# Patient Record
Sex: MALE | Race: BLACK OR AFRICAN AMERICAN | Employment: FULL TIME | RURAL
[De-identification: names, ages, dates, MRNs, and addresses within clinical notes are randomized per-mention and may not be internally consistent; named-entity substitution may affect disease eponyms.]

---

## 2021-10-14 LAB — COLONOSCOPY, EXTERNAL: NORMAL

## 2023-11-14 ENCOUNTER — TELEPHONE (OUTPATIENT)
Age: 73
End: 2023-11-14

## 2023-11-14 NOTE — TELEPHONE ENCOUNTER
Pt has diagnosis of congestive heart failure [place] Family is getting him to [facility]. Pt will be seeing Dr. Laura Colón for Cardiology on 11/27. Pt is in need of a primary care doctor. They would like him to see Dr. Bola Corona since he has family that sees her. Pt needs to be seen in the next few weeks. Are you willing to take him as a new patient within the next few weeks?

## 2023-11-16 ENCOUNTER — TELEPHONE (OUTPATIENT)
Age: 73
End: 2023-11-16

## 2023-11-16 NOTE — TELEPHONE ENCOUNTER
Pt. Is at a hospital in [place], Family wants the Pt. To be transported to [address] [place] [address]. In order to make that happen they are asking if Dr. Bianca Rojas, ( Pt. Stefany Rang seen him yet ) can call and speak with the DrMichelle There in [place] so he can confirm that he has an appt. Here. They are going to have him airlifted to [place] with a medical assistant since he needs to be on oxygen for his heart.      Dr. Sukumar Gutierrez - [phone]    [address] [facility] [address] - [phone]  Daughter - Bean Ponce - [phone]

## 2023-11-17 ENCOUNTER — TELEPHONE (OUTPATIENT)
Age: 73
End: 2023-11-17

## 2023-11-17 NOTE — TELEPHONE ENCOUNTER
Evita Andino would like the nurse to call the Dr at [facility] to confirm that the pt has a upcoming appt so the pt can be discharged.     Evita Andino # [phone]

## 2023-11-17 NOTE — TELEPHONE ENCOUNTER
We cannot call and request patient be transferred. We have no history on patient so not sure risks involved with transport, but also cannot call anyone to let them know patient even has an appointment because it would be a HIPAA violation with no PHI form completed since this is not a patient yet. Called Khloe Hoff (Emergency Contact) to let her know all of this. Her VM box is full so unable to leave message. If she calls back, please notify her of my message above. Dr. Pilar Conde is not even in the office today, but either way no one can call the doctor to let him know pt even has appt. I believe it would violate HIPAA and this is not even a patient of ours yet. If the doctor wants to call our office a PSR can confirm patient is scheduled, but that is all we are able to do.

## 2023-11-17 NOTE — TELEPHONE ENCOUNTER
Called Khloe. Mailbox full so unable to leave message. If she calls back, please tell her we cannot call the doctor in [place] or the hospital to have pt transferred or transported or to just say pt is scheduled for an office appt.  The patient would need to be transferred to a hospital here so the calls would need to be between discharging hospital and admitting hospital.

## 2023-11-27 ENCOUNTER — OFFICE VISIT (OUTPATIENT)
Age: 73
End: 2023-11-27
Payer: MEDICARE

## 2023-11-27 VITALS
RESPIRATION RATE: 15 BRPM | WEIGHT: 174 LBS | HEART RATE: 70 BPM | OXYGEN SATURATION: 97 % | SYSTOLIC BLOOD PRESSURE: 100 MMHG | DIASTOLIC BLOOD PRESSURE: 70 MMHG

## 2023-11-27 DIAGNOSIS — T84.59XS INFECTION OF PROSTHETIC KNEE JOINT, SEQUELA: ICD-10-CM

## 2023-11-27 DIAGNOSIS — I49.9 IRREGULAR HEART BEAT: ICD-10-CM

## 2023-11-27 DIAGNOSIS — I48.0 PAF (PAROXYSMAL ATRIAL FIBRILLATION) (HCC): ICD-10-CM

## 2023-11-27 DIAGNOSIS — N18.31 STAGE 3A CHRONIC KIDNEY DISEASE (HCC): ICD-10-CM

## 2023-11-27 DIAGNOSIS — I42.0 DILATED CARDIOMYOPATHY (HCC): Primary | ICD-10-CM

## 2023-11-27 DIAGNOSIS — Z95.810 AICD (AUTOMATIC CARDIOVERTER/DEFIBRILLATOR) PRESENT: ICD-10-CM

## 2023-11-27 DIAGNOSIS — Z96.659 INFECTION OF PROSTHETIC KNEE JOINT, SEQUELA: ICD-10-CM

## 2023-11-27 PROBLEM — N18.9 CKD (CHRONIC KIDNEY DISEASE): Status: ACTIVE | Noted: 2023-11-27

## 2023-11-27 PROBLEM — T84.59XA INFECTED PROSTHETIC KNEE JOINT (HCC): Status: ACTIVE | Noted: 2023-11-27

## 2023-11-27 PROCEDURE — G8421 BMI NOT CALCULATED: HCPCS | Performed by: SPECIALIST

## 2023-11-27 PROCEDURE — 99205 OFFICE O/P NEW HI 60 MIN: CPT | Performed by: SPECIALIST

## 2023-11-27 PROCEDURE — G8484 FLU IMMUNIZE NO ADMIN: HCPCS | Performed by: SPECIALIST

## 2023-11-27 PROCEDURE — 3017F COLORECTAL CA SCREEN DOC REV: CPT | Performed by: SPECIALIST

## 2023-11-27 PROCEDURE — 93010 ELECTROCARDIOGRAM REPORT: CPT | Performed by: SPECIALIST

## 2023-11-27 PROCEDURE — 1123F ACP DISCUSS/DSCN MKR DOCD: CPT | Performed by: SPECIALIST

## 2023-11-27 PROCEDURE — 93005 ELECTROCARDIOGRAM TRACING: CPT | Performed by: SPECIALIST

## 2023-11-27 PROCEDURE — 1036F TOBACCO NON-USER: CPT | Performed by: SPECIALIST

## 2023-11-27 PROCEDURE — G8427 DOCREV CUR MEDS BY ELIG CLIN: HCPCS | Performed by: SPECIALIST

## 2023-11-27 RX ORDER — BUMETANIDE 2 MG/1
2 TABLET ORAL 2 TIMES DAILY
COMMUNITY

## 2023-11-27 RX ORDER — AMIODARONE HYDROCHLORIDE 100 MG/1
100 TABLET ORAL DAILY
Qty: 30 TABLET | Refills: 3 | Status: SHIPPED | OUTPATIENT
Start: 2023-11-27

## 2023-11-27 RX ORDER — SACUBITRIL AND VALSARTAN 24; 26 MG/1; MG/1
1 TABLET, FILM COATED ORAL 2 TIMES DAILY
COMMUNITY

## 2023-11-27 RX ORDER — POTASSIUM CHLORIDE 20 MEQ/1
20 TABLET, EXTENDED RELEASE ORAL DAILY
COMMUNITY

## 2023-11-27 RX ORDER — FUROSEMIDE 40 MG/1
40 TABLET ORAL DAILY
COMMUNITY
End: 2023-11-27 | Stop reason: ALTCHOICE

## 2023-11-27 RX ORDER — RIFAMPIN 300 MG/1
300 CAPSULE ORAL DAILY
COMMUNITY

## 2023-11-27 RX ORDER — AMIODARONE HYDROCHLORIDE 400 MG/1
400 TABLET ORAL DAILY
COMMUNITY
End: 2023-11-27

## 2023-11-27 RX ORDER — AMIODARONE HYDROCHLORIDE 100 MG/1
100 TABLET ORAL DAILY
Qty: 30 TABLET | Refills: 3
Start: 2023-11-27 | End: 2023-11-27 | Stop reason: SDUPTHER

## 2023-11-27 RX ORDER — CARVEDILOL 3.12 MG/1
3.12 TABLET ORAL 2 TIMES DAILY WITH MEALS
COMMUNITY

## 2023-11-27 RX ORDER — AMLODIPINE BESYLATE 5 MG/1
5 TABLET ORAL DAILY
COMMUNITY
End: 2023-11-30

## 2023-11-27 RX ORDER — SPIRONOLACTONE 25 MG/1
25 TABLET ORAL DAILY
COMMUNITY

## 2023-11-27 RX ORDER — METOPROLOL SUCCINATE 50 MG/1
50 TABLET, EXTENDED RELEASE ORAL DAILY
COMMUNITY
End: 2023-11-27 | Stop reason: ALTCHOICE

## 2023-11-27 RX ORDER — CIPROFLOXACIN 750 MG/1
750 TABLET, FILM COATED ORAL 2 TIMES DAILY
COMMUNITY

## 2023-11-27 RX ORDER — LOSARTAN POTASSIUM 100 MG/1
100 TABLET ORAL DAILY
COMMUNITY
End: 2023-11-27 | Stop reason: ALTCHOICE

## 2023-11-27 RX ORDER — TAMSULOSIN HYDROCHLORIDE 0.4 MG/1
0.4 CAPSULE ORAL DAILY
COMMUNITY

## 2023-11-27 ASSESSMENT — PATIENT HEALTH QUESTIONNAIRE - PHQ9
2. FEELING DOWN, DEPRESSED OR HOPELESS: 0
SUM OF ALL RESPONSES TO PHQ QUESTIONS 1-9: 0
SUM OF ALL RESPONSES TO PHQ QUESTIONS 1-9: 0
1. LITTLE INTEREST OR PLEASURE IN DOING THINGS: 0
SUM OF ALL RESPONSES TO PHQ9 QUESTIONS 1 & 2: 0
SUM OF ALL RESPONSES TO PHQ QUESTIONS 1-9: 0
SUM OF ALL RESPONSES TO PHQ QUESTIONS 1-9: 0

## 2023-11-27 ASSESSMENT — ENCOUNTER SYMPTOMS: SHORTNESS OF BREATH: 1

## 2023-11-27 NOTE — PROGRESS NOTES
HISTORY OF PRESENT ILLNESS  Chastity Rodriguez is a 68 y.o. male   He is seen for an initial visit for heart failure and dilated cardiomyopathy. He is originally from the [facility] and is seen with 2 of his daughters. He is somewhat of a poor historian but his daughters are very good at providing history and I have multiple records for review. Records were obtained from [address] [place] [address] in the [facility] where he was admitted to the hospital even recently. He also has records from the [facility] in [place] where he was sent for heart failure. He has an implantable defibrillator and was told that he might need an LVAD. His ejection fraction was 24% at 1 time but apparently fell more recently to 18% although I cannot find records from the echocardiogram.  He is a poor historian but tells me that he might of had a heart catheterization done somewhere in [place] a year ago and was told that he had no blockages. He does have some shortness of breath and some swelling in his legs. He is on amiodarone 200 mg down from 400 mg. He was previously on digoxin but it was stopped. He was on milrinone in the hospital in the [facility] and his creatinine was about 2.7 but more recently has come down to 1.3. He was taking Bumex 2 mg twice daily but was told to increase it to 4 mg twice daily for swelling which she has done for several days. He also takes Entresto and carvedilol as well as amlodipine. 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 Edema    Shortness of Breath  Associated symptoms include leg swelling. Irregular Heart Beat   Associated symptoms include an irregular heartbeat, malaise/fatigue and shortness of breath.         Specialty Problems          Cardiology Problems    Dilated cardiomyopathy [facility])          Current Outpatient Medications   Medication Instructions    amiodarone (PACERONE) 100

## 2023-11-27 NOTE — PROGRESS NOTES
Requested records from [address] [place] [address] Cardiology. Phone # [phone]. Called for fax # [phone]. Faxed records request.    Per pt's family, if Dr. Puja Giron would like to speak to pt's cardiologist in [facility], his name is Dr. Michaela Griffin.  Phone # [phone]

## 2023-11-29 ENCOUNTER — TELEPHONE (OUTPATIENT)
Age: 73
End: 2023-11-29

## 2023-11-29 NOTE — TELEPHONE ENCOUNTER
Your office note says he may need another echocardiogram and that once you got records we would call him to schedule any follow-up and referral.    Now that we received records (they also sent some via [employer] which you can view under media) what would you like to do?

## 2023-11-29 NOTE — TELEPHONE ENCOUNTER
MD Erma Benson, RN  Caller: Unspecified (Today, 10:01 AM)  His arteries were normal by cardiac catheterization. I would like to get another echo here in our office. If he is still on amlodipine please have him stop taking it. I would like to see him back sometime in December. *called patient. LM on  and sent [employer] message.  Also tentatively scheduled echo and f/u    Khloe called back and r/s'ed (see separate encounter)

## 2023-11-30 ENCOUNTER — TELEPHONE (OUTPATIENT)
Age: 73
End: 2023-11-30

## 2023-11-30 NOTE — TELEPHONE ENCOUNTER
Called Khloe. Verified patient's identity with two identifiers. Notified her of Dr. Zoë Orozco recommendations to stop amlodipine and see him plus have an echo in December. She requested sooner appts than 12/26 and agreed to bring pt for echo at [facility] office then drive him to [facility] office to see Dr. Zoë Orozco same day. R/s'ed appts. She verbalized understanding and denied further questions or concerns.      Future Appointments   Date Time Provider [address] [place] [address]   12/6/2023  2:30 PM MD SONYA Almaguer BS AMB   12/13/2023  1:00 PM MATA STARKEY ECHO 1 STEFAN HARDIN AMB   12/13/2023  2:40 PM Lexine Cooks, MD CAV BS AMB

## 2023-11-30 NOTE — TELEPHONE ENCOUNTER
Kylie Birch is following up to see if we have received records for the Pt.      Phone - [phone]

## 2023-11-30 NOTE — TELEPHONE ENCOUNTER
Received disability forms. They were given to Dr. Chantale Bella to complete. Faxed completed form with last office note to [address] [facility].  Fax # [phone]

## 2023-12-06 ENCOUNTER — HOSPITAL ENCOUNTER (OUTPATIENT)
Facility: HOSPITAL | Age: 73
Discharge: HOME | End: 2023-12-09
Payer: MEDICARE

## 2023-12-06 ENCOUNTER — APPOINTMENT (OUTPATIENT)
Age: 73
End: 2023-12-06
Payer: MEDICARE

## 2023-12-06 ENCOUNTER — OFFICE VISIT (OUTPATIENT)
Age: 73
End: 2023-12-06
Payer: MEDICARE

## 2023-12-06 VITALS
SYSTOLIC BLOOD PRESSURE: 106 MMHG | WEIGHT: 167 LBS | DIASTOLIC BLOOD PRESSURE: 64 MMHG | TEMPERATURE: 97.6 F | HEIGHT: 70 IN | BODY MASS INDEX: 23.91 KG/M2 | OXYGEN SATURATION: 96 % | RESPIRATION RATE: 16 BRPM | HEART RATE: 60 BPM

## 2023-12-06 DIAGNOSIS — Z76.89 ENCOUNTER TO ESTABLISH CARE WITH NEW DOCTOR: ICD-10-CM

## 2023-12-06 DIAGNOSIS — M25.512 ACUTE PAIN OF LEFT SHOULDER: ICD-10-CM

## 2023-12-06 DIAGNOSIS — I25.83 CORONARY ARTERY DISEASE DUE TO LIPID RICH PLAQUE: ICD-10-CM

## 2023-12-06 DIAGNOSIS — Z12.5 SCREENING FOR PROSTATE CANCER: ICD-10-CM

## 2023-12-06 DIAGNOSIS — I25.10 CORONARY ARTERY DISEASE DUE TO LIPID RICH PLAQUE: ICD-10-CM

## 2023-12-06 DIAGNOSIS — Z91.81 AT HIGH RISK FOR FALLS: ICD-10-CM

## 2023-12-06 DIAGNOSIS — R53.83 MALAISE AND FATIGUE: ICD-10-CM

## 2023-12-06 DIAGNOSIS — R06.02 SOB (SHORTNESS OF BREATH): ICD-10-CM

## 2023-12-06 DIAGNOSIS — N18.31 STAGE 3A CHRONIC KIDNEY DISEASE (HCC): ICD-10-CM

## 2023-12-06 DIAGNOSIS — Z11.59 ENCOUNTER FOR HEPATITIS C SCREENING TEST FOR LOW RISK PATIENT: ICD-10-CM

## 2023-12-06 DIAGNOSIS — I42.0 DILATED CARDIOMYOPATHY (HCC): Primary | ICD-10-CM

## 2023-12-06 DIAGNOSIS — R53.81 MALAISE AND FATIGUE: ICD-10-CM

## 2023-12-06 PROCEDURE — 1036F TOBACCO NON-USER: CPT | Performed by: INTERNAL MEDICINE

## 2023-12-06 PROCEDURE — G8484 FLU IMMUNIZE NO ADMIN: HCPCS | Performed by: INTERNAL MEDICINE

## 2023-12-06 PROCEDURE — 99205 OFFICE O/P NEW HI 60 MIN: CPT | Performed by: INTERNAL MEDICINE

## 2023-12-06 PROCEDURE — 73030 X-RAY EXAM OF SHOULDER: CPT

## 2023-12-06 PROCEDURE — G8420 CALC BMI NORM PARAMETERS: HCPCS | Performed by: INTERNAL MEDICINE

## 2023-12-06 PROCEDURE — G8427 DOCREV CUR MEDS BY ELIG CLIN: HCPCS | Performed by: INTERNAL MEDICINE

## 2023-12-06 PROCEDURE — 1123F ACP DISCUSS/DSCN MKR DOCD: CPT | Performed by: INTERNAL MEDICINE

## 2023-12-06 PROCEDURE — 3017F COLORECTAL CA SCREEN DOC REV: CPT | Performed by: INTERNAL MEDICINE

## 2023-12-06 SDOH — ECONOMIC STABILITY: HOUSING INSECURITY
IN THE LAST 12 MONTHS, WAS THERE A TIME WHEN YOU DID NOT HAVE A STEADY PLACE TO SLEEP OR SLEPT IN A SHELTER (INCLUDING NOW)?: NO

## 2023-12-06 SDOH — ECONOMIC STABILITY: FOOD INSECURITY: WITHIN THE PAST 12 MONTHS, THE FOOD YOU BOUGHT JUST DIDN'T LAST AND YOU DIDN'T HAVE MONEY TO GET MORE.: NEVER TRUE

## 2023-12-06 SDOH — ECONOMIC STABILITY: INCOME INSECURITY: HOW HARD IS IT FOR YOU TO PAY FOR THE VERY BASICS LIKE FOOD, HOUSING, MEDICAL CARE, AND HEATING?: NOT HARD AT ALL

## 2023-12-06 SDOH — ECONOMIC STABILITY: FOOD INSECURITY: WITHIN THE PAST 12 MONTHS, YOU WORRIED THAT YOUR FOOD WOULD RUN OUT BEFORE YOU GOT MONEY TO BUY MORE.: NEVER TRUE

## 2023-12-06 ASSESSMENT — PATIENT HEALTH QUESTIONNAIRE - PHQ9
SUM OF ALL RESPONSES TO PHQ QUESTIONS 1-9: 0
1. LITTLE INTEREST OR PLEASURE IN DOING THINGS: 0
2. FEELING DOWN, DEPRESSED OR HOPELESS: 0
SUM OF ALL RESPONSES TO PHQ9 QUESTIONS 1 & 2: 0

## 2023-12-06 ASSESSMENT — LIFESTYLE VARIABLES
HOW OFTEN DO YOU HAVE A DRINK CONTAINING ALCOHOL: NEVER
HOW MANY STANDARD DRINKS CONTAINING ALCOHOL DO YOU HAVE ON A TYPICAL DAY: PATIENT DOES NOT DRINK

## 2023-12-06 NOTE — PROGRESS NOTES
CHIEF COMPLAINT:   Chief Complaint   Patient presents with    Medicare AWV    New Patient     Establish care new to provider     Shoulder Pain     Left shoulder pain      IMPRESSION AND PLAN:   1. Dilated cardiomyopathy ([address])  -     Comprehensive Metabolic Panel; Future  -     Brain Natriuretic Peptide; Future   ECHO is scheduled for 12/13/2023  2. Stage 3a chronic kidney disease ([address])  -     Comprehensive Metabolic Panel; Future  3. Screening for prostate cancer  -     PSA Screening; Future  4. SOB (shortness of breath)  -     Brain Natriuretic Peptide; Future  5. Malaise and fatigue  -     TSH; Future  -     T4, Free; Future  -     Vitamin B12; Future  -     Methylmalonic Acid, Serum; Future  6. Coronary artery disease due to lipid rich plaque  -     Comprehensive Metabolic Panel; Future  -     CBC with Auto Differential; Future  -     Lipid Panel; Future  7. Encounter to establish care with new doctor  Anticipatory guidance discussed. Immunizations reviewed  HM updated. 8. At high risk for falls   Discussed that he should use his assistive device at all times to help prevent falls (he has a cane). 9. Encounter for hepatitis C screening test for low risk patient  -     Hepatitis C Antibody; Future  10. Acute pain of left shoulder  -     XR SHOULDER LEFT (MIN 2 VIEWS); Future   Suggested that he could take Tylenol 500 mgs every 8 hours. Will assess further one the xray returns and labs to get basic physiological function. Will review past history as provided on the day of service. I have discussed the diagnosis with the patient and the intended treatment plan as seen in the above orders. The patient has received an after-visit summary and questions were answered concerning future plans. Asked to return should symptoms worsen or not improve with treatment. Any pending labs and studies will be relayed to patient when they become available.      Pt verbalizes understanding of plan of care and denies

## 2023-12-07 ENCOUNTER — TELEMEDICINE (OUTPATIENT)
Age: 73
End: 2023-12-07

## 2023-12-07 ENCOUNTER — TELEPHONE (OUTPATIENT)
Age: 73
End: 2023-12-07

## 2023-12-07 DIAGNOSIS — E87.6 HYPOKALEMIA: ICD-10-CM

## 2023-12-07 DIAGNOSIS — I42.0 DILATED CARDIOMYOPATHY (HCC): ICD-10-CM

## 2023-12-07 DIAGNOSIS — G47.33 OSA ON CPAP: ICD-10-CM

## 2023-12-07 DIAGNOSIS — E03.8 SUBCLINICAL HYPOTHYROIDISM: Primary | ICD-10-CM

## 2023-12-07 DIAGNOSIS — N18.32 STAGE 3B CHRONIC KIDNEY DISEASE (HCC): ICD-10-CM

## 2023-12-07 DIAGNOSIS — R97.20 ELEVATED PSA: ICD-10-CM

## 2023-12-07 LAB
ALBUMIN SERPL-MCNC: 3.2 G/DL (ref 3.5–5)
ALBUMIN/GLOB SERPL: 0.8 (ref 1.1–2.2)
ALP SERPL-CCNC: 69 U/L (ref 45–117)
ALT SERPL-CCNC: 24 U/L (ref 12–78)
ANION GAP SERPL CALC-SCNC: 4 MMOL/L (ref 5–15)
AST SERPL-CCNC: 18 U/L (ref 15–37)
BASOPHILS # BLD: 0 K/UL (ref 0–0.1)
BASOPHILS NFR BLD: 1 % (ref 0–1)
BILIRUB SERPL-MCNC: 0.5 MG/DL (ref 0.2–1)
BUN SERPL-MCNC: 22 MG/DL (ref 6–20)
BUN/CREAT SERPL: 11 (ref 12–20)
CALCIUM SERPL-MCNC: 8.3 MG/DL (ref 8.5–10.1)
CHLORIDE SERPL-SCNC: 106 MMOL/L (ref 97–108)
CHOLEST SERPL-MCNC: 176 MG/DL
CO2 SERPL-SCNC: 33 MMOL/L (ref 21–32)
CREAT SERPL-MCNC: 1.93 MG/DL (ref 0.7–1.3)
DIFFERENTIAL METHOD BLD: ABNORMAL
EOSINOPHIL # BLD: 0.1 K/UL (ref 0–0.4)
EOSINOPHIL NFR BLD: 3 % (ref 0–7)
ERYTHROCYTE [DISTWIDTH] IN BLOOD BY AUTOMATED COUNT: 17.8 % (ref 11.5–14.5)
GLOBULIN SER CALC-MCNC: 3.9 G/DL (ref 2–4)
GLUCOSE SERPL-MCNC: 101 MG/DL (ref 65–100)
HCT VFR BLD AUTO: 35.5 % (ref 36.6–50.3)
HCV AB SER IA-ACNC: 0.16 INDEX
HCV AB SERPL QL IA: NONREACTIVE
HDLC SERPL-MCNC: 87 MG/DL
HDLC SERPL: 2 (ref 0–5)
HGB BLD-MCNC: 11.6 G/DL (ref 12.1–17)
IMM GRANULOCYTES # BLD AUTO: 0 K/UL (ref 0–0.04)
IMM GRANULOCYTES NFR BLD AUTO: 0 % (ref 0–0.5)
LDLC SERPL CALC-MCNC: 77.2 MG/DL (ref 0–100)
LYMPHOCYTES # BLD: 2.1 K/UL (ref 0.8–3.5)
LYMPHOCYTES NFR BLD: 43 % (ref 12–49)
MCH RBC QN AUTO: 27.4 PG (ref 26–34)
MCHC RBC AUTO-ENTMCNC: 32.7 G/DL (ref 30–36.5)
MCV RBC AUTO: 83.9 FL (ref 80–99)
MONOCYTES # BLD: 0.6 K/UL (ref 0–1)
MONOCYTES NFR BLD: 11 % (ref 5–13)
NEUTS SEG # BLD: 2.1 K/UL (ref 1.8–8)
NEUTS SEG NFR BLD: 42 % (ref 32–75)
NRBC # BLD: 0 K/UL (ref 0–0.01)
NRBC BLD-RTO: 0 PER 100 WBC
NT PRO BNP: ABNORMAL PG/ML
PLATELET # BLD AUTO: 259 K/UL (ref 150–400)
PMV BLD AUTO: 10.2 FL (ref 8.9–12.9)
POTASSIUM SERPL-SCNC: 3.1 MMOL/L (ref 3.5–5.1)
PROT SERPL-MCNC: 7.1 G/DL (ref 6.4–8.2)
PSA SERPL-MCNC: 5.5 NG/ML (ref 0.01–4)
RBC # BLD AUTO: 4.23 M/UL (ref 4.1–5.7)
SODIUM SERPL-SCNC: 143 MMOL/L (ref 136–145)
T4 FREE SERPL-MCNC: 0.8 NG/DL (ref 0.8–1.5)
TRIGL SERPL-MCNC: 59 MG/DL
TSH SERPL DL<=0.05 MIU/L-ACNC: 37.5 UIU/ML (ref 0.36–3.74)
VIT B12 SERPL-MCNC: 373 PG/ML (ref 193–986)
VLDLC SERPL CALC-MCNC: 11.8 MG/DL
WBC # BLD AUTO: 4.9 K/UL (ref 4.1–11.1)

## 2023-12-07 RX ORDER — LEVOTHYROXINE SODIUM 0.05 MG/1
50 TABLET ORAL DAILY
Qty: 30 TABLET | Refills: 3 | Status: SHIPPED | OUTPATIENT
Start: 2023-12-07

## 2023-12-07 RX ORDER — POTASSIUM CHLORIDE 20 MEQ/1
20 TABLET, EXTENDED RELEASE ORAL DAILY
Qty: 30 TABLET | Refills: 3 | Status: SHIPPED | OUTPATIENT
Start: 2023-12-07

## 2023-12-07 ASSESSMENT — PATIENT HEALTH QUESTIONNAIRE - PHQ9
SUM OF ALL RESPONSES TO PHQ QUESTIONS 1-9: 0
1. LITTLE INTEREST OR PLEASURE IN DOING THINGS: 0
SUM OF ALL RESPONSES TO PHQ9 QUESTIONS 1 & 2: 0
SUM OF ALL RESPONSES TO PHQ QUESTIONS 1-9: 0
SUM OF ALL RESPONSES TO PHQ QUESTIONS 1-9: 0
2. FEELING DOWN, DEPRESSED OR HOPELESS: 0
SUM OF ALL RESPONSES TO PHQ QUESTIONS 1-9: 0

## 2023-12-07 ASSESSMENT — ENCOUNTER SYMPTOMS
SHORTNESS OF BREATH: 1
EYES NEGATIVE: 1
SHORTNESS OF BREATH: 1
EYES NEGATIVE: 1
GASTROINTESTINAL NEGATIVE: 1
ALLERGIC/IMMUNOLOGIC NEGATIVE: 1

## 2023-12-07 NOTE — TELEPHONE ENCOUNTER
----- Message from James Espinosa MD sent at 12/7/2023  2:06 AM EST -----  Please let patient know that his xray was normal and showed no fracture. More than likely, this is MSK pain and I would recommend PT. Also, we are awaiting his labs to see how aggressive we can be with pain management. Thanks!

## 2023-12-09 ENCOUNTER — HOSPITAL ENCOUNTER (INPATIENT)
Facility: HOSPITAL | Age: 73
LOS: 18 days | Discharge: HOME HEALTH | DRG: 286 | End: 2023-12-28
Attending: EMERGENCY MEDICINE | Admitting: FAMILY MEDICINE
Payer: MEDICARE

## 2023-12-09 DIAGNOSIS — E87.6 HYPOKALEMIA: ICD-10-CM

## 2023-12-09 DIAGNOSIS — I50.23 ACUTE ON CHRONIC SYSTOLIC CHF (CONGESTIVE HEART FAILURE), NYHA CLASS 2 (HCC): ICD-10-CM

## 2023-12-09 DIAGNOSIS — E85.4 CARDIAC AMYLOIDOSIS (HCC): ICD-10-CM

## 2023-12-09 DIAGNOSIS — I43 CARDIAC AMYLOIDOSIS (HCC): ICD-10-CM

## 2023-12-09 DIAGNOSIS — I49.9 IRREGULAR HEART BEAT: ICD-10-CM

## 2023-12-09 DIAGNOSIS — I42.0 DILATED CARDIOMYOPATHY (HCC): ICD-10-CM

## 2023-12-09 DIAGNOSIS — I50.9 ACUTE ON CHRONIC CONGESTIVE HEART FAILURE, UNSPECIFIED HEART FAILURE TYPE (HCC): Primary | ICD-10-CM

## 2023-12-09 DIAGNOSIS — I50.9 HEART FAILURE (HCC): ICD-10-CM

## 2023-12-09 LAB
COMMENT:: NORMAL
SPECIMEN HOLD: NORMAL

## 2023-12-09 PROCEDURE — 80053 COMPREHEN METABOLIC PANEL: CPT

## 2023-12-09 PROCEDURE — 93005 ELECTROCARDIOGRAM TRACING: CPT | Performed by: EMERGENCY MEDICINE

## 2023-12-09 PROCEDURE — 84484 ASSAY OF TROPONIN QUANT: CPT

## 2023-12-09 PROCEDURE — 85025 COMPLETE CBC W/AUTO DIFF WBC: CPT

## 2023-12-09 PROCEDURE — 99285 EMERGENCY DEPT VISIT HI MDM: CPT

## 2023-12-09 PROCEDURE — 83880 ASSAY OF NATRIURETIC PEPTIDE: CPT

## 2023-12-09 PROCEDURE — 36415 COLL VENOUS BLD VENIPUNCTURE: CPT

## 2023-12-09 PROCEDURE — 96374 THER/PROPH/DIAG INJ IV PUSH: CPT

## 2023-12-09 PROCEDURE — 83735 ASSAY OF MAGNESIUM: CPT

## 2023-12-09 RX ORDER — BUMETANIDE 0.25 MG/ML
2 INJECTION INTRAMUSCULAR; INTRAVENOUS ONCE
Status: COMPLETED | OUTPATIENT
Start: 2023-12-09 | End: 2023-12-10

## 2023-12-10 ENCOUNTER — APPOINTMENT (OUTPATIENT)
Facility: HOSPITAL | Age: 73
DRG: 286 | End: 2023-12-10
Payer: MEDICARE

## 2023-12-10 PROBLEM — I50.9 ACUTE ON CHRONIC CONGESTIVE HEART FAILURE, UNSPECIFIED HEART FAILURE TYPE (HCC): Status: ACTIVE | Noted: 2023-12-10

## 2023-12-10 LAB
ALBUMIN SERPL-MCNC: 3.2 G/DL (ref 3.5–5)
ALBUMIN/GLOB SERPL: 0.8 (ref 1.1–2.2)
ALP SERPL-CCNC: 89 U/L (ref 45–117)
ALT SERPL-CCNC: 38 U/L (ref 12–78)
ANION GAP SERPL CALC-SCNC: 7 MMOL/L (ref 5–15)
ANION GAP SERPL CALC-SCNC: 9 MMOL/L (ref 5–15)
APPEARANCE UR: CLEAR
AST SERPL-CCNC: 35 U/L (ref 15–37)
BACTERIA URNS QL MICRO: NEGATIVE /HPF
BASOPHILS # BLD: 0.1 K/UL (ref 0–0.1)
BASOPHILS NFR BLD: 1 % (ref 0–1)
BILIRUB SERPL-MCNC: 0.6 MG/DL (ref 0.2–1)
BILIRUB UR QL: NEGATIVE
BUN SERPL-MCNC: 36 MG/DL (ref 6–20)
BUN SERPL-MCNC: 37 MG/DL (ref 6–20)
BUN/CREAT SERPL: 14 (ref 12–20)
BUN/CREAT SERPL: 17 (ref 12–20)
CALCIUM SERPL-MCNC: 8 MG/DL (ref 8.5–10.1)
CALCIUM SERPL-MCNC: 8.6 MG/DL (ref 8.5–10.1)
CHLORIDE SERPL-SCNC: 107 MMOL/L (ref 97–108)
CHLORIDE SERPL-SCNC: 108 MMOL/L (ref 97–108)
CO2 SERPL-SCNC: 28 MMOL/L (ref 21–32)
CO2 SERPL-SCNC: 29 MMOL/L (ref 21–32)
COLOR UR: ABNORMAL
CREAT SERPL-MCNC: 2.19 MG/DL (ref 0.7–1.3)
CREAT SERPL-MCNC: 2.58 MG/DL (ref 0.7–1.3)
CREAT UR-MCNC: 181 MG/DL
DIFFERENTIAL METHOD BLD: ABNORMAL
EKG ATRIAL RATE: 50 BPM
EKG DIAGNOSIS: NORMAL
EKG DIAGNOSIS: NORMAL
EKG P AXIS: 48 DEGREES
EKG Q-T INTERVAL: 352 MS
EKG Q-T INTERVAL: 412 MS
EKG QRS DURATION: 134 MS
EKG QRS DURATION: 136 MS
EKG QTC CALCULATION (BAZETT): 454 MS
EKG QTC CALCULATION (BAZETT): 539 MS
EKG R AXIS: -50 DEGREES
EKG R AXIS: -50 DEGREES
EKG T AXIS: 104 DEGREES
EKG T AXIS: 116 DEGREES
EKG VENTRICULAR RATE: 100 BPM
EKG VENTRICULAR RATE: 103 BPM
EOSINOPHIL # BLD: 0.1 K/UL (ref 0–0.4)
EOSINOPHIL NFR BLD: 3 % (ref 0–7)
EPITH CASTS URNS QL MICRO: ABNORMAL /LPF
ERYTHROCYTE [DISTWIDTH] IN BLOOD BY AUTOMATED COUNT: 18 % (ref 11.5–14.5)
GLOBULIN SER CALC-MCNC: 4.1 G/DL (ref 2–4)
GLUCOSE SERPL-MCNC: 139 MG/DL (ref 65–100)
GLUCOSE SERPL-MCNC: 173 MG/DL (ref 65–100)
GLUCOSE UR STRIP.AUTO-MCNC: NEGATIVE MG/DL
HCT VFR BLD AUTO: 35 % (ref 36.6–50.3)
HGB BLD-MCNC: 11.6 G/DL (ref 12.1–17)
HGB UR QL STRIP: ABNORMAL
IMM GRANULOCYTES # BLD AUTO: 0 K/UL (ref 0–0.04)
IMM GRANULOCYTES NFR BLD AUTO: 0 % (ref 0–0.5)
KETONES UR QL STRIP.AUTO: NEGATIVE MG/DL
LEUKOCYTE ESTERASE UR QL STRIP.AUTO: NEGATIVE
LYMPHOCYTES # BLD: 2.2 K/UL (ref 0.8–3.5)
LYMPHOCYTES NFR BLD: 52 % (ref 12–49)
MAGNESIUM SERPL-MCNC: 1.8 MG/DL (ref 1.6–2.4)
MCH RBC QN AUTO: 27.6 PG (ref 26–34)
MCHC RBC AUTO-ENTMCNC: 33.1 G/DL (ref 30–36.5)
MCV RBC AUTO: 83.1 FL (ref 80–99)
MONOCYTES # BLD: 0.3 K/UL (ref 0–1)
MONOCYTES NFR BLD: 8 % (ref 5–13)
NEUTS SEG # BLD: 1.5 K/UL (ref 1.8–8)
NEUTS SEG NFR BLD: 36 % (ref 32–75)
NITRITE UR QL STRIP.AUTO: NEGATIVE
NRBC # BLD: 0 K/UL (ref 0–0.01)
NRBC BLD-RTO: 0 PER 100 WBC
NT PRO BNP: ABNORMAL PG/ML
PH UR STRIP: 5 (ref 5–8)
PLATELET # BLD AUTO: 252 K/UL (ref 150–400)
PMV BLD AUTO: 10.5 FL (ref 8.9–12.9)
POTASSIUM SERPL-SCNC: 2.8 MMOL/L (ref 3.5–5.1)
POTASSIUM SERPL-SCNC: 3.6 MMOL/L (ref 3.5–5.1)
PROT SERPL-MCNC: 7.3 G/DL (ref 6.4–8.2)
PROT UR STRIP-MCNC: ABNORMAL MG/DL
PROT UR-MCNC: 28 MG/DL (ref 0–11.9)
PROT/CREAT UR-RTO: 0.2
RBC # BLD AUTO: 4.21 M/UL (ref 4.1–5.7)
RBC #/AREA URNS HPF: ABNORMAL /HPF (ref 0–5)
SODIUM SERPL-SCNC: 143 MMOL/L (ref 136–145)
SODIUM SERPL-SCNC: 145 MMOL/L (ref 136–145)
SODIUM UR-SCNC: 12 MMOL/L
SP GR UR REFRACTOMETRY: 1.01 (ref 1–1.03)
SPECIMEN HOLD: NORMAL
TROPONIN I SERPL HS-MCNC: 115 NG/L (ref 0–76)
UROBILINOGEN UR QL STRIP.AUTO: 0.2 EU/DL (ref 0.2–1)
WBC # BLD AUTO: 4.1 K/UL (ref 4.1–11.1)
WBC URNS QL MICRO: ABNORMAL /HPF (ref 0–4)

## 2023-12-10 PROCEDURE — 84540 ASSAY OF URINE/UREA-N: CPT

## 2023-12-10 PROCEDURE — 2580000003 HC RX 258: Performed by: FAMILY MEDICINE

## 2023-12-10 PROCEDURE — 6360000002 HC RX W HCPCS: Performed by: INTERNAL MEDICINE

## 2023-12-10 PROCEDURE — G0378 HOSPITAL OBSERVATION PER HR: HCPCS

## 2023-12-10 PROCEDURE — 81001 URINALYSIS AUTO W/SCOPE: CPT

## 2023-12-10 PROCEDURE — 2060000000 HC ICU INTERMEDIATE R&B

## 2023-12-10 PROCEDURE — 71045 X-RAY EXAM CHEST 1 VIEW: CPT

## 2023-12-10 PROCEDURE — 82570 ASSAY OF URINE CREATININE: CPT

## 2023-12-10 PROCEDURE — 84300 ASSAY OF URINE SODIUM: CPT

## 2023-12-10 PROCEDURE — 99223 1ST HOSP IP/OBS HIGH 75: CPT | Performed by: INTERNAL MEDICINE

## 2023-12-10 PROCEDURE — 96376 TX/PRO/DX INJ SAME DRUG ADON: CPT

## 2023-12-10 PROCEDURE — 96366 THER/PROPH/DIAG IV INF ADDON: CPT

## 2023-12-10 PROCEDURE — 2500000003 HC RX 250 WO HCPCS: Performed by: STUDENT IN AN ORGANIZED HEALTH CARE EDUCATION/TRAINING PROGRAM

## 2023-12-10 PROCEDURE — 96375 TX/PRO/DX INJ NEW DRUG ADDON: CPT

## 2023-12-10 PROCEDURE — 84156 ASSAY OF PROTEIN URINE: CPT

## 2023-12-10 PROCEDURE — 2500000003 HC RX 250 WO HCPCS: Performed by: EMERGENCY MEDICINE

## 2023-12-10 PROCEDURE — 6370000000 HC RX 637 (ALT 250 FOR IP): Performed by: FAMILY MEDICINE

## 2023-12-10 PROCEDURE — 93010 ELECTROCARDIOGRAM REPORT: CPT | Performed by: INTERNAL MEDICINE

## 2023-12-10 PROCEDURE — 76770 US EXAM ABDO BACK WALL COMP: CPT

## 2023-12-10 PROCEDURE — 96365 THER/PROPH/DIAG IV INF INIT: CPT

## 2023-12-10 RX ORDER — POTASSIUM CHLORIDE 7.45 MG/ML
10 INJECTION INTRAVENOUS
Status: COMPLETED | OUTPATIENT
Start: 2023-12-10 | End: 2023-12-10

## 2023-12-10 RX ORDER — ACETAMINOPHEN 650 MG/1
650 SUPPOSITORY RECTAL EVERY 6 HOURS PRN
Status: DISCONTINUED | OUTPATIENT
Start: 2023-12-10 | End: 2023-12-28 | Stop reason: HOSPADM

## 2023-12-10 RX ORDER — SODIUM CHLORIDE 0.9 % (FLUSH) 0.9 %
5-40 SYRINGE (ML) INJECTION PRN
Status: DISCONTINUED | OUTPATIENT
Start: 2023-12-10 | End: 2023-12-28 | Stop reason: HOSPADM

## 2023-12-10 RX ORDER — POLYETHYLENE GLYCOL 3350 17 G/17G
17 POWDER, FOR SOLUTION ORAL DAILY PRN
Status: DISCONTINUED | OUTPATIENT
Start: 2023-12-10 | End: 2023-12-28 | Stop reason: HOSPADM

## 2023-12-10 RX ORDER — ONDANSETRON 4 MG/1
4 TABLET, ORALLY DISINTEGRATING ORAL EVERY 8 HOURS PRN
Status: DISCONTINUED | OUTPATIENT
Start: 2023-12-10 | End: 2023-12-28 | Stop reason: HOSPADM

## 2023-12-10 RX ORDER — POTASSIUM CHLORIDE 750 MG/1
20 TABLET, FILM COATED, EXTENDED RELEASE ORAL DAILY
Status: DISCONTINUED | OUTPATIENT
Start: 2023-12-10 | End: 2023-12-11

## 2023-12-10 RX ORDER — LEVOTHYROXINE SODIUM 0.05 MG/1
50 TABLET ORAL DAILY
Status: DISCONTINUED | OUTPATIENT
Start: 2023-12-10 | End: 2023-12-28 | Stop reason: HOSPADM

## 2023-12-10 RX ORDER — LEVOTHYROXINE SODIUM 0.05 MG/1
50 TABLET ORAL DAILY
Status: DISCONTINUED | OUTPATIENT
Start: 2023-12-10 | End: 2023-12-10

## 2023-12-10 RX ORDER — SODIUM CHLORIDE 0.9 % (FLUSH) 0.9 %
5-40 SYRINGE (ML) INJECTION EVERY 12 HOURS SCHEDULED
Status: DISCONTINUED | OUTPATIENT
Start: 2023-12-10 | End: 2023-12-28 | Stop reason: HOSPADM

## 2023-12-10 RX ORDER — ACETAMINOPHEN 325 MG/1
650 TABLET ORAL EVERY 6 HOURS PRN
Status: DISCONTINUED | OUTPATIENT
Start: 2023-12-10 | End: 2023-12-28 | Stop reason: HOSPADM

## 2023-12-10 RX ORDER — SODIUM CHLORIDE 9 MG/ML
INJECTION, SOLUTION INTRAVENOUS PRN
Status: DISCONTINUED | OUTPATIENT
Start: 2023-12-10 | End: 2023-12-28 | Stop reason: HOSPADM

## 2023-12-10 RX ORDER — AMIODARONE HYDROCHLORIDE 200 MG/1
100 TABLET ORAL DAILY
Status: DISCONTINUED | OUTPATIENT
Start: 2023-12-10 | End: 2023-12-10

## 2023-12-10 RX ORDER — AMIODARONE HYDROCHLORIDE 200 MG/1
200 TABLET ORAL DAILY
Status: DISCONTINUED | OUTPATIENT
Start: 2023-12-11 | End: 2023-12-28 | Stop reason: HOSPADM

## 2023-12-10 RX ORDER — BUMETANIDE 0.25 MG/ML
2 INJECTION INTRAMUSCULAR; INTRAVENOUS 2 TIMES DAILY
Status: DISCONTINUED | OUTPATIENT
Start: 2023-12-10 | End: 2023-12-13

## 2023-12-10 RX ORDER — TAMSULOSIN HYDROCHLORIDE 0.4 MG/1
0.4 CAPSULE ORAL DAILY
Status: DISCONTINUED | OUTPATIENT
Start: 2023-12-10 | End: 2023-12-28 | Stop reason: HOSPADM

## 2023-12-10 RX ORDER — ONDANSETRON 2 MG/ML
4 INJECTION INTRAMUSCULAR; INTRAVENOUS EVERY 6 HOURS PRN
Status: DISCONTINUED | OUTPATIENT
Start: 2023-12-10 | End: 2023-12-28 | Stop reason: HOSPADM

## 2023-12-10 RX ORDER — CARVEDILOL 3.12 MG/1
3.12 TABLET ORAL 2 TIMES DAILY WITH MEALS
Status: DISCONTINUED | OUTPATIENT
Start: 2023-12-10 | End: 2023-12-13

## 2023-12-10 RX ORDER — MILRINONE LACTATE 0.2 MG/ML
0.38 INJECTION, SOLUTION INTRAVENOUS CONTINUOUS
Status: DISCONTINUED | OUTPATIENT
Start: 2023-12-10 | End: 2023-12-16

## 2023-12-10 RX ADMIN — MILRINONE LACTATE 0.38 MCG/KG/MIN: 0.2 INJECTION, SOLUTION INTRAVENOUS at 10:53

## 2023-12-10 RX ADMIN — BUMETANIDE 2 MG: 0.25 INJECTION INTRAMUSCULAR; INTRAVENOUS at 10:53

## 2023-12-10 RX ADMIN — CARVEDILOL 3.12 MG: 3.12 TABLET, FILM COATED ORAL at 09:51

## 2023-12-10 RX ADMIN — BUMETANIDE 2 MG: 0.25 INJECTION INTRAMUSCULAR; INTRAVENOUS at 20:32

## 2023-12-10 RX ADMIN — LEVOTHYROXINE SODIUM 50 MCG: 50 TABLET ORAL at 06:21

## 2023-12-10 RX ADMIN — APIXABAN 5 MG: 5 TABLET, FILM COATED ORAL at 20:32

## 2023-12-10 RX ADMIN — POTASSIUM CHLORIDE 20 MEQ: 750 TABLET, FILM COATED, EXTENDED RELEASE ORAL at 09:50

## 2023-12-10 RX ADMIN — TAMSULOSIN HYDROCHLORIDE 0.4 MG: 0.4 CAPSULE ORAL at 09:50

## 2023-12-10 RX ADMIN — Medication 10 ML: at 19:58

## 2023-12-10 RX ADMIN — POTASSIUM CHLORIDE 10 MEQ: 10 INJECTION, SOLUTION INTRAVENOUS at 17:40

## 2023-12-10 RX ADMIN — APIXABAN 5 MG: 5 TABLET, FILM COATED ORAL at 09:50

## 2023-12-10 RX ADMIN — POTASSIUM CHLORIDE 10 MEQ: 10 INJECTION, SOLUTION INTRAVENOUS at 18:55

## 2023-12-10 RX ADMIN — POTASSIUM CHLORIDE 10 MEQ: 10 INJECTION, SOLUTION INTRAVENOUS at 20:41

## 2023-12-10 RX ADMIN — CARVEDILOL 3.12 MG: 3.12 TABLET, FILM COATED ORAL at 18:55

## 2023-12-10 RX ADMIN — BUMETANIDE 2 MG: 0.25 INJECTION INTRAMUSCULAR; INTRAVENOUS at 00:13

## 2023-12-10 RX ADMIN — MILRINONE LACTATE 0.38 MCG/KG/MIN: 0.2 INJECTION, SOLUTION INTRAVENOUS at 21:34

## 2023-12-10 NOTE — ED NOTES
Patient asleep on stretcher but arouses to RN presence in room. Synthroid given per orders. Patient still with 2+ BLE edema. Patient reports SOB improved but does appear dyspneic with conversation. O2 titrated down d/t patient maintaining 100% on RA. Patient remains on continuous cardiopulmonary monitoring with a fib and PVC on monitor. Call light within reach, side rails up for safety. Patient denies further needs at this time.      Serge Rodriguez RN  12/10/23 7743

## 2023-12-10 NOTE — ED PROVIDER NOTES
[facility] EMERGENCY DEP  EMERGENCY DEPARTMENT ENCOUNTER      Pt Name: Isatu Franco  MRN: [id number]  [address] [facility] 1950  Date of evaluation: 12/9/2023  Provider: El Poe MD    CHIEF COMPLAINT       Chief Complaint   Patient presents with    Shortness of Breath    Urinary Hesitancy         HISTORY OF PRESENT ILLNESS    This is a 28-year-old male with past med history dilated cardiomyopathy, CKD, AICD in place, atrial fibrillation on Eliquis presented to the ER for evaluation of worsening shortness breath requiring supplemental oxygen. Patient been having issues since last August vaccinating symptoms, current episode  The past day. Reports orthopnea associate with it. Denies any fevers or chills or chest pain. Has been compliant to medications include amiodarone for his A-fib and also Eliquis and Bumex. Care for cardiology being transferred to Drs. In the region, Dr. Jesse Andino with cardiology here            Review of External Medical Records:     Nursing Notes were reviewed. REVIEW OF SYSTEMS       Review of Systems   Constitutional:  Negative for fever. Respiratory:  Positive for cough and shortness of breath. Cardiovascular:  Positive for leg swelling. Negative for chest pain. Gastrointestinal:  Negative for abdominal pain. Neurological:  Negative for syncope. Except as noted above the remainder of the review of systems was reviewed and negative.        PAST MEDICAL HISTORY     Past Medical History:   Diagnosis Date    AICD (automatic cardioverter/defibrillator) present     CKD (chronic kidney disease)     Dilated cardiomyopathy ([address])     Low left ventricular ejection fraction     GERRI on CPAP          SURGICAL HISTORY       Past Surgical History:   Procedure Laterality Date    TOTAL KNEE ARTHROPLASTY           CURRENT MEDICATIONS       Previous Medications    AMIODARONE (PACERONE) 100 MG TABLET    Take 1 tablet by mouth daily    APIXABAN (ELIQUIS) 5 MG TABS TABLET    Take 1 tablet by

## 2023-12-10 NOTE — ED NOTES
RN alerted to artifact on monitor. Patient attempting to use urinal. Patient reports increased SOB and feeling like he's not able to urinate. Bladder scan completed showing max of 54 mL. Patient reassured he was not retaining. Patient unable to tolerate decreased supplemental O2 and again placed on 2LNC.      Osmany Bishop RN  12/10/23 8208

## 2023-12-10 NOTE — ED NOTES
Pt moved to hospital bed and attempted to get daily weight. Hospital bed scale inaccurate at time of transfer and unable to obtain weight.      Jerad Us RN  12/10/23 7756

## 2023-12-10 NOTE — ED NOTES
Patient resting quietly on stretcher with eyes closed after MD evaluation. Patient denies any s/s at this time and is in no apparent distress. Patient with equal chest rise and fall and non-labored breathing. No adventitious lung sounds notes. Call light within reach, side rails up for safety. Patient denies needs at this time. Family remains at bedside.       Franklin Cotter RN  12/09/23 1249

## 2023-12-10 NOTE — ED NOTES
Patient family requesting call when patient has room assignment. Emergency contacts updated.      Yamileth Rodriguez RN  12/10/23 0396

## 2023-12-10 NOTE — ED TRIAGE NOTES
Patient arrives with CC of SOB and trouble urinating. Stated they both started today. Patient stated when he experienced the SOB he felt like he had to urinate but was not able to.

## 2023-12-11 ENCOUNTER — APPOINTMENT (OUTPATIENT)
Facility: HOSPITAL | Age: 73
DRG: 286 | End: 2023-12-11
Attending: STUDENT IN AN ORGANIZED HEALTH CARE EDUCATION/TRAINING PROGRAM
Payer: MEDICARE

## 2023-12-11 PROBLEM — I50.23 ACUTE ON CHRONIC SYSTOLIC CHF (CONGESTIVE HEART FAILURE), NYHA CLASS 2 (HCC): Status: ACTIVE | Noted: 2023-12-11

## 2023-12-11 LAB
ALBUMIN SERPL-MCNC: 2.6 G/DL (ref 3.5–5)
ALBUMIN/GLOB SERPL: 0.7 (ref 1.1–2.2)
ALP SERPL-CCNC: 63 U/L (ref 45–117)
ALT SERPL-CCNC: 26 U/L (ref 12–78)
ANION GAP SERPL CALC-SCNC: 7 MMOL/L (ref 5–15)
APTT PPP: 29.1 SEC (ref 22.1–31)
AST SERPL-CCNC: 27 U/L (ref 15–37)
BASOPHILS # BLD: 0 K/UL (ref 0–0.1)
BASOPHILS NFR BLD: 1 % (ref 0–1)
BILIRUB SERPL-MCNC: 0.5 MG/DL (ref 0.2–1)
BUN SERPL-MCNC: 35 MG/DL (ref 6–20)
BUN/CREAT SERPL: 17 (ref 12–20)
CALCIUM SERPL-MCNC: 7.8 MG/DL (ref 8.5–10.1)
CHLORIDE SERPL-SCNC: 111 MMOL/L (ref 97–108)
CO2 SERPL-SCNC: 25 MMOL/L (ref 21–32)
CREAT SERPL-MCNC: 2.01 MG/DL (ref 0.7–1.3)
DIFFERENTIAL METHOD BLD: ABNORMAL
ECHO AO ROOT DIAM: 3.8 CM
ECHO AO ROOT INDEX: 1.94 CM/M2
ECHO AR MAX VEL PISA: 3.8 M/S
ECHO AV AREA PEAK VELOCITY: 1.9 CM2
ECHO AV AREA VTI: 1.8 CM2
ECHO AV AREA/BSA PEAK VELOCITY: 1 CM2/M2
ECHO AV AREA/BSA VTI: 0.9 CM2/M2
ECHO AV MEAN GRADIENT: 8 MMHG
ECHO AV MEAN VELOCITY: 1.2 M/S
ECHO AV PEAK GRADIENT: 13 MMHG
ECHO AV PEAK VELOCITY: 1.8 M/S
ECHO AV REGURGITANT PHT: 750 MILLISECOND
ECHO AV VELOCITY RATIO: 0.5
ECHO AV VTI: 31.2 CM
ECHO BSA: 1.97 M2
ECHO EST RA PRESSURE: 8 MMHG
ECHO LA DIAMETER INDEX: 1.94 CM/M2
ECHO LA DIAMETER: 3.8 CM
ECHO LA TO AORTIC ROOT RATIO: 1
ECHO LA VOL A-L A2C: 223 ML (ref 18–58)
ECHO LA VOL A-L A4C: 67 ML (ref 18–58)
ECHO LA VOL MOD A2C: 222 ML (ref 18–58)
ECHO LA VOL MOD A4C: 63 ML (ref 18–58)
ECHO LA VOLUME AREA LENGTH: 126 ML
ECHO LA VOLUME INDEX A-L A2C: 114 ML/M2 (ref 16–34)
ECHO LA VOLUME INDEX A-L A4C: 34 ML/M2 (ref 16–34)
ECHO LA VOLUME INDEX AREA LENGTH: 64 ML/M2 (ref 16–34)
ECHO LA VOLUME INDEX MOD A2C: 113 ML/M2 (ref 16–34)
ECHO LA VOLUME INDEX MOD A4C: 32 ML/M2 (ref 16–34)
ECHO LV E' LATERAL VELOCITY: 7 CM/S
ECHO LV E' SEPTAL VELOCITY: 2 CM/S
ECHO LV FRACTIONAL SHORTENING: 24 % (ref 28–44)
ECHO LV INTERNAL DIMENSION DIASTOLE INDEX: 3.37 CM/M2
ECHO LV INTERNAL DIMENSION DIASTOLIC: 6.6 CM (ref 4.2–5.9)
ECHO LV INTERNAL DIMENSION SYSTOLIC INDEX: 2.55 CM/M2
ECHO LV INTERNAL DIMENSION SYSTOLIC: 5 CM
ECHO LV IVSD: 1.2 CM (ref 0.6–1)
ECHO LV MASS 2D: 367.9 G (ref 88–224)
ECHO LV MASS INDEX 2D: 187.7 G/M2 (ref 49–115)
ECHO LV POSTERIOR WALL DIASTOLIC: 1.2 CM (ref 0.6–1)
ECHO LV RELATIVE WALL THICKNESS RATIO: 0.36
ECHO LVOT AREA: 3.5 CM2
ECHO LVOT AV VTI INDEX: 0.5
ECHO LVOT DIAM: 2.1 CM
ECHO LVOT MEAN GRADIENT: 2 MMHG
ECHO LVOT PEAK GRADIENT: 4 MMHG
ECHO LVOT PEAK VELOCITY: 0.9 M/S
ECHO LVOT STROKE VOLUME INDEX: 27.6 ML/M2
ECHO LVOT SV: 54 ML
ECHO LVOT VTI: 15.6 CM
ECHO MV REGURGITANT PEAK VELOCITY: 4.3 M/S
ECHO MV REGURGITANT PEAK VELOCITY: 4.6 M/S
ECHO MV REGURGITANT VTIA: 117.1 CM
ECHO PULMONARY ARTERY END DIASTOLIC PRESSURE: 25 MMHG
ECHO PULMONARY ARTERY SYSTOLIC PRESSURE (PASP): 50 MMHG
ECHO PV MAX VELOCITY: 1 M/S
ECHO PV PEAK GRADIENT: 4 MMHG
ECHO RIGHT VENTRICULAR SYSTOLIC PRESSURE (RVSP): 44 MMHG
ECHO RV TAPSE: 1.9 CM (ref 1.7–?)
ECHO TV REGURGITANT MAX VELOCITY: 3.01 M/S
ECHO TV REGURGITANT PEAK GRADIENT: 37 MMHG
EOSINOPHIL # BLD: 0.2 K/UL (ref 0–0.4)
EOSINOPHIL NFR BLD: 6 % (ref 0–7)
ERYTHROCYTE [DISTWIDTH] IN BLOOD BY AUTOMATED COUNT: 17.1 % (ref 11.5–14.5)
GLOBULIN SER CALC-MCNC: 3.8 G/DL (ref 2–4)
GLUCOSE SERPL-MCNC: 98 MG/DL (ref 65–100)
HCT VFR BLD AUTO: 29.4 % (ref 36.6–50.3)
HGB BLD-MCNC: 10 G/DL (ref 12.1–17)
IMM GRANULOCYTES # BLD AUTO: 0 K/UL (ref 0–0.04)
IMM GRANULOCYTES NFR BLD AUTO: 0 % (ref 0–0.5)
INR PPP: 1.3 (ref 0.9–1.1)
LYMPHOCYTES # BLD: 1.7 K/UL (ref 0.8–3.5)
LYMPHOCYTES NFR BLD: 43 % (ref 12–49)
MAGNESIUM SERPL-MCNC: 1.8 MG/DL (ref 1.6–2.4)
MCH RBC QN AUTO: 27.7 PG (ref 26–34)
MCHC RBC AUTO-ENTMCNC: 34 G/DL (ref 30–36.5)
MCV RBC AUTO: 81.4 FL (ref 80–99)
METHYLMALONATE SERPL-SCNC: 365 NMOL/L (ref 0–378)
MONOCYTES # BLD: 0.4 K/UL (ref 0–1)
MONOCYTES NFR BLD: 10 % (ref 5–13)
NEUTS SEG # BLD: 1.5 K/UL (ref 1.8–8)
NEUTS SEG NFR BLD: 40 % (ref 32–75)
NRBC # BLD: 0 K/UL (ref 0–0.01)
NRBC BLD-RTO: 0 PER 100 WBC
PLATELET # BLD AUTO: 198 K/UL (ref 150–400)
PMV BLD AUTO: 10.7 FL (ref 8.9–12.9)
POTASSIUM SERPL-SCNC: 3.3 MMOL/L (ref 3.5–5.1)
PROT SERPL-MCNC: 6.4 G/DL (ref 6.4–8.2)
PROTHROMBIN TIME: 13.4 SEC (ref 9–11.1)
RBC # BLD AUTO: 3.61 M/UL (ref 4.1–5.7)
SODIUM SERPL-SCNC: 143 MMOL/L (ref 136–145)
THERAPEUTIC RANGE: NORMAL SECS (ref 58–77)
UUN UR-MCNC: 673 MG/DL
WBC # BLD AUTO: 3.8 K/UL (ref 4.1–11.1)

## 2023-12-11 PROCEDURE — 94760 N-INVAS EAR/PLS OXIMETRY 1: CPT

## 2023-12-11 PROCEDURE — 96376 TX/PRO/DX INJ SAME DRUG ADON: CPT

## 2023-12-11 PROCEDURE — G0378 HOSPITAL OBSERVATION PER HR: HCPCS

## 2023-12-11 PROCEDURE — 2060000000 HC ICU INTERMEDIATE R&B

## 2023-12-11 PROCEDURE — 83735 ASSAY OF MAGNESIUM: CPT

## 2023-12-11 PROCEDURE — 99222 1ST HOSP IP/OBS MODERATE 55: CPT | Performed by: INTERNAL MEDICINE

## 2023-12-11 PROCEDURE — 2500000003 HC RX 250 WO HCPCS: Performed by: STUDENT IN AN ORGANIZED HEALTH CARE EDUCATION/TRAINING PROGRAM

## 2023-12-11 PROCEDURE — 93306 TTE W/DOPPLER COMPLETE: CPT | Performed by: INTERNAL MEDICINE

## 2023-12-11 PROCEDURE — 6360000002 HC RX W HCPCS: Performed by: INTERNAL MEDICINE

## 2023-12-11 PROCEDURE — 2700000000 HC OXYGEN THERAPY PER DAY

## 2023-12-11 PROCEDURE — 6370000000 HC RX 637 (ALT 250 FOR IP): Performed by: INTERNAL MEDICINE

## 2023-12-11 PROCEDURE — 36415 COLL VENOUS BLD VENIPUNCTURE: CPT

## 2023-12-11 PROCEDURE — 51798 US URINE CAPACITY MEASURE: CPT

## 2023-12-11 PROCEDURE — 2580000003 HC RX 258: Performed by: FAMILY MEDICINE

## 2023-12-11 PROCEDURE — 93306 TTE W/DOPPLER COMPLETE: CPT

## 2023-12-11 PROCEDURE — 6370000000 HC RX 637 (ALT 250 FOR IP): Performed by: STUDENT IN AN ORGANIZED HEALTH CARE EDUCATION/TRAINING PROGRAM

## 2023-12-11 PROCEDURE — 85730 THROMBOPLASTIN TIME PARTIAL: CPT

## 2023-12-11 PROCEDURE — 6370000000 HC RX 637 (ALT 250 FOR IP): Performed by: FAMILY MEDICINE

## 2023-12-11 PROCEDURE — 85025 COMPLETE CBC W/AUTO DIFF WBC: CPT

## 2023-12-11 PROCEDURE — 85610 PROTHROMBIN TIME: CPT

## 2023-12-11 PROCEDURE — 80053 COMPREHEN METABOLIC PANEL: CPT

## 2023-12-11 RX ORDER — POTASSIUM CHLORIDE 750 MG/1
40 TABLET, FILM COATED, EXTENDED RELEASE ORAL DAILY
Status: DISCONTINUED | OUTPATIENT
Start: 2023-12-11 | End: 2023-12-12

## 2023-12-11 RX ADMIN — MILRINONE LACTATE 0.38 MCG/KG/MIN: 0.2 INJECTION, SOLUTION INTRAVENOUS at 22:02

## 2023-12-11 RX ADMIN — Medication 10 ML: at 08:31

## 2023-12-11 RX ADMIN — APIXABAN 5 MG: 5 TABLET, FILM COATED ORAL at 09:05

## 2023-12-11 RX ADMIN — Medication 10 ML: at 21:33

## 2023-12-11 RX ADMIN — MILRINONE LACTATE 0.38 MCG/KG/MIN: 0.2 INJECTION, SOLUTION INTRAVENOUS at 12:21

## 2023-12-11 RX ADMIN — BUMETANIDE 2 MG: 0.25 INJECTION INTRAMUSCULAR; INTRAVENOUS at 21:32

## 2023-12-11 RX ADMIN — BUMETANIDE 2 MG: 0.25 INJECTION INTRAMUSCULAR; INTRAVENOUS at 09:05

## 2023-12-11 RX ADMIN — APIXABAN 5 MG: 5 TABLET, FILM COATED ORAL at 21:32

## 2023-12-11 RX ADMIN — POTASSIUM CHLORIDE 40 MEQ: 750 TABLET, FILM COATED, EXTENDED RELEASE ORAL at 09:05

## 2023-12-11 RX ADMIN — AMIODARONE HYDROCHLORIDE 200 MG: 200 TABLET ORAL at 09:05

## 2023-12-11 RX ADMIN — LEVOTHYROXINE SODIUM 50 MCG: 50 TABLET ORAL at 09:11

## 2023-12-11 RX ADMIN — TAMSULOSIN HYDROCHLORIDE 0.4 MG: 0.4 CAPSULE ORAL at 09:05

## 2023-12-11 NOTE — CARE COORDINATION
Care Management Initial Assessment       RUR:  15% Moderate Risk  Readmission? No  1st IM letter given? Yes - 12/10/2023  1st  letter given: No       12/11/23 0084   Service Assessment   Patient Orientation Alert and Oriented   Cognition Alert   History Provided By Patient   Primary Caregiver Self   Support Systems Family Members;Friends/Neighbors   Patient's Healthcare Decision Maker is: Named in [address]   PCP Verified by CM Yes   Last Visit to PCP Within last 3 months   Prior Functional Level Independent in ADLs/IADLs   Current Functional Level Independent in ADLs/IADLs   Can patient return to prior living arrangement Yes   Ability to make needs known: Good   Family able to assist with home care needs: Yes   Would you like for me to discuss the discharge plan with any other family members/significant others, and if so, who? Yes   Financial Resources Medicare   Social/Functional History   Lives With Friend(s)   Type of [address] [place] [address] Avenue to enter with rails   Entrance Stairs - Number of Steps 4   Entrance Stairs - Rails Both   Bathroom Shower/Tub Tub/Shower unit   Bathroom Toilet Standard   Bathroom Equipment Grab bars in shower   Bathroom Accessibility Walker accessible   [address] Help From Friend(s)   ADL Assistance Independent   Homemaking Assistance Independent   Homemaking Responsibilities No   Ambulation Assistance Independent   Transfer Assistance Independent   Active  Yes   Mode of Transportation Truck   Education technical School   Occupation Full time employment   Discharge [employer] [facility] Prior To Admission C-pap   Potential Assistance Needed N/A   DME Ordered? No   Potential Assistance Purchasing Medications No   Type of Home Care Services None   Patient expects to be discharged to: House   One/Two Story Residence One story   History of falls?  1

## 2023-12-11 NOTE — ED NOTES
Bedside and Verbal shift change report given to Darlene (oncoming nurse) by Barbara Clements (offgoing nurse). Report included the following information Nurse Handoff Report, Index, ED Encounter Summary, ED SBAR, and Adult Overview.        Debi Polo RN  12/10/23 1950

## 2023-12-11 NOTE — ED NOTES
TRANSFER - OUT REPORT:    Verbal report given to Kiko Kevin RN on Gregoria Felton  being transferred to [facility] for routine progression of patient care       Report consisted of patient's Situation, Background, Assessment and   Recommendations(SBAR). Information from the following report(s) Nurse Handoff Report, Index, ED Encounter Summary, ED SBAR, Adult Overview, MAR, Cardiac Rhythm Afib, Quality Measures, Neuro Assessment, and Event Log was reviewed with the receiving nurse. [place] Fall Assessment:    Presents to emergency department  because of falls (Syncope, seizure, or loss of consciousness): No  Age > 70: Yes  Altered Mental Status, Intoxication with alcohol or substance confusion (Disorientation, impaired judgment, poor safety awaremess, or inability to follow instructions): No  Impaired Mobility: Ambulates or transfers with assistive devices or assistance; Unable to ambulate or transer.: Yes  Nursing Judgement: Yes          Lines:   Peripheral IV 12/09/23 Left Antecubital (Active)        Opportunity for questions and clarification was provided.       Patient transported with:  Monitor, O2 @ 2lpm, Registered Nurse, and [place], [facility] Federico Guzman RN  12/11/23 0300

## 2023-12-11 NOTE — ED NOTES
Bladder scanner showing 41mL in bladder, pt denies feeling that he needs to void.  Tolerated well     Bethany English RN  12/10/23 1640

## 2023-12-11 NOTE — NURSE NAVIGATOR
HEART FAILURE NURSE NAVIGATOR NOTE  [address]    Patient chart was reviewed by Heart Failure (HF) Nurse Navigators for compliance of prescribed treatment with guidelines directed medical therapy (GDMT) and HF database completed. Please, review beneath recommendations for symptomatic patients with HF with Reduced Ejection Fraction ? 40% (HFrEF)* and patients whose LVEF improved > 40% (HFimpEF)* for your consideration when taking care of this patient. Current Medical Therapy:    Name Sofi Sung    1950   LVEF   24%   NYHA Functional Class   Documentation needed   ARNi/ACEi/ARB  Not prescribed see recommendation below   Beta-blocker  Coreg   Aldosterone Antagonist  Not prescribed see recommendation below   SGLT2 inhibitor  Not prescribed see recommendation below   Hydralazine/Isosorbide Dinitrate    Consulting Cardiologist:   MATA   see recommendation below     Recommendations:    Please, add the following GDMT for HFrEF ? 40% [Class 1] or document in discharge summary/progress note why patient cannot take the medication:  ARNi/ACEi or ARB  Beta-blockers (carvedilol, sustained-release metoprolol succinate or bisoprolol)  Aldosterone antagonists GFR > 30 and K< 5  SGLT2 inhibitor  Hydralazine/Isosorbide dinitrate for  Americans with Class III/IV symptoms  Adjust diuretic dose at discharge if hospitalized for volume overload    Consider adding the following GDMT for HFrEF ? 40%, if appropriate [Class 2b]:   Ivabradine for patients on maximally tolerated beta-blocker dose in order to achieve HR 70-80bpm  Digoxin, goal level 0.5-0.9  Polyunsaturated fatty acids  Vericuguat  For patient with hyperkalemia while on RAASi > 5.5, consider adding potassium binders (patiromer, sodium zirconium cycosilicate)    Note: the following medications may be potentially harmful in heart failure [Class 3]:  Calcium channel blockers (doxazosin, diltiazem, verapamil,

## 2023-12-12 ENCOUNTER — HOSPITAL ENCOUNTER (INPATIENT)
Facility: HOSPITAL | Age: 73
Discharge: HOME | DRG: 286 | End: 2023-12-14
Attending: NURSE PRACTITIONER
Payer: MEDICARE

## 2023-12-12 ENCOUNTER — HOSPITAL ENCOUNTER (INPATIENT)
Facility: HOSPITAL | Age: 73
Discharge: HOME | DRG: 286 | End: 2023-12-15
Payer: MEDICARE

## 2023-12-12 ENCOUNTER — APPOINTMENT (OUTPATIENT)
Facility: HOSPITAL | Age: 73
DRG: 286 | End: 2023-12-12
Attending: NURSE PRACTITIONER
Payer: MEDICARE

## 2023-12-12 DIAGNOSIS — I50.22 CHRONIC SYSTOLIC HEART FAILURE (HCC): Primary | ICD-10-CM

## 2023-12-12 LAB
25(OH)D3 SERPL-MCNC: 27.1 NG/ML (ref 30–100)
ABO + RH BLD: NORMAL
ANION GAP SERPL CALC-SCNC: 4 MMOL/L (ref 5–15)
APTT PPP: 28.7 SEC (ref 22.1–31)
BASOPHILS # BLD: 0 K/UL (ref 0–0.1)
BASOPHILS NFR BLD: 1 % (ref 0–1)
BLOOD GROUP ANTIBODIES SERPL: NORMAL
BUN SERPL-MCNC: 33 MG/DL (ref 6–20)
BUN/CREAT SERPL: 17 (ref 12–20)
CALCIUM SERPL-MCNC: 7.5 MG/DL (ref 8.5–10.1)
CALCIUM SERPL-MCNC: 7.9 MG/DL (ref 8.5–10.1)
CHLORIDE SERPL-SCNC: 110 MMOL/L (ref 97–108)
CO2 SERPL-SCNC: 29 MMOL/L (ref 21–32)
COMMENT:: NORMAL
CREAT SERPL-MCNC: 1.9 MG/DL (ref 0.7–1.3)
DIFFERENTIAL METHOD BLD: ABNORMAL
EOSINOPHIL # BLD: 0.3 K/UL (ref 0–0.4)
EOSINOPHIL NFR BLD: 7 % (ref 0–7)
ERYTHROCYTE [DISTWIDTH] IN BLOOD BY AUTOMATED COUNT: 17.6 % (ref 11.5–14.5)
EST. AVERAGE GLUCOSE BLD GHB EST-MCNC: 128 MG/DL
FERRITIN SERPL-MCNC: 56 NG/ML (ref 26–388)
GLUCOSE SERPL-MCNC: 98 MG/DL (ref 65–100)
HBA1C MFR BLD: 6.1 % (ref 4–5.6)
HCT VFR BLD AUTO: 30.6 % (ref 36.6–50.3)
HGB BLD-MCNC: 10.2 G/DL (ref 12.1–17)
HIV 1+2 AB+HIV1 P24 AG SERPL QL IA: NONREACTIVE
HIV 1/2 RESULT COMMENT: NORMAL
IMM GRANULOCYTES # BLD AUTO: 0 K/UL (ref 0–0.04)
IMM GRANULOCYTES NFR BLD AUTO: 0 % (ref 0–0.5)
INR PPP: 1.3 (ref 0.9–1.1)
IRON SATN MFR SERPL: 16 % (ref 20–50)
IRON SERPL-MCNC: 41 UG/DL (ref 35–150)
LDH SERPL L TO P-CCNC: 205 U/L (ref 85–241)
LYMPHOCYTES # BLD: 1.7 K/UL (ref 0.8–3.5)
LYMPHOCYTES NFR BLD: 47 % (ref 12–49)
MAGNESIUM SERPL-MCNC: 2.1 MG/DL (ref 1.6–2.4)
MCH RBC QN AUTO: 27.3 PG (ref 26–34)
MCHC RBC AUTO-ENTMCNC: 33.3 G/DL (ref 30–36.5)
MCV RBC AUTO: 82 FL (ref 80–99)
MONOCYTES # BLD: 0.3 K/UL (ref 0–1)
MONOCYTES NFR BLD: 8 % (ref 5–13)
NEUTS SEG # BLD: 1.4 K/UL (ref 1.8–8)
NEUTS SEG NFR BLD: 37 % (ref 32–75)
NRBC # BLD: 0 K/UL (ref 0–0.01)
NRBC BLD-RTO: 0 PER 100 WBC
PHOSPHATE SERPL-MCNC: 3.8 MG/DL (ref 2.6–4.7)
PLATELET # BLD AUTO: 191 K/UL (ref 150–400)
PMV BLD AUTO: 10.1 FL (ref 8.9–12.9)
POTASSIUM SERPL-SCNC: 3.1 MMOL/L (ref 3.5–5.1)
PREALB SERPL-MCNC: 15.2 MG/DL (ref 20–40)
PROTHROMBIN TIME: 13 SEC (ref 9–11.1)
PTH-INTACT SERPL-MCNC: 160.9 PG/ML (ref 18.4–88)
RBC # BLD AUTO: 3.73 M/UL (ref 4.1–5.7)
RPR SER QL: NONREACTIVE
SODIUM SERPL-SCNC: 143 MMOL/L (ref 136–145)
SPECIMEN EXP DATE BLD: NORMAL
SPECIMEN HOLD: NORMAL
THERAPEUTIC RANGE: NORMAL SECS (ref 58–77)
TIBC SERPL-MCNC: 261 UG/DL (ref 250–450)
URATE SERPL-MCNC: 8.7 MG/DL (ref 3.5–7.2)
WBC # BLD AUTO: 3.6 K/UL (ref 4.1–11.1)

## 2023-12-12 PROCEDURE — 86592 SYPHILIS TEST NON-TREP QUAL: CPT

## 2023-12-12 PROCEDURE — 74176 CT ABD & PELVIS W/O CONTRAST: CPT

## 2023-12-12 PROCEDURE — 93880 EXTRACRANIAL BILAT STUDY: CPT

## 2023-12-12 PROCEDURE — 6370000000 HC RX 637 (ALT 250 FOR IP): Performed by: STUDENT IN AN ORGANIZED HEALTH CARE EDUCATION/TRAINING PROGRAM

## 2023-12-12 PROCEDURE — 85610 PROTHROMBIN TIME: CPT

## 2023-12-12 PROCEDURE — G0378 HOSPITAL OBSERVATION PER HR: HCPCS

## 2023-12-12 PROCEDURE — 82306 VITAMIN D 25 HYDROXY: CPT

## 2023-12-12 PROCEDURE — 85613 RUSSELL VIPER VENOM DILUTED: CPT

## 2023-12-12 PROCEDURE — 94760 N-INVAS EAR/PLS OXIMETRY 1: CPT

## 2023-12-12 PROCEDURE — 36415 COLL VENOUS BLD VENIPUNCTURE: CPT

## 2023-12-12 PROCEDURE — 84100 ASSAY OF PHOSPHORUS: CPT

## 2023-12-12 PROCEDURE — 2500000003 HC RX 250 WO HCPCS: Performed by: STUDENT IN AN ORGANIZED HEALTH CARE EDUCATION/TRAINING PROGRAM

## 2023-12-12 PROCEDURE — 83970 ASSAY OF PARATHORMONE: CPT

## 2023-12-12 PROCEDURE — 85025 COMPLETE CBC W/AUTO DIFF WBC: CPT

## 2023-12-12 PROCEDURE — 83735 ASSAY OF MAGNESIUM: CPT

## 2023-12-12 PROCEDURE — G0480 DRUG TEST DEF 1-7 CLASSES: HCPCS

## 2023-12-12 PROCEDURE — 84550 ASSAY OF BLOOD/URIC ACID: CPT

## 2023-12-12 PROCEDURE — 51798 US URINE CAPACITY MEASURE: CPT

## 2023-12-12 PROCEDURE — 80307 DRUG TEST PRSMV CHEM ANLYZR: CPT

## 2023-12-12 PROCEDURE — 82575 CREATININE CLEARANCE TEST: CPT

## 2023-12-12 PROCEDURE — 99223 1ST HOSP IP/OBS HIGH 75: CPT | Performed by: INTERNAL MEDICINE

## 2023-12-12 PROCEDURE — 86022 PLATELET ANTIBODIES: CPT

## 2023-12-12 PROCEDURE — 86850 RBC ANTIBODY SCREEN: CPT

## 2023-12-12 PROCEDURE — 80048 BASIC METABOLIC PNL TOTAL CA: CPT

## 2023-12-12 PROCEDURE — 82570 ASSAY OF URINE CREATININE: CPT

## 2023-12-12 PROCEDURE — 6370000000 HC RX 637 (ALT 250 FOR IP): Performed by: FAMILY MEDICINE

## 2023-12-12 PROCEDURE — 2700000000 HC OXYGEN THERAPY PER DAY

## 2023-12-12 PROCEDURE — 6370000000 HC RX 637 (ALT 250 FOR IP): Performed by: INTERNAL MEDICINE

## 2023-12-12 PROCEDURE — 83615 LACTATE (LD) (LDH) ENZYME: CPT

## 2023-12-12 PROCEDURE — 86900 BLOOD TYPING SEROLOGIC ABO: CPT

## 2023-12-12 PROCEDURE — 86225 DNA ANTIBODY NATIVE: CPT

## 2023-12-12 PROCEDURE — 85732 THROMBOPLASTIN TIME PARTIAL: CPT

## 2023-12-12 PROCEDURE — 85598 HEXAGNAL PHOSPH PLTLT NEUTRL: CPT

## 2023-12-12 PROCEDURE — 86334 IMMUNOFIX E-PHORESIS SERUM: CPT

## 2023-12-12 PROCEDURE — 84155 ASSAY OF PROTEIN SERUM: CPT

## 2023-12-12 PROCEDURE — 83036 HEMOGLOBIN GLYCOSYLATED A1C: CPT

## 2023-12-12 PROCEDURE — 82728 ASSAY OF FERRITIN: CPT

## 2023-12-12 PROCEDURE — 6360000002 HC RX W HCPCS: Performed by: INTERNAL MEDICINE

## 2023-12-12 PROCEDURE — 83521 IG LIGHT CHAINS FREE EACH: CPT

## 2023-12-12 PROCEDURE — 85670 THROMBIN TIME PLASMA: CPT

## 2023-12-12 PROCEDURE — 85730 THROMBOPLASTIN TIME PARTIAL: CPT

## 2023-12-12 PROCEDURE — 2580000003 HC RX 258: Performed by: FAMILY MEDICINE

## 2023-12-12 PROCEDURE — 96376 TX/PRO/DX INJ SAME DRUG ADON: CPT

## 2023-12-12 PROCEDURE — 82784 ASSAY IGA/IGD/IGG/IGM EACH: CPT

## 2023-12-12 PROCEDURE — 84134 ASSAY OF PREALBUMIN: CPT

## 2023-12-12 PROCEDURE — 6370000000 HC RX 637 (ALT 250 FOR IP): Performed by: NURSE PRACTITIONER

## 2023-12-12 PROCEDURE — 87389 HIV-1 AG W/HIV-1&-2 AB AG IA: CPT

## 2023-12-12 PROCEDURE — 93922 UPR/L XTREMITY ART 2 LEVELS: CPT

## 2023-12-12 PROCEDURE — 84165 PROTEIN E-PHORESIS SERUM: CPT

## 2023-12-12 PROCEDURE — 85041 AUTOMATED RBC COUNT: CPT

## 2023-12-12 PROCEDURE — 82043 UR ALBUMIN QUANTITATIVE: CPT

## 2023-12-12 PROCEDURE — 86235 NUCLEAR ANTIGEN ANTIBODY: CPT

## 2023-12-12 PROCEDURE — 99232 SBSQ HOSP IP/OBS MODERATE 35: CPT | Performed by: INTERNAL MEDICINE

## 2023-12-12 PROCEDURE — 85705 THROMBOPLASTIN INHIBITION: CPT

## 2023-12-12 PROCEDURE — 71250 CT THORAX DX C-: CPT

## 2023-12-12 PROCEDURE — 82955 ASSAY OF G6PD ENZYME: CPT

## 2023-12-12 PROCEDURE — 83550 IRON BINDING TEST: CPT

## 2023-12-12 PROCEDURE — 83540 ASSAY OF IRON: CPT

## 2023-12-12 PROCEDURE — 86901 BLOOD TYPING SEROLOGIC RH(D): CPT

## 2023-12-12 PROCEDURE — 2060000000 HC ICU INTERMEDIATE R&B

## 2023-12-12 RX ORDER — POTASSIUM CHLORIDE 750 MG/1
40 TABLET, FILM COATED, EXTENDED RELEASE ORAL 2 TIMES DAILY
Status: DISCONTINUED | OUTPATIENT
Start: 2023-12-12 | End: 2023-12-13

## 2023-12-12 RX ADMIN — Medication 10 ML: at 20:52

## 2023-12-12 RX ADMIN — LEVOTHYROXINE SODIUM 50 MCG: 50 TABLET ORAL at 06:00

## 2023-12-12 RX ADMIN — BUMETANIDE 2 MG: 0.25 INJECTION INTRAMUSCULAR; INTRAVENOUS at 09:05

## 2023-12-12 RX ADMIN — POTASSIUM CHLORIDE 40 MEQ: 750 TABLET, FILM COATED, EXTENDED RELEASE ORAL at 20:51

## 2023-12-12 RX ADMIN — AMIODARONE HYDROCHLORIDE 200 MG: 200 TABLET ORAL at 09:05

## 2023-12-12 RX ADMIN — MILRINONE LACTATE 0.38 MCG/KG/MIN: 0.2 INJECTION, SOLUTION INTRAVENOUS at 10:54

## 2023-12-12 RX ADMIN — MILRINONE LACTATE 0.38 MCG/KG/MIN: 0.2 INJECTION, SOLUTION INTRAVENOUS at 22:52

## 2023-12-12 RX ADMIN — TAMSULOSIN HYDROCHLORIDE 0.4 MG: 0.4 CAPSULE ORAL at 09:05

## 2023-12-12 RX ADMIN — EMPAGLIFLOZIN 10 MG: 10 TABLET, FILM COATED ORAL at 13:21

## 2023-12-12 RX ADMIN — Medication 10 ML: at 08:04

## 2023-12-12 RX ADMIN — CARVEDILOL 3.12 MG: 3.12 TABLET, FILM COATED ORAL at 17:17

## 2023-12-12 RX ADMIN — BUMETANIDE 2 MG: 0.25 INJECTION INTRAMUSCULAR; INTRAVENOUS at 20:51

## 2023-12-12 RX ADMIN — CARVEDILOL 3.12 MG: 3.12 TABLET, FILM COATED ORAL at 06:00

## 2023-12-12 RX ADMIN — APIXABAN 5 MG: 5 TABLET, FILM COATED ORAL at 09:05

## 2023-12-12 RX ADMIN — APIXABAN 5 MG: 5 TABLET, FILM COATED ORAL at 20:51

## 2023-12-12 RX ADMIN — POTASSIUM CHLORIDE 40 MEQ: 750 TABLET, FILM COATED, EXTENDED RELEASE ORAL at 09:05

## 2023-12-13 ENCOUNTER — APPOINTMENT (OUTPATIENT)
Facility: HOSPITAL | Age: 73
DRG: 286 | End: 2023-12-13
Attending: NURSE PRACTITIONER
Payer: MEDICARE

## 2023-12-13 ENCOUNTER — APPOINTMENT (OUTPATIENT)
Facility: HOSPITAL | Age: 73
DRG: 286 | End: 2023-12-13
Payer: MEDICARE

## 2023-12-13 PROBLEM — R06.02 SHORTNESS OF BREATH: Status: ACTIVE | Noted: 2023-12-13

## 2023-12-13 LAB
ANION GAP SERPL CALC-SCNC: 3 MMOL/L (ref 5–15)
BASOPHILS # BLD: 0 K/UL (ref 0–0.1)
BASOPHILS NFR BLD: 1 % (ref 0–1)
BUN SERPL-MCNC: 33 MG/DL (ref 6–20)
BUN/CREAT SERPL: 18 (ref 12–20)
CALCIUM SERPL-MCNC: 8 MG/DL (ref 8.5–10.1)
CHLORIDE SERPL-SCNC: 111 MMOL/L (ref 97–108)
CO2 SERPL-SCNC: 29 MMOL/L (ref 21–32)
CREAT SERPL-MCNC: 1.8 MG/DL (ref 0.7–1.3)
CREAT UR-MCNC: 85.8 MG/DL
DIFFERENTIAL METHOD BLD: ABNORMAL
ECHO AO ASC DIAM: 3.7 CM
ECHO AO ASCENDING AORTA INDEX: 1.88 CM/M2
ECHO AO ROOT DIAM: 3.9 CM
ECHO AO ROOT INDEX: 1.98 CM/M2
ECHO AR MAX VEL PISA: 4.8 M/S
ECHO AV AREA PEAK VELOCITY: 1.5 CM2
ECHO AV AREA VTI: 1.4 CM2
ECHO AV AREA/BSA PEAK VELOCITY: 0.8 CM2/M2
ECHO AV AREA/BSA VTI: 0.7 CM2/M2
ECHO AV MEAN GRADIENT: 9 MMHG
ECHO AV MEAN VELOCITY: 1.4 M/S
ECHO AV PEAK GRADIENT: 17 MMHG
ECHO AV PEAK VELOCITY: 2.1 M/S
ECHO AV REGURGITANT PHT: 463.9 MILLISECOND
ECHO AV VELOCITY RATIO: 0.43
ECHO AV VTI: 36.5 CM
ECHO BSA: 1.98 M2
ECHO LA DIAMETER INDEX: 2.54 CM/M2
ECHO LA DIAMETER: 5 CM
ECHO LA TO AORTIC ROOT RATIO: 1.28
ECHO LA VOL A-L A2C: 134 ML (ref 18–58)
ECHO LA VOL A-L A4C: 105 ML (ref 18–58)
ECHO LA VOL MOD A2C: 129 ML (ref 18–58)
ECHO LA VOL MOD A4C: 97 ML (ref 18–58)
ECHO LA VOLUME AREA LENGTH: 123 ML
ECHO LA VOLUME INDEX A-L A2C: 68 ML/M2 (ref 16–34)
ECHO LA VOLUME INDEX A-L A4C: 53 ML/M2 (ref 16–34)
ECHO LA VOLUME INDEX AREA LENGTH: 62 ML/M2 (ref 16–34)
ECHO LA VOLUME INDEX MOD A2C: 65 ML/M2 (ref 16–34)
ECHO LA VOLUME INDEX MOD A4C: 49 ML/M2 (ref 16–34)
ECHO LV EDV A2C: 214 ML
ECHO LV EDV A4C: 215 ML
ECHO LV EDV BP: 215 ML (ref 67–155)
ECHO LV EDV INDEX A4C: 109 ML/M2
ECHO LV EDV INDEX BP: 109 ML/M2
ECHO LV EDV NDEX A2C: 109 ML/M2
ECHO LV EJECTION FRACTION A2C: 19 %
ECHO LV EJECTION FRACTION A4C: 28 %
ECHO LV EJECTION FRACTION BIPLANE: 23 % (ref 55–100)
ECHO LV ESV A2C: 173 ML
ECHO LV ESV A4C: 156 ML
ECHO LV ESV BP: 165 ML (ref 22–58)
ECHO LV ESV INDEX A2C: 88 ML/M2
ECHO LV ESV INDEX A4C: 79 ML/M2
ECHO LV ESV INDEX BP: 84 ML/M2
ECHO LV FRACTIONAL SHORTENING: 9 % (ref 28–44)
ECHO LV INTERNAL DIMENSION DIASTOLE INDEX: 3.25 CM/M2
ECHO LV INTERNAL DIMENSION DIASTOLIC: 6.4 CM (ref 4.2–5.9)
ECHO LV INTERNAL DIMENSION SYSTOLIC INDEX: 2.94 CM/M2
ECHO LV INTERNAL DIMENSION SYSTOLIC: 5.8 CM
ECHO LV IVSD: 0.9 CM (ref 0.6–1)
ECHO LV MASS 2D: 275.6 G (ref 88–224)
ECHO LV MASS INDEX 2D: 139.9 G/M2 (ref 49–115)
ECHO LV POSTERIOR WALL DIASTOLIC: 1.1 CM (ref 0.6–1)
ECHO LV RELATIVE WALL THICKNESS RATIO: 0.34
ECHO LVOT AREA: 3.5 CM2
ECHO LVOT AV VTI INDEX: 0.41
ECHO LVOT DIAM: 2.1 CM
ECHO LVOT MEAN GRADIENT: 2 MMHG
ECHO LVOT PEAK GRADIENT: 3 MMHG
ECHO LVOT PEAK VELOCITY: 0.9 M/S
ECHO LVOT STROKE VOLUME INDEX: 26.4 ML/M2
ECHO LVOT SV: 51.9 ML
ECHO LVOT VTI: 15 CM
ECHO MV REGURGITANT ALIASING (NYQUIST) VELOCITY: 34 CM/S
ECHO MV REGURGITANT RADIUS PISA: 0.64 CM
ECHO RV INTERNAL DIMENSION: 4.8 CM
ECHO RV LONGITUDINAL DIMENSION: 9 CM
ECHO RV MID DIMENSION: 3.3 CM
ECHO TV ALIASING VELOCITY (NYQUIST): 33 CM/S
EOSINOPHIL # BLD: 0.2 K/UL (ref 0–0.4)
EOSINOPHIL NFR BLD: 6 % (ref 0–7)
ERYTHROCYTE [DISTWIDTH] IN BLOOD BY AUTOMATED COUNT: 17.5 % (ref 11.5–14.5)
G6PD BLD QN: 284 U/10E12 RBC (ref 127–427)
GLUCOSE SERPL-MCNC: 98 MG/DL (ref 65–100)
HCT VFR BLD AUTO: 28.8 % (ref 36.6–50.3)
HEMOCCULT STL QL: NEGATIVE
HGB BLD-MCNC: 9.5 G/DL (ref 12.1–17)
IMM GRANULOCYTES # BLD AUTO: 0 K/UL (ref 0–0.04)
IMM GRANULOCYTES NFR BLD AUTO: 0 % (ref 0–0.5)
LYMPHOCYTES # BLD: 1.7 K/UL (ref 0.8–3.5)
LYMPHOCYTES NFR BLD: 45 % (ref 12–49)
MCH RBC QN AUTO: 27.7 PG (ref 26–34)
MCHC RBC AUTO-ENTMCNC: 33 G/DL (ref 30–36.5)
MCV RBC AUTO: 84 FL (ref 80–99)
MICROALBUMIN UR-MCNC: 0.91 MG/DL
MICROALBUMIN/CREAT UR-RTO: 11 MG/G (ref 0–30)
MONOCYTES # BLD: 0.3 K/UL (ref 0–1)
MONOCYTES NFR BLD: 9 % (ref 5–13)
NEUTS SEG # BLD: 1.5 K/UL (ref 1.8–8)
NEUTS SEG NFR BLD: 39 % (ref 32–75)
NRBC # BLD: 0 K/UL (ref 0–0.01)
NRBC BLD-RTO: 0 PER 100 WBC
PF4 HEPARIN CMPLX AB SER-ACNC: 0.09 OD (ref 0–0.4)
PLATELET # BLD AUTO: 187 K/UL (ref 150–400)
PMV BLD AUTO: 10.4 FL (ref 8.9–12.9)
POTASSIUM SERPL-SCNC: 3.6 MMOL/L (ref 3.5–5.1)
RBC # BLD AUTO: 3.43 M/UL (ref 4.1–5.7)
RBC # BLD AUTO: 3.56 X10E6/UL (ref 4.14–5.8)
SODIUM SERPL-SCNC: 143 MMOL/L (ref 136–145)
WBC # BLD AUTO: 3.8 K/UL (ref 4.1–11.1)

## 2023-12-13 PROCEDURE — 6370000000 HC RX 637 (ALT 250 FOR IP): Performed by: INTERNAL MEDICINE

## 2023-12-13 PROCEDURE — 36415 COLL VENOUS BLD VENIPUNCTURE: CPT

## 2023-12-13 PROCEDURE — 94760 N-INVAS EAR/PLS OXIMETRY 1: CPT

## 2023-12-13 PROCEDURE — 6370000000 HC RX 637 (ALT 250 FOR IP): Performed by: STUDENT IN AN ORGANIZED HEALTH CARE EDUCATION/TRAINING PROGRAM

## 2023-12-13 PROCEDURE — 6370000000 HC RX 637 (ALT 250 FOR IP): Performed by: NURSE PRACTITIONER

## 2023-12-13 PROCEDURE — 93321 DOPPLER ECHO F-UP/LMTD STD: CPT | Performed by: INTERNAL MEDICINE

## 2023-12-13 PROCEDURE — 99232 SBSQ HOSP IP/OBS MODERATE 35: CPT | Performed by: INTERNAL MEDICINE

## 2023-12-13 PROCEDURE — 93308 TTE F-UP OR LMTD: CPT

## 2023-12-13 PROCEDURE — 99223 1ST HOSP IP/OBS HIGH 75: CPT | Performed by: NURSE PRACTITIONER

## 2023-12-13 PROCEDURE — G0378 HOSPITAL OBSERVATION PER HR: HCPCS

## 2023-12-13 PROCEDURE — 2580000003 HC RX 258: Performed by: INTERNAL MEDICINE

## 2023-12-13 PROCEDURE — 80048 BASIC METABOLIC PNL TOTAL CA: CPT

## 2023-12-13 PROCEDURE — 96376 TX/PRO/DX INJ SAME DRUG ADON: CPT

## 2023-12-13 PROCEDURE — 6360000002 HC RX W HCPCS: Performed by: INTERNAL MEDICINE

## 2023-12-13 PROCEDURE — 85025 COMPLETE CBC W/AUTO DIFF WBC: CPT

## 2023-12-13 PROCEDURE — 2700000000 HC OXYGEN THERAPY PER DAY

## 2023-12-13 PROCEDURE — 2060000000 HC ICU INTERMEDIATE R&B

## 2023-12-13 PROCEDURE — 93325 DOPPLER ECHO COLOR FLOW MAPG: CPT | Performed by: INTERNAL MEDICINE

## 2023-12-13 PROCEDURE — 76700 US EXAM ABDOM COMPLETE: CPT

## 2023-12-13 PROCEDURE — 2580000003 HC RX 258: Performed by: FAMILY MEDICINE

## 2023-12-13 PROCEDURE — 82272 OCCULT BLD FECES 1-3 TESTS: CPT

## 2023-12-13 PROCEDURE — 99233 SBSQ HOSP IP/OBS HIGH 50: CPT | Performed by: INTERNAL MEDICINE

## 2023-12-13 PROCEDURE — 93308 TTE F-UP OR LMTD: CPT | Performed by: INTERNAL MEDICINE

## 2023-12-13 PROCEDURE — 2500000003 HC RX 250 WO HCPCS: Performed by: INTERNAL MEDICINE

## 2023-12-13 PROCEDURE — 6370000000 HC RX 637 (ALT 250 FOR IP): Performed by: FAMILY MEDICINE

## 2023-12-13 PROCEDURE — 94618 PULMONARY STRESS TESTING: CPT

## 2023-12-13 PROCEDURE — 96375 TX/PRO/DX INJ NEW DRUG ADDON: CPT

## 2023-12-13 RX ORDER — VITAMIN B COMPLEX
1000 TABLET ORAL DAILY
Status: DISCONTINUED | OUTPATIENT
Start: 2023-12-13 | End: 2023-12-28 | Stop reason: HOSPADM

## 2023-12-13 RX ORDER — METOPROLOL SUCCINATE 25 MG/1
12.5 TABLET, EXTENDED RELEASE ORAL DAILY
Status: DISCONTINUED | OUTPATIENT
Start: 2023-12-13 | End: 2023-12-16

## 2023-12-13 RX ORDER — BUMETANIDE 0.25 MG/ML
2 INJECTION INTRAMUSCULAR; INTRAVENOUS 3 TIMES DAILY
Status: DISCONTINUED | OUTPATIENT
Start: 2023-12-13 | End: 2023-12-25

## 2023-12-13 RX ADMIN — BUMETANIDE 2 MG: 0.25 INJECTION INTRAMUSCULAR; INTRAVENOUS at 20:54

## 2023-12-13 RX ADMIN — Medication 10 ML: at 08:53

## 2023-12-13 RX ADMIN — METOPROLOL SUCCINATE 12.5 MG: 25 TABLET, EXTENDED RELEASE ORAL at 17:10

## 2023-12-13 RX ADMIN — MILRINONE LACTATE 0.38 MCG/KG/MIN: 0.2 INJECTION, SOLUTION INTRAVENOUS at 21:28

## 2023-12-13 RX ADMIN — APIXABAN 5 MG: 5 TABLET, FILM COATED ORAL at 20:54

## 2023-12-13 RX ADMIN — APIXABAN 5 MG: 5 TABLET, FILM COATED ORAL at 09:54

## 2023-12-13 RX ADMIN — WATER 500 MG: 1 INJECTION INTRAMUSCULAR; INTRAVENOUS; SUBCUTANEOUS at 10:33

## 2023-12-13 RX ADMIN — LEVOTHYROXINE SODIUM 50 MCG: 50 TABLET ORAL at 04:37

## 2023-12-13 RX ADMIN — Medication 10 ML: at 20:54

## 2023-12-13 RX ADMIN — BUMETANIDE 2 MG: 0.25 INJECTION INTRAMUSCULAR; INTRAVENOUS at 09:54

## 2023-12-13 RX ADMIN — IRON SUCROSE 200 MG: 20 INJECTION, SOLUTION INTRAVENOUS at 10:28

## 2023-12-13 RX ADMIN — Medication 1000 UNITS: at 09:54

## 2023-12-13 RX ADMIN — CARVEDILOL 3.12 MG: 3.12 TABLET, FILM COATED ORAL at 09:54

## 2023-12-13 RX ADMIN — BUMETANIDE 2 MG: 0.25 INJECTION INTRAMUSCULAR; INTRAVENOUS at 15:34

## 2023-12-13 RX ADMIN — EMPAGLIFLOZIN 10 MG: 10 TABLET, FILM COATED ORAL at 09:54

## 2023-12-13 RX ADMIN — TAMSULOSIN HYDROCHLORIDE 0.4 MG: 0.4 CAPSULE ORAL at 09:54

## 2023-12-13 RX ADMIN — POTASSIUM BICARBONATE 40 MEQ: 782 TABLET, EFFERVESCENT ORAL at 20:54

## 2023-12-13 RX ADMIN — POTASSIUM CHLORIDE 40 MEQ: 750 TABLET, FILM COATED, EXTENDED RELEASE ORAL at 08:52

## 2023-12-13 RX ADMIN — AMIODARONE HYDROCHLORIDE 200 MG: 200 TABLET ORAL at 09:54

## 2023-12-13 RX ADMIN — MILRINONE LACTATE 0.38 MCG/KG/MIN: 0.2 INJECTION, SOLUTION INTRAVENOUS at 10:32

## 2023-12-13 NOTE — NURSE NAVIGATOR
Heart Failure Nurse Navigator rounds completed. HF NN provided introduction to self and nurse navigator role. Living With Heart Failure booklet given to patient, along with weight calendar, and magnet. Patient give card with QR codes for video resources for HF self-management. Patient is somewhat knowledgeable of his heart failure diagnosis as he has been living with HF for some time now. Patient states that right now the doctors are working him up for \"the pump\". HF NN asked patient how he is feeling about getting an LVAD. Patient shrugged his shoulders and shook his head. HF NN offered encouragement to patient and offered to answer any questions he had and also advised patient to ask questions of the Advanced Heart Failure team regarding LVAD placement. HF NN talked to patient about the importance of daily weights. He states he has a working scale but had not really been weighing daily, although he states he was told in October do do daily weights. HF NN advised to call provider if he notices a weight gain of 3 lbs overnight or 5 lbs in a week. Also reviewed importance of following low sodium diet. Dietitian has already been in to see patient regarding low sodium diet. HF NN went over heart failure zones and signs/symptoms to watch out for. Advised patient that follow up appointment will be scheduled and placed on Discharge Instructions prior to discharge. Patient given [facility] flyer and is agreeable to services as needed. Patient provided with contact information for HF NN and encouraged to call with questions. Will continue to follow until discharge.         Time spent with patient discussing HF education:  15 minutes

## 2023-12-14 ENCOUNTER — APPOINTMENT (OUTPATIENT)
Facility: HOSPITAL | Age: 73
DRG: 286 | End: 2023-12-14
Payer: MEDICARE

## 2023-12-14 LAB
AMPHETAMINES UR QL SCN: NEGATIVE NG/ML
ANION GAP SERPL CALC-SCNC: 3 MMOL/L (ref 5–15)
ANION GAP SERPL CALC-SCNC: 5 MMOL/L (ref 5–15)
APTT SCREEN TO CONFIRM RATIO: 0.78 RATIO (ref 0–1.34)
BARBITURATES UR QL SCN: NEGATIVE NG/ML
BASOPHILS # BLD: 0 K/UL (ref 0–0.1)
BASOPHILS NFR BLD: 1 % (ref 0–1)
BENZODIAZ UR QL: NEGATIVE NG/ML
BUN SERPL-MCNC: 29 MG/DL (ref 6–20)
BUN SERPL-MCNC: 29 MG/DL (ref 6–20)
BUN/CREAT SERPL: 17 (ref 12–20)
BUN/CREAT SERPL: 17 (ref 12–20)
BZE UR QL: NEGATIVE NG/ML
CALCIUM SERPL-MCNC: 8 MG/DL (ref 8.5–10.1)
CALCIUM SERPL-MCNC: 8.3 MG/DL (ref 8.5–10.1)
CANNABINOIDS UR QL SCN: NEGATIVE NG/ML
CENTROMERE B AB SER-ACNC: <0.2 AI (ref 0–0.9)
CHLORIDE SERPL-SCNC: 105 MMOL/L (ref 97–108)
CHLORIDE SERPL-SCNC: 106 MMOL/L (ref 97–108)
CHROMATIN AB SERPL-ACNC: <0.2 AI (ref 0–0.9)
CO2 SERPL-SCNC: 29 MMOL/L (ref 21–32)
CO2 SERPL-SCNC: 31 MMOL/L (ref 21–32)
COLLECT DURATION TIME UR: 12 HR
COMMENT:: NORMAL
CONFIRM APTT/NORMAL: 52 SEC (ref 0–47.6)
COTININE UR QL SCN: NEGATIVE NG/ML
CREAT SERPL-MCNC: 1.72 MG/DL (ref 0.7–1.3)
CREAT SERPL-MCNC: 1.74 MG/DL (ref 0.7–1.3)
CREAT SERPL-MCNC: 1.8 MG/DL (ref 0.7–1.3)
CREATININE RENAL CLEARANCE IN 12 HOUR URINE AND SERUM OR PLASMA: 77 ML/MIN (ref 97–137)
DIFFERENTIAL METHOD BLD: ABNORMAL
DSDNA AB SER-ACNC: <1 IU/ML (ref 0–9)
ECHO BSA: 1.97 M2
ECHO BSA: 1.97 M2
ENA JO1 AB SER-ACNC: <0.2 AI (ref 0–0.9)
ENA RNP AB SER-ACNC: 0.3 AI (ref 0–0.9)
ENA SCL70 AB SER-ACNC: <0.2 AI (ref 0–0.9)
ENA SM AB SER-ACNC: <0.2 AI (ref 0–0.9)
ENA SS-A AB SER-ACNC: 0.2 AI (ref 0–0.9)
ENA SS-B AB SER-ACNC: <0.2 AI (ref 0–0.9)
EOSINOPHIL # BLD: 0.2 K/UL (ref 0–0.4)
EOSINOPHIL NFR BLD: 5 % (ref 0–7)
ERYTHROCYTE [DISTWIDTH] IN BLOOD BY AUTOMATED COUNT: 17.6 % (ref 11.5–14.5)
GLUCOSE SERPL-MCNC: 108 MG/DL (ref 65–100)
GLUCOSE SERPL-MCNC: 97 MG/DL (ref 65–100)
HCT VFR BLD AUTO: 29.2 % (ref 36.6–50.3)
HEXAGONAL PHASE PHOSPHOLIPID: 5 SEC (ref 0–11)
HGB BLD-MCNC: 9.8 G/DL (ref 12.1–17)
IMM GRANULOCYTES # BLD AUTO: 0 K/UL (ref 0–0.04)
IMM GRANULOCYTES NFR BLD AUTO: 0 % (ref 0–0.5)
LA 2 SCREEN W REFLEX-IMP: ABNORMAL
LYMPHOCYTES # BLD: 1.6 K/UL (ref 0.8–3.5)
LYMPHOCYTES NFR BLD: 46 % (ref 12–49)
Lab: NORMAL
MAGNESIUM SERPL-MCNC: 2 MG/DL (ref 1.6–2.4)
MCH RBC QN AUTO: 28 PG (ref 26–34)
MCHC RBC AUTO-ENTMCNC: 33.6 G/DL (ref 30–36.5)
MCV RBC AUTO: 83.4 FL (ref 80–99)
MDMA URINE: NEGATIVE NG/ML
METHADONE UR QL SCN: NEGATIVE NG/ML
METHAQUALONE UR QL: NEGATIVE NG/ML
MIXING DRVVT: 39.7 SEC (ref 0–40.4)
MONOCYTES # BLD: 0.4 K/UL (ref 0–1)
MONOCYTES NFR BLD: 10 % (ref 5–13)
NEUTS SEG # BLD: 1.3 K/UL (ref 1.8–8)
NEUTS SEG NFR BLD: 38 % (ref 32–75)
NRBC # BLD: 0 K/UL (ref 0–0.01)
NRBC BLD-RTO: 0 PER 100 WBC
OPIATES UR QL: NEGATIVE NG/ML
PCP UR QL: NEGATIVE NG/ML
PLATELET # BLD AUTO: 178 K/UL (ref 150–400)
PMV BLD AUTO: 10.2 FL (ref 8.9–12.9)
POTASSIUM SERPL-SCNC: 3.2 MMOL/L (ref 3.5–5.1)
POTASSIUM SERPL-SCNC: 3.6 MMOL/L (ref 3.5–5.1)
PROPOXYPH UR QL: NEGATIVE NG/ML
PTT-LA MIX: 44.6 SEC (ref 0–40.5)
RBC # BLD AUTO: 3.5 M/UL (ref 4.1–5.7)
SCREEN APTT: 64.8 SEC (ref 0–43.5)
SCREEN DRVVT: 49.2 SEC (ref 0–47)
SODIUM SERPL-SCNC: 137 MMOL/L (ref 136–145)
SODIUM SERPL-SCNC: 142 MMOL/L (ref 136–145)
SPECIMEN VOL ?TM UR: 1300 ML
THROMBIN TIME: 17.2 SEC (ref 0–23)
VAS LEFT ABI: 1.21
VAS LEFT ARM BP: 117 MMHG
VAS LEFT CCA DIST EDV: 14.6 CM/S
VAS LEFT CCA DIST PSV: 64.8 CM/S
VAS LEFT CCA PROX EDV: 11.7 CM/S
VAS LEFT CCA PROX PSV: 65.7 CM/S
VAS LEFT DORSALIS PEDIS BP: 141 MMHG
VAS LEFT ECA EDV: 4.8 CM/S
VAS LEFT ECA PSV: 43.1 CM/S
VAS LEFT ICA DIST EDV: 19.5 CM/S
VAS LEFT ICA DIST PSV: 40.9 CM/S
VAS LEFT ICA MID EDV: 10.4 CM/S
VAS LEFT ICA MID PSV: 34.6 CM/S
VAS LEFT ICA PROX EDV: 14.6 CM/S
VAS LEFT ICA PROX PSV: 45.1 CM/S
VAS LEFT ICA/CCA PSV: 0.7 NO UNITS
VAS LEFT PTA BP: 132 MMHG
VAS LEFT VERTEBRAL EDV: 15.7 CM/S
VAS LEFT VERTEBRAL PSV: 58.5 CM/S
VAS RIGHT ABI: 1.23
VAS RIGHT ARM BP: 114 MMHG
VAS RIGHT CCA DIST EDV: 15.5 CM/S
VAS RIGHT CCA DIST PSV: 82 CM/S
VAS RIGHT CCA PROX EDV: 11.6 CM/S
VAS RIGHT CCA PROX PSV: 58.6 CM/S
VAS RIGHT DORSALIS PEDIS BP: 144 MMHG
VAS RIGHT ECA EDV: 6.4 CM/S
VAS RIGHT ECA PSV: 62.4 CM/S
VAS RIGHT ICA DIST EDV: 17.7 CM/S
VAS RIGHT ICA DIST PSV: 51.4 CM/S
VAS RIGHT ICA MID EDV: 15 CM/S
VAS RIGHT ICA MID PSV: 46.2 CM/S
VAS RIGHT ICA PROX EDV: 11.2 CM/S
VAS RIGHT ICA PROX PSV: 36.7 CM/S
VAS RIGHT ICA/CCA PSV: 0.6 NO UNITS
VAS RIGHT PTA BP: 125 MMHG
VAS RIGHT VERTEBRAL EDV: 15.2 CM/S
VAS RIGHT VERTEBRAL PSV: 31.1 CM/S
WBC # BLD AUTO: 3.5 K/UL (ref 4.1–11.1)

## 2023-12-14 PROCEDURE — 6370000000 HC RX 637 (ALT 250 FOR IP): Performed by: STUDENT IN AN ORGANIZED HEALTH CARE EDUCATION/TRAINING PROGRAM

## 2023-12-14 PROCEDURE — 6370000000 HC RX 637 (ALT 250 FOR IP): Performed by: INTERNAL MEDICINE

## 2023-12-14 PROCEDURE — 2580000003 HC RX 258: Performed by: FAMILY MEDICINE

## 2023-12-14 PROCEDURE — 6370000000 HC RX 637 (ALT 250 FOR IP): Performed by: FAMILY MEDICINE

## 2023-12-14 PROCEDURE — G0378 HOSPITAL OBSERVATION PER HR: HCPCS

## 2023-12-14 PROCEDURE — A9538 TC99M PYROPHOSPHATE: HCPCS | Performed by: NURSE PRACTITIONER

## 2023-12-14 PROCEDURE — 2500000003 HC RX 250 WO HCPCS: Performed by: INTERNAL MEDICINE

## 2023-12-14 PROCEDURE — 78803 RP LOCLZJ TUM SPECT 1 AREA: CPT

## 2023-12-14 PROCEDURE — 6360000002 HC RX W HCPCS: Performed by: INTERNAL MEDICINE

## 2023-12-14 PROCEDURE — 6370000000 HC RX 637 (ALT 250 FOR IP): Performed by: NURSE PRACTITIONER

## 2023-12-14 PROCEDURE — 96375 TX/PRO/DX INJ NEW DRUG ADDON: CPT

## 2023-12-14 PROCEDURE — 96376 TX/PRO/DX INJ SAME DRUG ADON: CPT

## 2023-12-14 PROCEDURE — 36415 COLL VENOUS BLD VENIPUNCTURE: CPT

## 2023-12-14 PROCEDURE — 6360000002 HC RX W HCPCS: Performed by: NURSE PRACTITIONER

## 2023-12-14 PROCEDURE — 83735 ASSAY OF MAGNESIUM: CPT

## 2023-12-14 PROCEDURE — 2060000000 HC ICU INTERMEDIATE R&B

## 2023-12-14 PROCEDURE — 99233 SBSQ HOSP IP/OBS HIGH 50: CPT | Performed by: INTERNAL MEDICINE

## 2023-12-14 PROCEDURE — 3430000000 HC RX DIAGNOSTIC RADIOPHARMACEUTICAL: Performed by: NURSE PRACTITIONER

## 2023-12-14 PROCEDURE — 85025 COMPLETE CBC W/AUTO DIFF WBC: CPT

## 2023-12-14 PROCEDURE — 80048 BASIC METABOLIC PNL TOTAL CA: CPT

## 2023-12-14 RX ORDER — POTASSIUM CHLORIDE 7.45 MG/ML
10 INJECTION INTRAVENOUS ONCE
Status: COMPLETED | OUTPATIENT
Start: 2023-12-14 | End: 2023-12-14

## 2023-12-14 RX ORDER — TECHNETIUM TC99M PYROPHOSPHATE 12 MG/10ML
16 INJECTION INTRAVENOUS ONCE
Status: COMPLETED | OUTPATIENT
Start: 2023-12-14 | End: 2023-12-14

## 2023-12-14 RX ADMIN — MILRINONE LACTATE 0.38 MCG/KG/MIN: 0.2 INJECTION, SOLUTION INTRAVENOUS at 17:13

## 2023-12-14 RX ADMIN — LEVOTHYROXINE SODIUM 50 MCG: 50 TABLET ORAL at 04:17

## 2023-12-14 RX ADMIN — EMPAGLIFLOZIN 10 MG: 10 TABLET, FILM COATED ORAL at 08:31

## 2023-12-14 RX ADMIN — Medication 1000 UNITS: at 08:31

## 2023-12-14 RX ADMIN — AMIODARONE HYDROCHLORIDE 200 MG: 200 TABLET ORAL at 08:30

## 2023-12-14 RX ADMIN — METOPROLOL SUCCINATE 12.5 MG: 25 TABLET, EXTENDED RELEASE ORAL at 08:31

## 2023-12-14 RX ADMIN — IRON SUCROSE 200 MG: 20 INJECTION, SOLUTION INTRAVENOUS at 08:30

## 2023-12-14 RX ADMIN — APIXABAN 5 MG: 5 TABLET, FILM COATED ORAL at 21:30

## 2023-12-14 RX ADMIN — APIXABAN 5 MG: 5 TABLET, FILM COATED ORAL at 08:30

## 2023-12-14 RX ADMIN — BUMETANIDE 2 MG: 0.25 INJECTION INTRAMUSCULAR; INTRAVENOUS at 16:00

## 2023-12-14 RX ADMIN — Medication 10 ML: at 21:34

## 2023-12-14 RX ADMIN — POTASSIUM BICARBONATE 40 MEQ: 782 TABLET, EFFERVESCENT ORAL at 08:30

## 2023-12-14 RX ADMIN — POTASSIUM BICARBONATE 40 MEQ: 782 TABLET, EFFERVESCENT ORAL at 21:32

## 2023-12-14 RX ADMIN — Medication 10 ML: at 08:31

## 2023-12-14 RX ADMIN — TAMSULOSIN HYDROCHLORIDE 0.4 MG: 0.4 CAPSULE ORAL at 08:30

## 2023-12-14 RX ADMIN — MILRINONE LACTATE 0.38 MCG/KG/MIN: 0.2 INJECTION, SOLUTION INTRAVENOUS at 08:29

## 2023-12-14 RX ADMIN — BUMETANIDE 2 MG: 0.25 INJECTION INTRAMUSCULAR; INTRAVENOUS at 08:29

## 2023-12-14 RX ADMIN — TECHNETIUM TC99M PYROPHOSPHATE 16 MILLICURIE: 12 INJECTION INTRAVENOUS at 07:35

## 2023-12-14 RX ADMIN — BUMETANIDE 2 MG: 0.25 INJECTION INTRAMUSCULAR; INTRAVENOUS at 21:34

## 2023-12-14 RX ADMIN — POTASSIUM CHLORIDE 10 MEQ: 10 INJECTION, SOLUTION INTRAVENOUS at 13:47

## 2023-12-14 NOTE — CARE COORDINATION
Transition of Care Plan:    RUR: 15% - moderate  Prior Level of Functioning: independent  Disposition: home health and home infusion  Follow up appointments: [facility]  DME needed: [employer]  Transportation at discharge: friend/family  Caregiver Contact: Friend - Anup Gonzales - p: [phone]  Discharge Caregiver contacted prior to discharge? no  Care Conference needed? no  Barriers to discharge: medical    CM updated by Advance Heart Failure MD that patient will eventually need home inotrope therapy once medically stable for discharge. Anticipate patient will be ready for discharge early next week. Clinical update: LVAD evaluation ongoing; RHC prior to discharge - timing is TBD. Baseline - patient splits time between address on file and [address] [place] [address]. Patient resides with family (stepdaughter) and friends in [facility]. Patient's wife resides in [address] [place] [address]. Patient is aware he will need to remain local to continue workup for LVAD. CM will continue to follow.     Umu Navarro, MPH  Care Manager [facility]  Available via [employer] or  [employer]

## 2023-12-15 ENCOUNTER — TELEPHONE (OUTPATIENT)
Age: 73
End: 2023-12-15

## 2023-12-15 LAB
ANION GAP SERPL CALC-SCNC: 5 MMOL/L (ref 5–15)
BUN SERPL-MCNC: 28 MG/DL (ref 6–20)
BUN/CREAT SERPL: 17 (ref 12–20)
CALCIUM SERPL-MCNC: 8.7 MG/DL (ref 8.5–10.1)
CHLORIDE SERPL-SCNC: 107 MMOL/L (ref 97–108)
CO2 SERPL-SCNC: 31 MMOL/L (ref 21–32)
CREAT SERPL-MCNC: 1.69 MG/DL (ref 0.7–1.3)
GLUCOSE SERPL-MCNC: 99 MG/DL (ref 65–100)
POTASSIUM SERPL-SCNC: 3.4 MMOL/L (ref 3.5–5.1)
SODIUM SERPL-SCNC: 143 MMOL/L (ref 136–145)

## 2023-12-15 PROCEDURE — 80048 BASIC METABOLIC PNL TOTAL CA: CPT

## 2023-12-15 PROCEDURE — G0378 HOSPITAL OBSERVATION PER HR: HCPCS

## 2023-12-15 PROCEDURE — 6370000000 HC RX 637 (ALT 250 FOR IP): Performed by: INTERNAL MEDICINE

## 2023-12-15 PROCEDURE — 97161 PT EVAL LOW COMPLEX 20 MIN: CPT

## 2023-12-15 PROCEDURE — 96376 TX/PRO/DX INJ SAME DRUG ADON: CPT

## 2023-12-15 PROCEDURE — 6360000002 HC RX W HCPCS: Performed by: INTERNAL MEDICINE

## 2023-12-15 PROCEDURE — 6370000000 HC RX 637 (ALT 250 FOR IP): Performed by: FAMILY MEDICINE

## 2023-12-15 PROCEDURE — 36415 COLL VENOUS BLD VENIPUNCTURE: CPT

## 2023-12-15 PROCEDURE — 2580000003 HC RX 258: Performed by: FAMILY MEDICINE

## 2023-12-15 PROCEDURE — 6370000000 HC RX 637 (ALT 250 FOR IP): Performed by: NURSE PRACTITIONER

## 2023-12-15 PROCEDURE — 97116 GAIT TRAINING THERAPY: CPT

## 2023-12-15 PROCEDURE — 2060000000 HC ICU INTERMEDIATE R&B

## 2023-12-15 PROCEDURE — 99233 SBSQ HOSP IP/OBS HIGH 50: CPT | Performed by: INTERNAL MEDICINE

## 2023-12-15 PROCEDURE — 6360000002 HC RX W HCPCS: Performed by: FAMILY MEDICINE

## 2023-12-15 PROCEDURE — 2500000003 HC RX 250 WO HCPCS: Performed by: INTERNAL MEDICINE

## 2023-12-15 RX ORDER — SPIRONOLACTONE 25 MG/1
25 TABLET ORAL DAILY
Status: DISCONTINUED | OUTPATIENT
Start: 2023-12-15 | End: 2023-12-28 | Stop reason: HOSPADM

## 2023-12-15 RX ORDER — POTASSIUM CHLORIDE 7.45 MG/ML
10 INJECTION INTRAVENOUS
Status: COMPLETED | OUTPATIENT
Start: 2023-12-15 | End: 2023-12-15

## 2023-12-15 RX ADMIN — Medication 10 ML: at 21:14

## 2023-12-15 RX ADMIN — METOPROLOL SUCCINATE 12.5 MG: 25 TABLET, EXTENDED RELEASE ORAL at 09:09

## 2023-12-15 RX ADMIN — Medication 1000 UNITS: at 09:07

## 2023-12-15 RX ADMIN — AMIODARONE HYDROCHLORIDE 200 MG: 200 TABLET ORAL at 09:08

## 2023-12-15 RX ADMIN — MILRINONE LACTATE 0.38 MCG/KG/MIN: 0.2 INJECTION, SOLUTION INTRAVENOUS at 13:42

## 2023-12-15 RX ADMIN — BUMETANIDE 2 MG: 0.25 INJECTION INTRAMUSCULAR; INTRAVENOUS at 21:14

## 2023-12-15 RX ADMIN — APIXABAN 5 MG: 5 TABLET, FILM COATED ORAL at 21:14

## 2023-12-15 RX ADMIN — POTASSIUM BICARBONATE 40 MEQ: 782 TABLET, EFFERVESCENT ORAL at 21:14

## 2023-12-15 RX ADMIN — POTASSIUM BICARBONATE 40 MEQ: 782 TABLET, EFFERVESCENT ORAL at 16:05

## 2023-12-15 RX ADMIN — BUMETANIDE 2 MG: 0.25 INJECTION INTRAMUSCULAR; INTRAVENOUS at 16:05

## 2023-12-15 RX ADMIN — APIXABAN 5 MG: 5 TABLET, FILM COATED ORAL at 09:08

## 2023-12-15 RX ADMIN — SPIRONOLACTONE 25 MG: 25 TABLET ORAL at 09:07

## 2023-12-15 RX ADMIN — LEVOTHYROXINE SODIUM 50 MCG: 50 TABLET ORAL at 06:57

## 2023-12-15 RX ADMIN — Medication 10 ML: at 09:08

## 2023-12-15 RX ADMIN — POTASSIUM CHLORIDE 10 MEQ: 10 INJECTION, SOLUTION INTRAVENOUS at 11:20

## 2023-12-15 RX ADMIN — POTASSIUM CHLORIDE 10 MEQ: 10 INJECTION, SOLUTION INTRAVENOUS at 09:08

## 2023-12-15 RX ADMIN — EMPAGLIFLOZIN 10 MG: 10 TABLET, FILM COATED ORAL at 09:09

## 2023-12-15 RX ADMIN — TAMSULOSIN HYDROCHLORIDE 0.4 MG: 0.4 CAPSULE ORAL at 09:07

## 2023-12-15 RX ADMIN — POTASSIUM BICARBONATE 40 MEQ: 782 TABLET, EFFERVESCENT ORAL at 09:07

## 2023-12-15 RX ADMIN — MILRINONE LACTATE 0.38 MCG/KG/MIN: 0.2 INJECTION, SOLUTION INTRAVENOUS at 07:01

## 2023-12-15 RX ADMIN — BUMETANIDE 2 MG: 0.25 INJECTION INTRAMUSCULAR; INTRAVENOUS at 09:07

## 2023-12-15 NOTE — TELEPHONE ENCOUNTER
Called and requested most recent office notes and cath records. Per Pacheco Douglas, will fax patient records to [facility] for Dr. Anisha Israel. Awaiting records.

## 2023-12-16 LAB
ANION GAP SERPL CALC-SCNC: 4 MMOL/L (ref 5–15)
BUN SERPL-MCNC: 29 MG/DL (ref 6–20)
BUN/CREAT SERPL: 17 (ref 12–20)
CALCIUM SERPL-MCNC: 8.1 MG/DL (ref 8.5–10.1)
CHLORIDE SERPL-SCNC: 107 MMOL/L (ref 97–108)
CO2 SERPL-SCNC: 32 MMOL/L (ref 21–32)
CREAT SERPL-MCNC: 1.67 MG/DL (ref 0.7–1.3)
GLUCOSE SERPL-MCNC: 106 MG/DL (ref 65–100)
NT PRO BNP: 7715 PG/ML
POTASSIUM SERPL-SCNC: 4.1 MMOL/L (ref 3.5–5.1)
SODIUM SERPL-SCNC: 143 MMOL/L (ref 136–145)

## 2023-12-16 PROCEDURE — 6370000000 HC RX 637 (ALT 250 FOR IP): Performed by: FAMILY MEDICINE

## 2023-12-16 PROCEDURE — 6360000002 HC RX W HCPCS: Performed by: INTERNAL MEDICINE

## 2023-12-16 PROCEDURE — G0378 HOSPITAL OBSERVATION PER HR: HCPCS

## 2023-12-16 PROCEDURE — 2500000003 HC RX 250 WO HCPCS: Performed by: INTERNAL MEDICINE

## 2023-12-16 PROCEDURE — 2060000000 HC ICU INTERMEDIATE R&B

## 2023-12-16 PROCEDURE — 36415 COLL VENOUS BLD VENIPUNCTURE: CPT

## 2023-12-16 PROCEDURE — 6370000000 HC RX 637 (ALT 250 FOR IP): Performed by: NURSE PRACTITIONER

## 2023-12-16 PROCEDURE — 2580000003 HC RX 258: Performed by: INTERNAL MEDICINE

## 2023-12-16 PROCEDURE — 6370000000 HC RX 637 (ALT 250 FOR IP): Performed by: INTERNAL MEDICINE

## 2023-12-16 PROCEDURE — 83880 ASSAY OF NATRIURETIC PEPTIDE: CPT

## 2023-12-16 PROCEDURE — 96376 TX/PRO/DX INJ SAME DRUG ADON: CPT

## 2023-12-16 PROCEDURE — 80048 BASIC METABOLIC PNL TOTAL CA: CPT

## 2023-12-16 PROCEDURE — 93005 ELECTROCARDIOGRAM TRACING: CPT | Performed by: INTERNAL MEDICINE

## 2023-12-16 PROCEDURE — 2580000003 HC RX 258: Performed by: FAMILY MEDICINE

## 2023-12-16 RX ORDER — MILRINONE LACTATE 0.2 MG/ML
0.12 INJECTION, SOLUTION INTRAVENOUS CONTINUOUS
Status: DISCONTINUED | OUTPATIENT
Start: 2023-12-16 | End: 2023-12-23

## 2023-12-16 RX ORDER — METOPROLOL SUCCINATE 25 MG/1
12.5 TABLET, EXTENDED RELEASE ORAL 2 TIMES DAILY
Status: DISCONTINUED | OUTPATIENT
Start: 2023-12-16 | End: 2023-12-21

## 2023-12-16 RX ADMIN — LEVOTHYROXINE SODIUM 50 MCG: 50 TABLET ORAL at 07:17

## 2023-12-16 RX ADMIN — APIXABAN 5 MG: 5 TABLET, FILM COATED ORAL at 20:47

## 2023-12-16 RX ADMIN — MILRINONE LACTATE 0.38 MCG/KG/MIN: 0.2 INJECTION, SOLUTION INTRAVENOUS at 10:58

## 2023-12-16 RX ADMIN — BUMETANIDE 2 MG: 0.25 INJECTION INTRAMUSCULAR; INTRAVENOUS at 08:53

## 2023-12-16 RX ADMIN — Medication 1000 UNITS: at 08:54

## 2023-12-16 RX ADMIN — Medication 10 ML: at 20:47

## 2023-12-16 RX ADMIN — APIXABAN 5 MG: 5 TABLET, FILM COATED ORAL at 08:54

## 2023-12-16 RX ADMIN — POTASSIUM BICARBONATE 40 MEQ: 782 TABLET, EFFERVESCENT ORAL at 20:46

## 2023-12-16 RX ADMIN — MILRINONE LACTATE 0.38 MCG/KG/MIN: 0.2 INJECTION, SOLUTION INTRAVENOUS at 00:13

## 2023-12-16 RX ADMIN — AMIODARONE HYDROCHLORIDE 200 MG: 200 TABLET ORAL at 08:53

## 2023-12-16 RX ADMIN — Medication 10 ML: at 08:54

## 2023-12-16 RX ADMIN — EMPAGLIFLOZIN 10 MG: 10 TABLET, FILM COATED ORAL at 08:54

## 2023-12-16 RX ADMIN — SPIRONOLACTONE 25 MG: 25 TABLET ORAL at 08:53

## 2023-12-16 RX ADMIN — WATER 250 MG: 1 INJECTION INTRAMUSCULAR; INTRAVENOUS; SUBCUTANEOUS at 14:36

## 2023-12-16 RX ADMIN — TAMSULOSIN HYDROCHLORIDE 0.4 MG: 0.4 CAPSULE ORAL at 08:53

## 2023-12-16 RX ADMIN — POTASSIUM BICARBONATE 40 MEQ: 782 TABLET, EFFERVESCENT ORAL at 08:53

## 2023-12-16 RX ADMIN — METOPROLOL SUCCINATE 12.5 MG: 25 TABLET, EXTENDED RELEASE ORAL at 20:46

## 2023-12-16 RX ADMIN — BUMETANIDE 2 MG: 0.25 INJECTION INTRAMUSCULAR; INTRAVENOUS at 20:46

## 2023-12-16 RX ADMIN — POTASSIUM BICARBONATE 40 MEQ: 782 TABLET, EFFERVESCENT ORAL at 14:36

## 2023-12-16 RX ADMIN — METOPROLOL SUCCINATE 12.5 MG: 25 TABLET, EXTENDED RELEASE ORAL at 08:54

## 2023-12-17 LAB
ANION GAP SERPL CALC-SCNC: 4 MMOL/L (ref 5–15)
BUN SERPL-MCNC: 36 MG/DL (ref 6–20)
BUN/CREAT SERPL: 20 (ref 12–20)
CALCIUM SERPL-MCNC: 8.3 MG/DL (ref 8.5–10.1)
CHLORIDE SERPL-SCNC: 102 MMOL/L (ref 97–108)
CO2 SERPL-SCNC: 32 MMOL/L (ref 21–32)
CREAT SERPL-MCNC: 1.8 MG/DL (ref 0.7–1.3)
EKG ATRIAL RATE: 88 BPM
EKG DIAGNOSIS: NORMAL
EKG P AXIS: 49 DEGREES
EKG P-R INTERVAL: 240 MS
EKG Q-T INTERVAL: 420 MS
EKG QRS DURATION: 132 MS
EKG QTC CALCULATION (BAZETT): 508 MS
EKG R AXIS: -29 DEGREES
EKG T AXIS: 73 DEGREES
EKG VENTRICULAR RATE: 88 BPM
GLUCOSE SERPL-MCNC: 97 MG/DL (ref 65–100)
NT PRO BNP: 7821 PG/ML
PETH BLD QL SCN: NEGATIVE
PETH BLD-MCNC: NEGATIVE NG/ML
POTASSIUM SERPL-SCNC: 3.9 MMOL/L (ref 3.5–5.1)
SODIUM SERPL-SCNC: 138 MMOL/L (ref 136–145)

## 2023-12-17 PROCEDURE — 2580000003 HC RX 258: Performed by: FAMILY MEDICINE

## 2023-12-17 PROCEDURE — 36415 COLL VENOUS BLD VENIPUNCTURE: CPT

## 2023-12-17 PROCEDURE — 6370000000 HC RX 637 (ALT 250 FOR IP): Performed by: INTERNAL MEDICINE

## 2023-12-17 PROCEDURE — 6370000000 HC RX 637 (ALT 250 FOR IP): Performed by: FAMILY MEDICINE

## 2023-12-17 PROCEDURE — 83880 ASSAY OF NATRIURETIC PEPTIDE: CPT

## 2023-12-17 PROCEDURE — 6360000002 HC RX W HCPCS: Performed by: INTERNAL MEDICINE

## 2023-12-17 PROCEDURE — G0378 HOSPITAL OBSERVATION PER HR: HCPCS

## 2023-12-17 PROCEDURE — 6370000000 HC RX 637 (ALT 250 FOR IP): Performed by: NURSE PRACTITIONER

## 2023-12-17 PROCEDURE — 2580000003 HC RX 258: Performed by: INTERNAL MEDICINE

## 2023-12-17 PROCEDURE — 96376 TX/PRO/DX INJ SAME DRUG ADON: CPT

## 2023-12-17 PROCEDURE — 2700000000 HC OXYGEN THERAPY PER DAY

## 2023-12-17 PROCEDURE — 80048 BASIC METABOLIC PNL TOTAL CA: CPT

## 2023-12-17 PROCEDURE — 2500000003 HC RX 250 WO HCPCS: Performed by: INTERNAL MEDICINE

## 2023-12-17 PROCEDURE — 2060000000 HC ICU INTERMEDIATE R&B

## 2023-12-17 PROCEDURE — 94760 N-INVAS EAR/PLS OXIMETRY 1: CPT

## 2023-12-17 RX ADMIN — BUMETANIDE 2 MG: 0.25 INJECTION INTRAMUSCULAR; INTRAVENOUS at 20:37

## 2023-12-17 RX ADMIN — AMIODARONE HYDROCHLORIDE 200 MG: 200 TABLET ORAL at 08:19

## 2023-12-17 RX ADMIN — MILRINONE LACTATE IN DEXTROSE 0.25 MCG/KG/MIN: 0.2 INJECTION, SOLUTION INTRAVENOUS at 04:49

## 2023-12-17 RX ADMIN — POTASSIUM BICARBONATE 40 MEQ: 782 TABLET, EFFERVESCENT ORAL at 20:37

## 2023-12-17 RX ADMIN — MILRINONE LACTATE IN DEXTROSE 0.25 MCG/KG/MIN: 0.2 INJECTION, SOLUTION INTRAVENOUS at 22:45

## 2023-12-17 RX ADMIN — WATER 250 MG: 1 INJECTION INTRAMUSCULAR; INTRAVENOUS; SUBCUTANEOUS at 08:40

## 2023-12-17 RX ADMIN — BUMETANIDE 2 MG: 0.25 INJECTION INTRAMUSCULAR; INTRAVENOUS at 15:37

## 2023-12-17 RX ADMIN — Medication 10 ML: at 08:20

## 2023-12-17 RX ADMIN — METOPROLOL SUCCINATE 12.5 MG: 25 TABLET, EXTENDED RELEASE ORAL at 08:19

## 2023-12-17 RX ADMIN — SPIRONOLACTONE 25 MG: 25 TABLET ORAL at 08:19

## 2023-12-17 RX ADMIN — POTASSIUM BICARBONATE 40 MEQ: 782 TABLET, EFFERVESCENT ORAL at 15:37

## 2023-12-17 RX ADMIN — BUMETANIDE 2 MG: 0.25 INJECTION INTRAMUSCULAR; INTRAVENOUS at 08:19

## 2023-12-17 RX ADMIN — LEVOTHYROXINE SODIUM 50 MCG: 50 TABLET ORAL at 04:51

## 2023-12-17 RX ADMIN — TAMSULOSIN HYDROCHLORIDE 0.4 MG: 0.4 CAPSULE ORAL at 08:19

## 2023-12-17 RX ADMIN — Medication 10 ML: at 20:37

## 2023-12-17 RX ADMIN — POTASSIUM BICARBONATE 40 MEQ: 782 TABLET, EFFERVESCENT ORAL at 08:20

## 2023-12-17 RX ADMIN — APIXABAN 5 MG: 5 TABLET, FILM COATED ORAL at 08:19

## 2023-12-17 RX ADMIN — EMPAGLIFLOZIN 10 MG: 10 TABLET, FILM COATED ORAL at 08:18

## 2023-12-17 RX ADMIN — APIXABAN 5 MG: 5 TABLET, FILM COATED ORAL at 20:37

## 2023-12-17 RX ADMIN — METOPROLOL SUCCINATE 12.5 MG: 25 TABLET, EXTENDED RELEASE ORAL at 20:37

## 2023-12-17 RX ADMIN — Medication 1000 UNITS: at 08:19

## 2023-12-18 LAB
ANION GAP SERPL CALC-SCNC: 5 MMOL/L (ref 5–15)
BUN SERPL-MCNC: 38 MG/DL (ref 6–20)
BUN/CREAT SERPL: 22 (ref 12–20)
CALCIUM SERPL-MCNC: 8.1 MG/DL (ref 8.5–10.1)
CHLORIDE SERPL-SCNC: 105 MMOL/L (ref 97–108)
CO2 SERPL-SCNC: 32 MMOL/L (ref 21–32)
CREAT SERPL-MCNC: 1.75 MG/DL (ref 0.7–1.3)
ECHO BSA: 1.98 M2
ERYTHROCYTE [DISTWIDTH] IN BLOOD BY AUTOMATED COUNT: 17.4 % (ref 11.5–14.5)
GLUCOSE SERPL-MCNC: 99 MG/DL (ref 65–100)
HCT VFR BLD AUTO: 29.1 % (ref 36.6–50.3)
HGB BLD-MCNC: 9.6 G/DL (ref 12.1–17)
MCH RBC QN AUTO: 27.5 PG (ref 26–34)
MCHC RBC AUTO-ENTMCNC: 33 G/DL (ref 30–36.5)
MCV RBC AUTO: 83.4 FL (ref 80–99)
NRBC # BLD: 0 K/UL (ref 0–0.01)
NRBC BLD-RTO: 0 PER 100 WBC
NT PRO BNP: 8001 PG/ML
PLATELET # BLD AUTO: 173 K/UL (ref 150–400)
PMV BLD AUTO: 10.6 FL (ref 8.9–12.9)
POTASSIUM SERPL-SCNC: 4 MMOL/L (ref 3.5–5.1)
RBC # BLD AUTO: 3.49 M/UL (ref 4.1–5.7)
SODIUM SERPL-SCNC: 142 MMOL/L (ref 136–145)
WBC # BLD AUTO: 4.2 K/UL (ref 4.1–11.1)

## 2023-12-18 PROCEDURE — 2500000003 HC RX 250 WO HCPCS: Performed by: INTERNAL MEDICINE

## 2023-12-18 PROCEDURE — 36415 COLL VENOUS BLD VENIPUNCTURE: CPT

## 2023-12-18 PROCEDURE — C1725 CATH, TRANSLUMIN NON-LASER: HCPCS | Performed by: INTERNAL MEDICINE

## 2023-12-18 PROCEDURE — 6370000000 HC RX 637 (ALT 250 FOR IP): Performed by: INTERNAL MEDICINE

## 2023-12-18 PROCEDURE — 76937 US GUIDE VASCULAR ACCESS: CPT | Performed by: INTERNAL MEDICINE

## 2023-12-18 PROCEDURE — 6370000000 HC RX 637 (ALT 250 FOR IP): Performed by: FAMILY MEDICINE

## 2023-12-18 PROCEDURE — C1713 ANCHOR/SCREW BN/BN,TIS/BN: HCPCS | Performed by: INTERNAL MEDICINE

## 2023-12-18 PROCEDURE — G0378 HOSPITAL OBSERVATION PER HR: HCPCS

## 2023-12-18 PROCEDURE — 93451 RIGHT HEART CATH: CPT | Performed by: INTERNAL MEDICINE

## 2023-12-18 PROCEDURE — 80048 BASIC METABOLIC PNL TOTAL CA: CPT

## 2023-12-18 PROCEDURE — 85027 COMPLETE CBC AUTOMATED: CPT

## 2023-12-18 PROCEDURE — 6370000000 HC RX 637 (ALT 250 FOR IP): Performed by: NURSE PRACTITIONER

## 2023-12-18 PROCEDURE — 99232 SBSQ HOSP IP/OBS MODERATE 35: CPT | Performed by: NURSE PRACTITIONER

## 2023-12-18 PROCEDURE — 4A023N6 MEASUREMENT OF CARDIAC SAMPLING AND PRESSURE, RIGHT HEART, PERCUTANEOUS APPROACH: ICD-10-PCS | Performed by: INTERNAL MEDICINE

## 2023-12-18 PROCEDURE — C1894 INTRO/SHEATH, NON-LASER: HCPCS | Performed by: INTERNAL MEDICINE

## 2023-12-18 PROCEDURE — 83880 ASSAY OF NATRIURETIC PEPTIDE: CPT

## 2023-12-18 PROCEDURE — 6360000002 HC RX W HCPCS: Performed by: INTERNAL MEDICINE

## 2023-12-18 PROCEDURE — 96376 TX/PRO/DX INJ SAME DRUG ADON: CPT

## 2023-12-18 PROCEDURE — 2580000003 HC RX 258: Performed by: FAMILY MEDICINE

## 2023-12-18 PROCEDURE — 2580000003 HC RX 258: Performed by: INTERNAL MEDICINE

## 2023-12-18 PROCEDURE — 2709999900 HC NON-CHARGEABLE SUPPLY: Performed by: INTERNAL MEDICINE

## 2023-12-18 PROCEDURE — 2060000000 HC ICU INTERMEDIATE R&B

## 2023-12-18 PROCEDURE — 99233 SBSQ HOSP IP/OBS HIGH 50: CPT | Performed by: INTERNAL MEDICINE

## 2023-12-18 RX ORDER — LIDOCAINE HYDROCHLORIDE 10 MG/ML
INJECTION, SOLUTION INFILTRATION; PERINEURAL PRN
Status: DISCONTINUED | OUTPATIENT
Start: 2023-12-18 | End: 2023-12-18 | Stop reason: HOSPADM

## 2023-12-18 RX ADMIN — LEVOTHYROXINE SODIUM 50 MCG: 50 TABLET ORAL at 04:45

## 2023-12-18 RX ADMIN — BUMETANIDE 2 MG: 0.25 INJECTION INTRAMUSCULAR; INTRAVENOUS at 21:25

## 2023-12-18 RX ADMIN — Medication 1000 UNITS: at 10:54

## 2023-12-18 RX ADMIN — AMIODARONE HYDROCHLORIDE 200 MG: 200 TABLET ORAL at 10:56

## 2023-12-18 RX ADMIN — APIXABAN 5 MG: 5 TABLET, FILM COATED ORAL at 10:54

## 2023-12-18 RX ADMIN — WATER 250 MG: 1 INJECTION INTRAMUSCULAR; INTRAVENOUS; SUBCUTANEOUS at 11:12

## 2023-12-18 RX ADMIN — TAMSULOSIN HYDROCHLORIDE 0.4 MG: 0.4 CAPSULE ORAL at 10:52

## 2023-12-18 RX ADMIN — POTASSIUM BICARBONATE 40 MEQ: 782 TABLET, EFFERVESCENT ORAL at 15:19

## 2023-12-18 RX ADMIN — MILRINONE LACTATE IN DEXTROSE 0.25 MCG/KG/MIN: 0.2 INJECTION, SOLUTION INTRAVENOUS at 19:21

## 2023-12-18 RX ADMIN — BUMETANIDE 2 MG: 0.25 INJECTION INTRAMUSCULAR; INTRAVENOUS at 15:22

## 2023-12-18 RX ADMIN — Medication 10 ML: at 21:27

## 2023-12-18 RX ADMIN — SPIRONOLACTONE 25 MG: 25 TABLET ORAL at 10:54

## 2023-12-18 RX ADMIN — POTASSIUM BICARBONATE 40 MEQ: 782 TABLET, EFFERVESCENT ORAL at 21:25

## 2023-12-18 RX ADMIN — METOPROLOL SUCCINATE 12.5 MG: 25 TABLET, EXTENDED RELEASE ORAL at 21:25

## 2023-12-18 RX ADMIN — BUMETANIDE 2 MG: 0.25 INJECTION INTRAMUSCULAR; INTRAVENOUS at 10:58

## 2023-12-18 RX ADMIN — METOPROLOL SUCCINATE 12.5 MG: 25 TABLET, EXTENDED RELEASE ORAL at 10:53

## 2023-12-18 RX ADMIN — EMPAGLIFLOZIN 10 MG: 10 TABLET, FILM COATED ORAL at 10:52

## 2023-12-18 RX ADMIN — APIXABAN 5 MG: 5 TABLET, FILM COATED ORAL at 21:25

## 2023-12-18 RX ADMIN — Medication 10 ML: at 10:58

## 2023-12-18 RX ADMIN — POTASSIUM BICARBONATE 40 MEQ: 782 TABLET, EFFERVESCENT ORAL at 10:54

## 2023-12-18 NOTE — DISCHARGE INSTRUCTIONS
Home milrinone arranged. Medications will continue to be adjusted by the heart failure team on follow-up. Follow-up with orthopedic surgeon Dr. Lakisha Kennedy for the post-op knees.

## 2023-12-19 LAB
ALBUMIN SERPL ELPH-MCNC: 3 G/DL (ref 2.9–4.4)
ALBUMIN SERPL-MCNC: 2.9 G/DL (ref 3.5–5)
ALBUMIN/GLOB SERPL: 0.8 (ref 1.1–2.2)
ALBUMIN/GLOB SERPL: 1.1 (ref 0.7–1.7)
ALP SERPL-CCNC: 55 U/L (ref 45–117)
ALPHA1 GLOB SERPL ELPH-MCNC: 0.2 G/DL (ref 0–0.4)
ALPHA2 GLOB SERPL ELPH-MCNC: 0.5 G/DL (ref 0.4–1)
ALT SERPL-CCNC: 17 U/L (ref 12–78)
ANION GAP SERPL CALC-SCNC: 6 MMOL/L (ref 5–15)
AST SERPL-CCNC: 20 U/L (ref 15–37)
B-GLOBULIN SERPL ELPH-MCNC: 1 G/DL (ref 0.7–1.3)
BILIRUB DIRECT SERPL-MCNC: 0.2 MG/DL (ref 0–0.2)
BILIRUB SERPL-MCNC: 0.7 MG/DL (ref 0.2–1)
BUN SERPL-MCNC: 35 MG/DL (ref 6–20)
BUN/CREAT SERPL: 19 (ref 12–20)
CALCIUM SERPL-MCNC: 8.3 MG/DL (ref 8.5–10.1)
CHLORIDE SERPL-SCNC: 103 MMOL/L (ref 97–108)
CO2 SERPL-SCNC: 33 MMOL/L (ref 21–32)
CREAT SERPL-MCNC: 1.89 MG/DL (ref 0.7–1.3)
GAMMA GLOB SERPL ELPH-MCNC: 1.2 G/DL (ref 0.4–1.8)
GLOBULIN SER CALC-MCNC: 3.5 G/DL (ref 2–4)
GLOBULIN SER-MCNC: 2.8 G/DL (ref 2.2–3.9)
GLUCOSE SERPL-MCNC: 103 MG/DL (ref 65–100)
IGA SERPL-MCNC: 552 MG/DL (ref 61–437)
IGG SERPL-MCNC: 1252 MG/DL (ref 603–1613)
IGM SERPL-MCNC: 109 MG/DL (ref 15–143)
INTERPRETATION SERPL IEP-IMP: ABNORMAL
KAPPA LC FREE SER-MCNC: 49.1 MG/L (ref 3.3–19.4)
KAPPA LC FREE/LAMBDA FREE SER: 1.72 (ref 0.26–1.65)
LAMBDA LC FREE SERPL-MCNC: 28.5 MG/L (ref 5.7–26.3)
M PROTEIN SERPL ELPH-MCNC: ABNORMAL G/DL
NT PRO BNP: 6089 PG/ML
POTASSIUM SERPL-SCNC: 4.2 MMOL/L (ref 3.5–5.1)
PROT SERPL-MCNC: 5.8 G/DL (ref 6–8.5)
PROT SERPL-MCNC: 6.4 G/DL (ref 6.4–8.2)
SODIUM SERPL-SCNC: 142 MMOL/L (ref 136–145)

## 2023-12-19 PROCEDURE — 93005 ELECTROCARDIOGRAM TRACING: CPT | Performed by: FAMILY MEDICINE

## 2023-12-19 PROCEDURE — 6370000000 HC RX 637 (ALT 250 FOR IP): Performed by: FAMILY MEDICINE

## 2023-12-19 PROCEDURE — 94760 N-INVAS EAR/PLS OXIMETRY 1: CPT

## 2023-12-19 PROCEDURE — 6370000000 HC RX 637 (ALT 250 FOR IP): Performed by: NURSE PRACTITIONER

## 2023-12-19 PROCEDURE — 96376 TX/PRO/DX INJ SAME DRUG ADON: CPT

## 2023-12-19 PROCEDURE — 99232 SBSQ HOSP IP/OBS MODERATE 35: CPT | Performed by: NURSE PRACTITIONER

## 2023-12-19 PROCEDURE — 2060000000 HC ICU INTERMEDIATE R&B

## 2023-12-19 PROCEDURE — 6370000000 HC RX 637 (ALT 250 FOR IP): Performed by: INTERNAL MEDICINE

## 2023-12-19 PROCEDURE — 2580000003 HC RX 258: Performed by: INTERNAL MEDICINE

## 2023-12-19 PROCEDURE — 6360000002 HC RX W HCPCS: Performed by: INTERNAL MEDICINE

## 2023-12-19 PROCEDURE — 83880 ASSAY OF NATRIURETIC PEPTIDE: CPT

## 2023-12-19 PROCEDURE — 80076 HEPATIC FUNCTION PANEL: CPT

## 2023-12-19 PROCEDURE — 80048 BASIC METABOLIC PNL TOTAL CA: CPT

## 2023-12-19 PROCEDURE — 6360000002 HC RX W HCPCS: Performed by: NURSE PRACTITIONER

## 2023-12-19 PROCEDURE — 2500000003 HC RX 250 WO HCPCS: Performed by: INTERNAL MEDICINE

## 2023-12-19 PROCEDURE — G0378 HOSPITAL OBSERVATION PER HR: HCPCS

## 2023-12-19 PROCEDURE — 2580000003 HC RX 258: Performed by: NURSE PRACTITIONER

## 2023-12-19 PROCEDURE — 36415 COLL VENOUS BLD VENIPUNCTURE: CPT

## 2023-12-19 PROCEDURE — 94618 PULMONARY STRESS TESTING: CPT

## 2023-12-19 PROCEDURE — 2700000000 HC OXYGEN THERAPY PER DAY

## 2023-12-19 PROCEDURE — 2580000003 HC RX 258: Performed by: FAMILY MEDICINE

## 2023-12-19 PROCEDURE — 97530 THERAPEUTIC ACTIVITIES: CPT

## 2023-12-19 PROCEDURE — P9047 ALBUMIN (HUMAN), 25%, 50ML: HCPCS | Performed by: NURSE PRACTITIONER

## 2023-12-19 RX ORDER — IPRATROPIUM BROMIDE AND ALBUTEROL SULFATE 2.5; .5 MG/3ML; MG/3ML
1 SOLUTION RESPIRATORY (INHALATION) EVERY 4 HOURS PRN
Status: DISCONTINUED | OUTPATIENT
Start: 2023-12-19 | End: 2023-12-28 | Stop reason: HOSPADM

## 2023-12-19 RX ORDER — METOLAZONE 5 MG/1
2.5 TABLET ORAL ONCE
Status: COMPLETED | OUTPATIENT
Start: 2023-12-19 | End: 2023-12-19

## 2023-12-19 RX ORDER — HYDRALAZINE HYDROCHLORIDE 10 MG/1
10 TABLET, FILM COATED ORAL EVERY 8 HOURS SCHEDULED
Status: DISCONTINUED | OUTPATIENT
Start: 2023-12-19 | End: 2023-12-21

## 2023-12-19 RX ORDER — ALBUMIN (HUMAN) 12.5 G/50ML
25 SOLUTION INTRAVENOUS EVERY 12 HOURS
Status: COMPLETED | OUTPATIENT
Start: 2023-12-19 | End: 2023-12-20

## 2023-12-19 RX ADMIN — WATER 250 MG: 1 INJECTION INTRAMUSCULAR; INTRAVENOUS; SUBCUTANEOUS at 22:10

## 2023-12-19 RX ADMIN — Medication 1000 UNITS: at 09:00

## 2023-12-19 RX ADMIN — BUMETANIDE 2 MG: 0.25 INJECTION INTRAMUSCULAR; INTRAVENOUS at 09:02

## 2023-12-19 RX ADMIN — BUMETANIDE 2 MG: 0.25 INJECTION INTRAMUSCULAR; INTRAVENOUS at 15:13

## 2023-12-19 RX ADMIN — Medication 10 ML: at 22:03

## 2023-12-19 RX ADMIN — HYDRALAZINE HYDROCHLORIDE 10 MG: 10 TABLET, FILM COATED ORAL at 10:04

## 2023-12-19 RX ADMIN — WATER 250 MG: 1 INJECTION INTRAMUSCULAR; INTRAVENOUS; SUBCUTANEOUS at 09:05

## 2023-12-19 RX ADMIN — ALBUMIN (HUMAN) 25 G: 0.25 INJECTION, SOLUTION INTRAVENOUS at 10:05

## 2023-12-19 RX ADMIN — AMIODARONE HYDROCHLORIDE 200 MG: 200 TABLET ORAL at 08:59

## 2023-12-19 RX ADMIN — METOPROLOL SUCCINATE 12.5 MG: 25 TABLET, EXTENDED RELEASE ORAL at 22:02

## 2023-12-19 RX ADMIN — BUMETANIDE 2 MG: 0.25 INJECTION INTRAMUSCULAR; INTRAVENOUS at 22:03

## 2023-12-19 RX ADMIN — POTASSIUM BICARBONATE 40 MEQ: 782 TABLET, EFFERVESCENT ORAL at 08:58

## 2023-12-19 RX ADMIN — APIXABAN 5 MG: 5 TABLET, FILM COATED ORAL at 08:59

## 2023-12-19 RX ADMIN — POTASSIUM BICARBONATE 40 MEQ: 782 TABLET, EFFERVESCENT ORAL at 22:03

## 2023-12-19 RX ADMIN — SPIRONOLACTONE 25 MG: 25 TABLET ORAL at 09:01

## 2023-12-19 RX ADMIN — LEVOTHYROXINE SODIUM 50 MCG: 50 TABLET ORAL at 04:54

## 2023-12-19 RX ADMIN — TAMSULOSIN HYDROCHLORIDE 0.4 MG: 0.4 CAPSULE ORAL at 08:59

## 2023-12-19 RX ADMIN — APIXABAN 5 MG: 5 TABLET, FILM COATED ORAL at 22:02

## 2023-12-19 RX ADMIN — POTASSIUM BICARBONATE 40 MEQ: 782 TABLET, EFFERVESCENT ORAL at 14:30

## 2023-12-19 RX ADMIN — ALBUMIN (HUMAN) 25 G: 0.25 INJECTION, SOLUTION INTRAVENOUS at 22:03

## 2023-12-19 RX ADMIN — MILRINONE LACTATE IN DEXTROSE 0.25 MCG/KG/MIN: 0.2 INJECTION, SOLUTION INTRAVENOUS at 14:36

## 2023-12-19 RX ADMIN — METOPROLOL SUCCINATE 12.5 MG: 25 TABLET, EXTENDED RELEASE ORAL at 09:00

## 2023-12-19 RX ADMIN — METOLAZONE 2.5 MG: 5 TABLET ORAL at 14:31

## 2023-12-19 RX ADMIN — Medication 20 ML: at 09:04

## 2023-12-19 RX ADMIN — EMPAGLIFLOZIN 10 MG: 10 TABLET, FILM COATED ORAL at 08:59

## 2023-12-19 NOTE — CARE COORDINATION
Transition of Care Plan:    RUR: 15% - moderate  Prior Level of Functioning: independent  Disposition: home health and home infusion  Follow up appointments: [facility]  DME needed: none  Transportation at discharge: family  IM/IMM Medicare/ letter given: needs 2nd IMM  Caregiver Contact: Samantha Lambert - [phone]   Discharge Caregiver contacted prior to discharge? planned  Care Conference needed? no  Barriers to discharge: medical    Patient continues LVAD education and workup. S/p RHC yesterday - revealed patient needs chronic milrinone. Anticipate milrinone for discharge. Home with home health and home infusion when medically stable. CM will continue to follow.     Disposition Plan:  Home Health: awaiting orders for inotrope therapy  Home Infusion: awaiting orders for inotrope therapy  Transportation: Family    Dyan Prather, [facility]  Available via [employer] or

## 2023-12-20 LAB
ALBUMIN SERPL-MCNC: 3.3 G/DL (ref 3.5–5)
ALBUMIN/GLOB SERPL: 1 (ref 1.1–2.2)
ALP SERPL-CCNC: 55 U/L (ref 45–117)
ALT SERPL-CCNC: 16 U/L (ref 12–78)
ANION GAP SERPL CALC-SCNC: 5 MMOL/L (ref 5–15)
AST SERPL-CCNC: 14 U/L (ref 15–37)
BILIRUB SERPL-MCNC: 0.9 MG/DL (ref 0.2–1)
BUN SERPL-MCNC: 33 MG/DL (ref 6–20)
BUN/CREAT SERPL: 16 (ref 12–20)
CALCIUM SERPL-MCNC: 8.7 MG/DL (ref 8.5–10.1)
CHLORIDE SERPL-SCNC: 100 MMOL/L (ref 97–108)
CO2 SERPL-SCNC: 33 MMOL/L (ref 21–32)
CREAT SERPL-MCNC: 2.03 MG/DL (ref 0.7–1.3)
CRP SERPL-MCNC: 0.38 MG/DL (ref 0–0.6)
ERYTHROCYTE [SEDIMENTATION RATE] IN BLOOD: 25 MM/HR (ref 0–20)
GLOBULIN SER CALC-MCNC: 3.3 G/DL (ref 2–4)
GLUCOSE SERPL-MCNC: 97 MG/DL (ref 65–100)
MAGNESIUM SERPL-MCNC: 2.2 MG/DL (ref 1.6–2.4)
NT PRO BNP: 5902 PG/ML
POTASSIUM SERPL-SCNC: 3.5 MMOL/L (ref 3.5–5.1)
PROT SERPL-MCNC: 6.6 G/DL (ref 6.4–8.2)
PSA SERPL-MCNC: 4.6 NG/ML (ref 0.01–4)
SODIUM SERPL-SCNC: 138 MMOL/L (ref 136–145)

## 2023-12-20 PROCEDURE — 6370000000 HC RX 637 (ALT 250 FOR IP): Performed by: NURSE PRACTITIONER

## 2023-12-20 PROCEDURE — 2580000003 HC RX 258: Performed by: NURSE PRACTITIONER

## 2023-12-20 PROCEDURE — 6370000000 HC RX 637 (ALT 250 FOR IP): Performed by: INTERNAL MEDICINE

## 2023-12-20 PROCEDURE — 6360000002 HC RX W HCPCS: Performed by: NURSE PRACTITIONER

## 2023-12-20 PROCEDURE — 6370000000 HC RX 637 (ALT 250 FOR IP): Performed by: FAMILY MEDICINE

## 2023-12-20 PROCEDURE — 6360000002 HC RX W HCPCS: Performed by: INTERNAL MEDICINE

## 2023-12-20 PROCEDURE — 99221 1ST HOSP IP/OBS SF/LOW 40: CPT | Performed by: INTERNAL MEDICINE

## 2023-12-20 PROCEDURE — 36415 COLL VENOUS BLD VENIPUNCTURE: CPT

## 2023-12-20 PROCEDURE — 2700000000 HC OXYGEN THERAPY PER DAY

## 2023-12-20 PROCEDURE — 83735 ASSAY OF MAGNESIUM: CPT

## 2023-12-20 PROCEDURE — 99222 1ST HOSP IP/OBS MODERATE 55: CPT | Performed by: PHYSICIAN ASSISTANT

## 2023-12-20 PROCEDURE — 86140 C-REACTIVE PROTEIN: CPT

## 2023-12-20 PROCEDURE — 80053 COMPREHEN METABOLIC PANEL: CPT

## 2023-12-20 PROCEDURE — 99253 IP/OBS CNSLTJ NEW/EST LOW 45: CPT | Performed by: INTERNAL MEDICINE

## 2023-12-20 PROCEDURE — 83880 ASSAY OF NATRIURETIC PEPTIDE: CPT

## 2023-12-20 PROCEDURE — 2500000003 HC RX 250 WO HCPCS: Performed by: INTERNAL MEDICINE

## 2023-12-20 PROCEDURE — G0103 PSA SCREENING: HCPCS

## 2023-12-20 PROCEDURE — G0378 HOSPITAL OBSERVATION PER HR: HCPCS

## 2023-12-20 PROCEDURE — 97116 GAIT TRAINING THERAPY: CPT

## 2023-12-20 PROCEDURE — 85652 RBC SED RATE AUTOMATED: CPT

## 2023-12-20 PROCEDURE — 2060000000 HC ICU INTERMEDIATE R&B

## 2023-12-20 PROCEDURE — 2580000003 HC RX 258: Performed by: FAMILY MEDICINE

## 2023-12-20 PROCEDURE — 96376 TX/PRO/DX INJ SAME DRUG ADON: CPT

## 2023-12-20 PROCEDURE — P9047 ALBUMIN (HUMAN), 25%, 50ML: HCPCS | Performed by: NURSE PRACTITIONER

## 2023-12-20 PROCEDURE — 94760 N-INVAS EAR/PLS OXIMETRY 1: CPT

## 2023-12-20 RX ORDER — METOLAZONE 5 MG/1
5 TABLET ORAL ONCE
Status: DISCONTINUED | OUTPATIENT
Start: 2023-12-20 | End: 2023-12-24

## 2023-12-20 RX ADMIN — POTASSIUM BICARBONATE 40 MEQ: 782 TABLET, EFFERVESCENT ORAL at 14:22

## 2023-12-20 RX ADMIN — EMPAGLIFLOZIN 10 MG: 10 TABLET, FILM COATED ORAL at 08:58

## 2023-12-20 RX ADMIN — SPIRONOLACTONE 25 MG: 25 TABLET ORAL at 08:57

## 2023-12-20 RX ADMIN — LEVOTHYROXINE SODIUM 50 MCG: 50 TABLET ORAL at 04:47

## 2023-12-20 RX ADMIN — APIXABAN 5 MG: 5 TABLET, FILM COATED ORAL at 21:31

## 2023-12-20 RX ADMIN — BUMETANIDE 2 MG: 0.25 INJECTION INTRAMUSCULAR; INTRAVENOUS at 08:57

## 2023-12-20 RX ADMIN — ALBUMIN (HUMAN) 25 G: 0.25 INJECTION, SOLUTION INTRAVENOUS at 08:56

## 2023-12-20 RX ADMIN — POTASSIUM BICARBONATE 40 MEQ: 782 TABLET, EFFERVESCENT ORAL at 08:57

## 2023-12-20 RX ADMIN — MILRINONE LACTATE IN DEXTROSE 0.25 MCG/KG/MIN: 0.2 INJECTION, SOLUTION INTRAVENOUS at 21:29

## 2023-12-20 RX ADMIN — WATER 250 MG: 1 INJECTION INTRAMUSCULAR; INTRAVENOUS; SUBCUTANEOUS at 09:12

## 2023-12-20 RX ADMIN — MILRINONE LACTATE IN DEXTROSE 0.25 MCG/KG/MIN: 0.2 INJECTION, SOLUTION INTRAVENOUS at 04:43

## 2023-12-20 RX ADMIN — POTASSIUM BICARBONATE 40 MEQ: 782 TABLET, EFFERVESCENT ORAL at 21:31

## 2023-12-20 RX ADMIN — TAMSULOSIN HYDROCHLORIDE 0.4 MG: 0.4 CAPSULE ORAL at 08:57

## 2023-12-20 RX ADMIN — BUMETANIDE 2 MG: 0.25 INJECTION INTRAMUSCULAR; INTRAVENOUS at 21:31

## 2023-12-20 RX ADMIN — Medication 10 ML: at 08:58

## 2023-12-20 RX ADMIN — METOPROLOL SUCCINATE 12.5 MG: 25 TABLET, EXTENDED RELEASE ORAL at 08:57

## 2023-12-20 RX ADMIN — ALBUMIN (HUMAN) 25 G: 0.25 INJECTION, SOLUTION INTRAVENOUS at 21:31

## 2023-12-20 RX ADMIN — METOPROLOL SUCCINATE 12.5 MG: 25 TABLET, EXTENDED RELEASE ORAL at 21:31

## 2023-12-20 RX ADMIN — Medication 1000 UNITS: at 08:58

## 2023-12-20 RX ADMIN — Medication 10 ML: at 21:32

## 2023-12-20 RX ADMIN — AMIODARONE HYDROCHLORIDE 200 MG: 200 TABLET ORAL at 08:57

## 2023-12-20 RX ADMIN — APIXABAN 5 MG: 5 TABLET, FILM COATED ORAL at 08:57

## 2023-12-21 LAB
ALBUMIN SERPL-MCNC: 3.1 G/DL (ref 3.5–5)
ALBUMIN/GLOB SERPL: 1 (ref 1.1–2.2)
ALP SERPL-CCNC: 55 U/L (ref 45–117)
ALT SERPL-CCNC: 13 U/L (ref 12–78)
ANION GAP SERPL CALC-SCNC: 6 MMOL/L (ref 5–15)
AST SERPL-CCNC: 18 U/L (ref 15–37)
BILIRUB SERPL-MCNC: 0.8 MG/DL (ref 0.2–1)
BUN SERPL-MCNC: 36 MG/DL (ref 6–20)
BUN/CREAT SERPL: 18 (ref 12–20)
CALCIUM SERPL-MCNC: 8.5 MG/DL (ref 8.5–10.1)
CHLORIDE SERPL-SCNC: 100 MMOL/L (ref 97–108)
CO2 SERPL-SCNC: 33 MMOL/L (ref 21–32)
CREAT SERPL-MCNC: 2.03 MG/DL (ref 0.7–1.3)
EKG ATRIAL RATE: 70 BPM
EKG DIAGNOSIS: NORMAL
EKG P AXIS: 70 DEGREES
EKG P-R INTERVAL: 194 MS
EKG Q-T INTERVAL: 472 MS
EKG QRS DURATION: 136 MS
EKG QTC CALCULATION (BAZETT): 509 MS
EKG R AXIS: -40 DEGREES
EKG T AXIS: 92 DEGREES
EKG VENTRICULAR RATE: 70 BPM
FERRITIN SERPL-MCNC: 171 NG/ML (ref 26–388)
GLOBULIN SER CALC-MCNC: 3.2 G/DL (ref 2–4)
GLUCOSE SERPL-MCNC: 98 MG/DL (ref 65–100)
IRON SATN MFR SERPL: 14 % (ref 20–50)
IRON SERPL-MCNC: 34 UG/DL (ref 35–150)
MAGNESIUM SERPL-MCNC: 2.1 MG/DL (ref 1.6–2.4)
NT PRO BNP: 5534 PG/ML
POTASSIUM SERPL-SCNC: 3.7 MMOL/L (ref 3.5–5.1)
PROT SERPL-MCNC: 6.3 G/DL (ref 6.4–8.2)
SODIUM SERPL-SCNC: 139 MMOL/L (ref 136–145)
TIBC SERPL-MCNC: 245 UG/DL (ref 250–450)

## 2023-12-21 PROCEDURE — 2500000003 HC RX 250 WO HCPCS: Performed by: INTERNAL MEDICINE

## 2023-12-21 PROCEDURE — 97535 SELF CARE MNGMENT TRAINING: CPT

## 2023-12-21 PROCEDURE — 2060000000 HC ICU INTERMEDIATE R&B

## 2023-12-21 PROCEDURE — 6370000000 HC RX 637 (ALT 250 FOR IP): Performed by: NURSE PRACTITIONER

## 2023-12-21 PROCEDURE — 94762 N-INVAS EAR/PLS OXIMTRY CONT: CPT

## 2023-12-21 PROCEDURE — 6370000000 HC RX 637 (ALT 250 FOR IP): Performed by: INTERNAL MEDICINE

## 2023-12-21 PROCEDURE — 99232 SBSQ HOSP IP/OBS MODERATE 35: CPT | Performed by: NURSE PRACTITIONER

## 2023-12-21 PROCEDURE — 83735 ASSAY OF MAGNESIUM: CPT

## 2023-12-21 PROCEDURE — 6360000002 HC RX W HCPCS: Performed by: INTERNAL MEDICINE

## 2023-12-21 PROCEDURE — APPNB30 APP NON BILLABLE TIME 0-30 MINS: Performed by: PHYSICIAN ASSISTANT

## 2023-12-21 PROCEDURE — 6370000000 HC RX 637 (ALT 250 FOR IP): Performed by: FAMILY MEDICINE

## 2023-12-21 PROCEDURE — 6360000002 HC RX W HCPCS: Performed by: NURSE PRACTITIONER

## 2023-12-21 PROCEDURE — P9047 ALBUMIN (HUMAN), 25%, 50ML: HCPCS | Performed by: NURSE PRACTITIONER

## 2023-12-21 PROCEDURE — 80053 COMPREHEN METABOLIC PANEL: CPT

## 2023-12-21 PROCEDURE — 51798 US URINE CAPACITY MEASURE: CPT

## 2023-12-21 PROCEDURE — 83540 ASSAY OF IRON: CPT

## 2023-12-21 PROCEDURE — 36415 COLL VENOUS BLD VENIPUNCTURE: CPT

## 2023-12-21 PROCEDURE — G0378 HOSPITAL OBSERVATION PER HR: HCPCS

## 2023-12-21 PROCEDURE — 83880 ASSAY OF NATRIURETIC PEPTIDE: CPT

## 2023-12-21 PROCEDURE — 2700000000 HC OXYGEN THERAPY PER DAY

## 2023-12-21 PROCEDURE — 2580000003 HC RX 258: Performed by: FAMILY MEDICINE

## 2023-12-21 PROCEDURE — 83550 IRON BINDING TEST: CPT

## 2023-12-21 PROCEDURE — 96376 TX/PRO/DX INJ SAME DRUG ADON: CPT

## 2023-12-21 PROCEDURE — 97165 OT EVAL LOW COMPLEX 30 MIN: CPT

## 2023-12-21 PROCEDURE — 82728 ASSAY OF FERRITIN: CPT

## 2023-12-21 RX ORDER — ALBUMIN (HUMAN) 12.5 G/50ML
25 SOLUTION INTRAVENOUS EVERY 12 HOURS
Status: COMPLETED | OUTPATIENT
Start: 2023-12-21 | End: 2023-12-22

## 2023-12-21 RX ORDER — METOPROLOL SUCCINATE 25 MG/1
12.5 TABLET, EXTENDED RELEASE ORAL DAILY
Status: DISCONTINUED | OUTPATIENT
Start: 2023-12-22 | End: 2023-12-28 | Stop reason: HOSPADM

## 2023-12-21 RX ADMIN — ALBUMIN (HUMAN) 25 G: 0.25 INJECTION, SOLUTION INTRAVENOUS at 21:08

## 2023-12-21 RX ADMIN — MILRINONE LACTATE IN DEXTROSE 0.25 MCG/KG/MIN: 0.2 INJECTION, SOLUTION INTRAVENOUS at 13:17

## 2023-12-21 RX ADMIN — APIXABAN 5 MG: 5 TABLET, FILM COATED ORAL at 09:23

## 2023-12-21 RX ADMIN — ALBUMIN (HUMAN) 25 G: 0.25 INJECTION, SOLUTION INTRAVENOUS at 09:22

## 2023-12-21 RX ADMIN — SPIRONOLACTONE 25 MG: 25 TABLET ORAL at 09:23

## 2023-12-21 RX ADMIN — TAMSULOSIN HYDROCHLORIDE 0.4 MG: 0.4 CAPSULE ORAL at 09:23

## 2023-12-21 RX ADMIN — BUMETANIDE 2 MG: 0.25 INJECTION INTRAMUSCULAR; INTRAVENOUS at 09:22

## 2023-12-21 RX ADMIN — POTASSIUM BICARBONATE 40 MEQ: 782 TABLET, EFFERVESCENT ORAL at 14:56

## 2023-12-21 RX ADMIN — AMIODARONE HYDROCHLORIDE 200 MG: 200 TABLET ORAL at 09:23

## 2023-12-21 RX ADMIN — IRON SUCROSE 200 MG: 20 INJECTION, SOLUTION INTRAVENOUS at 10:21

## 2023-12-21 RX ADMIN — POTASSIUM BICARBONATE 40 MEQ: 782 TABLET, EFFERVESCENT ORAL at 09:23

## 2023-12-21 RX ADMIN — Medication 1000 UNITS: at 09:23

## 2023-12-21 RX ADMIN — Medication 10 ML: at 09:24

## 2023-12-21 RX ADMIN — Medication 10 ML: at 21:09

## 2023-12-21 RX ADMIN — APIXABAN 5 MG: 5 TABLET, FILM COATED ORAL at 21:08

## 2023-12-21 RX ADMIN — BUMETANIDE 2 MG: 0.25 INJECTION INTRAMUSCULAR; INTRAVENOUS at 21:08

## 2023-12-21 RX ADMIN — POTASSIUM BICARBONATE 40 MEQ: 782 TABLET, EFFERVESCENT ORAL at 21:08

## 2023-12-21 RX ADMIN — LEVOTHYROXINE SODIUM 50 MCG: 50 TABLET ORAL at 07:02

## 2023-12-21 RX ADMIN — EMPAGLIFLOZIN 10 MG: 10 TABLET, FILM COATED ORAL at 09:23

## 2023-12-21 NOTE — CARE COORDINATION
Transition of Care Plan:    RUR: 12% - low  Prior Level of Functioning: independent  Disposition: home health and home infusion  Follow up appointments: [facility]  DME needed: none  Transportation at discharge: family  Caregiver Contact: lexi - Josh Arias - p: [phone]  Discharge Caregiver contacted prior to discharge? planned  Care Conference needed? no  Barriers to discharge: medical    Chart reviewed. Multiple consults placed for LVAD workup including ID, Ortho to determine if patient has infected knee. Images ordered. LVAD workup ongoing. Anticipate patient will discharge to address on file with lexi on home milrinone. CM will continue to follow.     Jenifer Guevara, MPH  Care Manager l [employer]  Available via [facility] or

## 2023-12-22 LAB
ALBUMIN SERPL-MCNC: 3.1 G/DL (ref 3.5–5)
ALBUMIN/GLOB SERPL: 0.8 (ref 1.1–2.2)
ALP SERPL-CCNC: 56 U/L (ref 45–117)
ALT SERPL-CCNC: 18 U/L (ref 12–78)
ANION GAP SERPL CALC-SCNC: 7 MMOL/L (ref 5–15)
AST SERPL-CCNC: 13 U/L (ref 15–37)
BASOPHILS # BLD: 0 K/UL (ref 0–0.1)
BASOPHILS NFR BLD: 1 % (ref 0–1)
BILIRUB SERPL-MCNC: 0.8 MG/DL (ref 0.2–1)
BUN SERPL-MCNC: 37 MG/DL (ref 6–20)
BUN/CREAT SERPL: 19 (ref 12–20)
CALCIUM SERPL-MCNC: 8.6 MG/DL (ref 8.5–10.1)
CHLORIDE SERPL-SCNC: 101 MMOL/L (ref 97–108)
CO2 SERPL-SCNC: 34 MMOL/L (ref 21–32)
CREAT SERPL-MCNC: 1.95 MG/DL (ref 0.7–1.3)
DIFFERENTIAL METHOD BLD: ABNORMAL
EOSINOPHIL # BLD: 0.2 K/UL (ref 0–0.4)
EOSINOPHIL NFR BLD: 4 % (ref 0–7)
ERYTHROCYTE [DISTWIDTH] IN BLOOD BY AUTOMATED COUNT: 17.5 % (ref 11.5–14.5)
GLOBULIN SER CALC-MCNC: 3.7 G/DL (ref 2–4)
GLUCOSE SERPL-MCNC: 87 MG/DL (ref 65–100)
HCT VFR BLD AUTO: 29.3 % (ref 36.6–50.3)
HGB BLD-MCNC: 9.7 G/DL (ref 12.1–17)
IMM GRANULOCYTES # BLD AUTO: 0 K/UL (ref 0–0.04)
IMM GRANULOCYTES NFR BLD AUTO: 0 % (ref 0–0.5)
LYMPHOCYTES # BLD: 1.5 K/UL (ref 0.8–3.5)
LYMPHOCYTES NFR BLD: 38 % (ref 12–49)
MAGNESIUM SERPL-MCNC: 2.2 MG/DL (ref 1.6–2.4)
MCH RBC QN AUTO: 27.8 PG (ref 26–34)
MCHC RBC AUTO-ENTMCNC: 33.1 G/DL (ref 30–36.5)
MCV RBC AUTO: 84 FL (ref 80–99)
MONOCYTES # BLD: 0.3 K/UL (ref 0–1)
MONOCYTES NFR BLD: 8 % (ref 5–13)
NEUTS SEG # BLD: 1.9 K/UL (ref 1.8–8)
NEUTS SEG NFR BLD: 49 % (ref 32–75)
NRBC # BLD: 0 K/UL (ref 0–0.01)
NRBC BLD-RTO: 0 PER 100 WBC
NT PRO BNP: 5313 PG/ML
PLATELET # BLD AUTO: 164 K/UL (ref 150–400)
PMV BLD AUTO: 10.3 FL (ref 8.9–12.9)
POTASSIUM SERPL-SCNC: 3.3 MMOL/L (ref 3.5–5.1)
PROT SERPL-MCNC: 6.8 G/DL (ref 6.4–8.2)
RBC # BLD AUTO: 3.49 M/UL (ref 4.1–5.7)
SODIUM SERPL-SCNC: 142 MMOL/L (ref 136–145)
WBC # BLD AUTO: 3.9 K/UL (ref 4.1–11.1)

## 2023-12-22 PROCEDURE — 80053 COMPREHEN METABOLIC PANEL: CPT

## 2023-12-22 PROCEDURE — G0378 HOSPITAL OBSERVATION PER HR: HCPCS

## 2023-12-22 PROCEDURE — 6370000000 HC RX 637 (ALT 250 FOR IP): Performed by: INTERNAL MEDICINE

## 2023-12-22 PROCEDURE — 97116 GAIT TRAINING THERAPY: CPT

## 2023-12-22 PROCEDURE — P9047 ALBUMIN (HUMAN), 25%, 50ML: HCPCS | Performed by: NURSE PRACTITIONER

## 2023-12-22 PROCEDURE — 36415 COLL VENOUS BLD VENIPUNCTURE: CPT

## 2023-12-22 PROCEDURE — 85025 COMPLETE CBC W/AUTO DIFF WBC: CPT

## 2023-12-22 PROCEDURE — 6360000002 HC RX W HCPCS: Performed by: NURSE PRACTITIONER

## 2023-12-22 PROCEDURE — 2700000000 HC OXYGEN THERAPY PER DAY

## 2023-12-22 PROCEDURE — 2500000003 HC RX 250 WO HCPCS: Performed by: INTERNAL MEDICINE

## 2023-12-22 PROCEDURE — 6370000000 HC RX 637 (ALT 250 FOR IP): Performed by: NURSE PRACTITIONER

## 2023-12-22 PROCEDURE — 6370000000 HC RX 637 (ALT 250 FOR IP): Performed by: FAMILY MEDICINE

## 2023-12-22 PROCEDURE — 2580000003 HC RX 258: Performed by: FAMILY MEDICINE

## 2023-12-22 PROCEDURE — 83880 ASSAY OF NATRIURETIC PEPTIDE: CPT

## 2023-12-22 PROCEDURE — 2060000000 HC ICU INTERMEDIATE R&B

## 2023-12-22 PROCEDURE — 83735 ASSAY OF MAGNESIUM: CPT

## 2023-12-22 PROCEDURE — 6360000002 HC RX W HCPCS: Performed by: INTERNAL MEDICINE

## 2023-12-22 PROCEDURE — 96376 TX/PRO/DX INJ SAME DRUG ADON: CPT

## 2023-12-22 PROCEDURE — 94760 N-INVAS EAR/PLS OXIMETRY 1: CPT

## 2023-12-22 PROCEDURE — 99232 SBSQ HOSP IP/OBS MODERATE 35: CPT | Performed by: NURSE PRACTITIONER

## 2023-12-22 RX ORDER — POTASSIUM CHLORIDE 750 MG/1
40 TABLET, FILM COATED, EXTENDED RELEASE ORAL 2 TIMES DAILY
Status: COMPLETED | OUTPATIENT
Start: 2023-12-22 | End: 2023-12-23

## 2023-12-22 RX ADMIN — AMIODARONE HYDROCHLORIDE 200 MG: 200 TABLET ORAL at 09:12

## 2023-12-22 RX ADMIN — POTASSIUM BICARBONATE 40 MEQ: 782 TABLET, EFFERVESCENT ORAL at 09:12

## 2023-12-22 RX ADMIN — Medication 1000 UNITS: at 09:12

## 2023-12-22 RX ADMIN — IRON SUCROSE 200 MG: 20 INJECTION, SOLUTION INTRAVENOUS at 11:12

## 2023-12-22 RX ADMIN — SPIRONOLACTONE 25 MG: 25 TABLET ORAL at 09:12

## 2023-12-22 RX ADMIN — ALBUMIN (HUMAN) 25 G: 0.25 INJECTION, SOLUTION INTRAVENOUS at 09:13

## 2023-12-22 RX ADMIN — APIXABAN 5 MG: 5 TABLET, FILM COATED ORAL at 22:07

## 2023-12-22 RX ADMIN — EMPAGLIFLOZIN 10 MG: 10 TABLET, FILM COATED ORAL at 09:12

## 2023-12-22 RX ADMIN — BUMETANIDE 2 MG: 0.25 INJECTION INTRAMUSCULAR; INTRAVENOUS at 22:06

## 2023-12-22 RX ADMIN — TAMSULOSIN HYDROCHLORIDE 0.4 MG: 0.4 CAPSULE ORAL at 09:12

## 2023-12-22 RX ADMIN — BUMETANIDE 2 MG: 0.25 INJECTION INTRAMUSCULAR; INTRAVENOUS at 09:13

## 2023-12-22 RX ADMIN — Medication 10 ML: at 09:52

## 2023-12-22 RX ADMIN — POTASSIUM CHLORIDE 40 MEQ: 750 TABLET, FILM COATED, EXTENDED RELEASE ORAL at 22:07

## 2023-12-22 RX ADMIN — POTASSIUM CHLORIDE 40 MEQ: 750 TABLET, FILM COATED, EXTENDED RELEASE ORAL at 13:26

## 2023-12-22 RX ADMIN — ALBUMIN (HUMAN) 25 G: 0.25 INJECTION, SOLUTION INTRAVENOUS at 22:12

## 2023-12-22 RX ADMIN — METOPROLOL SUCCINATE 12.5 MG: 25 TABLET, EXTENDED RELEASE ORAL at 09:12

## 2023-12-22 RX ADMIN — BUMETANIDE 2 MG: 0.25 INJECTION INTRAMUSCULAR; INTRAVENOUS at 16:08

## 2023-12-22 RX ADMIN — MILRINONE LACTATE IN DEXTROSE 0.25 MCG/KG/MIN: 0.2 INJECTION, SOLUTION INTRAVENOUS at 07:25

## 2023-12-22 RX ADMIN — APIXABAN 5 MG: 5 TABLET, FILM COATED ORAL at 09:12

## 2023-12-22 RX ADMIN — LEVOTHYROXINE SODIUM 50 MCG: 50 TABLET ORAL at 04:00

## 2023-12-22 RX ADMIN — Medication 10 ML: at 22:18

## 2023-12-22 NOTE — CONSULTS
[facility]                    Cardiology [facility] Care Note     [x]Initial Encounter     []Follow-up    Patient Name: Jeffrey Shaikh [facility]:[id number]  Primary Cardiologist: Jenna Joseph MD  Consulting Cardiologist: Beth Paul MD     Reason for encounter: CHF    HPI:       Gumaro Lemon is a 68 y.o. male with PMH significant for AICD and CHF   [place] and said his cardiologist was at [facility] there  He cannot tell me about the device or meds and PMH but said he had heart failure  Telemetry in ED showed atrial pacing with paroxysmal atrial tachycardia and PVCs  Chest xray had reported mild pulmonary edema  TSH 37.5     Dr Mika Kelley saw him in Nov   His daughter lives in [place]  Dr Mika Kelley summarized: \"Records were obtained from [address] [place] [address] in the [facility] where he was admitted to the hospital recently. He also has records from the [facility] in [place] where he was sent for heart failure. He has an implantable defibrillator and was told that he might need an LVAD. His ejection fraction was 24% at 1 time but apparently fell more recently to 18% although I cannot find records from the echocardiogram.  He is a poor historian but tells me that he might of had a heart catheterization done somewhere in [place] a year ago and was told that he had no blockages. He does have some shortness of breath and some swelling in his legs. He is on amiodarone 200 mg down from 400 mg. He was previously on digoxin but it was stopped. He was on milrinone in the hospital in the [facility] and his creatinine was about 2.7 but more recently has come down to 1.3. He was taking Bumex 2 mg twice daily but was told to increase it to 4 mg twice daily for swelling which she has done for several days. He also takes Entresto and carvedilol as well as amlodipine.   He has bilateral prosthetic knees and a currently has a chronic infection in the
[address] in [place], [place]  Inpatient Progress Note      Patient name: Michelle Kline  Patient : 1950  Patient MRN: [id number]  Consulting MD: Lisset Bailon, *  Date of service: 23    REASON FOR REFERRAL:  Management of advanced heart failure    DISCUSSION:  Michelle Kline is a 68 y.o. male admitted with acute on chronic systolic heart failure, stage D, NYHA class IV. Multiple hospitalizations for heart failure last 6 months, requiring inotrope therapy during hospitalization for stabilization. I have discussed anticipated benefits to both quality and quantity of life with DT LVAD. I have also included longer-term risk which include:  Bleedin out of 5 patients. Most common source is gastrointestinal and recurrent bleeding can require transfusion, hospitalization, and endoscopic evaluations  Infection: 1 out of 10 patients develop infection. These are most commonly associated with exit site driveline and can require long-term antibiotics, surgical drainage or pump replacement  Stroke: 1 out of 15 patient's may have stroke, half of which may be hemorrhagic, often life-threatening, while nonhemorrhagic strokes tend to be disabling  LVAD thrombosis/replacement: 1 out of 75 patients may require pump replacement due to mechanical failure  Body image concerns: Some patients experience emotional stress from having to manage LVAD peripherals (batteries,  and driveline)  LVAD evaluation process discussed with patient, daughter at bedside and daughter by phone. Questions answered. Patient would like to proceed with LVAD evaluation, though not sure yet if he would want to pursue LVAD implant. We discussed this would limit him to living full time locally and he would not be able to permanently move back home to [address] [place] [address].  We discussed alternatives including palliative inotrope therapy for symptom relief but inotrope therapy does not
[facility]   [address], [facility], [address] [facility]  Phone: [phone]   [phone] NOTE     Patient: Kristina Carr MRN: [id number]  PCP: Lala Rausch MD   :     1950  Age:   68 y.o. Sex:  male      Referring physician: Nicol Glez, Marylin Krueger, *  Reason for consultation: AMITA on CKD  Admission Date: 2023 11:01 PM  LOS: 0 days      ASSESSMENT and PLAN :   AMITA on CKD,  likely cardiorenal, UOO?  - Cr on record 2  - presenting Cr 2.58; given IV Bumex  - was having urinary retention but this resolved  - Cr down to 2.19  - check US renal; Ulytes, UPCR  - strict I/Os; bladder scan  - no NSAIDs, hypotension    Hypokalemia, from diuretics  - replete accordingly; Mg normal    HFrEF flare  Dilated cardiomyopathy s/p AICD  - Cardio on board  - milrinone gtt  - Bumex   - hold Aldactone, Paulett Part for now       Afib   Hypothyroidism       Care Plan discussed with:  pt/daughter        Thank you for consulting [facility] in the care of your patient. Subjective:   HPI: Kristina Carr is a 68 y.o.  male with past medical history of dilated cardiomyopathy, AICD, chronic heart failure reduced ejection fraction (HFrEF), atrial fibrillation, obstructive sleep apnea on CPAP, CKD, hypothyroidism presented to the emergency department chief complaint of shortness of breath and difficulty urinating. Patient is a limited historian. Majority of history is obtained per review of ED and electronic medical records. Of note, patient has been followed in the past at [facility] clinic for heart failure with EF as low as 18%. He notably had AICD implantation with discussion for possible LVAD. He now is followed locally by cardiologist.  He has some chronic knee infection for which he has been on rifampin and ciprofloxacin as an outpatient.   He started complaining of shortness of breath which is worse in, aggravated with activity, without
Nutrition Education    Consult appreciated. RD following pt, see full assessment from 12/11 for more details. Did provided education today with daughter present. Pt has received low Na+ diet education in the past and has made dietary changes at home. He does not add salt to his foods, seasons with Ms. Marely Van. He eats fast food on occasion, less than weekly. Does not regularly consume high Na+ foods. Educated on CHF diet. Learners: Patient and Family  Readiness: Acceptance  Method: Explanation and Handout  Response: Verbalizes Understanding  Contact name and number provided.     Manjula Rinaldi RD  Contact via [email or web address]
Palliative Medicine  Patient Name: Jack Reilly  YOB: 1950  MRN: [id number]  Age: 68 y.o. Gender: male    Date of Initial Consult: 12/13/2023  Date of Service: 12/13/2023  Time: 4:20 PM  Provider: Concha Bell Day: 5  Admit Date: 12/9/2023  Referring Provider: Jose Tang        Reasons for Consultation:  LVAD evaluation     HISTORY OF PRESENT ILLNESS (HPI):   Jack Reilly is a 68 y.o. male with a past medical history of HFrEF- 15-20%, who was admitted on 12/9/2023 from home due to shortness of breath and urinary hesitancy and admitted with acute on chronic HFrEF, pulmonary edema, AMITA on CKD 3, afib (amiodarone), elevated troponin,urinary retention. Patient is on a milrinone drip and likely to go home on inotrope therapy. PMH:   HFrEF- stage D, NYHA Class IV, non-ischemic cardiomyopathy, dual chamber AICD- 9/29/2023, MI x 2,  afib (amiodarone), hypothyroidism, HTN, hyperlipidemia, GERRI (CPAP), BPH, TURP, bilateral prosthetic knees (June and July 2023) with infection in right knee (rifampin and cipro)     Psychosocial: patient is from [address] [place] [address] in the [facility]. His wife lives there. Patient is now here in [facility] as of October 2023. He lives with close family friends- his \"adopted daughter\" (Major Ross) and her family- 5 total adults in the home. Patient's daughter, Diogenes Ramsey, who is his MPOA lives in [address] [place] [address]   Patient was receiving care at the [facility] in [address] [place] [address]. Patient has 5 daughters and 2 sons. They live all over the [address] [place] [address] and in [address] [place] [address]. Patient had been working as a - more recently doing more maintenance work. Daughter reports she has the patient's AMD and she is the primary MPOA.  She will email in the document     Patient would describe himself as easily adaptable and a \"simple person\"    Patient states his family and his work are important to him       PALLIATIVE DIAGNOSES:    Palliative care encounter  HFrEF-
for headache   Skin: Negative for rash, sores/open wounds   Musculoskeletal: Negative joint pain,has R knee swelling,no erythema    Endocrine: Negative    Psych: Negative     Medications:  No current facility-administered medications on file prior to encounter.      Current Outpatient Medications on File Prior to Encounter   Medication Sig Dispense Refill    levothyroxine (SYNTHROID) 50 MCG tablet Take 1 tablet by mouth daily 30 tablet 3    potassium chloride (KLOR-CON M) 20 MEQ extended release tablet Take 1 tablet by mouth daily (Patient taking differently: Take 1 tablet by mouth 2 times daily) 30 tablet 3    ciprofloxacin (CIPRO) 750 MG tablet Take 1 tablet by mouth 2 times daily      dapagliflozin (FARXIGA) 10 MG tablet Take 1 tablet by mouth Daily with supper      apixaban (ELIQUIS) 5 MG TABS tablet Take 1 tablet by mouth 2 times daily      carvedilol (COREG) 3.125 MG tablet Take 1 tablet by mouth 2 times daily (with meals)      spironolactone (ALDACTONE) 25 MG tablet Take 1 tablet by mouth daily      sacubitril-valsartan (ENTRESTO) 24-26 MG per tablet Take 1 tablet by mouth daily      bumetanide (BUMEX) 2 MG tablet Take 1 tablet by mouth 2 times daily      tamsulosin (FLOMAX) 0.4 MG capsule Take 1 capsule by mouth daily      potassium chloride (KLOR-CON M) 20 MEQ extended release tablet Take 1 tablet by mouth daily (Patient not taking: Reported on 12/11/2023)      rifAMPin (RIFADIN) 300 MG capsule Take 1 capsule by mouth daily      amiodarone (PACERONE) 100 MG tablet Take 1 tablet by mouth daily (Patient taking differently: Take 2 tablets by mouth 2 times daily) 30 tablet 3           Physical Exam:    Vitals: Patient Vitals for the past 24 hrs:   Temp Pulse Resp BP SpO2   12/20/23 1541 -- 77 -- 95/62 --   12/20/23 1444 -- 96 -- 121/61 90 %   12/20/23 1434 -- 94 -- 95/65 91 %   12/20/23 1430 -- 76 -- (!) 85/53 --   12/20/23 1415 -- -- -- (!) 89/49 --   12/20/23 1400 -- 80 -- -- --   12/20/23 1200 -- 73 -- -- --
chronic congestive heart failure, unspecified heart failure type (HCC)    Acute on chronic systolic CHF (congestive heart failure), NYHA class 2 (HCC)    Shortness of breath     Plan:       Regular diet now, transition to CLD on 12/26  Plan for EGD/colonoscopy on 12/27  On Eliquis, would need to hold 2 days prior to procedure  Cardiac clearance requested  Discussed patient with Dr. Freedom Montana  Thank you for allowing me to participate in care of Neerajdionna Angeloa       Signed By: Sol Muhammad PA-C     12/22/2023  12:56 PM
problems. *      Plan:   History of bilateral total knee arthroplasties out of the area with reported chronic infection of right knee    At this time patient does not have exam consistent with chronic infection of his right knee. He has no erythema, effusion or evidence of chronic pain. He has done well with his motion despite having minimal amounts of physical therapy. It is not completely clear why he was started on 6 months of antibiotics. Per patient family member at his surgeon states that no bacteria was ever found on aspirate or washout cultures. She is unsure why he he was concerned about an infection in the first place. Have called patient's index surgeon's office and found him to be unavailable but have started the process to request records that cover his treatment in terms of his right knee.   CRP and ESR ordered   Will hold off on aspiration for now until above labs are back - can consider aspiration if significantly elevated but patient likely has minimal if any intraarticular fluid  Unclear if needs to remain on PTA antibiotics  Likely needs ID input  Will follow  No surgical plans at this time and would be very hesitant to explant current hardware as he has no symptoms    Dr Beatriz Hawthorne aware of patient and in agreement with current plan of care    Dylon Bingham PA-C  Orthopedic Trauma Service  [facility]

## 2023-12-23 LAB
ALBUMIN SERPL-MCNC: 3.6 G/DL (ref 3.5–5)
ALBUMIN/GLOB SERPL: 1 (ref 1.1–2.2)
ALP SERPL-CCNC: 64 U/L (ref 45–117)
ALT SERPL-CCNC: 16 U/L (ref 12–78)
ANION GAP SERPL CALC-SCNC: 6 MMOL/L (ref 5–15)
AST SERPL-CCNC: 10 U/L (ref 15–37)
BASOPHILS # BLD: 0 K/UL (ref 0–0.1)
BASOPHILS NFR BLD: 1 % (ref 0–1)
BILIRUB SERPL-MCNC: 0.9 MG/DL (ref 0.2–1)
BUN SERPL-MCNC: 35 MG/DL (ref 6–20)
BUN/CREAT SERPL: 19 (ref 12–20)
CALCIUM SERPL-MCNC: 9.2 MG/DL (ref 8.5–10.1)
CHLORIDE SERPL-SCNC: 105 MMOL/L (ref 97–108)
CO2 SERPL-SCNC: 31 MMOL/L (ref 21–32)
CREAT SERPL-MCNC: 1.82 MG/DL (ref 0.7–1.3)
DIFFERENTIAL METHOD BLD: ABNORMAL
EOSINOPHIL # BLD: 0.2 K/UL (ref 0–0.4)
EOSINOPHIL NFR BLD: 4 % (ref 0–7)
ERYTHROCYTE [DISTWIDTH] IN BLOOD BY AUTOMATED COUNT: 17.5 % (ref 11.5–14.5)
GLOBULIN SER CALC-MCNC: 3.7 G/DL (ref 2–4)
GLUCOSE SERPL-MCNC: 108 MG/DL (ref 65–100)
HCT VFR BLD AUTO: 30 % (ref 36.6–50.3)
HGB BLD-MCNC: 9.9 G/DL (ref 12.1–17)
IMM GRANULOCYTES # BLD AUTO: 0 K/UL (ref 0–0.04)
IMM GRANULOCYTES NFR BLD AUTO: 0 % (ref 0–0.5)
LYMPHOCYTES # BLD: 1.4 K/UL (ref 0.8–3.5)
LYMPHOCYTES NFR BLD: 35 % (ref 12–49)
MCH RBC QN AUTO: 27.9 PG (ref 26–34)
MCHC RBC AUTO-ENTMCNC: 33 G/DL (ref 30–36.5)
MCV RBC AUTO: 84.5 FL (ref 80–99)
MONOCYTES # BLD: 0.3 K/UL (ref 0–1)
MONOCYTES NFR BLD: 7 % (ref 5–13)
NEUTS SEG # BLD: 2.1 K/UL (ref 1.8–8)
NEUTS SEG NFR BLD: 53 % (ref 32–75)
NRBC # BLD: 0 K/UL (ref 0–0.01)
NRBC BLD-RTO: 0 PER 100 WBC
NT PRO BNP: 6784 PG/ML
PLATELET # BLD AUTO: 163 K/UL (ref 150–400)
PMV BLD AUTO: 9.8 FL (ref 8.9–12.9)
POTASSIUM SERPL-SCNC: 3.8 MMOL/L (ref 3.5–5.1)
PROT SERPL-MCNC: 7.3 G/DL (ref 6.4–8.2)
RBC # BLD AUTO: 3.55 M/UL (ref 4.1–5.7)
SODIUM SERPL-SCNC: 142 MMOL/L (ref 136–145)
WBC # BLD AUTO: 3.9 K/UL (ref 4.1–11.1)

## 2023-12-23 PROCEDURE — 2060000000 HC ICU INTERMEDIATE R&B

## 2023-12-23 PROCEDURE — 6370000000 HC RX 637 (ALT 250 FOR IP): Performed by: INTERNAL MEDICINE

## 2023-12-23 PROCEDURE — G0378 HOSPITAL OBSERVATION PER HR: HCPCS

## 2023-12-23 PROCEDURE — 2500000003 HC RX 250 WO HCPCS: Performed by: INTERNAL MEDICINE

## 2023-12-23 PROCEDURE — 6370000000 HC RX 637 (ALT 250 FOR IP): Performed by: NURSE PRACTITIONER

## 2023-12-23 PROCEDURE — 99233 SBSQ HOSP IP/OBS HIGH 50: CPT | Performed by: INTERNAL MEDICINE

## 2023-12-23 PROCEDURE — 96376 TX/PRO/DX INJ SAME DRUG ADON: CPT

## 2023-12-23 PROCEDURE — 80053 COMPREHEN METABOLIC PANEL: CPT

## 2023-12-23 PROCEDURE — 6360000002 HC RX W HCPCS: Performed by: INTERNAL MEDICINE

## 2023-12-23 PROCEDURE — 2580000003 HC RX 258: Performed by: FAMILY MEDICINE

## 2023-12-23 PROCEDURE — 6370000000 HC RX 637 (ALT 250 FOR IP): Performed by: FAMILY MEDICINE

## 2023-12-23 PROCEDURE — 85025 COMPLETE CBC W/AUTO DIFF WBC: CPT

## 2023-12-23 PROCEDURE — 36415 COLL VENOUS BLD VENIPUNCTURE: CPT

## 2023-12-23 PROCEDURE — 83880 ASSAY OF NATRIURETIC PEPTIDE: CPT

## 2023-12-23 RX ORDER — MILRINONE LACTATE 0.2 MG/ML
0.25 INJECTION, SOLUTION INTRAVENOUS CONTINUOUS
Status: DISCONTINUED | OUTPATIENT
Start: 2023-12-23 | End: 2023-12-27

## 2023-12-23 RX ADMIN — MILRINONE LACTATE IN DEXTROSE 0.25 MCG/KG/MIN: 0.2 INJECTION, SOLUTION INTRAVENOUS at 16:24

## 2023-12-23 RX ADMIN — APIXABAN 5 MG: 5 TABLET, FILM COATED ORAL at 09:23

## 2023-12-23 RX ADMIN — SPIRONOLACTONE 25 MG: 25 TABLET ORAL at 09:23

## 2023-12-23 RX ADMIN — APIXABAN 5 MG: 5 TABLET, FILM COATED ORAL at 21:03

## 2023-12-23 RX ADMIN — TAMSULOSIN HYDROCHLORIDE 0.4 MG: 0.4 CAPSULE ORAL at 09:23

## 2023-12-23 RX ADMIN — AMIODARONE HYDROCHLORIDE 200 MG: 200 TABLET ORAL at 09:23

## 2023-12-23 RX ADMIN — Medication 10 ML: at 21:04

## 2023-12-23 RX ADMIN — METOPROLOL SUCCINATE 12.5 MG: 25 TABLET, EXTENDED RELEASE ORAL at 09:23

## 2023-12-23 RX ADMIN — POTASSIUM CHLORIDE 40 MEQ: 750 TABLET, FILM COATED, EXTENDED RELEASE ORAL at 21:03

## 2023-12-23 RX ADMIN — Medication 1000 UNITS: at 09:23

## 2023-12-23 RX ADMIN — POTASSIUM CHLORIDE 40 MEQ: 750 TABLET, FILM COATED, EXTENDED RELEASE ORAL at 09:23

## 2023-12-23 RX ADMIN — BUMETANIDE 2 MG: 0.25 INJECTION INTRAMUSCULAR; INTRAVENOUS at 09:26

## 2023-12-23 RX ADMIN — LEVOTHYROXINE SODIUM 50 MCG: 50 TABLET ORAL at 06:24

## 2023-12-23 RX ADMIN — BUMETANIDE 2 MG: 0.25 INJECTION INTRAMUSCULAR; INTRAVENOUS at 14:20

## 2023-12-23 RX ADMIN — EMPAGLIFLOZIN 10 MG: 10 TABLET, FILM COATED ORAL at 09:23

## 2023-12-23 RX ADMIN — BUMETANIDE 2 MG: 0.25 INJECTION INTRAMUSCULAR; INTRAVENOUS at 21:03

## 2023-12-24 LAB
ALBUMIN SERPL-MCNC: 3.3 G/DL (ref 3.5–5)
ALBUMIN/GLOB SERPL: 1 (ref 1.1–2.2)
ALP SERPL-CCNC: 52 U/L (ref 45–117)
ALT SERPL-CCNC: 15 U/L (ref 12–78)
ANION GAP SERPL CALC-SCNC: 5 MMOL/L (ref 5–15)
AST SERPL-CCNC: 11 U/L (ref 15–37)
BILIRUB SERPL-MCNC: 1.3 MG/DL (ref 0.2–1)
BUN SERPL-MCNC: 33 MG/DL (ref 6–20)
BUN/CREAT SERPL: 19 (ref 12–20)
CALCIUM SERPL-MCNC: 8.8 MG/DL (ref 8.5–10.1)
CHLORIDE SERPL-SCNC: 105 MMOL/L (ref 97–108)
CO2 SERPL-SCNC: 30 MMOL/L (ref 21–32)
CREAT SERPL-MCNC: 1.72 MG/DL (ref 0.7–1.3)
GLOBULIN SER CALC-MCNC: 3.4 G/DL (ref 2–4)
GLUCOSE SERPL-MCNC: 105 MG/DL (ref 65–100)
NT PRO BNP: 7570 PG/ML
POTASSIUM SERPL-SCNC: 3.5 MMOL/L (ref 3.5–5.1)
PROT SERPL-MCNC: 6.7 G/DL (ref 6.4–8.2)
SODIUM SERPL-SCNC: 140 MMOL/L (ref 136–145)

## 2023-12-24 PROCEDURE — 99233 SBSQ HOSP IP/OBS HIGH 50: CPT | Performed by: INTERNAL MEDICINE

## 2023-12-24 PROCEDURE — 6370000000 HC RX 637 (ALT 250 FOR IP): Performed by: NURSE PRACTITIONER

## 2023-12-24 PROCEDURE — 6370000000 HC RX 637 (ALT 250 FOR IP): Performed by: INTERNAL MEDICINE

## 2023-12-24 PROCEDURE — 36415 COLL VENOUS BLD VENIPUNCTURE: CPT

## 2023-12-24 PROCEDURE — 96376 TX/PRO/DX INJ SAME DRUG ADON: CPT

## 2023-12-24 PROCEDURE — 2060000000 HC ICU INTERMEDIATE R&B

## 2023-12-24 PROCEDURE — 80053 COMPREHEN METABOLIC PANEL: CPT

## 2023-12-24 PROCEDURE — 6360000002 HC RX W HCPCS: Performed by: INTERNAL MEDICINE

## 2023-12-24 PROCEDURE — 6370000000 HC RX 637 (ALT 250 FOR IP): Performed by: FAMILY MEDICINE

## 2023-12-24 PROCEDURE — 2580000003 HC RX 258: Performed by: FAMILY MEDICINE

## 2023-12-24 PROCEDURE — 83880 ASSAY OF NATRIURETIC PEPTIDE: CPT

## 2023-12-24 PROCEDURE — 2500000003 HC RX 250 WO HCPCS: Performed by: INTERNAL MEDICINE

## 2023-12-24 PROCEDURE — G0378 HOSPITAL OBSERVATION PER HR: HCPCS

## 2023-12-24 RX ORDER — METOLAZONE 5 MG/1
5 TABLET ORAL ONCE
Status: COMPLETED | OUTPATIENT
Start: 2023-12-24 | End: 2023-12-24

## 2023-12-24 RX ADMIN — EMPAGLIFLOZIN 10 MG: 10 TABLET, FILM COATED ORAL at 08:39

## 2023-12-24 RX ADMIN — LEVOTHYROXINE SODIUM 50 MCG: 50 TABLET ORAL at 05:35

## 2023-12-24 RX ADMIN — Medication 1000 UNITS: at 08:39

## 2023-12-24 RX ADMIN — BUMETANIDE 2 MG: 0.25 INJECTION INTRAMUSCULAR; INTRAVENOUS at 08:40

## 2023-12-24 RX ADMIN — AMIODARONE HYDROCHLORIDE 200 MG: 200 TABLET ORAL at 08:40

## 2023-12-24 RX ADMIN — Medication 10 ML: at 08:40

## 2023-12-24 RX ADMIN — METOPROLOL SUCCINATE 12.5 MG: 25 TABLET, EXTENDED RELEASE ORAL at 08:39

## 2023-12-24 RX ADMIN — TAMSULOSIN HYDROCHLORIDE 0.4 MG: 0.4 CAPSULE ORAL at 08:39

## 2023-12-24 RX ADMIN — APIXABAN 5 MG: 5 TABLET, FILM COATED ORAL at 20:45

## 2023-12-24 RX ADMIN — BUMETANIDE 2 MG: 0.25 INJECTION INTRAMUSCULAR; INTRAVENOUS at 15:32

## 2023-12-24 RX ADMIN — SPIRONOLACTONE 25 MG: 25 TABLET ORAL at 08:39

## 2023-12-24 RX ADMIN — BUMETANIDE 2 MG: 0.25 INJECTION INTRAMUSCULAR; INTRAVENOUS at 20:45

## 2023-12-24 RX ADMIN — Medication 10 ML: at 20:56

## 2023-12-24 RX ADMIN — METOLAZONE 5 MG: 5 TABLET ORAL at 08:39

## 2023-12-24 RX ADMIN — APIXABAN 5 MG: 5 TABLET, FILM COATED ORAL at 08:40

## 2023-12-24 RX ADMIN — MILRINONE LACTATE IN DEXTROSE 0.25 MCG/KG/MIN: 0.2 INJECTION, SOLUTION INTRAVENOUS at 13:00

## 2023-12-24 ASSESSMENT — PAIN SCALES - GENERAL
PAINLEVEL_OUTOF10: 0

## 2023-12-25 LAB
ALBUMIN SERPL-MCNC: 3.4 G/DL (ref 3.5–5)
ALBUMIN/GLOB SERPL: 0.9 (ref 1.1–2.2)
ALP SERPL-CCNC: 54 U/L (ref 45–117)
ALT SERPL-CCNC: 13 U/L (ref 12–78)
ANION GAP SERPL CALC-SCNC: 7 MMOL/L (ref 5–15)
AST SERPL-CCNC: 12 U/L (ref 15–37)
BILIRUB SERPL-MCNC: 1.3 MG/DL (ref 0.2–1)
BUN SERPL-MCNC: 32 MG/DL (ref 6–20)
BUN/CREAT SERPL: 17 (ref 12–20)
CALCIUM SERPL-MCNC: 9 MG/DL (ref 8.5–10.1)
CHLORIDE SERPL-SCNC: 99 MMOL/L (ref 97–108)
CO2 SERPL-SCNC: 34 MMOL/L (ref 21–32)
CREAT SERPL-MCNC: 1.89 MG/DL (ref 0.7–1.3)
GLOBULIN SER CALC-MCNC: 3.6 G/DL (ref 2–4)
GLUCOSE SERPL-MCNC: 105 MG/DL (ref 65–100)
NT PRO BNP: 6461 PG/ML
POTASSIUM SERPL-SCNC: 2.8 MMOL/L (ref 3.5–5.1)
PROT SERPL-MCNC: 7 G/DL (ref 6.4–8.2)
SODIUM SERPL-SCNC: 140 MMOL/L (ref 136–145)

## 2023-12-25 PROCEDURE — 2580000003 HC RX 258: Performed by: FAMILY MEDICINE

## 2023-12-25 PROCEDURE — 99233 SBSQ HOSP IP/OBS HIGH 50: CPT | Performed by: INTERNAL MEDICINE

## 2023-12-25 PROCEDURE — 6370000000 HC RX 637 (ALT 250 FOR IP): Performed by: INTERNAL MEDICINE

## 2023-12-25 PROCEDURE — 83880 ASSAY OF NATRIURETIC PEPTIDE: CPT

## 2023-12-25 PROCEDURE — 6370000000 HC RX 637 (ALT 250 FOR IP): Performed by: FAMILY MEDICINE

## 2023-12-25 PROCEDURE — 94760 N-INVAS EAR/PLS OXIMETRY 1: CPT

## 2023-12-25 PROCEDURE — 2060000000 HC ICU INTERMEDIATE R&B

## 2023-12-25 PROCEDURE — 6370000000 HC RX 637 (ALT 250 FOR IP): Performed by: NURSE PRACTITIONER

## 2023-12-25 PROCEDURE — 80053 COMPREHEN METABOLIC PANEL: CPT

## 2023-12-25 PROCEDURE — 36415 COLL VENOUS BLD VENIPUNCTURE: CPT

## 2023-12-25 PROCEDURE — G0378 HOSPITAL OBSERVATION PER HR: HCPCS

## 2023-12-25 PROCEDURE — 6360000002 HC RX W HCPCS: Performed by: INTERNAL MEDICINE

## 2023-12-25 PROCEDURE — 96376 TX/PRO/DX INJ SAME DRUG ADON: CPT

## 2023-12-25 PROCEDURE — 6370000000 HC RX 637 (ALT 250 FOR IP): Performed by: HOSPITALIST

## 2023-12-25 RX ORDER — POTASSIUM CHLORIDE 750 MG/1
40 TABLET, FILM COATED, EXTENDED RELEASE ORAL
Status: DISCONTINUED | OUTPATIENT
Start: 2023-12-25 | End: 2023-12-26

## 2023-12-25 RX ORDER — POTASSIUM CHLORIDE 750 MG/1
40 TABLET, FILM COATED, EXTENDED RELEASE ORAL ONCE
Status: CANCELLED | OUTPATIENT
Start: 2023-12-25

## 2023-12-25 RX ORDER — POTASSIUM CHLORIDE 750 MG/1
40 TABLET, FILM COATED, EXTENDED RELEASE ORAL 2 TIMES DAILY
Status: DISCONTINUED | OUTPATIENT
Start: 2023-12-25 | End: 2023-12-25

## 2023-12-25 RX ORDER — POTASSIUM CHLORIDE 750 MG/1
40 TABLET, FILM COATED, EXTENDED RELEASE ORAL ONCE
Status: COMPLETED | OUTPATIENT
Start: 2023-12-25 | End: 2023-12-25

## 2023-12-25 RX ORDER — BUMETANIDE 1 MG/1
3 TABLET ORAL
Status: DISCONTINUED | OUTPATIENT
Start: 2023-12-25 | End: 2023-12-28

## 2023-12-25 RX ORDER — ACETAZOLAMIDE 500 MG/1
500 CAPSULE, EXTENDED RELEASE ORAL DAILY
Status: COMPLETED | OUTPATIENT
Start: 2023-12-25 | End: 2023-12-26

## 2023-12-25 RX ADMIN — METOPROLOL SUCCINATE 12.5 MG: 25 TABLET, EXTENDED RELEASE ORAL at 08:27

## 2023-12-25 RX ADMIN — Medication 10 ML: at 08:28

## 2023-12-25 RX ADMIN — POTASSIUM CHLORIDE 40 MEQ: 750 TABLET, FILM COATED, EXTENDED RELEASE ORAL at 08:27

## 2023-12-25 RX ADMIN — Medication 1000 UNITS: at 08:27

## 2023-12-25 RX ADMIN — AMIODARONE HYDROCHLORIDE 200 MG: 200 TABLET ORAL at 08:27

## 2023-12-25 RX ADMIN — BUMETANIDE 3 MG: 1 TABLET ORAL at 08:25

## 2023-12-25 RX ADMIN — MILRINONE LACTATE IN DEXTROSE 0.25 MCG/KG/MIN: 0.2 INJECTION, SOLUTION INTRAVENOUS at 23:16

## 2023-12-25 RX ADMIN — EMPAGLIFLOZIN 10 MG: 10 TABLET, FILM COATED ORAL at 08:26

## 2023-12-25 RX ADMIN — SPIRONOLACTONE 25 MG: 25 TABLET ORAL at 08:26

## 2023-12-25 RX ADMIN — BUMETANIDE 3 MG: 1 TABLET ORAL at 16:06

## 2023-12-25 RX ADMIN — MILRINONE LACTATE IN DEXTROSE 0.25 MCG/KG/MIN: 0.2 INJECTION, SOLUTION INTRAVENOUS at 04:43

## 2023-12-25 RX ADMIN — ACETAZOLAMIDE EXTENDED-RELEASE 500 MG: 500 CAPSULE ORAL at 10:11

## 2023-12-25 RX ADMIN — APIXABAN 5 MG: 5 TABLET, FILM COATED ORAL at 20:30

## 2023-12-25 RX ADMIN — POTASSIUM CHLORIDE 40 MEQ: 750 TABLET, FILM COATED, EXTENDED RELEASE ORAL at 16:06

## 2023-12-25 RX ADMIN — LEVOTHYROXINE SODIUM 50 MCG: 50 TABLET ORAL at 04:43

## 2023-12-25 RX ADMIN — POTASSIUM CHLORIDE 40 MEQ: 750 TABLET, FILM COATED, EXTENDED RELEASE ORAL at 11:49

## 2023-12-25 RX ADMIN — APIXABAN 5 MG: 5 TABLET, FILM COATED ORAL at 08:27

## 2023-12-25 RX ADMIN — Medication 10 ML: at 20:31

## 2023-12-25 RX ADMIN — TAMSULOSIN HYDROCHLORIDE 0.4 MG: 0.4 CAPSULE ORAL at 08:26

## 2023-12-25 ASSESSMENT — PAIN SCALES - GENERAL
PAINLEVEL_OUTOF10: 0
PAINLEVEL_OUTOF10: 2
PAINLEVEL_OUTOF10: 0
PAINLEVEL_OUTOF10: 0

## 2023-12-26 ENCOUNTER — APPOINTMENT (OUTPATIENT)
Facility: HOSPITAL | Age: 73
DRG: 286 | End: 2023-12-26
Attending: HOSPITALIST
Payer: MEDICARE

## 2023-12-26 LAB
ALBUMIN SERPL-MCNC: 3.4 G/DL (ref 3.5–5)
ALBUMIN/GLOB SERPL: 1 (ref 1.1–2.2)
ALP SERPL-CCNC: 57 U/L (ref 45–117)
ALT SERPL-CCNC: 14 U/L (ref 12–78)
ANION GAP SERPL CALC-SCNC: 8 MMOL/L (ref 5–15)
AST SERPL-CCNC: 11 U/L (ref 15–37)
BILIRUB SERPL-MCNC: 0.9 MG/DL (ref 0.2–1)
BUN SERPL-MCNC: 41 MG/DL (ref 6–20)
BUN/CREAT SERPL: 18 (ref 12–20)
CALCIUM SERPL-MCNC: 8.5 MG/DL (ref 8.5–10.1)
CHLORIDE SERPL-SCNC: 101 MMOL/L (ref 97–108)
CO2 SERPL-SCNC: 32 MMOL/L (ref 21–32)
CREAT SERPL-MCNC: 2.23 MG/DL (ref 0.7–1.3)
GLOBULIN SER CALC-MCNC: 3.5 G/DL (ref 2–4)
GLUCOSE SERPL-MCNC: 99 MG/DL (ref 65–100)
MAGNESIUM SERPL-MCNC: 2.1 MG/DL (ref 1.6–2.4)
NT PRO BNP: 5130 PG/ML
POTASSIUM SERPL-SCNC: 3.2 MMOL/L (ref 3.5–5.1)
PROT SERPL-MCNC: 6.9 G/DL (ref 6.4–8.2)
SODIUM SERPL-SCNC: 141 MMOL/L (ref 136–145)

## 2023-12-26 PROCEDURE — 2700000000 HC OXYGEN THERAPY PER DAY

## 2023-12-26 PROCEDURE — 94618 PULMONARY STRESS TESTING: CPT

## 2023-12-26 PROCEDURE — 6370000000 HC RX 637 (ALT 250 FOR IP): Performed by: NURSE PRACTITIONER

## 2023-12-26 PROCEDURE — 2580000003 HC RX 258: Performed by: FAMILY MEDICINE

## 2023-12-26 PROCEDURE — 2060000000 HC ICU INTERMEDIATE R&B

## 2023-12-26 PROCEDURE — 6360000002 HC RX W HCPCS: Performed by: STUDENT IN AN ORGANIZED HEALTH CARE EDUCATION/TRAINING PROGRAM

## 2023-12-26 PROCEDURE — 6370000000 HC RX 637 (ALT 250 FOR IP): Performed by: HOSPITALIST

## 2023-12-26 PROCEDURE — 36415 COLL VENOUS BLD VENIPUNCTURE: CPT

## 2023-12-26 PROCEDURE — 6370000000 HC RX 637 (ALT 250 FOR IP): Performed by: INTERNAL MEDICINE

## 2023-12-26 PROCEDURE — 2709999900 IR INSERT TUNNELED CV CATH WO SQ PORT/PUMP < 5YRS

## 2023-12-26 PROCEDURE — 76937 US GUIDE VASCULAR ACCESS: CPT

## 2023-12-26 PROCEDURE — 83880 ASSAY OF NATRIURETIC PEPTIDE: CPT

## 2023-12-26 PROCEDURE — G0378 HOSPITAL OBSERVATION PER HR: HCPCS

## 2023-12-26 PROCEDURE — 2580000003 HC RX 258: Performed by: STUDENT IN AN ORGANIZED HEALTH CARE EDUCATION/TRAINING PROGRAM

## 2023-12-26 PROCEDURE — 94760 N-INVAS EAR/PLS OXIMETRY 1: CPT

## 2023-12-26 PROCEDURE — 83735 ASSAY OF MAGNESIUM: CPT

## 2023-12-26 PROCEDURE — P9047 ALBUMIN (HUMAN), 25%, 50ML: HCPCS | Performed by: NURSE PRACTITIONER

## 2023-12-26 PROCEDURE — 80053 COMPREHEN METABOLIC PANEL: CPT

## 2023-12-26 PROCEDURE — 2500000003 HC RX 250 WO HCPCS: Performed by: STUDENT IN AN ORGANIZED HEALTH CARE EDUCATION/TRAINING PROGRAM

## 2023-12-26 PROCEDURE — 6370000000 HC RX 637 (ALT 250 FOR IP): Performed by: FAMILY MEDICINE

## 2023-12-26 PROCEDURE — 96376 TX/PRO/DX INJ SAME DRUG ADON: CPT

## 2023-12-26 PROCEDURE — 6360000002 HC RX W HCPCS: Performed by: NURSE PRACTITIONER

## 2023-12-26 PROCEDURE — 99233 SBSQ HOSP IP/OBS HIGH 50: CPT | Performed by: INTERNAL MEDICINE

## 2023-12-26 PROCEDURE — 6360000002 HC RX W HCPCS: Performed by: INTERNAL MEDICINE

## 2023-12-26 RX ORDER — LIDOCAINE HYDROCHLORIDE 10 MG/ML
INJECTION, SOLUTION EPIDURAL; INFILTRATION; INTRACAUDAL; PERINEURAL PRN
Status: COMPLETED | OUTPATIENT
Start: 2023-12-26 | End: 2023-12-26

## 2023-12-26 RX ORDER — ALBUMIN (HUMAN) 12.5 G/50ML
25 SOLUTION INTRAVENOUS ONCE
Status: COMPLETED | OUTPATIENT
Start: 2023-12-26 | End: 2023-12-26

## 2023-12-26 RX ORDER — SODIUM CHLORIDE 0.9 % (FLUSH) 0.9 %
SYRINGE (ML) INJECTION PRN
Status: COMPLETED | OUTPATIENT
Start: 2023-12-26 | End: 2023-12-26

## 2023-12-26 RX ORDER — FENTANYL CITRATE 50 UG/ML
INJECTION, SOLUTION INTRAMUSCULAR; INTRAVENOUS PRN
Status: COMPLETED | OUTPATIENT
Start: 2023-12-26 | End: 2023-12-26

## 2023-12-26 RX ORDER — MIDAZOLAM HYDROCHLORIDE 2 MG/2ML
INJECTION, SOLUTION INTRAMUSCULAR; INTRAVENOUS PRN
Status: COMPLETED | OUTPATIENT
Start: 2023-12-26 | End: 2023-12-26

## 2023-12-26 RX ORDER — POTASSIUM CHLORIDE 750 MG/1
40 TABLET, FILM COATED, EXTENDED RELEASE ORAL 2 TIMES DAILY
Status: DISCONTINUED | OUTPATIENT
Start: 2023-12-26 | End: 2023-12-28 | Stop reason: HOSPADM

## 2023-12-26 RX ADMIN — LIDOCAINE HYDROCHLORIDE 10 ML: 10 INJECTION, SOLUTION EPIDURAL; INFILTRATION; INTRACAUDAL; PERINEURAL at 09:36

## 2023-12-26 RX ADMIN — POTASSIUM CHLORIDE 40 MEQ: 750 TABLET, FILM COATED, EXTENDED RELEASE ORAL at 20:54

## 2023-12-26 RX ADMIN — APIXABAN 5 MG: 5 TABLET, FILM COATED ORAL at 08:05

## 2023-12-26 RX ADMIN — BUMETANIDE 3 MG: 1 TABLET ORAL at 07:26

## 2023-12-26 RX ADMIN — MIDAZOLAM HYDROCHLORIDE 0.5 MG: 1 INJECTION, SOLUTION INTRAMUSCULAR; INTRAVENOUS at 09:35

## 2023-12-26 RX ADMIN — ACETAZOLAMIDE EXTENDED-RELEASE 500 MG: 500 CAPSULE ORAL at 10:38

## 2023-12-26 RX ADMIN — Medication 10 ML: at 20:54

## 2023-12-26 RX ADMIN — LEVOTHYROXINE SODIUM 50 MCG: 50 TABLET ORAL at 04:51

## 2023-12-26 RX ADMIN — SODIUM CHLORIDE, PRESERVATIVE FREE 50 ML: 5 INJECTION INTRAVENOUS at 09:42

## 2023-12-26 RX ADMIN — METOPROLOL SUCCINATE 12.5 MG: 25 TABLET, EXTENDED RELEASE ORAL at 10:38

## 2023-12-26 RX ADMIN — TAMSULOSIN HYDROCHLORIDE 0.4 MG: 0.4 CAPSULE ORAL at 10:38

## 2023-12-26 RX ADMIN — WATER 2000 MG: 1 INJECTION INTRAMUSCULAR; INTRAVENOUS; SUBCUTANEOUS at 09:14

## 2023-12-26 RX ADMIN — ALBUMIN (HUMAN) 25 G: 0.25 INJECTION, SOLUTION INTRAVENOUS at 20:53

## 2023-12-26 RX ADMIN — APIXABAN 5 MG: 5 TABLET, FILM COATED ORAL at 20:54

## 2023-12-26 RX ADMIN — AMIODARONE HYDROCHLORIDE 200 MG: 200 TABLET ORAL at 10:37

## 2023-12-26 RX ADMIN — Medication 10 ML: at 10:38

## 2023-12-26 RX ADMIN — POTASSIUM CHLORIDE 40 MEQ: 750 TABLET, FILM COATED, EXTENDED RELEASE ORAL at 07:26

## 2023-12-26 RX ADMIN — MILRINONE LACTATE IN DEXTROSE 0.25 MCG/KG/MIN: 0.2 INJECTION, SOLUTION INTRAVENOUS at 18:43

## 2023-12-26 RX ADMIN — Medication 1000 UNITS: at 10:37

## 2023-12-26 RX ADMIN — EMPAGLIFLOZIN 10 MG: 10 TABLET, FILM COATED ORAL at 10:37

## 2023-12-26 RX ADMIN — SPIRONOLACTONE 25 MG: 25 TABLET ORAL at 10:37

## 2023-12-26 RX ADMIN — FENTANYL CITRATE 25 MCG: 50 INJECTION INTRAMUSCULAR; INTRAVENOUS at 09:35

## 2023-12-26 ASSESSMENT — PAIN SCALES - GENERAL
PAINLEVEL_OUTOF10: 0

## 2023-12-26 ASSESSMENT — PAIN - FUNCTIONAL ASSESSMENT: PAIN_FUNCTIONAL_ASSESSMENT: NONE - DENIES PAIN

## 2023-12-26 NOTE — PROCEDURES
Interventional Radiology  Procedure Note        12/26/2023 10:07 AM    Patient: Neeraj Lucas     Informed consent obtained    Diagnosis: heart failure    Procedure(s): tunneled RIJ central venous catheter    Estimated blood loss: less than 5cc    Anesthesia: Moderate sedation    Specimens removed:  none    Complications: None    Implants: None    Primary Physician: Anne Fernandez MD    Recommendations: N/A    Full dictated report to follow    Anne Fernandez MD  Interventional Radiology  [facility]  10:07 AM, 12/26/2023

## 2023-12-26 NOTE — H&P
10\"), weight 70.3 kg (154 lb 15.7 oz), SpO2 99 %. GENERAL: alert, cooperative, no distress, appears stated age,   LUNG: Nonlabored respiration on room air  HEART: regular rate and rhythm    ASA 3  Mallampati 3    Alerts:      Laboratory:      Recent Labs     12/26/23  0452   BUN 41*   K 3.2*         Plan of Care/Planned Procedure:  Risks, benefits, and alternatives reviewed with patient and he agrees to proceed with the procedure. Tunneled central venous catheter placement    Deemed appropriate for moderate sedation with versed and fentanyl.     Rikki Perales MD  Interventional Radiology  [facility]  9:10 AM, 12/26/2023
normal limits   URINALYSIS WITH MICROSCOPIC - Abnormal; Notable for the following components:    Protein, UA TRACE (*)     Blood, Urine TRACE (*)     All other components within normal limits   BRAIN NATRIURETIC PEPTIDE - Abnormal; Notable for the following components:    NT Pro-BNP 21,006 (*)     All other components within normal limits       [unfilled]    IMAGING:   XR CHEST PORTABLE   Final Result      Mild pulmonary edema. ECG/ECHO:  [unfilled]       Notes reviewed from all clinical/nonclinical/nursing services involved in patient's clinical care. Care coordination discussions were held with appropriate clinical/nonclinical/ nursing providers based on care coordination needs. Assessment:   Given the patient's current clinical presentation, there is a high level of concern for decompensation if discharged from the emergency department. Complex decision making was performed, which includes reviewing the patient's available past medical records, laboratory results, and imaging studies. Principal Problem:    Acute on chronic congestive heart failure, unspecified heart failure type ([address])  Resolved Problems:    * No resolved hospital problems. *      Plan:     1. Acute on chronic heart failure reduced ejection fraction (HFrEF)  -Dilated cardiomyopathy  -Status post AICD  -Continue telemetry  -Given Bumex 2 mg IV x 1 dose-consult cardiologist  -Adjust diuretic dosing accordingly  -Place on strict I's and O's  -Daily weights  -Continue guideline directed medical therapy as tolerated    2. Difficulty urinating  -Urinary retention  -Now able to self void  -Monitor urine output    3. Acute kidney injury superimposed on chronic kidney disease (AMITA on CKD)  -BUN 36, creatinine 2.58, GFR 26  -Repeat renal panel in a.m.  -Consult nephrologist    4. Atrial fibrillation  -Intermittently rate controlled  -Continue Eliquis  -Continuous telemetry monitoring    5.   Elevated troponin  -High

## 2023-12-26 NOTE — CARE COORDINATION
Transition of Care Plan:     RUR: 12% - low  Prior Level of Functioning: independent  Disposition: home health and home infusion  Follow up appointments: [facility]  DME needed: none  Transportation at discharge: family  Caregiver Contact: lexi - Lesli Toro - luis miguel: [phone]  Discharge Caregiver contacted prior to discharge? planned  Care Conference needed? no  Barriers to discharge: medical     Chart reviewed. Multiple consults placed for LVAD workup including ID, Ortho to determine if patient has infected knee. Images ordered. LVAD workup ongoing. Anticipate patient will discharge to address on file with lexi on home milrinone. Update-12/26/23  Plan to discharge home with IV Milrinone gtt. Having line placed today in IR. Will need orders for medication and home health. Referral sent to [facility] for medication and supplies. Referral also sent to [facility] for home health services. Will need final order for home health and medication orders to be uploaded when available.

## 2023-12-27 ENCOUNTER — HOME HEALTH ADMISSION (OUTPATIENT)
Dept: HOME HEALTH SERVICES | Facility: HOME HEALTH | Age: 73
End: 2023-12-27
Payer: COMMERCIAL

## 2023-12-27 LAB
ALBUMIN SERPL-MCNC: 3.5 G/DL (ref 3.5–5)
ALBUMIN/GLOB SERPL: 1 (ref 1.1–2.2)
ALP SERPL-CCNC: 55 U/L (ref 45–117)
ALT SERPL-CCNC: 11 U/L (ref 12–78)
ANION GAP SERPL CALC-SCNC: 8 MMOL/L (ref 5–15)
AST SERPL-CCNC: 9 U/L (ref 15–37)
BASOPHILS # BLD: 0 K/UL (ref 0–0.1)
BASOPHILS NFR BLD: 1 % (ref 0–1)
BILIRUB SERPL-MCNC: 0.8 MG/DL (ref 0.2–1)
BUN SERPL-MCNC: 40 MG/DL (ref 6–20)
BUN/CREAT SERPL: 16 (ref 12–20)
CALCIUM SERPL-MCNC: 8.8 MG/DL (ref 8.5–10.1)
CHLORIDE SERPL-SCNC: 103 MMOL/L (ref 97–108)
CO2 SERPL-SCNC: 32 MMOL/L (ref 21–32)
CREAT SERPL-MCNC: 2.43 MG/DL (ref 0.7–1.3)
DIFFERENTIAL METHOD BLD: ABNORMAL
ECHO BSA: 1.98 M2
EOSINOPHIL # BLD: 0.2 K/UL (ref 0–0.4)
EOSINOPHIL NFR BLD: 5 % (ref 0–7)
ERYTHROCYTE [DISTWIDTH] IN BLOOD BY AUTOMATED COUNT: 17.7 % (ref 11.5–14.5)
GLOBULIN SER CALC-MCNC: 3.5 G/DL (ref 2–4)
GLUCOSE SERPL-MCNC: 101 MG/DL (ref 65–100)
HCT VFR BLD AUTO: 28.9 % (ref 36.6–50.3)
HGB BLD-MCNC: 9.4 G/DL (ref 12.1–17)
IMM GRANULOCYTES # BLD AUTO: 0 K/UL
IMM GRANULOCYTES NFR BLD AUTO: 0 %
LYMPHOCYTES # BLD: 1.7 K/UL (ref 0.8–3.5)
LYMPHOCYTES NFR BLD: 41 % (ref 12–49)
MCH RBC QN AUTO: 27.9 PG (ref 26–34)
MCHC RBC AUTO-ENTMCNC: 32.5 G/DL (ref 30–36.5)
MCV RBC AUTO: 85.8 FL (ref 80–99)
MONOCYTES # BLD: 0.3 K/UL (ref 0–1)
MONOCYTES NFR BLD: 8 % (ref 5–13)
NEUTS BAND NFR BLD MANUAL: 1 % (ref 0–6)
NEUTS SEG # BLD: 1.9 K/UL (ref 1.8–8)
NEUTS SEG NFR BLD: 44 % (ref 32–75)
NRBC # BLD: 0 K/UL (ref 0–0.01)
NRBC BLD-RTO: 0 PER 100 WBC
NT PRO BNP: 3248 PG/ML
PLATELET # BLD AUTO: 180 K/UL (ref 150–400)
PMV BLD AUTO: 10 FL (ref 8.9–12.9)
POTASSIUM SERPL-SCNC: 3.6 MMOL/L (ref 3.5–5.1)
PROT SERPL-MCNC: 7 G/DL (ref 6.4–8.2)
RBC # BLD AUTO: 3.37 M/UL (ref 4.1–5.7)
RBC MORPH BLD: ABNORMAL
SODIUM SERPL-SCNC: 143 MMOL/L (ref 136–145)
WBC # BLD AUTO: 4.1 K/UL (ref 4.1–11.1)
WBC MORPH BLD: ABNORMAL

## 2023-12-27 PROCEDURE — 76937 US GUIDE VASCULAR ACCESS: CPT | Performed by: INTERNAL MEDICINE

## 2023-12-27 PROCEDURE — 2500000003 HC RX 250 WO HCPCS: Performed by: INTERNAL MEDICINE

## 2023-12-27 PROCEDURE — 6370000000 HC RX 637 (ALT 250 FOR IP): Performed by: NURSE PRACTITIONER

## 2023-12-27 PROCEDURE — 85025 COMPLETE CBC W/AUTO DIFF WBC: CPT

## 2023-12-27 PROCEDURE — 94760 N-INVAS EAR/PLS OXIMETRY 1: CPT

## 2023-12-27 PROCEDURE — 6370000000 HC RX 637 (ALT 250 FOR IP): Performed by: INTERNAL MEDICINE

## 2023-12-27 PROCEDURE — 6370000000 HC RX 637 (ALT 250 FOR IP): Performed by: FAMILY MEDICINE

## 2023-12-27 PROCEDURE — 2580000003 HC RX 258: Performed by: FAMILY MEDICINE

## 2023-12-27 PROCEDURE — 36415 COLL VENOUS BLD VENIPUNCTURE: CPT

## 2023-12-27 PROCEDURE — G0378 HOSPITAL OBSERVATION PER HR: HCPCS

## 2023-12-27 PROCEDURE — C1894 INTRO/SHEATH, NON-LASER: HCPCS | Performed by: INTERNAL MEDICINE

## 2023-12-27 PROCEDURE — 99231 SBSQ HOSP IP/OBS SF/LOW 25: CPT | Performed by: INTERNAL MEDICINE

## 2023-12-27 PROCEDURE — 83880 ASSAY OF NATRIURETIC PEPTIDE: CPT

## 2023-12-27 PROCEDURE — 93451 RIGHT HEART CATH: CPT | Performed by: INTERNAL MEDICINE

## 2023-12-27 PROCEDURE — 4A023N6 MEASUREMENT OF CARDIAC SAMPLING AND PRESSURE, RIGHT HEART, PERCUTANEOUS APPROACH: ICD-10-PCS | Performed by: INTERNAL MEDICINE

## 2023-12-27 PROCEDURE — 6360000002 HC RX W HCPCS: Performed by: INTERNAL MEDICINE

## 2023-12-27 PROCEDURE — 51798 US URINE CAPACITY MEASURE: CPT

## 2023-12-27 PROCEDURE — 2709999900 HC NON-CHARGEABLE SUPPLY: Performed by: INTERNAL MEDICINE

## 2023-12-27 PROCEDURE — 99233 SBSQ HOSP IP/OBS HIGH 50: CPT | Performed by: INTERNAL MEDICINE

## 2023-12-27 PROCEDURE — 96376 TX/PRO/DX INJ SAME DRUG ADON: CPT

## 2023-12-27 PROCEDURE — 2060000000 HC ICU INTERMEDIATE R&B

## 2023-12-27 PROCEDURE — 80053 COMPREHEN METABOLIC PANEL: CPT

## 2023-12-27 PROCEDURE — C1725 CATH, TRANSLUMIN NON-LASER: HCPCS | Performed by: INTERNAL MEDICINE

## 2023-12-27 RX ORDER — MILRINONE LACTATE 0.2 MG/ML
0.25 INJECTION, SOLUTION INTRAVENOUS CONTINUOUS
Status: DISCONTINUED | OUTPATIENT
Start: 2023-12-27 | End: 2023-12-28 | Stop reason: HOSPADM

## 2023-12-27 RX ADMIN — AMIODARONE HYDROCHLORIDE 200 MG: 200 TABLET ORAL at 08:17

## 2023-12-27 RX ADMIN — SPIRONOLACTONE 25 MG: 25 TABLET ORAL at 08:18

## 2023-12-27 RX ADMIN — APIXABAN 5 MG: 5 TABLET, FILM COATED ORAL at 08:18

## 2023-12-27 RX ADMIN — Medication 10 ML: at 20:51

## 2023-12-27 RX ADMIN — Medication 10 ML: at 08:19

## 2023-12-27 RX ADMIN — TAMSULOSIN HYDROCHLORIDE 0.4 MG: 0.4 CAPSULE ORAL at 08:18

## 2023-12-27 RX ADMIN — METOPROLOL SUCCINATE 12.5 MG: 25 TABLET, EXTENDED RELEASE ORAL at 08:18

## 2023-12-27 RX ADMIN — POTASSIUM CHLORIDE 40 MEQ: 750 TABLET, FILM COATED, EXTENDED RELEASE ORAL at 08:17

## 2023-12-27 RX ADMIN — EMPAGLIFLOZIN 10 MG: 10 TABLET, FILM COATED ORAL at 08:18

## 2023-12-27 RX ADMIN — LEVOTHYROXINE SODIUM 50 MCG: 50 TABLET ORAL at 05:16

## 2023-12-27 RX ADMIN — Medication 1000 UNITS: at 08:18

## 2023-12-27 RX ADMIN — MILRINONE LACTATE IN DEXTROSE 0.25 MCG/KG/MIN: 0.2 INJECTION, SOLUTION INTRAVENOUS at 13:18

## 2023-12-27 RX ADMIN — APIXABAN 5 MG: 5 TABLET, FILM COATED ORAL at 20:51

## 2023-12-27 ASSESSMENT — PAIN SCALES - GENERAL
PAINLEVEL_OUTOF10: 0

## 2023-12-27 NOTE — CARE COORDINATION
Transition of Care Plan:    RUR: 14% - low  Prior Level of Functioning: independent  Disposition: home health and home infusion  Follow up appointments: [facility]  DME needed: none  Transportation at discharge: family  IM/IMM Medicare/ letter given: needs 2nd IMM  Caregiver Contact: Angelo Ho - [phone]   Discharge Caregiver contacted prior to discharge? planned  Care Conference needed? no  Barriers to discharge: medical; home infusion set up    CM spoke with [facility] MD. Plans for [facility]HC today and potential discharge on milrinone in 1-2 days. Chart reviewed - referral was pending with [facility] that start of care delay for 48hrs. CM called [facility] and spoke with Yas at intake - advised intake that this patient is on home inotrope and a LVAD workup for [address]. Home health will re-review and likely able to accept patient pending medical stability and discharge. Updated [facility] about discharge plans. Sent home health order and supporting clinicals. Romie Port result remains outstanding/pending provider signature. Will need to send report to [address] for final acceptance of home infusion services. IR contacted by [facility]. Appreciate assistance. CM will continue to follow. Disposition Plan:  Home Health:  [address] Infusion: [facility]  Transportation: Family    Keke Chandler, [address]  Available via [facility] or

## 2023-12-28 ENCOUNTER — TELEPHONE (OUTPATIENT)
Age: 73
End: 2023-12-28

## 2023-12-28 VITALS
TEMPERATURE: 98.1 F | HEIGHT: 70 IN | BODY MASS INDEX: 22.79 KG/M2 | SYSTOLIC BLOOD PRESSURE: 104 MMHG | HEART RATE: 72 BPM | WEIGHT: 159.17 LBS | RESPIRATION RATE: 18 BRPM | DIASTOLIC BLOOD PRESSURE: 67 MMHG | OXYGEN SATURATION: 100 %

## 2023-12-28 DIAGNOSIS — I42.0 DILATED CARDIOMYOPATHY (HCC): ICD-10-CM

## 2023-12-28 DIAGNOSIS — G47.33 OSA ON CPAP: Primary | ICD-10-CM

## 2023-12-28 DIAGNOSIS — R97.20 ELEVATED PSA: ICD-10-CM

## 2023-12-28 PROBLEM — I50.9 ACUTE ON CHRONIC CONGESTIVE HEART FAILURE, UNSPECIFIED HEART FAILURE TYPE (HCC): Status: RESOLVED | Noted: 2023-12-10 | Resolved: 2023-12-28

## 2023-12-28 PROBLEM — I50.23 ACUTE ON CHRONIC SYSTOLIC CHF (CONGESTIVE HEART FAILURE), NYHA CLASS 2 (HCC): Status: RESOLVED | Noted: 2023-12-11 | Resolved: 2023-12-28

## 2023-12-28 PROBLEM — R06.02 SHORTNESS OF BREATH: Status: RESOLVED | Noted: 2023-12-13 | Resolved: 2023-12-28

## 2023-12-28 LAB
ALBUMIN SERPL-MCNC: 3.6 G/DL (ref 3.5–5)
ALBUMIN/GLOB SERPL: 1 (ref 1.1–2.2)
ALP SERPL-CCNC: 52 U/L (ref 45–117)
ALT SERPL-CCNC: 12 U/L (ref 12–78)
ANION GAP SERPL CALC-SCNC: 7 MMOL/L (ref 5–15)
AST SERPL-CCNC: 13 U/L (ref 15–37)
BASOPHILS # BLD: 0 K/UL (ref 0–0.1)
BASOPHILS NFR BLD: 1 % (ref 0–1)
BILIRUB SERPL-MCNC: 0.8 MG/DL (ref 0.2–1)
BUN SERPL-MCNC: 45 MG/DL (ref 6–20)
BUN/CREAT SERPL: 20 (ref 12–20)
CALCIUM SERPL-MCNC: 8.6 MG/DL (ref 8.5–10.1)
CHLORIDE SERPL-SCNC: 103 MMOL/L (ref 97–108)
CO2 SERPL-SCNC: 31 MMOL/L (ref 21–32)
CREAT SERPL-MCNC: 2.26 MG/DL (ref 0.7–1.3)
DIFFERENTIAL METHOD BLD: ABNORMAL
EOSINOPHIL # BLD: 0.2 K/UL (ref 0–0.4)
EOSINOPHIL NFR BLD: 5 % (ref 0–7)
ERYTHROCYTE [DISTWIDTH] IN BLOOD BY AUTOMATED COUNT: 17.9 % (ref 11.5–14.5)
GLOBULIN SER CALC-MCNC: 3.5 G/DL (ref 2–4)
GLUCOSE SERPL-MCNC: 97 MG/DL (ref 65–100)
HCT VFR BLD AUTO: 27.8 % (ref 36.6–50.3)
HGB BLD-MCNC: 9.2 G/DL (ref 12.1–17)
IMM GRANULOCYTES # BLD AUTO: 0 K/UL
IMM GRANULOCYTES NFR BLD AUTO: 0 %
LYMPHOCYTES # BLD: 1.8 K/UL (ref 0.8–3.5)
LYMPHOCYTES NFR BLD: 45 % (ref 12–49)
MAGNESIUM SERPL-MCNC: 2.4 MG/DL (ref 1.6–2.4)
MCH RBC QN AUTO: 27.9 PG (ref 26–34)
MCHC RBC AUTO-ENTMCNC: 33.1 G/DL (ref 30–36.5)
MCV RBC AUTO: 84.2 FL (ref 80–99)
MONOCYTES # BLD: 0.3 K/UL (ref 0–1)
MONOCYTES NFR BLD: 9 % (ref 5–13)
NEUTS SEG # BLD: 1.5 K/UL (ref 1.8–8)
NEUTS SEG NFR BLD: 40 % (ref 32–75)
NRBC # BLD: 0 K/UL (ref 0–0.01)
NRBC BLD-RTO: 0 PER 100 WBC
NT PRO BNP: 3658 PG/ML
PLATELET # BLD AUTO: 191 K/UL (ref 150–400)
PMV BLD AUTO: 9.9 FL (ref 8.9–12.9)
POTASSIUM SERPL-SCNC: 3.2 MMOL/L (ref 3.5–5.1)
PROT SERPL-MCNC: 7.1 G/DL (ref 6.4–8.2)
RBC # BLD AUTO: 3.3 M/UL (ref 4.1–5.7)
RBC MORPH BLD: ABNORMAL
SODIUM SERPL-SCNC: 141 MMOL/L (ref 136–145)
WBC # BLD AUTO: 3.8 K/UL (ref 4.1–11.1)
WBC MORPH BLD: ABNORMAL

## 2023-12-28 PROCEDURE — 6370000000 HC RX 637 (ALT 250 FOR IP): Performed by: NURSE PRACTITIONER

## 2023-12-28 PROCEDURE — 6370000000 HC RX 637 (ALT 250 FOR IP): Performed by: HOSPITALIST

## 2023-12-28 PROCEDURE — 6370000000 HC RX 637 (ALT 250 FOR IP): Performed by: FAMILY MEDICINE

## 2023-12-28 PROCEDURE — 36415 COLL VENOUS BLD VENIPUNCTURE: CPT

## 2023-12-28 PROCEDURE — 6370000000 HC RX 637 (ALT 250 FOR IP): Performed by: INTERNAL MEDICINE

## 2023-12-28 PROCEDURE — 83735 ASSAY OF MAGNESIUM: CPT

## 2023-12-28 PROCEDURE — 97530 THERAPEUTIC ACTIVITIES: CPT

## 2023-12-28 PROCEDURE — 83880 ASSAY OF NATRIURETIC PEPTIDE: CPT

## 2023-12-28 PROCEDURE — 80053 COMPREHEN METABOLIC PANEL: CPT

## 2023-12-28 PROCEDURE — 85025 COMPLETE CBC W/AUTO DIFF WBC: CPT

## 2023-12-28 PROCEDURE — G0378 HOSPITAL OBSERVATION PER HR: HCPCS

## 2023-12-28 PROCEDURE — 96376 TX/PRO/DX INJ SAME DRUG ADON: CPT

## 2023-12-28 PROCEDURE — 94760 N-INVAS EAR/PLS OXIMETRY 1: CPT

## 2023-12-28 PROCEDURE — 2700000000 HC OXYGEN THERAPY PER DAY

## 2023-12-28 PROCEDURE — 99233 SBSQ HOSP IP/OBS HIGH 50: CPT | Performed by: INTERNAL MEDICINE

## 2023-12-28 PROCEDURE — 6360000002 HC RX W HCPCS: Performed by: INTERNAL MEDICINE

## 2023-12-28 RX ORDER — METOPROLOL SUCCINATE 25 MG/1
12.5 TABLET, EXTENDED RELEASE ORAL DAILY
Qty: 30 TABLET | Refills: 2 | Status: SHIPPED | OUTPATIENT
Start: 2023-12-29

## 2023-12-28 RX ORDER — BUMETANIDE 1 MG/1
3 TABLET ORAL
Status: DISCONTINUED | OUTPATIENT
Start: 2023-12-28 | End: 2023-12-28 | Stop reason: HOSPADM

## 2023-12-28 RX ORDER — AMIODARONE HYDROCHLORIDE 200 MG/1
200 TABLET ORAL DAILY
Qty: 90 TABLET | Refills: 0 | Status: SHIPPED | OUTPATIENT
Start: 2023-12-28

## 2023-12-28 RX ORDER — MILRINONE LACTATE 0.2 MG/ML
0.25 INJECTION, SOLUTION INTRAVENOUS CONTINUOUS
Qty: 999 ML | Refills: 0 | Status: SHIPPED
Start: 2023-12-28

## 2023-12-28 RX ORDER — BUMETANIDE 2 MG/1
3 TABLET ORAL 2 TIMES DAILY
Qty: 90 TABLET | Refills: 2 | Status: SHIPPED | OUTPATIENT
Start: 2023-12-28

## 2023-12-28 RX ORDER — POTASSIUM CHLORIDE 750 MG/1
40 TABLET, FILM COATED, EXTENDED RELEASE ORAL ONCE
Status: COMPLETED | OUTPATIENT
Start: 2023-12-28 | End: 2023-12-28

## 2023-12-28 RX ORDER — CHOLECALCIFEROL (VITAMIN D3) 25 MCG
1000 TABLET ORAL DAILY
Qty: 90 TABLET | Refills: 1 | Status: SHIPPED | OUTPATIENT
Start: 2023-12-29

## 2023-12-28 RX ORDER — POTASSIUM CHLORIDE 20 MEQ/1
20 TABLET, EXTENDED RELEASE ORAL 2 TIMES DAILY
Qty: 60 TABLET | Refills: 2 | Status: SHIPPED | OUTPATIENT
Start: 2023-12-28

## 2023-12-28 RX ADMIN — TAMSULOSIN HYDROCHLORIDE 0.4 MG: 0.4 CAPSULE ORAL at 09:27

## 2023-12-28 RX ADMIN — APIXABAN 5 MG: 5 TABLET, FILM COATED ORAL at 09:28

## 2023-12-28 RX ADMIN — LEVOTHYROXINE SODIUM 50 MCG: 50 TABLET ORAL at 06:38

## 2023-12-28 RX ADMIN — AMIODARONE HYDROCHLORIDE 200 MG: 200 TABLET ORAL at 09:27

## 2023-12-28 RX ADMIN — POTASSIUM CHLORIDE 40 MEQ: 750 TABLET, FILM COATED, EXTENDED RELEASE ORAL at 09:27

## 2023-12-28 RX ADMIN — EMPAGLIFLOZIN 10 MG: 10 TABLET, FILM COATED ORAL at 09:28

## 2023-12-28 RX ADMIN — BUMETANIDE 3 MG: 1 TABLET ORAL at 10:17

## 2023-12-28 RX ADMIN — MILRINONE LACTATE IN DEXTROSE 0.25 MCG/KG/MIN: 0.2 INJECTION, SOLUTION INTRAVENOUS at 06:38

## 2023-12-28 RX ADMIN — METOPROLOL SUCCINATE 12.5 MG: 25 TABLET, EXTENDED RELEASE ORAL at 09:27

## 2023-12-28 RX ADMIN — Medication 1000 UNITS: at 09:28

## 2023-12-28 ASSESSMENT — PAIN SCALES - GENERAL
PAINLEVEL_OUTOF10: 0
PAINLEVEL_OUTOF10: 0

## 2023-12-28 NOTE — DISCHARGE SUMMARY
[facility] [place]. Building: Driving WEST on [address], take exit [address] to [address]. Turn left onto [address], then turn right into [employer] Parking lot Driving EAST on [address], take exit [address]. Turn right at the end of the exit ramp. Turn left onto [address], then turn right into [employer] lot. Dilcia Burton MD Cardiology Follow up The office will call you with a follow up appointment date and time. [address]       Vianca Durand MD Orthopedic Surgery Schedule an appointment as soon as possible for a visit  [address] [id number] [phone]                 ADDITIONAL CARE RECOMMENDATIONS: ***    DIET: {diet:10013}  Oral Nutritional Supplements: {NUTRITION SUPPLEMENTS:347248}{Frequencies; times a MRT:99605}    ACTIVITY: {discharge activity:13398}    WOUND CARE: ***    EQUIPMENT needed: ***      DISCHARGE MEDICATIONS:     Medication List        START taking these medications      metoprolol succinate 25 MG extended release tablet  Commonly known as: TOPROL XL  Take 0.5 tablets by mouth daily  Start taking on: December 29, 2023     milrinone 20-5 MG/100ML-% Soln infusion  Commonly known as: PRIMACOR  Infuse 0.0176 mg/min intravenously continuous     Vitamin D3 25 MCG Tabs  Take 1 tablet by mouth daily  Start taking on: December 29, 2023            CHANGE how you take these medications      amiodarone 200 MG tablet  Commonly known as: CORDARONE  Take 1 tablet by mouth daily  What changed:   medication strength  how much to take     bumetanide 2 MG tablet  Commonly known as: BUMEX  Take 1.5 tablets by mouth 2 times daily  What changed: how much to take     potassium chloride 20 MEQ extended release tablet  Commonly known as: KLOR-CON M  Take 1 tablet by mouth 2 times daily  What changed:   when to take this  Another medication with the same name was removed.  Continue taking

## 2023-12-28 NOTE — TELEPHONE ENCOUNTER
----- Message from Nuria Major MD sent at 12/28/2023  3:11 PM EST -----  This patient is discharging on intotrope as a bridge to LVAD candidacy.     He needs discharge follow up appointment.--scheduled 1/2/24 at 9am    He needs to schedule outpatient PFTs, referral to sleep medicine, referral for neurocognitive testing, referral to urology for elevated PSA.    At clinic visit will need genetic testing.     Thanks    Sleep med referral placed  PFT, neurocog, and urology orders placed and reviewed with patient at hospital discharge visit.

## 2023-12-28 NOTE — CARE COORDINATION
Transition of Care Plan:    RUR: 15% - moderate  Prior Level of Functioning: independent w cane  Disposition: home health and home infusion  Follow up appointments: [facility]  DME needed: none  Transportation at discharge: family  IM/IMM Medicare/ letter given: 12/28/23  Caregiver Contact: Albert Rodriguez - [phone]  Discharge Caregiver contacted prior to discharge? yes  Care Conference needed? no  Barriers to discharge: signed IR note for home infusion company    CM updated by [facility] MD that patient may need nocturnal oxygen for GERRI. Patient has been placed on nocturnal oxygen during inpatient admission. Patient has CPAP machine; however, may be broken or in house at [address] [place], [place] [address]or to follow up on this with patient and family. Reviewed PT note from this AM - patient does not qualify for oxygen while ambulating - SPO2 was 96% during session. CM spoke with [facility]. Pharmacy is mixing the milrinone for the patient this morning but will not release it for delivery until note from IR is received indicating Romie port is OK for use and location of placement. CM escalated Romie port documentation issue. Spoke with patient and family member at bedside re CPAP machine issue. Both advised me that patient has not used the CPAP machine in 10 years and it is no longer functioning. CM encouraged them to go to Sleep Medicine clinic and appointment information will be on AVS for renewal of testing and CPAP machine. GERRI is not a qualifying dx for nocturnal oxygen. CM will continue to follow. Disposition Plan:  Home Health:  [address] [facility] Infusion: [facility]  Transportation: Family    Ivana Michael, [address]  Available via [facility] or  "[employer]

## 2023-12-29 ENCOUNTER — TELEPHONE (OUTPATIENT)
Facility: HOSPITAL | Age: 73
End: 2023-12-29

## 2023-12-29 ENCOUNTER — HOME CARE VISIT (OUTPATIENT)
Facility: HOME HEALTH | Age: 73
End: 2023-12-29
Payer: COMMERCIAL

## 2023-12-29 PROCEDURE — 0221000100 HH NO PAY CLAIM PROCEDURE

## 2023-12-29 PROCEDURE — G0299 HHS/HOSPICE OF RN EA 15 MIN: HCPCS

## 2023-12-29 NOTE — TELEPHONE ENCOUNTER
HEART FAILURE NURSE NAVIGATOR POST DISCHARGE FOLLOW UP PHONE CALL     HF NN contacted patient by telephone to perform post hospital discharge follow up call. Spoke with Abdias. Her number is primary for the patient. The pharmacy closed early yesterday. Ms. Madina Beckford is call the pharmacy this morning and will  the medications today. She has not heard from home health yet. She is aware the medications that were discontinued. Reinforced importance of daily weights and dietary restrictions, following low sodium diet. Per Ms. Madina Beckford, patient had been weighting himself daily prior to hospital stay. Reinforced signs/symptoms of HF and when to notify the physician.     Confirmed knowledge of scheduled follow up appointment:

## 2023-12-30 ENCOUNTER — HOME CARE VISIT (OUTPATIENT)
Facility: HOME HEALTH | Age: 73
End: 2023-12-30
Payer: COMMERCIAL

## 2023-12-30 VITALS
BODY MASS INDEX: 22.15 KG/M2 | OXYGEN SATURATION: 94 % | TEMPERATURE: 98.5 F | RESPIRATION RATE: 16 BRPM | DIASTOLIC BLOOD PRESSURE: 62 MMHG | WEIGHT: 154.4 LBS | SYSTOLIC BLOOD PRESSURE: 114 MMHG | HEART RATE: 72 BPM

## 2023-12-30 VITALS
DIASTOLIC BLOOD PRESSURE: 64 MMHG | HEIGHT: 70 IN | TEMPERATURE: 99.3 F | BODY MASS INDEX: 22.05 KG/M2 | WEIGHT: 154 LBS | SYSTOLIC BLOOD PRESSURE: 108 MMHG | RESPIRATION RATE: 16 BRPM | OXYGEN SATURATION: 97 % | HEART RATE: 72 BPM

## 2023-12-30 PROCEDURE — G0299 HHS/HOSPICE OF RN EA 15 MIN: HCPCS

## 2023-12-30 NOTE — HOME HEALTH
Reason for referral, PMH SUMMARY of clinical condition: 68year old male patient of Dr. Germaine Ocampo admitted to home care after hospitalization at [facility] from 12/11-12/28/23 for heart failure exacerbation, increased shortness of breath and urinary retention. Patient started on milrinone therapy. Patient not a candidate for LVAD due to his kidney function. Patient lives with friend in multi-level home. Moved to [place] from [address] [place] [address] approximately one month ago. Patient ambulates with cane but is a high fall risk related to weakness and caregivers require training to assist patient. Patient had bilateral knee replacements in June and July of 2023, then shortly after he developed a right knee infection and was started on Rifampin and Cipro. No current infection. No wheelchair in home to assess ability to wheel self. Double lumen tunnelled Catheter noted to right internal jugular dated 12/26/23. Friend Mari and Daughter Khushboo Wells  showed willingness in adminstering milrinone via CADD pump. Reeducated on SASH method to line not infusing milrinone. NO SASH MAT in home but patient able to verbalize SASH method with 100% accuracy. Milrinone bag to be changed every 48 hours. Skin intact. Pressure injury to sacrum has healed. Lungs clear. No edema noted. Patient requires skilled nursing for cardiopulmonary assessment,  weekly labs and CVC dressing change and IV inotrope management to prevent infection and prevent rehospitalization. PMH of chronic systolic heart failure due to non ischemic cardiomyopathy, HTN, HLD, PAF/Atrial tachycardia, VT, GERRI on CPAP, CKD stage 3, BPH s/p TURP and hypothyroidism, [employer] Dual Chamber ICD. Diagnosis: Heart failure     Subjective: Patient reports pain 7/10 to bilateral shoulders    Caregiver: daughter/friend. Caregiver assists with: Medications, Meals, Bathing, ADL, IADL, Transportation and Housekeeping.  Caregiver is available 24 hours/day Caregiver is present at

## 2023-12-31 ENCOUNTER — HOME CARE VISIT (OUTPATIENT)
Facility: HOME HEALTH | Age: 73
End: 2023-12-31
Payer: COMMERCIAL

## 2024-01-01 ENCOUNTER — HOME CARE VISIT (OUTPATIENT)
Facility: HOME HEALTH | Age: 74
End: 2024-01-01
Payer: COMMERCIAL

## 2024-01-01 PROCEDURE — G0299 HHS/HOSPICE OF RN EA 15 MIN: HCPCS

## 2024-01-02 ENCOUNTER — SOCIAL WORK (OUTPATIENT)
Age: 74
End: 2024-01-02

## 2024-01-02 ENCOUNTER — OFFICE VISIT (OUTPATIENT)
Age: 74
End: 2024-01-02
Payer: MEDICARE

## 2024-01-02 ENCOUNTER — HOME CARE VISIT (OUTPATIENT)
Dept: HOME HEALTH SERVICES | Facility: HOME HEALTH | Age: 74
End: 2024-01-02
Payer: COMMERCIAL

## 2024-01-02 VITALS
TEMPERATURE: 97.5 F | HEART RATE: 73 BPM | SYSTOLIC BLOOD PRESSURE: 118 MMHG | DIASTOLIC BLOOD PRESSURE: 80 MMHG | BODY MASS INDEX: 22.89 KG/M2 | WEIGHT: 159.5 LBS | RESPIRATION RATE: 18 BRPM | OXYGEN SATURATION: 99 %

## 2024-01-02 VITALS
SYSTOLIC BLOOD PRESSURE: 126 MMHG | HEART RATE: 66 BPM | TEMPERATURE: 97.9 F | WEIGHT: 161 LBS | HEIGHT: 70 IN | DIASTOLIC BLOOD PRESSURE: 68 MMHG | OXYGEN SATURATION: 99 % | RESPIRATION RATE: 18 BRPM | BODY MASS INDEX: 23.05 KG/M2

## 2024-01-02 DIAGNOSIS — I50.22 CHRONIC SYSTOLIC HEART FAILURE (HCC): ICD-10-CM

## 2024-01-02 DIAGNOSIS — I49.9 IRREGULAR HEART BEAT: ICD-10-CM

## 2024-01-02 DIAGNOSIS — E03.8 SUBCLINICAL HYPOTHYROIDISM: ICD-10-CM

## 2024-01-02 DIAGNOSIS — I42.0 DILATED CARDIOMYOPATHY (HCC): Primary | ICD-10-CM

## 2024-01-02 DIAGNOSIS — R06.02 SHORTNESS OF BREATH: ICD-10-CM

## 2024-01-02 PROCEDURE — 99214 OFFICE O/P EST MOD 30 MIN: CPT | Performed by: NURSE PRACTITIONER

## 2024-01-02 PROCEDURE — 3017F COLORECTAL CA SCREEN DOC REV: CPT | Performed by: NURSE PRACTITIONER

## 2024-01-02 PROCEDURE — G8427 DOCREV CUR MEDS BY ELIG CLIN: HCPCS | Performed by: NURSE PRACTITIONER

## 2024-01-02 PROCEDURE — G8484 FLU IMMUNIZE NO ADMIN: HCPCS | Performed by: NURSE PRACTITIONER

## 2024-01-02 PROCEDURE — 1036F TOBACCO NON-USER: CPT | Performed by: NURSE PRACTITIONER

## 2024-01-02 PROCEDURE — G8420 CALC BMI NORM PARAMETERS: HCPCS | Performed by: NURSE PRACTITIONER

## 2024-01-02 PROCEDURE — 1123F ACP DISCUSS/DSCN MKR DOCD: CPT | Performed by: NURSE PRACTITIONER

## 2024-01-02 PROCEDURE — 1111F DSCHRG MED/CURRENT MED MERGE: CPT | Performed by: NURSE PRACTITIONER

## 2024-01-02 RX ORDER — AMIODARONE HYDROCHLORIDE 200 MG/1
100 TABLET ORAL DAILY
Qty: 30 TABLET | Refills: 1
Start: 2024-01-02

## 2024-01-02 RX ORDER — LEVOTHYROXINE SODIUM 0.05 MG/1
50 TABLET ORAL DAILY
Qty: 30 TABLET | Refills: 3 | Status: SHIPPED | OUTPATIENT
Start: 2024-01-02

## 2024-01-02 RX ORDER — METOPROLOL SUCCINATE 25 MG/1
25 TABLET, EXTENDED RELEASE ORAL DAILY
Qty: 30 TABLET | Refills: 2 | Status: SHIPPED | OUTPATIENT
Start: 2024-01-02

## 2024-01-02 ASSESSMENT — ENCOUNTER SYMPTOMS
CHEST TIGHTNESS: 0
SHORTNESS OF BREATH: 1
STOOL DESCRIPTION: FORMED
ABDOMINAL PAIN: 0
SORE THROAT: 0
COUGH: 0
ABDOMINAL DISTENTION: 0
EYE PAIN: 0
BLOOD IN STOOL: 0

## 2024-01-02 ASSESSMENT — PATIENT HEALTH QUESTIONNAIRE - PHQ9
SUM OF ALL RESPONSES TO PHQ QUESTIONS 1-9: 0
SUM OF ALL RESPONSES TO PHQ9 QUESTIONS 1 & 2: 0
SUM OF ALL RESPONSES TO PHQ QUESTIONS 1-9: 0
SUM OF ALL RESPONSES TO PHQ QUESTIONS 1-9: 0
1. LITTLE INTEREST OR PLEASURE IN DOING THINGS: 0
2. FEELING DOWN, DEPRESSED OR HOPELESS: 0
SUM OF ALL RESPONSES TO PHQ QUESTIONS 1-9: 0

## 2024-01-02 NOTE — PROGRESS NOTES
Met with pt. And daughter. Pt. Was very quiet, but did participate in visit today. Pt. Daughter explained need for medical record release to pt. Employer and to a . ZOHAIB provided instructions for how to obtain records via [employer], but also provided two medical record release forms in office which pt. Completed and signed. SW to fax to Medical records. Pt. Daughter also stated pt.  FMLA will need to be renewed in February.

## 2024-01-02 NOTE — PROGRESS NOTES
Called LM with [facility] to add on TSH to lab draw today.  
mmol/L 141  143  141  140  140  142  142    Potassium 3.5 - 5.1 mmol/L 3.2  3.6  3.2  2.8  3.5  3.8  3.3    Chloride 97 - 108 mmol/L 103  103  101  99  105  105  101    CO2 21 - 32 mmol/L 31  32  32  34  30  31  34    CALCIUM, SERUM, 423504 8.5 - 10.1 MG/DL 8.6  8.8  8.5  9.0  8.8  9.2  8.6    Total Protein 6.4 - 8.2 g/dL 7.1  7.0  6.9  7.0  6.7  7.3  6.8    BILIRUBIN TOTAL 0.2 - 1.0 MG/DL 0.8  0.8  0.9  1.3  1.3  0.9  0.8    AST 15 - 37 U/L 13  9  11  12  11  10  13    ALT 12 - 78 U/L 12  11  14  13  15  16  18        ALLERGY:  No Known Allergies     CURRENT MEDICATIONS:    Current Outpatient Medications:     sodium chloride 0.9 % SOLN, Infuse 10 mLs intravenously every 24 hours. FLUSH LUMEN NOT IN USE FOR MILRINONE WITH 10ML EVERY 24 HOURS. FLUSH PRE/POST LAB DRAW WITH 10-20ML, Disp: , Rfl:     Heparin Sod, Pork, Lock Flush (HEPARIN, PF,) 10 UNIT/ML injection, Infuse 3-5 mLs intravenously every 24 hours FLUSH LUMEN NOT IN USE FOR MILRINONE WITH 3-5ML AFTER LAST SALINE FLUSH OR EVERY 24 HOURS FOR MAINTENANCE IF NOT USED. FLUSH POST LAB DRAW., Disp: , Rfl:     acetaminophen (TYLENOL) 500 MG tablet, Take 1 tablet by mouth every 6 hours as needed for Pain, Disp: , Rfl:     amiodarone (CORDARONE) 200 MG tablet, Take 1 tablet by mouth daily (Patient taking differently: Take 0.5 tablets by mouth daily), Disp: 90 tablet, Rfl: 0    bumetanide (BUMEX) 2 MG tablet, Take 1.5 tablets by mouth 2 times daily, Disp: 90 tablet, Rfl: 2    potassium chloride (KLOR-CON M) 20 MEQ extended release tablet, Take 1 tablet by mouth 2 times daily, Disp: 60 tablet, Rfl: 2    Cholecalciferol (VITAMIN D) 25 MCG TABS, Take 1 tablet by mouth daily, Disp: 90 tablet, Rfl: 1    metoprolol succinate (TOPROL XL) 25 MG extended release tablet, Take 0.5 tablets by mouth daily, Disp: 30 tablet, Rfl: 2    milrinone (PRIMACOR) 20-5 MG/100ML-% SOLN infusion, Infuse 0.0176 mg/min intravenously continuous (Patient taking differently: Infuse 0.25 mcg/kg/min

## 2024-01-02 NOTE — HOME HEALTH
Subjective: Patient states he is doing well  Falls since last visit No(if yes complete the Fall Tracking Form and include bsrifallreport):   Caregiver involvement changes: N/a  Home health supplies by type and quantity ordered/delivered this visit include: N/A    Clinician asked if patient has had any physician contact since last home care visit and patient states: NO  Clinician asked if patient has any new or changed medications and patient states:  N/A   If Yes, were medications reconciled? N/A   Was the certifying physician notified of changes in medications? N/A no    Clinical assessment (what this visit means for the patient overall and need for ongoing skilled care) and progress or lack of progress towards SPECIFIC goals: Patient is 73 year old male with CADD pump/IJ and hx of CHF living in two story home with family as caregivers at all times.   Medication education done this visit includes milrinone.  Education given to caregiver on milrinone.   Patient response to visit includes understanding of IJ care, and milrinone.   Needs continued SN to prevent infection and rehospitalization due to illness/diagnosis.   Needs continued SN for education.     Patients caregivers/daughter are making strides towards independence with CADD pump.    Written Teaching Material Utilized: N/A    Interdisciplinary communication with: N/A    Discharge planning as follows: When goals are met    Specific plan for next visit: Lab draw/picc dressing change

## 2024-01-02 NOTE — PATIENT INSTRUCTIONS
Medication changes:    Increase metoprolol to 25mg daily    Please record blood pressure/ heart rate daily before medication and two hours after medication, please record weight daily upon waking/after using the bathroom. Keep a written records of your weights and blood pressure and bring to your next appointment. If you have a weight gain of 3 or more pounds overnight OR 5 or more pounds in one week, new/worsened shortness of breath or swelling, or if your blood pressure begins to consistently run below 90/60 and/or you begin to experience dizziness or lightheadedness please contact our office at [phone] option 2.    Please take this to your pharmacy to notify them of the change in medications.     Testing Ordered:    Add TSH (thyroid level) to  labs today    Genetic testing today in clinic      As part of your ongoing LVAD evaluation:    An order for a PULMONARY FUNCTION TEST has been placed to be done SOONEST AVAILABLE. You will be receiving an automated call from [facility] to schedule this test. If you are unavailable to receive the call or would like to contact coordination of care yourself you may contact [phone] to schedule. You will need to contact coordination  care yourself if you miss their calls as they will only make 3 attempts to reach you.         Other Recommendations:     A referral to neurocognitive and urology has been placed as per recommendation of Dr. Major after hospital discharge. You will be contacted for scheduling. If you do not hear from them within two weeks please call our office at [phone].      Ensure your drinking an adequate amount of water with a goal of 6-8 eight ounce glasses (1.5-2 liters) of fluid daily. Your urine should be clear and light yellow straw colored.     Follow up in 1 month with [facility]      Thank you for allowing us the privilege of being a part of your healthcare team! Please do not hesitate to contact our office

## 2024-01-03 ENCOUNTER — HOME CARE VISIT (OUTPATIENT)
Facility: HOME HEALTH | Age: 74
End: 2024-01-03
Payer: COMMERCIAL

## 2024-01-03 ENCOUNTER — HOSPITAL ENCOUNTER (OUTPATIENT)
Facility: HOSPITAL | Age: 74
Setting detail: SPECIMEN
Discharge: HOME | End: 2024-01-06

## 2024-01-03 ENCOUNTER — HOME CARE VISIT (OUTPATIENT)
Dept: HOME HEALTH SERVICES | Facility: HOME HEALTH | Age: 74
End: 2024-01-03
Payer: COMMERCIAL

## 2024-01-03 VITALS
WEIGHT: 154 LBS | DIASTOLIC BLOOD PRESSURE: 70 MMHG | SYSTOLIC BLOOD PRESSURE: 108 MMHG | HEART RATE: 60 BPM | TEMPERATURE: 97.7 F | BODY MASS INDEX: 22.1 KG/M2 | OXYGEN SATURATION: 96 % | RESPIRATION RATE: 18 BRPM

## 2024-01-03 LAB
ALBUMIN SERPL-MCNC: 3.9 G/DL (ref 3.5–5)
ALBUMIN/GLOB SERPL: 1.1 (ref 1.1–2.2)
ALP SERPL-CCNC: 64 U/L (ref 45–117)
ALT SERPL-CCNC: 27 U/L (ref 12–78)
ANION GAP SERPL CALC-SCNC: 8 MMOL/L (ref 5–15)
AST SERPL-CCNC: 16 U/L (ref 15–37)
BASOPHILS # BLD: 0 K/UL (ref 0–0.1)
BASOPHILS NFR BLD: 1 % (ref 0–1)
BILIRUB SERPL-MCNC: 1.1 MG/DL (ref 0.2–1)
BUN SERPL-MCNC: 37 MG/DL (ref 6–20)
BUN/CREAT SERPL: 18 (ref 12–20)
CALCIUM SERPL-MCNC: 8.5 MG/DL (ref 8.5–10.1)
CHLORIDE SERPL-SCNC: 104 MMOL/L (ref 97–108)
CO2 SERPL-SCNC: 28 MMOL/L (ref 21–32)
CREAT SERPL-MCNC: 2.08 MG/DL (ref 0.7–1.3)
DIFFERENTIAL METHOD BLD: ABNORMAL
EOSINOPHIL # BLD: 0.1 K/UL (ref 0–0.4)
EOSINOPHIL NFR BLD: 3 % (ref 0–7)
ERYTHROCYTE [DISTWIDTH] IN BLOOD BY AUTOMATED COUNT: 17.7 % (ref 11.5–14.5)
GLOBULIN SER CALC-MCNC: 3.5 G/DL (ref 2–4)
GLUCOSE SERPL-MCNC: 193 MG/DL (ref 65–100)
HCT VFR BLD AUTO: 29.9 % (ref 36.6–50.3)
HGB BLD-MCNC: 10 G/DL (ref 12.1–17)
IMM GRANULOCYTES # BLD AUTO: 0 K/UL (ref 0–0.04)
IMM GRANULOCYTES NFR BLD AUTO: 0 % (ref 0–0.5)
LYMPHOCYTES # BLD: 1.5 K/UL (ref 0.8–3.5)
LYMPHOCYTES NFR BLD: 39 % (ref 12–49)
MAGNESIUM SERPL-MCNC: 1.8 MG/DL (ref 1.6–2.4)
MCH RBC QN AUTO: 28.2 PG (ref 26–34)
MCHC RBC AUTO-ENTMCNC: 33.4 G/DL (ref 30–36.5)
MCV RBC AUTO: 84.2 FL (ref 80–99)
MONOCYTES # BLD: 0.3 K/UL (ref 0–1)
MONOCYTES NFR BLD: 9 % (ref 5–13)
NEUTS SEG # BLD: 1.8 K/UL (ref 1.8–8)
NEUTS SEG NFR BLD: 48 % (ref 32–75)
NRBC # BLD: 0 K/UL (ref 0–0.01)
NRBC BLD-RTO: 0 PER 100 WBC
NT PRO BNP: ABNORMAL PG/ML
PLATELET # BLD AUTO: 214 K/UL (ref 150–400)
PMV BLD AUTO: 10 FL (ref 8.9–12.9)
POTASSIUM SERPL-SCNC: 3.4 MMOL/L (ref 3.5–5.1)
PROT SERPL-MCNC: 7.4 G/DL (ref 6.4–8.2)
RBC # BLD AUTO: 3.55 M/UL (ref 4.1–5.7)
SODIUM SERPL-SCNC: 140 MMOL/L (ref 136–145)
TSH SERPL DL<=0.05 MIU/L-ACNC: 8.34 UIU/ML (ref 0.36–3.74)
WBC # BLD AUTO: 3.7 K/UL (ref 4.1–11.1)

## 2024-01-03 PROCEDURE — G0151 HHCP-SERV OF PT,EA 15 MIN: HCPCS

## 2024-01-03 PROCEDURE — 85025 COMPLETE CBC W/AUTO DIFF WBC: CPT

## 2024-01-03 PROCEDURE — 84443 ASSAY THYROID STIM HORMONE: CPT

## 2024-01-03 PROCEDURE — 83735 ASSAY OF MAGNESIUM: CPT

## 2024-01-03 PROCEDURE — 83880 ASSAY OF NATRIURETIC PEPTIDE: CPT

## 2024-01-03 PROCEDURE — G0299 HHS/HOSPICE OF RN EA 15 MIN: HCPCS

## 2024-01-03 PROCEDURE — 36415 COLL VENOUS BLD VENIPUNCTURE: CPT

## 2024-01-03 PROCEDURE — 80053 COMPREHEN METABOLIC PANEL: CPT

## 2024-01-03 NOTE — HOME HEALTH
Subjective: \"I have some shoulder pain.\"  Falls since last visit No(if yes complete the Fall Tracking Form and include bsrifallreport):   Caregiver involvement changes: none  Home health supplies by type and quantity ordered/delivered this visit include: curos caps, CHG discs ordered    Clinician asked if patient has had any physician contact since last home care visit and patient states: Yes  Clinician asked if patient has any new or changed medications and patient states:  YES   If Yes, were medications reconciled? YES   Was the certifying physician notified of changes in medications? MD aware    Clinical assessment (what this visit means for the patient overall and need for ongoing skilled care) and progress or lack of progress towards SPECIFIC goals: Patient seen for assessment, education, central line dressing change, labs, and milrinone bag change. Lungs clear, no edema noted to BLE. Patient educated on daily weights and when to call MD, never to flush line infusing Milrinone, and s/sx. infection to watch out for with having a central line. Visits are needed for weekly labs/dressing changes and milrinone bag management to reduce risk of re-hospitalization.     Written Teaching Material Utilized: N/A     Interdisciplinary communication with: Aldo Styles LPN regarding labs collected     Discharge planning as follows: When goals are met    Specific plan for next visit: Milrinone bag change on Friday, assess family's ability to complete indepenently

## 2024-01-04 ENCOUNTER — TELEPHONE (OUTPATIENT)
Age: 74
End: 2024-01-04

## 2024-01-04 VITALS
SYSTOLIC BLOOD PRESSURE: 139 MMHG | TEMPERATURE: 97.1 F | BODY MASS INDEX: 22.44 KG/M2 | WEIGHT: 156.4 LBS | RESPIRATION RATE: 16 BRPM | OXYGEN SATURATION: 98 % | HEART RATE: 66 BPM | DIASTOLIC BLOOD PRESSURE: 82 MMHG

## 2024-01-04 DIAGNOSIS — E87.6 HYPOKALEMIA: ICD-10-CM

## 2024-01-04 NOTE — TELEPHONE ENCOUNTER
----- Message from Alex Major MD sent at 1/4/2024  8:12 AM EST -----  Patient's potassium remains low. Can you have him increase to 40 meq BID    LM for return call    Sent my chart message about above,  Update rx sent to pharmacy  Requested Prescriptions     Signed Prescriptions Disp Refills    potassium chloride (KLOR-CON M) 20 MEQ extended release tablet 120 tablet 1     Sig: Take 2 tablets by mouth 2 times daily     Authorizing Provider: ALEX MAJOR     Ordering User: LINDA DELANEY

## 2024-01-05 ENCOUNTER — HOME CARE VISIT (OUTPATIENT)
Facility: HOME HEALTH | Age: 74
End: 2024-01-05
Payer: COMMERCIAL

## 2024-01-05 PROCEDURE — G0151 HHCP-SERV OF PT,EA 15 MIN: HCPCS

## 2024-01-05 PROCEDURE — G0299 HHS/HOSPICE OF RN EA 15 MIN: HCPCS

## 2024-01-05 RX ORDER — POTASSIUM CHLORIDE 20 MEQ/1
40 TABLET, EXTENDED RELEASE ORAL 2 TIMES DAILY
Qty: 120 TABLET | Refills: 1 | Status: SHIPPED | OUTPATIENT
Start: 2024-01-05

## 2024-01-05 ASSESSMENT — ENCOUNTER SYMPTOMS: DYSPNEA ACTIVITY LEVEL: AFTER AMBULATING MORE THAN 20 FT

## 2024-01-05 NOTE — HOME HEALTH
Caregiver: Pt is home with his daughter who assists with care as needed.  Caregiver assists with: Medications, Meals, Bathing, ADL, IADL, Transportation and Housekeeping.  Caregiver is available Regularly Caregiver is  present at this visit and did participate with clinician.    Medications reconciled and all medications are available in the home this visit. Patient/CG able to demonstrate knowledge through teach back with 100 percent accuracy.      Patient at risk for falls Yes:   Recommended requesting PT/OT orders due to fall risk YES:   Patient response to recommended requesting of PT/OT orders: Agreeable to PT    Interdisciplinary communication with: yes with RN regarding PT admission.     Written Teaching Material Utilized: N/A    Clinician reviewed orientation to home health booklet with patient/caregiver including agency phone number, agency complaint process, state hotline number, as well as Joint Commission's quality hotline number. Emergency preparedness reviewed with patient/caregiver in home health booklet. Consent forms signed    Patient/caregiver instructed on plan of care and are agreeable to plan of care at this time.      Plan of care and admission to home health status called to attending physician. The following practitioner has agreed to sign the ongoing POC Nuria Major MD    and was notified of the following: visit frequency of 2wk4    Discharge planning discussed with patient and caregiver. Discharge planning as follows: Will discharge when the patient has reached their maximum functional potential and maximum safety in their home and When goals are met Patient/caregiver did verbalize agreement with discharge planning.

## 2024-01-07 VITALS
BODY MASS INDEX: 23.24 KG/M2 | RESPIRATION RATE: 18 BRPM | DIASTOLIC BLOOD PRESSURE: 62 MMHG | SYSTOLIC BLOOD PRESSURE: 120 MMHG | OXYGEN SATURATION: 99 % | WEIGHT: 162 LBS | TEMPERATURE: 97.2 F | HEART RATE: 89 BPM

## 2024-01-07 ASSESSMENT — ENCOUNTER SYMPTOMS
STOOL DESCRIPTION: FORMED
DYSPNEA ACTIVITY LEVEL: AFTER AMBULATING 10 - 20 FT

## 2024-01-08 ENCOUNTER — APPOINTMENT (OUTPATIENT)
Facility: HOSPITAL | Age: 74
DRG: 314 | End: 2024-01-08
Payer: MEDICARE

## 2024-01-08 ENCOUNTER — TELEPHONE (OUTPATIENT)
Age: 74
End: 2024-01-08

## 2024-01-08 ENCOUNTER — HOME CARE VISIT (OUTPATIENT)
Facility: HOME HEALTH | Age: 74
End: 2024-01-08
Payer: COMMERCIAL

## 2024-01-08 ENCOUNTER — HOSPITAL ENCOUNTER (INPATIENT)
Facility: HOSPITAL | Age: 74
LOS: 9 days | Discharge: HOME HEALTH | DRG: 314 | End: 2024-01-17
Attending: EMERGENCY MEDICINE | Admitting: INTERNAL MEDICINE
Payer: MEDICARE

## 2024-01-08 VITALS
BODY MASS INDEX: 23.24 KG/M2 | DIASTOLIC BLOOD PRESSURE: 65 MMHG | TEMPERATURE: 97.2 F | OXYGEN SATURATION: 96 % | RESPIRATION RATE: 16 BRPM | SYSTOLIC BLOOD PRESSURE: 122 MMHG | HEART RATE: 66 BPM | WEIGHT: 162 LBS

## 2024-01-08 VITALS
DIASTOLIC BLOOD PRESSURE: 64 MMHG | OXYGEN SATURATION: 97 % | HEART RATE: 78 BPM | WEIGHT: 164 LBS | TEMPERATURE: 98 F | RESPIRATION RATE: 18 BRPM | BODY MASS INDEX: 23.53 KG/M2 | SYSTOLIC BLOOD PRESSURE: 130 MMHG

## 2024-01-08 DIAGNOSIS — R06.02 SHORTNESS OF BREATH: ICD-10-CM

## 2024-01-08 DIAGNOSIS — J90 PLEURAL EFFUSION: ICD-10-CM

## 2024-01-08 DIAGNOSIS — I50.23 ACUTE ON CHRONIC SYSTOLIC HEART FAILURE (HCC): ICD-10-CM

## 2024-01-08 DIAGNOSIS — R78.81 BACTEREMIA: ICD-10-CM

## 2024-01-08 DIAGNOSIS — I50.9 CHRONIC CONGESTIVE HEART FAILURE, UNSPECIFIED HEART FAILURE TYPE (HCC): Primary | ICD-10-CM

## 2024-01-08 LAB
ALBUMIN SERPL-MCNC: 3.8 G/DL (ref 3.5–5)
ALBUMIN/GLOB SERPL: 0.9 (ref 1.1–2.2)
ALP SERPL-CCNC: 99 U/L (ref 45–117)
ALT SERPL-CCNC: 51 U/L (ref 12–78)
ANION GAP SERPL CALC-SCNC: 8 MMOL/L (ref 5–15)
AST SERPL-CCNC: 33 U/L (ref 15–37)
B PERT DNA SPEC QL NAA+PROBE: NOT DETECTED
BASOPHILS # BLD: 0 K/UL (ref 0–0.1)
BASOPHILS NFR BLD: 1 % (ref 0–1)
BILIRUB SERPL-MCNC: 1.6 MG/DL (ref 0.2–1)
BORDETELLA PARAPERTUSSIS BY PCR: NOT DETECTED
BUN SERPL-MCNC: 43 MG/DL (ref 6–20)
BUN/CREAT SERPL: 17 (ref 12–20)
C PNEUM DNA SPEC QL NAA+PROBE: NOT DETECTED
CALCIUM SERPL-MCNC: 9 MG/DL (ref 8.5–10.1)
CHLORIDE SERPL-SCNC: 110 MMOL/L (ref 97–108)
CO2 SERPL-SCNC: 24 MMOL/L (ref 21–32)
COMMENT:: NORMAL
CREAT SERPL-MCNC: 2.58 MG/DL (ref 0.7–1.3)
DIFFERENTIAL METHOD BLD: ABNORMAL
EOSINOPHIL # BLD: 0.1 K/UL (ref 0–0.4)
EOSINOPHIL NFR BLD: 1 % (ref 0–7)
ERYTHROCYTE [DISTWIDTH] IN BLOOD BY AUTOMATED COUNT: 19.3 % (ref 11.5–14.5)
FLUAV AG NPH QL IA: NEGATIVE
FLUAV SUBTYP SPEC NAA+PROBE: NOT DETECTED
FLUBV AG NOSE QL IA: NEGATIVE
FLUBV RNA SPEC QL NAA+PROBE: NOT DETECTED
GLOBULIN SER CALC-MCNC: 4.4 G/DL (ref 2–4)
GLUCOSE SERPL-MCNC: 118 MG/DL (ref 65–100)
HADV DNA SPEC QL NAA+PROBE: NOT DETECTED
HCOV 229E RNA SPEC QL NAA+PROBE: NOT DETECTED
HCOV HKU1 RNA SPEC QL NAA+PROBE: NOT DETECTED
HCOV NL63 RNA SPEC QL NAA+PROBE: NOT DETECTED
HCOV OC43 RNA SPEC QL NAA+PROBE: NOT DETECTED
HCT VFR BLD AUTO: 31.9 % (ref 36.6–50.3)
HGB BLD-MCNC: 10.7 G/DL (ref 12.1–17)
HMPV RNA SPEC QL NAA+PROBE: NOT DETECTED
HPIV1 RNA SPEC QL NAA+PROBE: NOT DETECTED
HPIV2 RNA SPEC QL NAA+PROBE: NOT DETECTED
HPIV3 RNA SPEC QL NAA+PROBE: NOT DETECTED
HPIV4 RNA SPEC QL NAA+PROBE: NOT DETECTED
IMM GRANULOCYTES # BLD AUTO: 0 K/UL (ref 0–0.04)
IMM GRANULOCYTES NFR BLD AUTO: 0 % (ref 0–0.5)
IRON SATN MFR SERPL: 11 % (ref 20–50)
IRON SERPL-MCNC: 34 UG/DL (ref 35–150)
LACTATE SERPL-SCNC: 1.9 MMOL/L (ref 0.4–2)
LYMPHOCYTES # BLD: 1.1 K/UL (ref 0.8–3.5)
LYMPHOCYTES NFR BLD: 22 % (ref 12–49)
M PNEUMO DNA SPEC QL NAA+PROBE: NOT DETECTED
MCH RBC QN AUTO: 28.2 PG (ref 26–34)
MCHC RBC AUTO-ENTMCNC: 33.5 G/DL (ref 30–36.5)
MCV RBC AUTO: 84.2 FL (ref 80–99)
MONOCYTES # BLD: 0.4 K/UL (ref 0–1)
MONOCYTES NFR BLD: 8 % (ref 5–13)
NEUTS SEG # BLD: 3.6 K/UL (ref 1.8–8)
NEUTS SEG NFR BLD: 68 % (ref 32–75)
NRBC # BLD: 0.04 K/UL (ref 0–0.01)
NRBC BLD-RTO: 0.8 PER 100 WBC
NT PRO BNP: ABNORMAL PG/ML
PLATELET # BLD AUTO: 247 K/UL (ref 150–400)
PMV BLD AUTO: 10.7 FL (ref 8.9–12.9)
POTASSIUM SERPL-SCNC: 4.4 MMOL/L (ref 3.5–5.1)
PROT SERPL-MCNC: 8.2 G/DL (ref 6.4–8.2)
RBC # BLD AUTO: 3.79 M/UL (ref 4.1–5.7)
RSV RNA SPEC QL NAA+PROBE: NOT DETECTED
RV+EV RNA SPEC QL NAA+PROBE: NOT DETECTED
SARS-COV-2 RDRP RESP QL NAA+PROBE: NOT DETECTED
SARS-COV-2 RNA RESP QL NAA+PROBE: NOT DETECTED
SODIUM SERPL-SCNC: 142 MMOL/L (ref 136–145)
SOURCE: NORMAL
SPECIMEN HOLD: NORMAL
T4 FREE SERPL-MCNC: 1.2 NG/DL (ref 0.8–1.5)
TIBC SERPL-MCNC: 309 UG/DL (ref 250–450)
TROPONIN I SERPL HS-MCNC: 101 NG/L (ref 0–76)
TROPONIN I SERPL HS-MCNC: 102 NG/L (ref 0–76)
TROPONIN I SERPL HS-MCNC: 80 NG/L (ref 0–76)
TSH SERPL DL<=0.05 MIU/L-ACNC: 10.5 UIU/ML (ref 0.36–3.74)
WBC # BLD AUTO: 5.3 K/UL (ref 4.1–11.1)

## 2024-01-08 PROCEDURE — 6370000000 HC RX 637 (ALT 250 FOR IP): Performed by: INTERNAL MEDICINE

## 2024-01-08 PROCEDURE — 2060000000 HC ICU INTERMEDIATE R&B

## 2024-01-08 PROCEDURE — 87804 INFLUENZA ASSAY W/OPTIC: CPT

## 2024-01-08 PROCEDURE — 36415 COLL VENOUS BLD VENIPUNCTURE: CPT

## 2024-01-08 PROCEDURE — 87154 CUL TYP ID BLD PTHGN 6+ TRGT: CPT

## 2024-01-08 PROCEDURE — 87635 SARS-COV-2 COVID-19 AMP PRB: CPT

## 2024-01-08 PROCEDURE — 84439 ASSAY OF FREE THYROXINE: CPT

## 2024-01-08 PROCEDURE — 87077 CULTURE AEROBIC IDENTIFY: CPT

## 2024-01-08 PROCEDURE — 83605 ASSAY OF LACTIC ACID: CPT

## 2024-01-08 PROCEDURE — 87186 SC STD MICRODIL/AGAR DIL: CPT

## 2024-01-08 PROCEDURE — 83880 ASSAY OF NATRIURETIC PEPTIDE: CPT

## 2024-01-08 PROCEDURE — 93005 ELECTROCARDIOGRAM TRACING: CPT

## 2024-01-08 PROCEDURE — 84484 ASSAY OF TROPONIN QUANT: CPT

## 2024-01-08 PROCEDURE — 85025 COMPLETE CBC W/AUTO DIFF WBC: CPT

## 2024-01-08 PROCEDURE — 84443 ASSAY THYROID STIM HORMONE: CPT

## 2024-01-08 PROCEDURE — 0202U NFCT DS 22 TRGT SARS-COV-2: CPT

## 2024-01-08 PROCEDURE — 83540 ASSAY OF IRON: CPT

## 2024-01-08 PROCEDURE — 6360000002 HC RX W HCPCS: Performed by: EMERGENCY MEDICINE

## 2024-01-08 PROCEDURE — 83550 IRON BINDING TEST: CPT

## 2024-01-08 PROCEDURE — 6360000002 HC RX W HCPCS: Performed by: INTERNAL MEDICINE

## 2024-01-08 PROCEDURE — 80053 COMPREHEN METABOLIC PANEL: CPT

## 2024-01-08 PROCEDURE — 2700000000 HC OXYGEN THERAPY PER DAY

## 2024-01-08 PROCEDURE — 2580000003 HC RX 258: Performed by: INTERNAL MEDICINE

## 2024-01-08 PROCEDURE — 87040 BLOOD CULTURE FOR BACTERIA: CPT

## 2024-01-08 PROCEDURE — 71046 X-RAY EXAM CHEST 2 VIEWS: CPT

## 2024-01-08 PROCEDURE — G0151 HHCP-SERV OF PT,EA 15 MIN: HCPCS

## 2024-01-08 PROCEDURE — 94760 N-INVAS EAR/PLS OXIMETRY 1: CPT

## 2024-01-08 PROCEDURE — 99285 EMERGENCY DEPT VISIT HI MDM: CPT

## 2024-01-08 PROCEDURE — G0300 HHS/HOSPICE OF LPN EA 15 MIN: HCPCS

## 2024-01-08 RX ORDER — VITAMIN B COMPLEX
1000 TABLET ORAL DAILY
Status: DISCONTINUED | OUTPATIENT
Start: 2024-01-08 | End: 2024-01-17 | Stop reason: HOSPADM

## 2024-01-08 RX ORDER — ONDANSETRON 4 MG/1
4 TABLET, ORALLY DISINTEGRATING ORAL EVERY 8 HOURS PRN
Status: DISCONTINUED | OUTPATIENT
Start: 2024-01-08 | End: 2024-01-17 | Stop reason: HOSPADM

## 2024-01-08 RX ORDER — BUMETANIDE 0.25 MG/ML
2 INJECTION INTRAMUSCULAR; INTRAVENOUS 3 TIMES DAILY
Status: DISCONTINUED | OUTPATIENT
Start: 2024-01-09 | End: 2024-01-09

## 2024-01-08 RX ORDER — BUMETANIDE 0.25 MG/ML
3 INJECTION INTRAMUSCULAR; INTRAVENOUS
Status: COMPLETED | OUTPATIENT
Start: 2024-01-08 | End: 2024-01-08

## 2024-01-08 RX ORDER — ACETAMINOPHEN 650 MG/1
650 SUPPOSITORY RECTAL EVERY 6 HOURS PRN
Status: DISCONTINUED | OUTPATIENT
Start: 2024-01-08 | End: 2024-01-17 | Stop reason: HOSPADM

## 2024-01-08 RX ORDER — POLYETHYLENE GLYCOL 3350 17 G/17G
17 POWDER, FOR SOLUTION ORAL DAILY PRN
Status: DISCONTINUED | OUTPATIENT
Start: 2024-01-08 | End: 2024-01-17 | Stop reason: HOSPADM

## 2024-01-08 RX ORDER — MILRINONE LACTATE 0.2 MG/ML
0.25 INJECTION, SOLUTION INTRAVENOUS CONTINUOUS
Status: DISCONTINUED | OUTPATIENT
Start: 2024-01-08 | End: 2024-01-17 | Stop reason: HOSPADM

## 2024-01-08 RX ORDER — AMIODARONE HYDROCHLORIDE 200 MG/1
100 TABLET ORAL DAILY
Status: DISCONTINUED | OUTPATIENT
Start: 2024-01-08 | End: 2024-01-17 | Stop reason: HOSPADM

## 2024-01-08 RX ORDER — SODIUM CHLORIDE 0.9 % (FLUSH) 0.9 %
5-40 SYRINGE (ML) INJECTION PRN
Status: DISCONTINUED | OUTPATIENT
Start: 2024-01-08 | End: 2024-01-17 | Stop reason: HOSPADM

## 2024-01-08 RX ORDER — SODIUM CHLORIDE 9 MG/ML
INJECTION, SOLUTION INTRAVENOUS PRN
Status: DISCONTINUED | OUTPATIENT
Start: 2024-01-08 | End: 2024-01-17 | Stop reason: HOSPADM

## 2024-01-08 RX ORDER — ACETAMINOPHEN 325 MG/1
650 TABLET ORAL EVERY 6 HOURS PRN
Status: DISCONTINUED | OUTPATIENT
Start: 2024-01-08 | End: 2024-01-17 | Stop reason: HOSPADM

## 2024-01-08 RX ORDER — SODIUM CHLORIDE 0.9 % (FLUSH) 0.9 %
5-40 SYRINGE (ML) INJECTION EVERY 12 HOURS SCHEDULED
Status: DISCONTINUED | OUTPATIENT
Start: 2024-01-08 | End: 2024-01-17 | Stop reason: HOSPADM

## 2024-01-08 RX ORDER — TAMSULOSIN HYDROCHLORIDE 0.4 MG/1
0.4 CAPSULE ORAL DAILY
Status: DISCONTINUED | OUTPATIENT
Start: 2024-01-08 | End: 2024-01-17 | Stop reason: HOSPADM

## 2024-01-08 RX ORDER — METOPROLOL SUCCINATE 25 MG/1
25 TABLET, EXTENDED RELEASE ORAL DAILY
Status: DISCONTINUED | OUTPATIENT
Start: 2024-01-08 | End: 2024-01-10

## 2024-01-08 RX ORDER — LEVOTHYROXINE SODIUM 0.05 MG/1
50 TABLET ORAL DAILY
Status: DISCONTINUED | OUTPATIENT
Start: 2024-01-09 | End: 2024-01-17 | Stop reason: HOSPADM

## 2024-01-08 RX ORDER — BUMETANIDE 0.25 MG/ML
0.5 INJECTION INTRAMUSCULAR; INTRAVENOUS
Status: DISCONTINUED | OUTPATIENT
Start: 2024-01-08 | End: 2024-01-08

## 2024-01-08 RX ORDER — ONDANSETRON 2 MG/ML
4 INJECTION INTRAMUSCULAR; INTRAVENOUS EVERY 6 HOURS PRN
Status: DISCONTINUED | OUTPATIENT
Start: 2024-01-08 | End: 2024-01-17 | Stop reason: HOSPADM

## 2024-01-08 RX ADMIN — BUMETANIDE 3 MG: 0.25 INJECTION INTRAMUSCULAR; INTRAVENOUS at 17:05

## 2024-01-08 RX ADMIN — APIXABAN 5 MG: 5 TABLET, FILM COATED ORAL at 20:34

## 2024-01-08 RX ADMIN — MILRINONE LACTATE IN DEXTROSE 0.25 MCG/KG/MIN: 0.2 INJECTION, SOLUTION INTRAVENOUS at 17:19

## 2024-01-08 RX ADMIN — VANCOMYCIN HYDROCHLORIDE 2000 MG: 10 INJECTION, POWDER, LYOPHILIZED, FOR SOLUTION INTRAVENOUS at 21:58

## 2024-01-08 ASSESSMENT — PAIN SCALES - GENERAL: PAINLEVEL_OUTOF10: 0

## 2024-01-08 NOTE — TELEPHONE ENCOUNTER
Pt. Daughter Shannan messaged through [employer] about some questions from the patients' . SW had received her VM on Friday and called back to :[phone] (used [employer]). Today SW replied to [employer] message and called pt. Daughter's number again and left VM requesting return call.

## 2024-01-08 NOTE — ED NOTES
Pt's daughter at bedside stating pt was weighed this morning at 70.2kg. On arrival, pt triaged at 83kg. Pt re-weighed at bedside at 78.6kg. Accurate weight needed for milrinone dosing.

## 2024-01-08 NOTE — H&P
tablet Take 1 tablet by mouth 2 times daily    Provider, MD Justin   tamsulosin (FLOMAX) 0.4 MG capsule Take 1 capsule by mouth daily    Provider, MD Justin     No Known Allergies   No family history on file.   Social History:  reports that he has never smoked. He has never used smokeless tobacco. He reports that he does not drink alcohol.   Social Determinants of Health     Tobacco Use: Low Risk  (1/2/2024)    Patient History     Smoking Tobacco Use: Never     Smokeless Tobacco Use: Never     Passive Exposure: Not on file   Alcohol Use: Not At Risk (12/6/2023)    AUDIT-C     Frequency of Alcohol Consumption: Never     Average Number of Drinks: Patient does not drink     Frequency of Binge Drinking: Never   Financial Resource Strain: Low Risk  (12/6/2023)    Overall Financial Resource Strain (CARDIA)     Difficulty of Paying Living Expenses: Not hard at all   Food Insecurity: No Food Insecurity (12/11/2023)    Hunger Vital Sign     Worried About Running Out of Food in the Last Year: Never true     Ran Out of Food in the Last Year: Never true   Transportation Needs: No Transportation Needs (12/29/2023)    OASIS : Transportation     Lack of Transportation (Medical): No     Lack of Transportation (Non-Medical): No     Patient Unable or Declines to Respond: No   Physical Activity: Inactive (12/6/2023)    Exercise Vital Sign     Days of Exercise per Week: 0 days     Minutes of Exercise per Session: 0 min   Stress: Not on file   Social Connections: Feeling Socially Integrated (12/29/2023)    OASIS : Social Isolation     Frequency of experiencing loneliness or isolation: Never   Intimate Partner Violence: Not on file   Depression: Not at risk (1/2/2024)    PHQ-2     PHQ-2 Score: 0   Housing Stability: Low Risk  (12/11/2023)    Housing Stability Vital Sign     Unable to Pay for Housing in the Last Year: No     Number of Places Lived in the Last Year: 1     Unstable Housing in the Last Year: No

## 2024-01-08 NOTE — HOME HEALTH
Subjective: Pt with no complaints today    Falls since last visit: No  Caregiver involvement changes: No changes  Home health supplies by type and quantity ordered/delivered this visit include: N/A    Clinician asked if patient has had any physician contact since last home care visit and patient states: NO  Clinician asked if patient has any new or changed medications and patient states:  NO   If Yes, were medications reconciled? NA  Was the certifying physician notified of changes in medications? NA    Clinical assessment (what this visit means for the patient overall and need for ongoing skilled care) and progress or lack of progress towards SPECIFIC goals: Mr. Kennedy is in need of skilled PT for strengthening and cardiopulmonary rehab due to heart failure.      Progress towards goals:  Pt is learning recommended home therex;  Still needs PT support to perform.     Written Teaching Material Utilized: NA    Interdisciplinary communication with: N/A     Discharge planning as follows: Is no longer homebound, Will discharge when the patient has reached their maximum functional potential and maximum safety in their home and When goals are met    Specific plan for next visit: Instruct caregiver/patient in strengthening and cardiopulmonary rehab.

## 2024-01-08 NOTE — ED PROVIDER NOTES
[facility] EMERGENCY DEP  EMERGENCY DEPARTMENT ENCOUNTER      Pt Name: Ingrid Kennedy  MRN: [id number]  Birthdate 1950  Date of evaluation: 1/8/2024  Provider: Helene Ac MD    CHIEF COMPLAINT       Chief Complaint   Patient presents with    Shortness of Breath         HISTORY OF PRESENT ILLNESS    HPI    Ingrid Kennedy is a 73 y.o. male with PMH significant for CHF with EF 15%, CKD, dilated cardiomyopathy, GERRI  He presented presents to the emergency department increasing SOB x 4 days.  Patient reports increasing shortness of breath despite increasing his Bumex at home.  Advised by Dr. Hannah Major, heart failure, to go to emergency department today for evaluation.  Denies fever, chills, nausea, vomiting, diarrhea, constipation.  Denies chest pain.  Patient has been on milrinone drip since last admission after echocardiogram showed worsening cardiac function and EF of 15%.  Patient reports no problems with milrinone at home.  Also reports mild bilateral ankle swelling x 4 days.  Patient reports he is on 3 mg Bumex daily and took an extra dose of Bumex 4 days ago with minimal relief.  Nursing Notes were reviewed.    REVIEW OF SYSTEMS       Review of Systems   Constitutional:  Negative for fever.   Respiratory:  Positive for shortness of breath. Negative for cough.    Cardiovascular:  Positive for leg swelling. Negative for chest pain.   Gastrointestinal:  Negative for abdominal pain.   Musculoskeletal:  Negative for myalgias.   Skin:  Negative for wound.           PAST MEDICAL HISTORY     Past Medical History:   Diagnosis Date    AICD (automatic cardioverter/defibrillator) present     CKD (chronic kidney disease)     Dilated cardiomyopathy (HCC)     Low left ventricular ejection fraction     GERRI on CPAP          SURGICAL HISTORY       Past Surgical History:   Procedure Laterality Date    CARDIAC PROCEDURE N/A 12/18/2023    Right heart cath performed by Ananda Walton MD at [facility] CARDIAC CATH LAB    CARDIAC PROCEDURE N/A

## 2024-01-08 NOTE — ED NOTES
Home milrinone running off of wt of 70.2kg from this morning. Per pharmacist, will continue to run using that wt at this time.

## 2024-01-08 NOTE — TELEPHONE ENCOUNTER
[facility] PT called in from patients home, concerned about worsened SOB    Was only able to walk 10 feet with PT then SOB (last visit was able to walk much further with no SOB) Noticed SOB at rest as well during visit.    Minimal weight gain since last week only about 2 lbs, (but up 10lbs from 2 weeks ago) took 1 tab extra bumex tab Thursday as instructed, currently taking 3mg BID  1/8-112/62- 82, 164lb    milrinone infusing-- no issues, confirmed proper dose over phone    call back [phone]  or [phone]--caryn    Will review with provider for recommendations, Per Marly PALOMINO patient needs to come to ER, spoke with HH nurse/ patient--patient will come to [facility] now.

## 2024-01-08 NOTE — ED TRIAGE NOTES
Pt sent for increased sob, pt on Milrinone drip x 2-3 weeks, +ankle swelling ,denies fever , denies chest pain

## 2024-01-09 ENCOUNTER — APPOINTMENT (OUTPATIENT)
Facility: HOSPITAL | Age: 74
DRG: 314 | End: 2024-01-09
Attending: INTERNAL MEDICINE
Payer: MEDICARE

## 2024-01-09 ENCOUNTER — HOSPITAL ENCOUNTER (INPATIENT)
Facility: HOSPITAL | Age: 74
Discharge: HOME | DRG: 314 | End: 2024-01-12
Attending: HOSPITALIST
Payer: MEDICARE

## 2024-01-09 VITALS
SYSTOLIC BLOOD PRESSURE: 112 MMHG | BODY MASS INDEX: 23.53 KG/M2 | RESPIRATION RATE: 16 BRPM | WEIGHT: 164 LBS | OXYGEN SATURATION: 98 % | DIASTOLIC BLOOD PRESSURE: 62 MMHG | HEART RATE: 82 BPM | TEMPERATURE: 97.3 F

## 2024-01-09 PROBLEM — R78.81 BACTEREMIA ASSOCIATED WITH INTRAVASCULAR LINE (HCC): Status: ACTIVE | Noted: 2024-01-09

## 2024-01-09 PROBLEM — T82.7XXA BACTEREMIA ASSOCIATED WITH INTRAVASCULAR LINE (HCC): Status: ACTIVE | Noted: 2024-01-09

## 2024-01-09 LAB
25(OH)D3 SERPL-MCNC: 38.4 NG/ML (ref 30–100)
ACCESSION NUMBER, LLC1M: ABNORMAL
ACINETOBACTER CALCOAC BAUMANNII COMPLEX BY PCR: NOT DETECTED
ALBUMIN SERPL-MCNC: 3.2 G/DL (ref 3.5–5)
ALBUMIN/GLOB SERPL: 0.9 (ref 1.1–2.2)
ALP SERPL-CCNC: 76 U/L (ref 45–117)
ALT SERPL-CCNC: 41 U/L (ref 12–78)
ANION GAP SERPL CALC-SCNC: 7 MMOL/L (ref 5–15)
APPEARANCE UR: CLEAR
AST SERPL-CCNC: 26 U/L (ref 15–37)
B FRAGILIS DNA BLD POS QL NAA+NON-PROBE: NOT DETECTED
BACTERIA SPEC CULT: NORMAL
BACTERIA SPEC CULT: NORMAL
BACTERIA URNS QL MICRO: NEGATIVE /HPF
BASOPHILS # BLD: 0 K/UL (ref 0–0.1)
BASOPHILS NFR BLD: 0 % (ref 0–1)
BILIRUB SERPL-MCNC: 1.7 MG/DL (ref 0.2–1)
BILIRUB UR QL: NEGATIVE
BIOFIRE TEST COMMENT: ABNORMAL
BLACTX-M ISLT/SPM QL: NOT DETECTED
BLAIMP ISLT/SPM QL: NOT DETECTED
BLAKPC BLD POS QL NAA+NON-PROBE: NOT DETECTED
BLAOXA-48-LIKE ISLT/SPM QL: NOT DETECTED
BLAVIM ISLT/SPM QL: NOT DETECTED
BUN SERPL-MCNC: 41 MG/DL (ref 6–20)
BUN/CREAT SERPL: 19 (ref 12–20)
C ALBICANS DNA BLD POS QL NAA+NON-PROBE: NOT DETECTED
C AURIS DNA BLD POS QL NAA+NON-PROBE: NOT DETECTED
C GATTII+NEOFOR DNA BLD POS QL NAA+N-PRB: NOT DETECTED
C GLABRATA DNA BLD POS QL NAA+NON-PROBE: NOT DETECTED
C KRUSEI DNA BLD POS QL NAA+NON-PROBE: NOT DETECTED
C PARAP DNA BLD POS QL NAA+NON-PROBE: NOT DETECTED
C TROPICLS DNA BLD POS QL NAA+NON-PROBE: NOT DETECTED
CALCIUM SERPL-MCNC: 8.4 MG/DL (ref 8.5–10.1)
CHLORIDE SERPL-SCNC: 112 MMOL/L (ref 97–108)
CHOLEST SERPL-MCNC: 126 MG/DL
CO2 SERPL-SCNC: 24 MMOL/L (ref 21–32)
COLISTIN RES MCR-1 ISLT/SPM QL: NOT DETECTED
COLOR UR: ABNORMAL
CREAT SERPL-MCNC: 2.21 MG/DL (ref 0.7–1.3)
CRP SERPL-MCNC: 8.15 MG/DL (ref 0–0.6)
DIFFERENTIAL METHOD BLD: ABNORMAL
E CLOAC COMP DNA BLD POS NAA+NON-PROBE: NOT DETECTED
E COLI DNA BLD POS QL NAA+NON-PROBE: NOT DETECTED
E FAECALIS DNA BLD POS QL NAA+NON-PROBE: NOT DETECTED
E FAECIUM DNA BLD POS QL NAA+NON-PROBE: NOT DETECTED
ECHO AR MAX VEL PISA: 4.1 M/S
ECHO AV AREA PEAK VELOCITY: 1.8 CM2
ECHO AV AREA/BSA PEAK VELOCITY: 0.9 CM2/M2
ECHO AV PEAK GRADIENT: 11 MMHG
ECHO AV PEAK VELOCITY: 1.6 M/S
ECHO AV REGURGITANT PHT: 540.8 MILLISECOND
ECHO AV VELOCITY RATIO: 0.63
ECHO BSA: 1.97 M2
ECHO EST RA PRESSURE: 8 MMHG
ECHO LA DIAMETER INDEX: 2.6 CM/M2
ECHO LA DIAMETER: 5.1 CM
ECHO LA VOL A-L A4C: 135 ML (ref 18–58)
ECHO LA VOL MOD A4C: 129 ML (ref 18–58)
ECHO LA VOLUME INDEX A-L A4C: 69 ML/M2 (ref 16–34)
ECHO LA VOLUME INDEX MOD A4C: 66 ML/M2 (ref 16–34)
ECHO LV FRACTIONAL SHORTENING: -2 % (ref 28–44)
ECHO LV INTERNAL DIMENSION DIASTOLE INDEX: 3.11 CM/M2
ECHO LV INTERNAL DIMENSION DIASTOLIC: 6.1 CM (ref 4.2–5.9)
ECHO LV INTERNAL DIMENSION SYSTOLIC INDEX: 3.16 CM/M2
ECHO LV INTERNAL DIMENSION SYSTOLIC: 6.2 CM
ECHO LV IVSD: 0.9 CM (ref 0.6–1)
ECHO LV MASS 2D: 222 G (ref 88–224)
ECHO LV MASS INDEX 2D: 113.2 G/M2 (ref 49–115)
ECHO LV POSTERIOR WALL DIASTOLIC: 0.9 CM (ref 0.6–1)
ECHO LV RELATIVE WALL THICKNESS RATIO: 0.3
ECHO LVOT AREA: 3.1 CM2
ECHO LVOT DIAM: 2 CM
ECHO LVOT PEAK GRADIENT: 4 MMHG
ECHO LVOT PEAK VELOCITY: 1 M/S
ECHO MV A VELOCITY: 0.35 M/S
ECHO MV E VELOCITY: 1.01 M/S
ECHO MV E/A RATIO: 2.89
ECHO MV REGURGITANT PEAK GRADIENT: 64 MMHG
ECHO MV REGURGITANT PEAK VELOCITY: 4 M/S
ECHO PULMONARY ARTERY SYSTOLIC PRESSURE (PASP): 75 MMHG
ECHO RIGHT VENTRICULAR SYSTOLIC PRESSURE (RVSP): 75 MMHG
ECHO RV INTERNAL DIMENSION: 5.9 CM
ECHO RV TAPSE: 1.6 CM (ref 1.7–?)
ECHO TV REGURGITANT MAX VELOCITY: 4.09 M/S
ECHO TV REGURGITANT PEAK GRADIENT: 67 MMHG
EKG ATRIAL RATE: 85 BPM
EKG DIAGNOSIS: NORMAL
EKG P AXIS: 78 DEGREES
EKG P-R INTERVAL: 240 MS
EKG Q-T INTERVAL: 414 MS
EKG QRS DURATION: 136 MS
EKG QTC CALCULATION (BAZETT): 492 MS
EKG R AXIS: 158 DEGREES
EKG T AXIS: 96 DEGREES
EKG VENTRICULAR RATE: 85 BPM
ENTEROBACTERALES DNA BLD POS NAA+N-PRB: DETECTED
EOSINOPHIL # BLD: 0 K/UL (ref 0–0.4)
EOSINOPHIL NFR BLD: 0 % (ref 0–7)
EPITH CASTS URNS QL MICRO: ABNORMAL /LPF
ERYTHROCYTE [DISTWIDTH] IN BLOOD BY AUTOMATED COUNT: 18.4 % (ref 11.5–14.5)
ERYTHROCYTE [SEDIMENTATION RATE] IN BLOOD: 30 MM/HR (ref 0–20)
FERRITIN SERPL-MCNC: 163 NG/ML (ref 26–388)
GLOBULIN SER CALC-MCNC: 3.6 G/DL (ref 2–4)
GLUCOSE SERPL-MCNC: 114 MG/DL (ref 65–100)
GLUCOSE UR STRIP.AUTO-MCNC: 100 MG/DL
GP B STREP DNA BLD POS QL NAA+NON-PROBE: NOT DETECTED
HAEM INFLU DNA BLD POS QL NAA+NON-PROBE: NOT DETECTED
HCT VFR BLD AUTO: 27.1 % (ref 36.6–50.3)
HDLC SERPL-MCNC: 48 MG/DL
HDLC SERPL: 2.6 (ref 0–5)
HGB BLD-MCNC: 9 G/DL (ref 12.1–17)
HGB UR QL STRIP: ABNORMAL
HYALINE CASTS URNS QL MICRO: ABNORMAL /LPF (ref 0–5)
IMM GRANULOCYTES # BLD AUTO: 0 K/UL (ref 0–0.04)
IMM GRANULOCYTES NFR BLD AUTO: 0 % (ref 0–0.5)
K OXYTOCA DNA BLD POS QL NAA+NON-PROBE: NOT DETECTED
KETONES UR QL STRIP.AUTO: NEGATIVE MG/DL
KLEBSIELLA SP DNA BLD POS QL NAA+NON-PRB: DETECTED
KLEBSIELLA SP DNA BLD POS QL NAA+NON-PRB: NOT DETECTED
L MONOCYTOG DNA BLD POS QL NAA+NON-PROBE: NOT DETECTED
LDLC SERPL CALC-MCNC: 62.8 MG/DL (ref 0–100)
LEUKOCYTE ESTERASE UR QL STRIP.AUTO: ABNORMAL
LYMPHOCYTES # BLD: 1.4 K/UL (ref 0.8–3.5)
LYMPHOCYTES NFR BLD: 14 % (ref 12–49)
MAGNESIUM SERPL-MCNC: 1.9 MG/DL (ref 1.6–2.4)
MCH RBC QN AUTO: 28.1 PG (ref 26–34)
MCHC RBC AUTO-ENTMCNC: 33.2 G/DL (ref 30–36.5)
MCV RBC AUTO: 84.7 FL (ref 80–99)
MONOCYTES # BLD: 0.7 K/UL (ref 0–1)
MONOCYTES NFR BLD: 7 % (ref 5–13)
N MEN DNA BLD POS QL NAA+NON-PROBE: NOT DETECTED
NEUTS SEG # BLD: 7.8 K/UL (ref 1.8–8)
NEUTS SEG NFR BLD: 79 % (ref 32–75)
NITRITE UR QL STRIP.AUTO: NEGATIVE
NRBC # BLD: 0.04 K/UL (ref 0–0.01)
NRBC BLD-RTO: 0.4 PER 100 WBC
NT PRO BNP: ABNORMAL PG/ML
P AERUGINOSA DNA BLD POS NAA+NON-PROBE: NOT DETECTED
PH UR STRIP: 5.5 (ref 5–8)
PLATELET # BLD AUTO: 182 K/UL (ref 150–400)
PMV BLD AUTO: 10 FL (ref 8.9–12.9)
POTASSIUM SERPL-SCNC: 3.9 MMOL/L (ref 3.5–5.1)
PROT SERPL-MCNC: 6.8 G/DL (ref 6.4–8.2)
PROT UR STRIP-MCNC: NEGATIVE MG/DL
PROTEUS SP DNA BLD POS QL NAA+NON-PROBE: NOT DETECTED
RBC # BLD AUTO: 3.2 M/UL (ref 4.1–5.7)
RBC #/AREA URNS HPF: ABNORMAL /HPF (ref 0–5)
RESISTANT GENE NDM BY PCR: NOT DETECTED
RESISTANT GENE TARGETS: ABNORMAL
S AUREUS DNA BLD POS QL NAA+NON-PROBE: NOT DETECTED
S AUREUS+CONS DNA BLD POS NAA+NON-PROBE: NOT DETECTED
S EPIDERMIDIS DNA BLD POS QL NAA+NON-PRB: NOT DETECTED
S LUGDUNENSIS DNA BLD POS QL NAA+NON-PRB: NOT DETECTED
S MALTOPHILIA DNA BLD POS QL NAA+NON-PRB: NOT DETECTED
S MARCESCENS DNA BLD POS NAA+NON-PROBE: NOT DETECTED
S PNEUM DNA BLD POS QL NAA+NON-PROBE: NOT DETECTED
S PYO DNA BLD POS QL NAA+NON-PROBE: NOT DETECTED
SALMONELLA DNA BLD POS QL NAA+NON-PROBE: NOT DETECTED
SERVICE CMNT-IMP: NORMAL
SODIUM SERPL-SCNC: 143 MMOL/L (ref 136–145)
SP GR UR REFRACTOMETRY: 1.01 (ref 1–1.03)
STREPTOCOCCUS DNA BLD POS NAA+NON-PROBE: NOT DETECTED
TRIGL SERPL-MCNC: 76 MG/DL
URATE SERPL-MCNC: 9.5 MG/DL (ref 3.5–7.2)
URINE CULTURE IF INDICATED: ABNORMAL
UROBILINOGEN UR QL STRIP.AUTO: 0.2 EU/DL (ref 0.2–1)
VLDLC SERPL CALC-MCNC: 15.2 MG/DL
WBC # BLD AUTO: 10 K/UL (ref 4.1–11.1)
WBC URNS QL MICRO: ABNORMAL /HPF (ref 0–4)

## 2024-01-09 PROCEDURE — 84550 ASSAY OF BLOOD/URIC ACID: CPT

## 2024-01-09 PROCEDURE — 6360000002 HC RX W HCPCS: Performed by: EMERGENCY MEDICINE

## 2024-01-09 PROCEDURE — 80061 LIPID PANEL: CPT

## 2024-01-09 PROCEDURE — 0JPT3XZ REMOVAL OF TUNNELED VASCULAR ACCESS DEVICE FROM TRUNK SUBCUTANEOUS TISSUE AND FASCIA, PERCUTANEOUS APPROACH: ICD-10-PCS | Performed by: RADIOLOGY

## 2024-01-09 PROCEDURE — 6360000002 HC RX W HCPCS: Performed by: HOSPITALIST

## 2024-01-09 PROCEDURE — 93308 TTE F-UP OR LMTD: CPT

## 2024-01-09 PROCEDURE — 85025 COMPLETE CBC W/AUTO DIFF WBC: CPT

## 2024-01-09 PROCEDURE — 81001 URINALYSIS AUTO W/SCOPE: CPT

## 2024-01-09 PROCEDURE — 80053 COMPREHEN METABOLIC PANEL: CPT

## 2024-01-09 PROCEDURE — 36589 REMOVAL TUNNELED CV CATH: CPT

## 2024-01-09 PROCEDURE — 6370000000 HC RX 637 (ALT 250 FOR IP): Performed by: INTERNAL MEDICINE

## 2024-01-09 PROCEDURE — 6360000002 HC RX W HCPCS: Performed by: NURSE PRACTITIONER

## 2024-01-09 PROCEDURE — 86140 C-REACTIVE PROTEIN: CPT

## 2024-01-09 PROCEDURE — 2700000000 HC OXYGEN THERAPY PER DAY

## 2024-01-09 PROCEDURE — 93010 ELECTROCARDIOGRAM REPORT: CPT | Performed by: SPECIALIST

## 2024-01-09 PROCEDURE — 83880 ASSAY OF NATRIURETIC PEPTIDE: CPT

## 2024-01-09 PROCEDURE — 82306 VITAMIN D 25 HYDROXY: CPT

## 2024-01-09 PROCEDURE — 97535 SELF CARE MNGMENT TRAINING: CPT

## 2024-01-09 PROCEDURE — APPSS45 APP SPLIT SHARED TIME 31-45 MINUTES: Performed by: NURSE PRACTITIONER

## 2024-01-09 PROCEDURE — 83735 ASSAY OF MAGNESIUM: CPT

## 2024-01-09 PROCEDURE — 6370000000 HC RX 637 (ALT 250 FOR IP): Performed by: NURSE PRACTITIONER

## 2024-01-09 PROCEDURE — 93308 TTE F-UP OR LMTD: CPT | Performed by: SPECIALIST

## 2024-01-09 PROCEDURE — 97165 OT EVAL LOW COMPLEX 30 MIN: CPT

## 2024-01-09 PROCEDURE — 2580000003 HC RX 258: Performed by: HOSPITALIST

## 2024-01-09 PROCEDURE — 93325 DOPPLER ECHO COLOR FLOW MAPG: CPT | Performed by: SPECIALIST

## 2024-01-09 PROCEDURE — 36415 COLL VENOUS BLD VENIPUNCTURE: CPT

## 2024-01-09 PROCEDURE — 2580000003 HC RX 258: Performed by: INTERNAL MEDICINE

## 2024-01-09 PROCEDURE — 93321 DOPPLER ECHO F-UP/LMTD STD: CPT | Performed by: SPECIALIST

## 2024-01-09 PROCEDURE — 85652 RBC SED RATE AUTOMATED: CPT

## 2024-01-09 PROCEDURE — 82728 ASSAY OF FERRITIN: CPT

## 2024-01-09 PROCEDURE — 99233 SBSQ HOSP IP/OBS HIGH 50: CPT | Performed by: INTERNAL MEDICINE

## 2024-01-09 PROCEDURE — 2060000000 HC ICU INTERMEDIATE R&B

## 2024-01-09 PROCEDURE — 94760 N-INVAS EAR/PLS OXIMETRY 1: CPT

## 2024-01-09 RX ORDER — POTASSIUM CHLORIDE 750 MG/1
40 TABLET, FILM COATED, EXTENDED RELEASE ORAL 2 TIMES DAILY
Status: DISCONTINUED | OUTPATIENT
Start: 2024-01-09 | End: 2024-01-17 | Stop reason: HOSPADM

## 2024-01-09 RX ORDER — BUMETANIDE 1 MG/1
3 TABLET ORAL 2 TIMES DAILY
Status: DISCONTINUED | OUTPATIENT
Start: 2024-01-09 | End: 2024-01-17 | Stop reason: HOSPADM

## 2024-01-09 RX ORDER — ALLOPURINOL 100 MG/1
50 TABLET ORAL DAILY
Status: DISCONTINUED | OUTPATIENT
Start: 2024-01-09 | End: 2024-01-17 | Stop reason: HOSPADM

## 2024-01-09 RX ADMIN — ALLOPURINOL 50 MG: 100 TABLET ORAL at 15:09

## 2024-01-09 RX ADMIN — PIPERACILLIN AND TAZOBACTAM 3375 MG: 3; .375 INJECTION, POWDER, FOR SOLUTION INTRAVENOUS at 22:08

## 2024-01-09 RX ADMIN — AMIODARONE HYDROCHLORIDE 100 MG: 200 TABLET ORAL at 08:56

## 2024-01-09 RX ADMIN — POTASSIUM CHLORIDE 40 MEQ: 750 TABLET, FILM COATED, EXTENDED RELEASE ORAL at 20:56

## 2024-01-09 RX ADMIN — Medication 1000 UNITS: at 08:56

## 2024-01-09 RX ADMIN — LEVOTHYROXINE SODIUM 50 MCG: 50 TABLET ORAL at 08:56

## 2024-01-09 RX ADMIN — PIPERACILLIN AND TAZOBACTAM 4500 MG: 4; .5 INJECTION, POWDER, LYOPHILIZED, FOR SOLUTION INTRAVENOUS at 08:55

## 2024-01-09 RX ADMIN — APIXABAN 5 MG: 5 TABLET, FILM COATED ORAL at 08:56

## 2024-01-09 RX ADMIN — SODIUM CHLORIDE, PRESERVATIVE FREE 10 ML: 5 INJECTION INTRAVENOUS at 08:55

## 2024-01-09 RX ADMIN — SODIUM CHLORIDE, PRESERVATIVE FREE 10 ML: 5 INJECTION INTRAVENOUS at 20:56

## 2024-01-09 RX ADMIN — PIPERACILLIN AND TAZOBACTAM 3375 MG: 3; .375 INJECTION, POWDER, FOR SOLUTION INTRAVENOUS at 15:09

## 2024-01-09 RX ADMIN — BUMETANIDE 2 MG: 0.25 INJECTION INTRAMUSCULAR; INTRAVENOUS at 08:55

## 2024-01-09 RX ADMIN — APIXABAN 5 MG: 5 TABLET, FILM COATED ORAL at 20:56

## 2024-01-09 RX ADMIN — METOPROLOL SUCCINATE 25 MG: 25 TABLET, EXTENDED RELEASE ORAL at 08:56

## 2024-01-09 RX ADMIN — TAMSULOSIN HYDROCHLORIDE 0.4 MG: 0.4 CAPSULE ORAL at 08:56

## 2024-01-09 RX ADMIN — MILRINONE LACTATE IN DEXTROSE 0.25 MCG/KG/MIN: 0.2 INJECTION, SOLUTION INTRAVENOUS at 10:45

## 2024-01-09 ASSESSMENT — ENCOUNTER SYMPTOMS: STOOL DESCRIPTION: SOFT FORMED

## 2024-01-09 ASSESSMENT — PAIN SCALES - GENERAL: PAINLEVEL_OUTOF10: 0

## 2024-01-09 NOTE — NURSE NAVIGATOR
Heart Failure Nurse Navigator (HFNN) rounds completed.    HF NN provided introduction to self and nurse navigator role.  Patient had received heart failure education material on 12/13/23 when last seen by HFNN.  Patient states he had been doing well at home but became weaker and found he couldn't work with  therapy at home.  He stated he began having afternoon chills.       Advised patient that follow up appointment will be scheduled and placed on Discharge Instructions prior to discharge.Will continue to follow until discharge.        Time spent with patient discussing HF education:  10 minutes  
(thiasolidinediones, saxagliptin, alogliptin)  NSAIDs and LEVY 2 inhibitors    Consider vaccinations for respiratory illnesses (flu, pneumonia, covid) [Class 2b]        Patient Education:     Teach back in heart failure education provided, including information about medical therapy, lifestyle modifications, diet and fluid restrictions, physical activity.  Educational resources provided: “Living with Heart Failure” booklet; Signs/Symptoms magnet; Weight Calendar; Dis[facility] flyer; Preparation for Successful Discharge Checklist.  Information provided about HF support group.  Heart failure avoiding triggers on discharge instructions.      Plan for Transitional Care:    Post discharge follow up phone call to be made within 48-72 hours of discharge.  Patient will follow-up with PCP.  Patient will follow-up with Primary Cardiologist.  Obstructive sleep apnea screening done and patient was referred to Sleep Medicine.  Referral/follow-up with Cardiac Rehabilitation.  Referral/follow-up with Advanced Heart Failure Specialist.  Referral/follow-up with Palliative Care Specialist.      Heart Failure Nurse Navigator  Phone: [phone]  /  [phone]    *Recommendations listed above are based on 2022 AHA/ACC/HFSA Guideline for the Management of Heart Failure: A Report of the American College of Cardiology/American Heart Association Joint Committee on Clinical Practice Guidelines. Circulation 2022; e1032. and 2017 AHA/ACC/HRS guideline for management of patients with ventricular arrhythmias and the prevention of sudden cardiac death: A Report of the American College of Cardiology/American Heart Association Task Force on Clinical Practice Guidelines and the Heart Rhythm Society. Heart Rhythm, Vol 15, No 10, October 2018 *Class of Recommendation: Class 1 (strong), Class 2a (moderate), Class 2b (weak), Class 3 (not recommended, potentially harmful)

## 2024-01-09 NOTE — HOME HEALTH
Subjective: I feel very SOB today.  Pt reports calling MD last week about SOB and he was instructed to take 1 extra Bumex for just 1 day.  Now states that his SOB feels worse.     Falls since last visit: No  Caregiver involvement changes: No changes  Home health supplies by type and quantity ordered/delivered this visit include: N/A    Clinician asked if patient has had any physician contact since last home care visit and patient states: NO  Clinician asked if patient has any new or changed medications and patient states:  NO   If Yes, were medications reconciled? NA  Was the certifying physician notified of changes in medications? NA    Clinical assessment (what this visit means for the patient overall and need for ongoing skilled care) and progress or lack of progress towards SPECIFIC goals: Mr. Kennedy is in need of skilled PT for strengthening and cardiopulmonary rehab due to heart failure.  Nuria Major MD   was notified today of SOB symptoms.  Pt could barely even walk around 10 feet without SOB which was very different from last week.     Progress towards goals:  NA    Written Teaching Material Utilized: NA    Interdisciplinary communication with: N/A     Discharge planning as follows: Is no longer homebound, Will discharge when the patient has reached their maximum functional potential and maximum safety in their home and When goals are met    Specific plan for next visit: Instruct caregiver/patient in strengthening and cardiopulmonary rehab.

## 2024-01-09 NOTE — HOME HEALTH
Subjective: Patient stated he is not feeling well today and getiing short of breath  Falls since last visit No(if yes complete the Fall Tracking Form and include bsrifallreport):   Caregiver involvement changes: family present for visit  Home health supplies by type and quantity ordered/delivered this visit include: none    Clinician asked if patient has had any physician contact since last home care visit and patient states: NO  Clinician asked if patient has any new or changed medications and patient states:  NO  If Yes, were medications reconciled? N/A  Was the certifying physician notified of changes in medications? NO    Clinical assessment (what this visit means for the patient overall and need for ongoing skilled care) and progress or lack of progress towards SPECIFIC goals: Upon arrival PT was present and had called Cardiologist to inform about patient increase in shortness of breath. Patient being seen for cardiac labs due to infusion and history of CHF, patient had increased shortness of breath using his accesory muscles to breath also edema in lower extremities and unsure if weighing self correctly, could not remember what weights were the past two days and not written down. Cardiologist office called during visit and instructed the patient to go to the ED. No education or labs drawn due to going to the ED.    Written Teaching Material Utilized: N/A    Interdisciplinary communication with: Cardiologist office and care team for the purpose of patient shortness of breath    Discharge planning as follows: Per physician order and When goals are met    Specific plan for next visit: None due to going to ED

## 2024-01-10 ENCOUNTER — HOME CARE VISIT (OUTPATIENT)
Dept: HOME HEALTH SERVICES | Facility: HOME HEALTH | Age: 74
End: 2024-01-10
Payer: COMMERCIAL

## 2024-01-10 LAB
ALBUMIN SERPL-MCNC: 3.1 G/DL (ref 3.5–5)
ALBUMIN/GLOB SERPL: 0.9 (ref 1.1–2.2)
ALP SERPL-CCNC: 82 U/L (ref 45–117)
ALT SERPL-CCNC: 36 U/L (ref 12–78)
ANION GAP SERPL CALC-SCNC: 8 MMOL/L (ref 5–15)
AST SERPL-CCNC: 20 U/L (ref 15–37)
BASOPHILS # BLD: 0.1 K/UL (ref 0–0.1)
BASOPHILS NFR BLD: 1 % (ref 0–1)
BILIRUB SERPL-MCNC: 1.2 MG/DL (ref 0.2–1)
BUN SERPL-MCNC: 48 MG/DL (ref 6–20)
BUN/CREAT SERPL: 20 (ref 12–20)
CALCIUM SERPL-MCNC: 8.4 MG/DL (ref 8.5–10.1)
CHLORIDE SERPL-SCNC: 110 MMOL/L (ref 97–108)
CO2 SERPL-SCNC: 23 MMOL/L (ref 21–32)
CREAT SERPL-MCNC: 2.45 MG/DL (ref 0.7–1.3)
DIFFERENTIAL METHOD BLD: ABNORMAL
EOSINOPHIL # BLD: 0.2 K/UL (ref 0–0.4)
EOSINOPHIL NFR BLD: 3 % (ref 0–7)
ERYTHROCYTE [DISTWIDTH] IN BLOOD BY AUTOMATED COUNT: 18.3 % (ref 11.5–14.5)
GLOBULIN SER CALC-MCNC: 3.3 G/DL (ref 2–4)
GLUCOSE SERPL-MCNC: 113 MG/DL (ref 65–100)
HCT VFR BLD AUTO: 28.1 % (ref 36.6–50.3)
HGB BLD-MCNC: 9.5 G/DL (ref 12.1–17)
IMM GRANULOCYTES # BLD AUTO: 0 K/UL (ref 0–0.04)
IMM GRANULOCYTES NFR BLD AUTO: 0 % (ref 0–0.5)
LYMPHOCYTES # BLD: 2.3 K/UL (ref 0.8–3.5)
LYMPHOCYTES NFR BLD: 32 % (ref 12–49)
MAGNESIUM SERPL-MCNC: 2 MG/DL (ref 1.6–2.4)
MCH RBC QN AUTO: 28.4 PG (ref 26–34)
MCHC RBC AUTO-ENTMCNC: 33.8 G/DL (ref 30–36.5)
MCV RBC AUTO: 83.9 FL (ref 80–99)
MONOCYTES # BLD: 0.9 K/UL (ref 0–1)
MONOCYTES NFR BLD: 13 % (ref 5–13)
NEUTS SEG # BLD: 3.7 K/UL (ref 1.8–8)
NEUTS SEG NFR BLD: 51 % (ref 32–75)
NRBC # BLD: 0.04 K/UL (ref 0–0.01)
NRBC BLD-RTO: 0.6 PER 100 WBC
NT PRO BNP: ABNORMAL PG/ML
PLATELET # BLD AUTO: 182 K/UL (ref 150–400)
PMV BLD AUTO: 10.4 FL (ref 8.9–12.9)
POTASSIUM SERPL-SCNC: 4.2 MMOL/L (ref 3.5–5.1)
PROT SERPL-MCNC: 6.4 G/DL (ref 6.4–8.2)
RBC # BLD AUTO: 3.35 M/UL (ref 4.1–5.7)
SODIUM SERPL-SCNC: 141 MMOL/L (ref 136–145)
WBC # BLD AUTO: 7.2 K/UL (ref 4.1–11.1)

## 2024-01-10 PROCEDURE — 6370000000 HC RX 637 (ALT 250 FOR IP): Performed by: INTERNAL MEDICINE

## 2024-01-10 PROCEDURE — 2060000000 HC ICU INTERMEDIATE R&B

## 2024-01-10 PROCEDURE — 6370000000 HC RX 637 (ALT 250 FOR IP): Performed by: NURSE PRACTITIONER

## 2024-01-10 PROCEDURE — 97161 PT EVAL LOW COMPLEX 20 MIN: CPT

## 2024-01-10 PROCEDURE — APPSS30 APP SPLIT SHARED TIME 16-30 MINUTES: Performed by: NURSE PRACTITIONER

## 2024-01-10 PROCEDURE — 36415 COLL VENOUS BLD VENIPUNCTURE: CPT

## 2024-01-10 PROCEDURE — 83735 ASSAY OF MAGNESIUM: CPT

## 2024-01-10 PROCEDURE — 83880 ASSAY OF NATRIURETIC PEPTIDE: CPT

## 2024-01-10 PROCEDURE — 2580000003 HC RX 258: Performed by: HOSPITALIST

## 2024-01-10 PROCEDURE — 2700000000 HC OXYGEN THERAPY PER DAY

## 2024-01-10 PROCEDURE — 99233 SBSQ HOSP IP/OBS HIGH 50: CPT | Performed by: INTERNAL MEDICINE

## 2024-01-10 PROCEDURE — 2580000003 HC RX 258: Performed by: INTERNAL MEDICINE

## 2024-01-10 PROCEDURE — 6360000002 HC RX W HCPCS: Performed by: HOSPITALIST

## 2024-01-10 PROCEDURE — 94760 N-INVAS EAR/PLS OXIMETRY 1: CPT

## 2024-01-10 PROCEDURE — 97116 GAIT TRAINING THERAPY: CPT

## 2024-01-10 PROCEDURE — 85025 COMPLETE CBC W/AUTO DIFF WBC: CPT

## 2024-01-10 PROCEDURE — 99221 1ST HOSP IP/OBS SF/LOW 40: CPT | Performed by: INTERNAL MEDICINE

## 2024-01-10 PROCEDURE — 6360000002 HC RX W HCPCS: Performed by: EMERGENCY MEDICINE

## 2024-01-10 PROCEDURE — NBSRV NON-BILLABLE SERVICE: Performed by: INTERNAL MEDICINE

## 2024-01-10 PROCEDURE — 80053 COMPREHEN METABOLIC PANEL: CPT

## 2024-01-10 RX ORDER — METOPROLOL SUCCINATE 25 MG/1
12.5 TABLET, EXTENDED RELEASE ORAL DAILY
Status: DISCONTINUED | OUTPATIENT
Start: 2024-01-11 | End: 2024-01-17 | Stop reason: HOSPADM

## 2024-01-10 RX ORDER — BENZONATATE 100 MG/1
100 CAPSULE ORAL 3 TIMES DAILY PRN
Status: DISCONTINUED | OUTPATIENT
Start: 2024-01-10 | End: 2024-01-17 | Stop reason: HOSPADM

## 2024-01-10 RX ADMIN — MILRINONE LACTATE IN DEXTROSE 0.25 MCG/KG/MIN: 0.2 INJECTION, SOLUTION INTRAVENOUS at 06:12

## 2024-01-10 RX ADMIN — SODIUM CHLORIDE, PRESERVATIVE FREE 10 ML: 5 INJECTION INTRAVENOUS at 20:53

## 2024-01-10 RX ADMIN — APIXABAN 5 MG: 5 TABLET, FILM COATED ORAL at 20:51

## 2024-01-10 RX ADMIN — Medication 1000 UNITS: at 10:09

## 2024-01-10 RX ADMIN — LEVOTHYROXINE SODIUM 50 MCG: 50 TABLET ORAL at 10:09

## 2024-01-10 RX ADMIN — POTASSIUM CHLORIDE 40 MEQ: 750 TABLET, FILM COATED, EXTENDED RELEASE ORAL at 20:51

## 2024-01-10 RX ADMIN — PIPERACILLIN AND TAZOBACTAM 3375 MG: 3; .375 INJECTION, POWDER, FOR SOLUTION INTRAVENOUS at 21:03

## 2024-01-10 RX ADMIN — BENZONATATE 100 MG: 100 CAPSULE ORAL at 00:46

## 2024-01-10 RX ADMIN — BENZONATATE 100 MG: 100 CAPSULE ORAL at 16:31

## 2024-01-10 RX ADMIN — PIPERACILLIN AND TAZOBACTAM 3375 MG: 3; .375 INJECTION, POWDER, FOR SOLUTION INTRAVENOUS at 13:59

## 2024-01-10 RX ADMIN — ALLOPURINOL 50 MG: 100 TABLET ORAL at 10:09

## 2024-01-10 RX ADMIN — PIPERACILLIN AND TAZOBACTAM 3375 MG: 3; .375 INJECTION, POWDER, FOR SOLUTION INTRAVENOUS at 06:11

## 2024-01-10 RX ADMIN — APIXABAN 5 MG: 5 TABLET, FILM COATED ORAL at 10:10

## 2024-01-10 RX ADMIN — POTASSIUM CHLORIDE 40 MEQ: 750 TABLET, FILM COATED, EXTENDED RELEASE ORAL at 10:09

## 2024-01-10 ASSESSMENT — PAIN SCALES - GENERAL: PAINLEVEL_OUTOF10: 0

## 2024-01-11 ENCOUNTER — TELEPHONE (OUTPATIENT)
Age: 74
End: 2024-01-11

## 2024-01-11 ENCOUNTER — PATIENT MESSAGE (OUTPATIENT)
Age: 74
End: 2024-01-11

## 2024-01-11 LAB
ALBUMIN SERPL-MCNC: 3.1 G/DL (ref 3.5–5)
ALBUMIN/GLOB SERPL: 0.9 (ref 1.1–2.2)
ALP SERPL-CCNC: 82 U/L (ref 45–117)
ALT SERPL-CCNC: 29 U/L (ref 12–78)
ANION GAP SERPL CALC-SCNC: 7 MMOL/L (ref 5–15)
AST SERPL-CCNC: 10 U/L (ref 15–37)
BACTERIA SPEC CULT: ABNORMAL
BACTERIA SPEC CULT: ABNORMAL
BASOPHILS # BLD: 0 K/UL (ref 0–0.1)
BASOPHILS NFR BLD: 0 % (ref 0–1)
BILIRUB SERPL-MCNC: 1.1 MG/DL (ref 0.2–1)
BUN SERPL-MCNC: 42 MG/DL (ref 6–20)
BUN/CREAT SERPL: 19 (ref 12–20)
CALCIUM SERPL-MCNC: 8.5 MG/DL (ref 8.5–10.1)
CHLORIDE SERPL-SCNC: 112 MMOL/L (ref 97–108)
CO2 SERPL-SCNC: 23 MMOL/L (ref 21–32)
CREAT SERPL-MCNC: 2.26 MG/DL (ref 0.7–1.3)
DIFFERENTIAL METHOD BLD: ABNORMAL
EOSINOPHIL # BLD: 0.1 K/UL (ref 0–0.4)
EOSINOPHIL NFR BLD: 2 % (ref 0–7)
ERYTHROCYTE [DISTWIDTH] IN BLOOD BY AUTOMATED COUNT: 18 % (ref 11.5–14.5)
GLOBULIN SER CALC-MCNC: 3.4 G/DL (ref 2–4)
GLUCOSE SERPL-MCNC: 111 MG/DL (ref 65–100)
HCT VFR BLD AUTO: 28.7 % (ref 36.6–50.3)
HGB BLD-MCNC: 9.4 G/DL (ref 12.1–17)
IMM GRANULOCYTES # BLD AUTO: 0 K/UL
IMM GRANULOCYTES NFR BLD AUTO: 0 %
LYMPHOCYTES # BLD: 1.3 K/UL (ref 0.8–3.5)
LYMPHOCYTES NFR BLD: 30 % (ref 12–49)
MAGNESIUM SERPL-MCNC: 2 MG/DL (ref 1.6–2.4)
MCH RBC QN AUTO: 27.7 PG (ref 26–34)
MCHC RBC AUTO-ENTMCNC: 32.8 G/DL (ref 30–36.5)
MCV RBC AUTO: 84.7 FL (ref 80–99)
MONOCYTES # BLD: 0.3 K/UL (ref 0–1)
MONOCYTES NFR BLD: 6 % (ref 5–13)
NEUTS SEG # BLD: 2.5 K/UL (ref 1.8–8)
NEUTS SEG NFR BLD: 62 % (ref 32–75)
NRBC # BLD: 0.04 K/UL (ref 0–0.01)
NRBC BLD-RTO: 1 PER 100 WBC
NT PRO BNP: 8084 PG/ML
PLATELET # BLD AUTO: 204 K/UL (ref 150–400)
PMV BLD AUTO: 10 FL (ref 8.9–12.9)
POTASSIUM SERPL-SCNC: 4.6 MMOL/L (ref 3.5–5.1)
PROT SERPL-MCNC: 6.5 G/DL (ref 6.4–8.2)
RBC # BLD AUTO: 3.39 M/UL (ref 4.1–5.7)
RBC MORPH BLD: ABNORMAL
SERVICE CMNT-IMP: ABNORMAL
SODIUM SERPL-SCNC: 142 MMOL/L (ref 136–145)
WBC # BLD AUTO: 4.2 K/UL (ref 4.1–11.1)

## 2024-01-11 PROCEDURE — 6370000000 HC RX 637 (ALT 250 FOR IP): Performed by: NURSE PRACTITIONER

## 2024-01-11 PROCEDURE — 87040 BLOOD CULTURE FOR BACTERIA: CPT

## 2024-01-11 PROCEDURE — 6360000002 HC RX W HCPCS: Performed by: EMERGENCY MEDICINE

## 2024-01-11 PROCEDURE — 2700000000 HC OXYGEN THERAPY PER DAY

## 2024-01-11 PROCEDURE — 99233 SBSQ HOSP IP/OBS HIGH 50: CPT | Performed by: INTERNAL MEDICINE

## 2024-01-11 PROCEDURE — 6360000002 HC RX W HCPCS: Performed by: HOSPITALIST

## 2024-01-11 PROCEDURE — 6370000000 HC RX 637 (ALT 250 FOR IP): Performed by: INTERNAL MEDICINE

## 2024-01-11 PROCEDURE — 83735 ASSAY OF MAGNESIUM: CPT

## 2024-01-11 PROCEDURE — 2580000003 HC RX 258: Performed by: HOSPITALIST

## 2024-01-11 PROCEDURE — 83880 ASSAY OF NATRIURETIC PEPTIDE: CPT

## 2024-01-11 PROCEDURE — 85025 COMPLETE CBC W/AUTO DIFF WBC: CPT

## 2024-01-11 PROCEDURE — 36415 COLL VENOUS BLD VENIPUNCTURE: CPT

## 2024-01-11 PROCEDURE — 2060000000 HC ICU INTERMEDIATE R&B

## 2024-01-11 PROCEDURE — 94760 N-INVAS EAR/PLS OXIMETRY 1: CPT

## 2024-01-11 PROCEDURE — 6370000000 HC RX 637 (ALT 250 FOR IP): Performed by: HOSPITALIST

## 2024-01-11 PROCEDURE — 80053 COMPREHEN METABOLIC PANEL: CPT

## 2024-01-11 PROCEDURE — APPSS30 APP SPLIT SHARED TIME 16-30 MINUTES: Performed by: NURSE PRACTITIONER

## 2024-01-11 PROCEDURE — 2580000003 HC RX 258: Performed by: INTERNAL MEDICINE

## 2024-01-11 RX ADMIN — BUMETANIDE 3 MG: 1 TABLET ORAL at 21:28

## 2024-01-11 RX ADMIN — SODIUM CHLORIDE, PRESERVATIVE FREE 10 ML: 5 INJECTION INTRAVENOUS at 21:28

## 2024-01-11 RX ADMIN — POTASSIUM CHLORIDE 40 MEQ: 750 TABLET, FILM COATED, EXTENDED RELEASE ORAL at 21:28

## 2024-01-11 RX ADMIN — PIPERACILLIN AND TAZOBACTAM 3375 MG: 3; .375 INJECTION, POWDER, FOR SOLUTION INTRAVENOUS at 14:09

## 2024-01-11 RX ADMIN — SODIUM CHLORIDE, PRESERVATIVE FREE 10 ML: 5 INJECTION INTRAVENOUS at 09:20

## 2024-01-11 RX ADMIN — MILRINONE LACTATE IN DEXTROSE 0.25 MCG/KG/MIN: 0.2 INJECTION, SOLUTION INTRAVENOUS at 19:23

## 2024-01-11 RX ADMIN — AMIODARONE HYDROCHLORIDE 100 MG: 200 TABLET ORAL at 09:16

## 2024-01-11 RX ADMIN — EMPAGLIFLOZIN 10 MG: 10 TABLET, FILM COATED ORAL at 09:17

## 2024-01-11 RX ADMIN — PIPERACILLIN AND TAZOBACTAM 3375 MG: 3; .375 INJECTION, POWDER, FOR SOLUTION INTRAVENOUS at 21:28

## 2024-01-11 RX ADMIN — TAMSULOSIN HYDROCHLORIDE 0.4 MG: 0.4 CAPSULE ORAL at 09:16

## 2024-01-11 RX ADMIN — METOPROLOL SUCCINATE 12.5 MG: 25 TABLET, EXTENDED RELEASE ORAL at 09:16

## 2024-01-11 RX ADMIN — PIPERACILLIN AND TAZOBACTAM 3375 MG: 3; .375 INJECTION, POWDER, FOR SOLUTION INTRAVENOUS at 05:21

## 2024-01-11 RX ADMIN — LEVOTHYROXINE SODIUM 50 MCG: 50 TABLET ORAL at 05:20

## 2024-01-11 RX ADMIN — ALLOPURINOL 50 MG: 100 TABLET ORAL at 09:16

## 2024-01-11 RX ADMIN — APIXABAN 5 MG: 5 TABLET, FILM COATED ORAL at 09:17

## 2024-01-11 RX ADMIN — BUMETANIDE 3 MG: 1 TABLET ORAL at 09:16

## 2024-01-11 RX ADMIN — MILRINONE LACTATE IN DEXTROSE 0.25 MCG/KG/MIN: 0.2 INJECTION, SOLUTION INTRAVENOUS at 00:28

## 2024-01-11 RX ADMIN — POTASSIUM CHLORIDE 40 MEQ: 750 TABLET, FILM COATED, EXTENDED RELEASE ORAL at 09:17

## 2024-01-11 RX ADMIN — APIXABAN 5 MG: 5 TABLET, FILM COATED ORAL at 21:28

## 2024-01-11 RX ADMIN — Medication 1000 UNITS: at 09:17

## 2024-01-11 ASSESSMENT — PAIN SCALES - GENERAL
PAINLEVEL_OUTOF10: 0
PAINLEVEL_OUTOF10: 0

## 2024-01-11 NOTE — CONSULTS
Infectious Disease Consult Note    Reason for Consult: Bacteremia  Date of Consultation: January 10, 2024  Date of Admission: 1/8/2024  Referring Physician:       HPI:    The patient was personally evaluated and examined by me at bedside today.  The patient is comfortable and relaxed offers no complaints.  He denies headache, fever, sweats or chills.  The patient states that he is breathing fine and has no shortness of breath, no cough, no sputum, no chest pain and no chest pressure.  The patient states that he is eating well and has no nausea, vomiting, diarrhea or constipation.  He denies abdominal pain or discomfort of any kind.  The patient states that his knees are fine and that he has not had any problem with them recently.  He states that the antibiotics been working well and that he is feeling much better  Patient remains afebrile without leukocytosis.  Repeat cultures are negative.      The patient was admitted 01/08/2024 by way of the emergency room with a chief complaint of shortness of breath as well as leg swelling.  Patient was found to be afebrile with a temperature of 97.8 and without leukocytosis with WBC of 5.3.  Patient started to have rigors and there was concern for sepsis blood culture sent and physician noting CVC in place since 12/26.  CXR showed enlarged heart with bilateral effusion.  Empiric antibiotics were started with vancomycin.  The patient was evaluated for COVID, influenza, RSV and MRSA.  Blood cultures were positive 1/1 bottle 01/08/2024 for Klebsiella pneumonia.  MRSA screening was negative.  The patient was placed on Zosyn.  The patient has a history of recent bilateral knee replacements in [place] and was placed on chronic rifampin and ciprofloxacin for suppression.  Orthopedic physicians here stopped these antibiotics last admission      Past Medical History:  Past Medical History:   Diagnosis Date    AICD (automatic cardioverter/defibrillator) present     CKD (chronic 
Nutrition Education    Educated on CHF diet  Learners: Patient  Readiness: Acceptance  Method: Explanation (declined additional handout - has from previous education last month)  Response: Verbalizes Understanding  Contact name and number provided.     Pt has received low Na+ diet education in the past, most recently on 12/13/23.  Confirmed again with pt that he drinks Ensure, prefers strawberry flavor, vanilla o/w.  Will add once/day as he was doing on last admission.    Marisa Hernandez RD  Available via [employer]    
[place], [place], referred from hospitalization in [place]. He had a [employer] Dual Chamber ICD implanted 9/29/2023. He continued to be hospitalized with heart failure. His most recent hospitalization was in [place] on 11/4/2023. He required inotrope therapy with Milrinone and IV diuresis. He had AMITA on CKD with creatinine up to 2.91 which improved to 1.68 with Milrinone. LVAD was discussed during hospitalization and he was scheduled for visit in December 2023 back at [facility] in [place] for CardioMEMS implant and LVAD evaluation.      He has since relocated to [place], [place] to live with close family friends which he refers to has his adopted daughter and her family, in total 5 adults. He has no family in [place] and was interested in LVAD evaluation near a support system. His daughter who is present today lives in [place] but travels back and forth to visit. He has been in [place] since November 29 th. He reports between hospitalizations he does well and has no limitations in his daily activities. Decompensations occur quickly. He says the day of admission he was out taking a boat ride and developed progressive shortness of breath. He had noticed swelling. He does monitor weight and blood pressure daily and has been compliant with medications. He is on GDMT therapy with Carvedilol, Entresto, Farxiga and Spironolactone. He reports compliance with diet. He does not smoke or drink alcohol. \"          LIFE GOALS:  Lifestyle goals reviewed with the patient.     IMAGING STUDIES REVIEWED:  Echo 12/11/2023    Left Ventricle: Severely reduced left ventricular systolic function with a visually estimated EF of 20 - 25%. Left ventricle is moderately dilated. Normal wall thickness. Severe global hypokinesis present.    Mitral Valve: Mild regurgitation.    Pulmonary Arteries: Mild to moderate pulmonary hypertension present. Estimated PA pressure of 50 mmhg.    Pericardium:  No pericardial effusion. Left pleural

## 2024-01-11 NOTE — TELEPHONE ENCOUNTER
Spoke with pt. Ellie when she returned call. She requested to have the letter sent to the  whose number is listed on the medical records request form. ZOHAIB noted this form does not necessarily authorize our office to release the records directly as it intended for [facility] Medical records department and the recipient is not a medical facility. Instead, ZOHAIB uploaded  to [employer] and called pt. Daughter back, and left VM advising she has access to the document to print from that application and she and the patient can determine where to send it.

## 2024-01-11 NOTE — TELEPHONE ENCOUNTER
CHARLIE Hale provided SW with a copy of signed letter from provider. SW contacted pt. Daughter, Shannan who has been messaging on [employer] to assist pt. Left VM asking her to call back to clarify who should receive this letter.

## 2024-01-12 ENCOUNTER — HOME CARE VISIT (OUTPATIENT)
Dept: HOME HEALTH SERVICES | Facility: HOME HEALTH | Age: 74
End: 2024-01-12
Payer: COMMERCIAL

## 2024-01-12 LAB
ALBUMIN SERPL-MCNC: 3.1 G/DL (ref 3.5–5)
ALBUMIN/GLOB SERPL: 0.9 (ref 1.1–2.2)
ALP SERPL-CCNC: 86 U/L (ref 45–117)
ALT SERPL-CCNC: 26 U/L (ref 12–78)
ANION GAP SERPL CALC-SCNC: 7 MMOL/L (ref 5–15)
AST SERPL-CCNC: 10 U/L (ref 15–37)
BILIRUB SERPL-MCNC: 0.9 MG/DL (ref 0.2–1)
BUN SERPL-MCNC: 39 MG/DL (ref 6–20)
BUN/CREAT SERPL: 18 (ref 12–20)
CALCIUM SERPL-MCNC: 8 MG/DL (ref 8.5–10.1)
CHLORIDE SERPL-SCNC: 112 MMOL/L (ref 97–108)
CO2 SERPL-SCNC: 24 MMOL/L (ref 21–32)
CREAT SERPL-MCNC: 2.22 MG/DL (ref 0.7–1.3)
ERYTHROCYTE [DISTWIDTH] IN BLOOD BY AUTOMATED COUNT: 18.2 % (ref 11.5–14.5)
GLOBULIN SER CALC-MCNC: 3.4 G/DL (ref 2–4)
GLUCOSE SERPL-MCNC: 122 MG/DL (ref 65–100)
HCT VFR BLD AUTO: 29.4 % (ref 36.6–50.3)
HGB BLD-MCNC: 10 G/DL (ref 12.1–17)
MCH RBC QN AUTO: 28.3 PG (ref 26–34)
MCHC RBC AUTO-ENTMCNC: 34 G/DL (ref 30–36.5)
MCV RBC AUTO: 83.3 FL (ref 80–99)
NRBC # BLD: 0.04 K/UL (ref 0–0.01)
NRBC BLD-RTO: 0.8 PER 100 WBC
NT PRO BNP: ABNORMAL PG/ML
PLATELET # BLD AUTO: 226 K/UL (ref 150–400)
PMV BLD AUTO: 9.9 FL (ref 8.9–12.9)
POTASSIUM SERPL-SCNC: 4.2 MMOL/L (ref 3.5–5.1)
PROT SERPL-MCNC: 6.5 G/DL (ref 6.4–8.2)
RBC # BLD AUTO: 3.53 M/UL (ref 4.1–5.7)
SODIUM SERPL-SCNC: 143 MMOL/L (ref 136–145)
WBC # BLD AUTO: 4.8 K/UL (ref 4.1–11.1)

## 2024-01-12 PROCEDURE — 80053 COMPREHEN METABOLIC PANEL: CPT

## 2024-01-12 PROCEDURE — 2700000000 HC OXYGEN THERAPY PER DAY

## 2024-01-12 PROCEDURE — 83880 ASSAY OF NATRIURETIC PEPTIDE: CPT

## 2024-01-12 PROCEDURE — 97116 GAIT TRAINING THERAPY: CPT

## 2024-01-12 PROCEDURE — 94760 N-INVAS EAR/PLS OXIMETRY 1: CPT

## 2024-01-12 PROCEDURE — 6360000002 HC RX W HCPCS: Performed by: HOSPITALIST

## 2024-01-12 PROCEDURE — APPNB30 APP NON BILLABLE TIME 0-30 MINS: Performed by: NURSE PRACTITIONER

## 2024-01-12 PROCEDURE — 2580000003 HC RX 258: Performed by: HOSPITALIST

## 2024-01-12 PROCEDURE — 85027 COMPLETE CBC AUTOMATED: CPT

## 2024-01-12 PROCEDURE — 6370000000 HC RX 637 (ALT 250 FOR IP): Performed by: INTERNAL MEDICINE

## 2024-01-12 PROCEDURE — 97530 THERAPEUTIC ACTIVITIES: CPT

## 2024-01-12 PROCEDURE — 2580000003 HC RX 258: Performed by: INTERNAL MEDICINE

## 2024-01-12 PROCEDURE — 6370000000 HC RX 637 (ALT 250 FOR IP): Performed by: HOSPITALIST

## 2024-01-12 PROCEDURE — 2060000000 HC ICU INTERMEDIATE R&B

## 2024-01-12 PROCEDURE — 6370000000 HC RX 637 (ALT 250 FOR IP): Performed by: NURSE PRACTITIONER

## 2024-01-12 PROCEDURE — 6360000002 HC RX W HCPCS: Performed by: EMERGENCY MEDICINE

## 2024-01-12 PROCEDURE — 6360000002 HC RX W HCPCS

## 2024-01-12 PROCEDURE — 36415 COLL VENOUS BLD VENIPUNCTURE: CPT

## 2024-01-12 PROCEDURE — 99233 SBSQ HOSP IP/OBS HIGH 50: CPT | Performed by: INTERNAL MEDICINE

## 2024-01-12 PROCEDURE — 2580000003 HC RX 258

## 2024-01-12 RX ADMIN — METOPROLOL SUCCINATE 12.5 MG: 25 TABLET, EXTENDED RELEASE ORAL at 09:17

## 2024-01-12 RX ADMIN — SODIUM CHLORIDE, PRESERVATIVE FREE 10 ML: 5 INJECTION INTRAVENOUS at 20:54

## 2024-01-12 RX ADMIN — APIXABAN 5 MG: 5 TABLET, FILM COATED ORAL at 20:51

## 2024-01-12 RX ADMIN — ALLOPURINOL 50 MG: 100 TABLET ORAL at 09:17

## 2024-01-12 RX ADMIN — APIXABAN 5 MG: 5 TABLET, FILM COATED ORAL at 09:17

## 2024-01-12 RX ADMIN — MILRINONE LACTATE IN DEXTROSE 0.25 MCG/KG/MIN: 0.2 INJECTION, SOLUTION INTRAVENOUS at 16:06

## 2024-01-12 RX ADMIN — BUMETANIDE 3 MG: 1 TABLET ORAL at 20:51

## 2024-01-12 RX ADMIN — AMIODARONE HYDROCHLORIDE 100 MG: 200 TABLET ORAL at 09:17

## 2024-01-12 RX ADMIN — BUMETANIDE 3 MG: 1 TABLET ORAL at 09:17

## 2024-01-12 RX ADMIN — TAMSULOSIN HYDROCHLORIDE 0.4 MG: 0.4 CAPSULE ORAL at 09:17

## 2024-01-12 RX ADMIN — LEVOTHYROXINE SODIUM 50 MCG: 50 TABLET ORAL at 05:53

## 2024-01-12 RX ADMIN — PIPERACILLIN AND TAZOBACTAM 3375 MG: 3; .375 INJECTION, POWDER, FOR SOLUTION INTRAVENOUS at 05:53

## 2024-01-12 RX ADMIN — EMPAGLIFLOZIN 10 MG: 10 TABLET, FILM COATED ORAL at 09:17

## 2024-01-12 RX ADMIN — POTASSIUM CHLORIDE 40 MEQ: 750 TABLET, FILM COATED, EXTENDED RELEASE ORAL at 09:17

## 2024-01-12 RX ADMIN — POTASSIUM CHLORIDE 40 MEQ: 750 TABLET, FILM COATED, EXTENDED RELEASE ORAL at 20:51

## 2024-01-12 RX ADMIN — Medication 1000 UNITS: at 09:17

## 2024-01-12 RX ADMIN — WATER 2000 MG: 1 INJECTION INTRAMUSCULAR; INTRAVENOUS; SUBCUTANEOUS at 13:40

## 2024-01-12 ASSESSMENT — PAIN SCALES - GENERAL
PAINLEVEL_OUTOF10: 0

## 2024-01-13 LAB
ALBUMIN SERPL-MCNC: 2.9 G/DL (ref 3.5–5)
ALBUMIN/GLOB SERPL: 0.8 (ref 1.1–2.2)
ALP SERPL-CCNC: 79 U/L (ref 45–117)
ALT SERPL-CCNC: 22 U/L (ref 12–78)
ANION GAP SERPL CALC-SCNC: 8 MMOL/L (ref 5–15)
AST SERPL-CCNC: 11 U/L (ref 15–37)
BACTERIA SPEC CULT: NORMAL
BILIRUB SERPL-MCNC: 0.7 MG/DL (ref 0.2–1)
BUN SERPL-MCNC: 33 MG/DL (ref 6–20)
BUN/CREAT SERPL: 19 (ref 12–20)
CALCIUM SERPL-MCNC: 8.6 MG/DL (ref 8.5–10.1)
CHLORIDE SERPL-SCNC: 112 MMOL/L (ref 97–108)
CO2 SERPL-SCNC: 24 MMOL/L (ref 21–32)
CREAT SERPL-MCNC: 1.78 MG/DL (ref 0.7–1.3)
ERYTHROCYTE [DISTWIDTH] IN BLOOD BY AUTOMATED COUNT: 18.5 % (ref 11.5–14.5)
GLOBULIN SER CALC-MCNC: 3.5 G/DL (ref 2–4)
GLUCOSE SERPL-MCNC: 94 MG/DL (ref 65–100)
HCT VFR BLD AUTO: 29.8 % (ref 36.6–50.3)
HGB BLD-MCNC: 9.9 G/DL (ref 12.1–17)
MCH RBC QN AUTO: 28 PG (ref 26–34)
MCHC RBC AUTO-ENTMCNC: 33.2 G/DL (ref 30–36.5)
MCV RBC AUTO: 84.4 FL (ref 80–99)
NRBC # BLD: 0.02 K/UL (ref 0–0.01)
NRBC BLD-RTO: 0.5 PER 100 WBC
NT PRO BNP: ABNORMAL PG/ML
PLATELET # BLD AUTO: 216 K/UL (ref 150–400)
PMV BLD AUTO: 9.6 FL (ref 8.9–12.9)
POTASSIUM SERPL-SCNC: 3.7 MMOL/L (ref 3.5–5.1)
PROT SERPL-MCNC: 6.4 G/DL (ref 6.4–8.2)
RBC # BLD AUTO: 3.53 M/UL (ref 4.1–5.7)
SERVICE CMNT-IMP: NORMAL
SODIUM SERPL-SCNC: 144 MMOL/L (ref 136–145)
WBC # BLD AUTO: 4.4 K/UL (ref 4.1–11.1)

## 2024-01-13 PROCEDURE — 36415 COLL VENOUS BLD VENIPUNCTURE: CPT

## 2024-01-13 PROCEDURE — 6370000000 HC RX 637 (ALT 250 FOR IP): Performed by: HOSPITALIST

## 2024-01-13 PROCEDURE — 83880 ASSAY OF NATRIURETIC PEPTIDE: CPT

## 2024-01-13 PROCEDURE — 6360000002 HC RX W HCPCS

## 2024-01-13 PROCEDURE — 85027 COMPLETE CBC AUTOMATED: CPT

## 2024-01-13 PROCEDURE — 6360000002 HC RX W HCPCS: Performed by: EMERGENCY MEDICINE

## 2024-01-13 PROCEDURE — 2580000003 HC RX 258

## 2024-01-13 PROCEDURE — 6370000000 HC RX 637 (ALT 250 FOR IP): Performed by: NURSE PRACTITIONER

## 2024-01-13 PROCEDURE — APPNB30 APP NON BILLABLE TIME 0-30 MINS: Performed by: NURSE PRACTITIONER

## 2024-01-13 PROCEDURE — 99233 SBSQ HOSP IP/OBS HIGH 50: CPT | Performed by: INTERNAL MEDICINE

## 2024-01-13 PROCEDURE — 2060000000 HC ICU INTERMEDIATE R&B

## 2024-01-13 PROCEDURE — 2580000003 HC RX 258: Performed by: INTERNAL MEDICINE

## 2024-01-13 PROCEDURE — 80053 COMPREHEN METABOLIC PANEL: CPT

## 2024-01-13 PROCEDURE — 6370000000 HC RX 637 (ALT 250 FOR IP): Performed by: INTERNAL MEDICINE

## 2024-01-13 RX ADMIN — SODIUM CHLORIDE, PRESERVATIVE FREE 10 ML: 5 INJECTION INTRAVENOUS at 20:50

## 2024-01-13 RX ADMIN — BUMETANIDE 3 MG: 1 TABLET ORAL at 20:49

## 2024-01-13 RX ADMIN — APIXABAN 5 MG: 5 TABLET, FILM COATED ORAL at 20:48

## 2024-01-13 RX ADMIN — EMPAGLIFLOZIN 10 MG: 10 TABLET, FILM COATED ORAL at 08:31

## 2024-01-13 RX ADMIN — SODIUM CHLORIDE, PRESERVATIVE FREE 10 ML: 5 INJECTION INTRAVENOUS at 08:31

## 2024-01-13 RX ADMIN — ALLOPURINOL 50 MG: 100 TABLET ORAL at 08:30

## 2024-01-13 RX ADMIN — METOPROLOL SUCCINATE 12.5 MG: 25 TABLET, EXTENDED RELEASE ORAL at 08:31

## 2024-01-13 RX ADMIN — POTASSIUM CHLORIDE 40 MEQ: 750 TABLET, FILM COATED, EXTENDED RELEASE ORAL at 08:30

## 2024-01-13 RX ADMIN — LEVOTHYROXINE SODIUM 50 MCG: 50 TABLET ORAL at 06:21

## 2024-01-13 RX ADMIN — WATER 2000 MG: 1 INJECTION INTRAMUSCULAR; INTRAVENOUS; SUBCUTANEOUS at 13:56

## 2024-01-13 RX ADMIN — BUMETANIDE 3 MG: 1 TABLET ORAL at 08:30

## 2024-01-13 RX ADMIN — MILRINONE LACTATE IN DEXTROSE 0.25 MCG/KG/MIN: 0.2 INJECTION, SOLUTION INTRAVENOUS at 12:06

## 2024-01-13 RX ADMIN — TAMSULOSIN HYDROCHLORIDE 0.4 MG: 0.4 CAPSULE ORAL at 08:31

## 2024-01-13 RX ADMIN — POTASSIUM CHLORIDE 40 MEQ: 750 TABLET, FILM COATED, EXTENDED RELEASE ORAL at 20:48

## 2024-01-13 RX ADMIN — Medication 1000 UNITS: at 08:31

## 2024-01-13 RX ADMIN — AMIODARONE HYDROCHLORIDE 100 MG: 200 TABLET ORAL at 08:30

## 2024-01-13 RX ADMIN — APIXABAN 5 MG: 5 TABLET, FILM COATED ORAL at 08:30

## 2024-01-13 ASSESSMENT — PAIN SCALES - GENERAL
PAINLEVEL_OUTOF10: 0

## 2024-01-14 LAB
ALBUMIN SERPL-MCNC: 2.9 G/DL (ref 3.5–5)
ALBUMIN/GLOB SERPL: 0.9 (ref 1.1–2.2)
ALP SERPL-CCNC: 74 U/L (ref 45–117)
ALT SERPL-CCNC: 19 U/L (ref 12–78)
ANION GAP SERPL CALC-SCNC: 8 MMOL/L (ref 5–15)
AST SERPL-CCNC: 10 U/L (ref 15–37)
BILIRUB SERPL-MCNC: 0.6 MG/DL (ref 0.2–1)
BUN SERPL-MCNC: 31 MG/DL (ref 6–20)
BUN/CREAT SERPL: 18 (ref 12–20)
CALCIUM SERPL-MCNC: 8.1 MG/DL (ref 8.5–10.1)
CHLORIDE SERPL-SCNC: 113 MMOL/L (ref 97–108)
CO2 SERPL-SCNC: 24 MMOL/L (ref 21–32)
CREAT SERPL-MCNC: 1.68 MG/DL (ref 0.7–1.3)
ERYTHROCYTE [DISTWIDTH] IN BLOOD BY AUTOMATED COUNT: 18.2 % (ref 11.5–14.5)
GLOBULIN SER CALC-MCNC: 3.4 G/DL (ref 2–4)
GLUCOSE SERPL-MCNC: 104 MG/DL (ref 65–100)
HCT VFR BLD AUTO: 30.4 % (ref 36.6–50.3)
HGB BLD-MCNC: 10.2 G/DL (ref 12.1–17)
MCH RBC QN AUTO: 28.6 PG (ref 26–34)
MCHC RBC AUTO-ENTMCNC: 33.6 G/DL (ref 30–36.5)
MCV RBC AUTO: 85.2 FL (ref 80–99)
NRBC # BLD: 0 K/UL (ref 0–0.01)
NRBC BLD-RTO: 0 PER 100 WBC
NT PRO BNP: ABNORMAL PG/ML
PLATELET # BLD AUTO: 225 K/UL (ref 150–400)
PMV BLD AUTO: 9.4 FL (ref 8.9–12.9)
POTASSIUM SERPL-SCNC: 3.5 MMOL/L (ref 3.5–5.1)
PROT SERPL-MCNC: 6.3 G/DL (ref 6.4–8.2)
RBC # BLD AUTO: 3.57 M/UL (ref 4.1–5.7)
SODIUM SERPL-SCNC: 145 MMOL/L (ref 136–145)
WBC # BLD AUTO: 4.5 K/UL (ref 4.1–11.1)

## 2024-01-14 PROCEDURE — 6360000002 HC RX W HCPCS: Performed by: EMERGENCY MEDICINE

## 2024-01-14 PROCEDURE — 6370000000 HC RX 637 (ALT 250 FOR IP): Performed by: INTERNAL MEDICINE

## 2024-01-14 PROCEDURE — 2580000003 HC RX 258: Performed by: INTERNAL MEDICINE

## 2024-01-14 PROCEDURE — APPNB30 APP NON BILLABLE TIME 0-30 MINS: Performed by: NURSE PRACTITIONER

## 2024-01-14 PROCEDURE — 6370000000 HC RX 637 (ALT 250 FOR IP): Performed by: NURSE PRACTITIONER

## 2024-01-14 PROCEDURE — 80053 COMPREHEN METABOLIC PANEL: CPT

## 2024-01-14 PROCEDURE — 36415 COLL VENOUS BLD VENIPUNCTURE: CPT

## 2024-01-14 PROCEDURE — 99233 SBSQ HOSP IP/OBS HIGH 50: CPT | Performed by: INTERNAL MEDICINE

## 2024-01-14 PROCEDURE — 2580000003 HC RX 258

## 2024-01-14 PROCEDURE — 85027 COMPLETE CBC AUTOMATED: CPT

## 2024-01-14 PROCEDURE — 2060000000 HC ICU INTERMEDIATE R&B

## 2024-01-14 PROCEDURE — 6370000000 HC RX 637 (ALT 250 FOR IP): Performed by: HOSPITALIST

## 2024-01-14 PROCEDURE — 6360000002 HC RX W HCPCS

## 2024-01-14 PROCEDURE — 83880 ASSAY OF NATRIURETIC PEPTIDE: CPT

## 2024-01-14 RX ADMIN — Medication 1000 UNITS: at 09:58

## 2024-01-14 RX ADMIN — POTASSIUM CHLORIDE 40 MEQ: 750 TABLET, FILM COATED, EXTENDED RELEASE ORAL at 09:57

## 2024-01-14 RX ADMIN — EMPAGLIFLOZIN 10 MG: 10 TABLET, FILM COATED ORAL at 09:58

## 2024-01-14 RX ADMIN — SODIUM CHLORIDE, PRESERVATIVE FREE 10 ML: 5 INJECTION INTRAVENOUS at 20:52

## 2024-01-14 RX ADMIN — MILRINONE LACTATE IN DEXTROSE 0.25 MCG/KG/MIN: 0.2 INJECTION, SOLUTION INTRAVENOUS at 04:06

## 2024-01-14 RX ADMIN — BUMETANIDE 3 MG: 1 TABLET ORAL at 09:57

## 2024-01-14 RX ADMIN — LEVOTHYROXINE SODIUM 50 MCG: 50 TABLET ORAL at 06:28

## 2024-01-14 RX ADMIN — SODIUM CHLORIDE, PRESERVATIVE FREE 10 ML: 5 INJECTION INTRAVENOUS at 09:58

## 2024-01-14 RX ADMIN — APIXABAN 5 MG: 5 TABLET, FILM COATED ORAL at 09:57

## 2024-01-14 RX ADMIN — AMIODARONE HYDROCHLORIDE 100 MG: 200 TABLET ORAL at 09:57

## 2024-01-14 RX ADMIN — POTASSIUM CHLORIDE 40 MEQ: 750 TABLET, FILM COATED, EXTENDED RELEASE ORAL at 20:52

## 2024-01-14 RX ADMIN — METOPROLOL SUCCINATE 12.5 MG: 25 TABLET, EXTENDED RELEASE ORAL at 09:58

## 2024-01-14 RX ADMIN — MILRINONE LACTATE IN DEXTROSE 0.25 MCG/KG/MIN: 0.2 INJECTION, SOLUTION INTRAVENOUS at 23:46

## 2024-01-14 RX ADMIN — ALLOPURINOL 50 MG: 100 TABLET ORAL at 09:58

## 2024-01-14 RX ADMIN — APIXABAN 5 MG: 5 TABLET, FILM COATED ORAL at 20:52

## 2024-01-14 RX ADMIN — TAMSULOSIN HYDROCHLORIDE 0.4 MG: 0.4 CAPSULE ORAL at 09:57

## 2024-01-14 RX ADMIN — WATER 2000 MG: 1 INJECTION INTRAMUSCULAR; INTRAVENOUS; SUBCUTANEOUS at 14:12

## 2024-01-14 RX ADMIN — BUMETANIDE 3 MG: 1 TABLET ORAL at 20:52

## 2024-01-14 ASSESSMENT — PAIN SCALES - GENERAL: PAINLEVEL_OUTOF10: 0

## 2024-01-15 ENCOUNTER — APPOINTMENT (OUTPATIENT)
Facility: HOSPITAL | Age: 74
DRG: 314 | End: 2024-01-15
Payer: MEDICARE

## 2024-01-15 ENCOUNTER — ANESTHESIA EVENT (OUTPATIENT)
Facility: HOSPITAL | Age: 74
DRG: 314 | End: 2024-01-15
Payer: MEDICARE

## 2024-01-15 ENCOUNTER — ANESTHESIA (OUTPATIENT)
Facility: HOSPITAL | Age: 74
DRG: 314 | End: 2024-01-15
Payer: MEDICARE

## 2024-01-15 LAB
ALBUMIN SERPL-MCNC: 3 G/DL (ref 3.5–5)
ALBUMIN/GLOB SERPL: 0.8 (ref 1.1–2.2)
ALP SERPL-CCNC: 68 U/L (ref 45–117)
ALT SERPL-CCNC: 18 U/L (ref 12–78)
ANION GAP SERPL CALC-SCNC: 6 MMOL/L (ref 5–15)
AST SERPL-CCNC: 9 U/L (ref 15–37)
BILIRUB SERPL-MCNC: 0.7 MG/DL (ref 0.2–1)
BUN SERPL-MCNC: 29 MG/DL (ref 6–20)
BUN/CREAT SERPL: 17 (ref 12–20)
CALCIUM SERPL-MCNC: 8.6 MG/DL (ref 8.5–10.1)
CHLORIDE SERPL-SCNC: 112 MMOL/L (ref 97–108)
CO2 SERPL-SCNC: 27 MMOL/L (ref 21–32)
CREAT SERPL-MCNC: 1.72 MG/DL (ref 0.7–1.3)
ECHO BSA: 1.9 M2
GLOBULIN SER CALC-MCNC: 3.6 G/DL (ref 2–4)
GLUCOSE SERPL-MCNC: 97 MG/DL (ref 65–100)
NT PRO BNP: 8260 PG/ML
POTASSIUM SERPL-SCNC: 3.3 MMOL/L (ref 3.5–5.1)
PROT SERPL-MCNC: 6.6 G/DL (ref 6.4–8.2)
SODIUM SERPL-SCNC: 145 MMOL/L (ref 136–145)

## 2024-01-15 PROCEDURE — B246ZZ4 ULTRASONOGRAPHY OF RIGHT AND LEFT HEART, TRANSESOPHAGEAL: ICD-10-PCS | Performed by: SPECIALIST

## 2024-01-15 PROCEDURE — 3700000001 HC ADD 15 MINUTES (ANESTHESIA)

## 2024-01-15 PROCEDURE — 2060000000 HC ICU INTERMEDIATE R&B

## 2024-01-15 PROCEDURE — 97116 GAIT TRAINING THERAPY: CPT

## 2024-01-15 PROCEDURE — 3700000000 HC ANESTHESIA ATTENDED CARE

## 2024-01-15 PROCEDURE — 93312 ECHO TRANSESOPHAGEAL: CPT

## 2024-01-15 PROCEDURE — 6360000002 HC RX W HCPCS

## 2024-01-15 PROCEDURE — 6360000002 HC RX W HCPCS: Performed by: EMERGENCY MEDICINE

## 2024-01-15 PROCEDURE — 6370000000 HC RX 637 (ALT 250 FOR IP): Performed by: NURSE PRACTITIONER

## 2024-01-15 PROCEDURE — 6370000000 HC RX 637 (ALT 250 FOR IP): Performed by: INTERNAL MEDICINE

## 2024-01-15 PROCEDURE — 36415 COLL VENOUS BLD VENIPUNCTURE: CPT

## 2024-01-15 PROCEDURE — 93312 ECHO TRANSESOPHAGEAL: CPT | Performed by: SPECIALIST

## 2024-01-15 PROCEDURE — 93325 DOPPLER ECHO COLOR FLOW MAPG: CPT | Performed by: SPECIALIST

## 2024-01-15 PROCEDURE — 6370000000 HC RX 637 (ALT 250 FOR IP): Performed by: HOSPITALIST

## 2024-01-15 PROCEDURE — 80053 COMPREHEN METABOLIC PANEL: CPT

## 2024-01-15 PROCEDURE — 83880 ASSAY OF NATRIURETIC PEPTIDE: CPT

## 2024-01-15 PROCEDURE — 93320 DOPPLER ECHO COMPLETE: CPT | Performed by: SPECIALIST

## 2024-01-15 PROCEDURE — 2700000000 HC OXYGEN THERAPY PER DAY

## 2024-01-15 PROCEDURE — 94760 N-INVAS EAR/PLS OXIMETRY 1: CPT

## 2024-01-15 PROCEDURE — 2580000003 HC RX 258

## 2024-01-15 PROCEDURE — 6360000002 HC RX W HCPCS: Performed by: NURSE ANESTHETIST, CERTIFIED REGISTERED

## 2024-01-15 PROCEDURE — 99232 SBSQ HOSP IP/OBS MODERATE 35: CPT | Performed by: NURSE PRACTITIONER

## 2024-01-15 PROCEDURE — 2500000003 HC RX 250 WO HCPCS: Performed by: NURSE ANESTHETIST, CERTIFIED REGISTERED

## 2024-01-15 PROCEDURE — 2580000003 HC RX 258: Performed by: INTERNAL MEDICINE

## 2024-01-15 PROCEDURE — 97530 THERAPEUTIC ACTIVITIES: CPT

## 2024-01-15 RX ORDER — EPHEDRINE SULFATE 50 MG/ML
INJECTION INTRAVENOUS
Status: DISPENSED
Start: 2024-01-15 | End: 2024-01-16

## 2024-01-15 RX ORDER — LIDOCAINE HYDROCHLORIDE 20 MG/ML
INJECTION, SOLUTION EPIDURAL; INFILTRATION; INTRACAUDAL; PERINEURAL PRN
Status: DISCONTINUED | OUTPATIENT
Start: 2024-01-15 | End: 2024-01-15 | Stop reason: SDUPTHER

## 2024-01-15 RX ORDER — PHENYLEPHRINE HCL IN 0.9% NACL 0.4MG/10ML
SYRINGE (ML) INTRAVENOUS
Status: DISPENSED
Start: 2024-01-15 | End: 2024-01-16

## 2024-01-15 RX ADMIN — APIXABAN 5 MG: 5 TABLET, FILM COATED ORAL at 10:00

## 2024-01-15 RX ADMIN — PROPOFOL 25 MG: 10 INJECTION, EMULSION INTRAVENOUS at 15:07

## 2024-01-15 RX ADMIN — POTASSIUM CHLORIDE 40 MEQ: 750 TABLET, FILM COATED, EXTENDED RELEASE ORAL at 21:28

## 2024-01-15 RX ADMIN — AMIODARONE HYDROCHLORIDE 100 MG: 200 TABLET ORAL at 09:23

## 2024-01-15 RX ADMIN — TAMSULOSIN HYDROCHLORIDE 0.4 MG: 0.4 CAPSULE ORAL at 17:30

## 2024-01-15 RX ADMIN — PROPOFOL 25 MG: 10 INJECTION, EMULSION INTRAVENOUS at 15:09

## 2024-01-15 RX ADMIN — LIDOCAINE HYDROCHLORIDE 100 MG: 20 INJECTION, SOLUTION EPIDURAL; INFILTRATION; INTRACAUDAL; PERINEURAL at 15:01

## 2024-01-15 RX ADMIN — PROPOFOL 50 MG: 10 INJECTION, EMULSION INTRAVENOUS at 15:03

## 2024-01-15 RX ADMIN — WATER 2000 MG: 1 INJECTION INTRAMUSCULAR; INTRAVENOUS; SUBCUTANEOUS at 17:30

## 2024-01-15 RX ADMIN — APIXABAN 5 MG: 5 TABLET, FILM COATED ORAL at 21:28

## 2024-01-15 RX ADMIN — ALLOPURINOL 50 MG: 100 TABLET ORAL at 17:06

## 2024-01-15 RX ADMIN — MILRINONE LACTATE IN DEXTROSE 0.25 MCG/KG/MIN: 0.2 INJECTION, SOLUTION INTRAVENOUS at 18:00

## 2024-01-15 RX ADMIN — Medication 1000 UNITS: at 17:30

## 2024-01-15 RX ADMIN — SODIUM CHLORIDE, PRESERVATIVE FREE 10 ML: 5 INJECTION INTRAVENOUS at 09:26

## 2024-01-15 RX ADMIN — PROPOFOL 25 MG: 10 INJECTION, EMULSION INTRAVENOUS at 15:12

## 2024-01-15 RX ADMIN — PROPOFOL 50 MG: 10 INJECTION, EMULSION INTRAVENOUS at 15:05

## 2024-01-15 RX ADMIN — LEVOTHYROXINE SODIUM 50 MCG: 50 TABLET ORAL at 06:43

## 2024-01-15 RX ADMIN — EMPAGLIFLOZIN 10 MG: 10 TABLET, FILM COATED ORAL at 17:30

## 2024-01-15 RX ADMIN — BUMETANIDE 3 MG: 1 TABLET ORAL at 21:28

## 2024-01-15 RX ADMIN — PROPOFOL 50 MG: 10 INJECTION, EMULSION INTRAVENOUS at 15:01

## 2024-01-15 RX ADMIN — SODIUM CHLORIDE, PRESERVATIVE FREE 10 ML: 5 INJECTION INTRAVENOUS at 21:29

## 2024-01-15 RX ADMIN — BUMETANIDE 3 MG: 1 TABLET ORAL at 17:30

## 2024-01-15 RX ADMIN — POTASSIUM CHLORIDE 40 MEQ: 750 TABLET, FILM COATED, EXTENDED RELEASE ORAL at 17:30

## 2024-01-15 RX ADMIN — METOPROLOL SUCCINATE 12.5 MG: 25 TABLET, EXTENDED RELEASE ORAL at 09:24

## 2024-01-15 RX ADMIN — PROPOFOL 25 MG: 10 INJECTION, EMULSION INTRAVENOUS at 15:08

## 2024-01-15 RX ADMIN — PROPOFOL 25 MG: 10 INJECTION, EMULSION INTRAVENOUS at 15:10

## 2024-01-15 ASSESSMENT — ENCOUNTER SYMPTOMS
NAUSEA: 0
COUGH: 0
ABDOMINAL DISTENTION: 0
VOMITING: 0
SHORTNESS OF BREATH: 0

## 2024-01-15 ASSESSMENT — PAIN SCALES - GENERAL
PAINLEVEL_OUTOF10: 0
PAINLEVEL_OUTOF10: 0

## 2024-01-15 NOTE — ANESTHESIA PRE PROCEDURE
• IR INSERT TUNNELED CV CATH WO PORT < 5YRS  12/26/2023    IR INSERT TUNNELED CV CATH WO PORT < 5YRS 12/26/2023 [facility] IR   • TOTAL KNEE ARTHROPLASTY         Social History:    Social History     Tobacco Use   • Smoking status: Never   • Smokeless tobacco: Never   Substance Use Topics   • Alcohol use: Never                                Counseling given: Not Answered      Vital Signs (Current):   Vitals:    01/15/24 1116 01/15/24 1208 01/15/24 1412 01/15/24 1423   BP: 128/78  122/81    Pulse: 85 90 73    Resp: 17  12    Temp: 98 °F (36.7 °C)      TempSrc: Oral      SpO2: 95%  95% 95%   Weight:       Height:       PF:                                                  BP Readings from Last 3 Encounters:   01/15/24 122/81   01/08/24 130/64   01/08/24 112/62       NPO Status:                                                   Date of last liquid consumption: 01/14/24                        Date of last solid food consumption: 01/14/24    BMI:   Wt Readings from Last 3 Encounters:   01/14/24 73.4 kg (161 lb 13.1 oz)   01/08/24 74.4 kg (164 lb)   01/08/24 74.4 kg (164 lb)     Body mass index is 23.22 kg/m².    CBC:   Lab Results   Component Value Date/Time    WBC 4.5 01/14/2024 02:26 AM    RBC 3.57 01/14/2024 02:26 AM    RBC 3.56 12/12/2023 04:29 PM    HGB 10.2 01/14/2024 02:26 AM    HCT 30.4 01/14/2024 02:26 AM    MCV 85.2 01/14/2024 02:26 AM    RDW 18.2 01/14/2024 02:26 AM     01/14/2024 02:26 AM       CMP:   Lab Results   Component Value Date/Time     01/15/2024 05:19 AM    K 3.3 01/15/2024 05:19 AM     01/15/2024 05:19 AM    CO2 27 01/15/2024 05:19 AM    BUN 29 01/15/2024 05:19 AM    CREATININE 1.72 01/15/2024 05:19 AM    LABGLOM 42 01/15/2024 05:19 AM    GLUCOSE 97 01/15/2024 05:19 AM    PROT 6.6 01/15/2024 05:19 AM    CALCIUM 8.6 01/15/2024 05:19 AM    BILITOT 0.7 01/15/2024 05:19 AM    ALKPHOS 68 01/15/2024 05:19 AM    AST 9 01/15/2024 05:19 AM    ALT 18 01/15/2024 05:19 AM       POC

## 2024-01-15 NOTE — ANESTHESIA POSTPROCEDURE EVALUATION
Department of Anesthesiology  Postprocedure Note    Patient: Ingrid Kennedy  MRN: [id number]  YOB: 1950  Date of evaluation: 1/15/2024    Procedure Summary       Date: 01/15/24 Room / Location: [facility] NON-INVASIVE CARDIOLOGY    Anesthesia Start: 1441 Anesthesia Stop: 1519    Procedure: NISHA (PRN CONTRAST/BUBBLE/3D) Diagnosis: Bacteremia    Scheduled Providers:  Responsible Provider: Pepper Freeman DO    Anesthesia Type: MAC ASA Status: 3            Anesthesia Type: No value filed.    Mando Phase I: Mando Score: 10    Mando Phase II:      Anesthesia Post Evaluation    Patient location during evaluation: PACU  Patient participation: complete - patient participated  Level of consciousness: awake and alert  Pain score: 0  Airway patency: patent  Nausea & Vomiting: no nausea and no vomiting  Cardiovascular status: blood pressure returned to baseline and hemodynamically stable  Respiratory status: room air  Hydration status: euvolemic  Pain management: adequate    No notable events documented.

## 2024-01-15 NOTE — ANESTHESIA PROCEDURE NOTES
Arterial Line:    An arterial line was placed using surface landmarks, in the pre-op for the following indication(s): continuous blood pressure monitoring and blood sampling needed.    A 20 gauge (size) (length), Arrow (type) catheter was placed, Seldinger technique used, into the right radial artery, secured by Tegaderm.  Anesthesia type: Local  Local infiltration: Injection    Events:  patient tolerated procedure well with no complications.  Anesthesiologist: Pepper Freeman DO  Performed: Anesthesiologist   Preanesthetic Checklist  Completed: patient identified, IV checked, site marked, risks and benefits discussed, surgical/procedural consents, equipment checked, pre-op evaluation, timeout performed, anesthesia consent given and oxygen available

## 2024-01-15 NOTE — PROCEDURES
Ingrid Kennedy is a 73 y.o. male patient.  1. Chronic congestive heart failure, unspecified heart failure type (HCC)    2. Shortness of breath    3. Pleural effusion    4. Acute on chronic systolic heart failure (HCC)    5. Bacteremia      Past Medical History:   Diagnosis Date    AICD (automatic cardioverter/defibrillator) present     CKD (chronic kidney disease)     Dilated cardiomyopathy (HCC)     Low left ventricular ejection fraction     GERRI on CPAP      Blood pressure 121/85, pulse 70, temperature 97.6 °F (36.4 °C), temperature source Oral, resp. rate 18, height 1.778 m (5' 10\"), weight 73.4 kg (161 lb 13.1 oz), SpO2 97 %, peak flow 99 L/min.    Procedures  Patient was informed about risks and benefits of NISHA.    NISHA performed without complications.    There is no convincing evidence for vegetation of the level of the valves or at the level of the AICD wire.    Severe low ventricular systolic dysfunction with mild mitral regurgitation and tricuspid regurgitation.    No immediate postop complications  Gabriel Danielson MD  1/15/2024

## 2024-01-16 LAB
ALBUMIN SERPL-MCNC: 3.1 G/DL (ref 3.5–5)
ALBUMIN/GLOB SERPL: 0.8 (ref 1.1–2.2)
ALP SERPL-CCNC: 71 U/L (ref 45–117)
ALT SERPL-CCNC: 20 U/L (ref 12–78)
ANION GAP SERPL CALC-SCNC: 7 MMOL/L (ref 5–15)
AST SERPL-CCNC: ABNORMAL U/L (ref 15–37)
BACTERIA SPEC CULT: NORMAL
BACTERIA SPEC CULT: NORMAL
BILIRUB SERPL-MCNC: 0.8 MG/DL (ref 0.2–1)
BUN SERPL-MCNC: 28 MG/DL (ref 6–20)
BUN/CREAT SERPL: 17 (ref 12–20)
CALCIUM SERPL-MCNC: 8.3 MG/DL (ref 8.5–10.1)
CHLORIDE SERPL-SCNC: 110 MMOL/L (ref 97–108)
CO2 SERPL-SCNC: 27 MMOL/L (ref 21–32)
CREAT SERPL-MCNC: 1.65 MG/DL (ref 0.7–1.3)
GLOBULIN SER CALC-MCNC: 3.8 G/DL (ref 2–4)
GLUCOSE SERPL-MCNC: 87 MG/DL (ref 65–100)
NT PRO BNP: 9159 PG/ML
POTASSIUM SERPL-SCNC: ABNORMAL MMOL/L (ref 3.5–5.1)
PROT SERPL-MCNC: 6.9 G/DL (ref 6.4–8.2)
SERVICE CMNT-IMP: NORMAL
SERVICE CMNT-IMP: NORMAL
SODIUM SERPL-SCNC: 144 MMOL/L (ref 136–145)

## 2024-01-16 PROCEDURE — 2060000000 HC ICU INTERMEDIATE R&B

## 2024-01-16 PROCEDURE — 6370000000 HC RX 637 (ALT 250 FOR IP): Performed by: INTERNAL MEDICINE

## 2024-01-16 PROCEDURE — 2580000003 HC RX 258

## 2024-01-16 PROCEDURE — 6370000000 HC RX 637 (ALT 250 FOR IP): Performed by: NURSE PRACTITIONER

## 2024-01-16 PROCEDURE — 6360000002 HC RX W HCPCS: Performed by: EMERGENCY MEDICINE

## 2024-01-16 PROCEDURE — 80053 COMPREHEN METABOLIC PANEL: CPT

## 2024-01-16 PROCEDURE — 2580000003 HC RX 258: Performed by: INTERNAL MEDICINE

## 2024-01-16 PROCEDURE — 83880 ASSAY OF NATRIURETIC PEPTIDE: CPT

## 2024-01-16 PROCEDURE — 6370000000 HC RX 637 (ALT 250 FOR IP): Performed by: HOSPITALIST

## 2024-01-16 PROCEDURE — 6360000002 HC RX W HCPCS

## 2024-01-16 PROCEDURE — 36415 COLL VENOUS BLD VENIPUNCTURE: CPT

## 2024-01-16 PROCEDURE — APPNB45 APP NON BILLABLE 31-45 MINUTES: Performed by: NURSE PRACTITIONER

## 2024-01-16 PROCEDURE — 99233 SBSQ HOSP IP/OBS HIGH 50: CPT | Performed by: INTERNAL MEDICINE

## 2024-01-16 RX ORDER — ALLOPURINOL 100 MG/1
50 TABLET ORAL DAILY
Qty: 30 TABLET | Refills: 3 | Status: SHIPPED | OUTPATIENT
Start: 2024-01-17

## 2024-01-16 RX ORDER — BUMETANIDE 1 MG/1
3 TABLET ORAL 2 TIMES DAILY
Qty: 180 TABLET | Refills: 0 | Status: SHIPPED | OUTPATIENT
Start: 2024-01-16 | End: 2024-02-15

## 2024-01-16 RX ORDER — METOPROLOL SUCCINATE 25 MG/1
12.5 TABLET, EXTENDED RELEASE ORAL DAILY
Qty: 15 TABLET | Refills: 0 | Status: SHIPPED | OUTPATIENT
Start: 2024-01-17 | End: 2024-02-16

## 2024-01-16 RX ADMIN — APIXABAN 5 MG: 5 TABLET, FILM COATED ORAL at 21:14

## 2024-01-16 RX ADMIN — TAMSULOSIN HYDROCHLORIDE 0.4 MG: 0.4 CAPSULE ORAL at 09:24

## 2024-01-16 RX ADMIN — METOPROLOL SUCCINATE 12.5 MG: 25 TABLET, EXTENDED RELEASE ORAL at 09:24

## 2024-01-16 RX ADMIN — WATER 2000 MG: 1 INJECTION INTRAMUSCULAR; INTRAVENOUS; SUBCUTANEOUS at 14:02

## 2024-01-16 RX ADMIN — POTASSIUM CHLORIDE 40 MEQ: 750 TABLET, FILM COATED, EXTENDED RELEASE ORAL at 09:24

## 2024-01-16 RX ADMIN — SODIUM CHLORIDE, PRESERVATIVE FREE 10 ML: 5 INJECTION INTRAVENOUS at 21:14

## 2024-01-16 RX ADMIN — ALLOPURINOL 50 MG: 100 TABLET ORAL at 09:25

## 2024-01-16 RX ADMIN — EMPAGLIFLOZIN 10 MG: 10 TABLET, FILM COATED ORAL at 09:24

## 2024-01-16 RX ADMIN — BUMETANIDE 3 MG: 1 TABLET ORAL at 09:24

## 2024-01-16 RX ADMIN — Medication 1000 UNITS: at 09:24

## 2024-01-16 RX ADMIN — SODIUM CHLORIDE, PRESERVATIVE FREE 10 ML: 5 INJECTION INTRAVENOUS at 09:25

## 2024-01-16 RX ADMIN — MILRINONE LACTATE IN DEXTROSE 0.25 MCG/KG/MIN: 0.2 INJECTION, SOLUTION INTRAVENOUS at 13:55

## 2024-01-16 RX ADMIN — BUMETANIDE 3 MG: 1 TABLET ORAL at 21:14

## 2024-01-16 RX ADMIN — AMIODARONE HYDROCHLORIDE 100 MG: 200 TABLET ORAL at 09:24

## 2024-01-16 RX ADMIN — LEVOTHYROXINE SODIUM 50 MCG: 50 TABLET ORAL at 06:42

## 2024-01-16 RX ADMIN — APIXABAN 5 MG: 5 TABLET, FILM COATED ORAL at 09:24

## 2024-01-16 RX ADMIN — POTASSIUM CHLORIDE 40 MEQ: 750 TABLET, FILM COATED, EXTENDED RELEASE ORAL at 21:14

## 2024-01-16 ASSESSMENT — ENCOUNTER SYMPTOMS
SHORTNESS OF BREATH: 0
COUGH: 0
VOMITING: 0
NAUSEA: 0
ABDOMINAL DISTENTION: 0

## 2024-01-16 NOTE — DISCHARGE INSTRUCTIONS
Discharge Instructions       PATIENT ID: Ingrid Kennedy  MRN: [id number]   YOB: 1950    DATE OF ADMISSION: 1/8/2024   DATE OF DISCHARGE: 1/16/2024    PRIMARY CARE PROVIDER: Elisa Loyd     ATTENDING PHYSICIAN: Monique Jessica MD   DISCHARGING PROVIDER: Monique Jessica MD    To contact this individual call [phone] and ask the  to page.   If unavailable ask to be transferred the Adult Hospitalist Department.    DISCHARGE DIAGNOSES CHF and bacteremia    CONSULTATIONS: [unfilled]    PROCEDURES/SURGERIES: * No surgery found *    PENDING TEST RESULTS:   At the time of discharge the following test results are still pending:     FOLLOW UP APPOINTMENTS:   [unfilled]     ADDITIONAL CARE RECOMMENDATIONS:  Cont milrinone drip and as follows    INFECTIOUS DISEASE discharge plan:     Diagnosis: Klebsiella Pneumoniae Bacteremia     Antibiotic: ceftriaxone IV 2 g Q 24 H through 1/24/24     PICC line insertion for outpatient antibiotic.      Routine PICC/ Romie/ Portacath Care including PRN catheter flow management     Weekly labs with results fax to # [phone]. Call critical lab values to [phone].   [x] CBC w/diff  [x] BUN/Creatinine  [x] AST, ALT  [] CPK  [] CRP, ESR  [] Vancomycin trough level goal  drawn every Monday and Thursday. Pharm D consult for Vancomycin dosing.      Home Health to pull PICC line after last dose or send to IR for Romie removal     May send to IR for line evaluation or replacement PRN Phone # [phone]     Follow up with PCP       DIET: cardiac diet      ACTIVITY: activity as tolerated    WOUND CARE:     EQUIPMENT needed:       DISCHARGE MEDICATIONS:   See Medication Reconciliation Form    It is important that you take the medication exactly as they are prescribed.   Keep your medication in the bottles provided by the pharmacist and keep a list of the medication names, dosages, and times to be taken in your wallet.   Do not take other medications without

## 2024-01-17 ENCOUNTER — HOSPITAL ENCOUNTER (INPATIENT)
Facility: HOSPITAL | Age: 74
Discharge: HOME | DRG: 314 | End: 2024-01-20
Payer: MEDICARE

## 2024-01-17 VITALS
WEIGHT: 161.38 LBS | RESPIRATION RATE: 13 BRPM | OXYGEN SATURATION: 98 % | BODY MASS INDEX: 23.1 KG/M2 | DIASTOLIC BLOOD PRESSURE: 86 MMHG | SYSTOLIC BLOOD PRESSURE: 115 MMHG | TEMPERATURE: 98.5 F | HEIGHT: 70 IN | HEART RATE: 88 BPM

## 2024-01-17 VITALS
HEART RATE: 72 BPM | TEMPERATURE: 98 F | RESPIRATION RATE: 14 BRPM | SYSTOLIC BLOOD PRESSURE: 150 MMHG | OXYGEN SATURATION: 95 % | DIASTOLIC BLOOD PRESSURE: 76 MMHG

## 2024-01-17 LAB
ALBUMIN SERPL-MCNC: 3.2 G/DL (ref 3.5–5)
ALBUMIN/GLOB SERPL: 0.8 (ref 1.1–2.2)
ALP SERPL-CCNC: 77 U/L (ref 45–117)
ALT SERPL-CCNC: 19 U/L (ref 12–78)
ANION GAP SERPL CALC-SCNC: 9 MMOL/L (ref 5–15)
AST SERPL-CCNC: 10 U/L (ref 15–37)
BILIRUB SERPL-MCNC: 0.8 MG/DL (ref 0.2–1)
BUN SERPL-MCNC: 25 MG/DL (ref 6–20)
BUN/CREAT SERPL: 15 (ref 12–20)
CALCIUM SERPL-MCNC: 8.7 MG/DL (ref 8.5–10.1)
CHLORIDE SERPL-SCNC: 111 MMOL/L (ref 97–108)
CO2 SERPL-SCNC: 26 MMOL/L (ref 21–32)
CREAT SERPL-MCNC: 1.71 MG/DL (ref 0.7–1.3)
GLOBULIN SER CALC-MCNC: 3.8 G/DL (ref 2–4)
GLUCOSE SERPL-MCNC: 103 MG/DL (ref 65–100)
NT PRO BNP: 7327 PG/ML
POTASSIUM SERPL-SCNC: 3 MMOL/L (ref 3.5–5.1)
PROT SERPL-MCNC: 7 G/DL (ref 6.4–8.2)
SODIUM SERPL-SCNC: 146 MMOL/L (ref 136–145)

## 2024-01-17 PROCEDURE — B548ZZA ULTRASONOGRAPHY OF SUPERIOR VENA CAVA, GUIDANCE: ICD-10-PCS | Performed by: RADIOLOGY

## 2024-01-17 PROCEDURE — 2580000003 HC RX 258

## 2024-01-17 PROCEDURE — 2500000003 HC RX 250 WO HCPCS

## 2024-01-17 PROCEDURE — 6360000002 HC RX W HCPCS: Performed by: EMERGENCY MEDICINE

## 2024-01-17 PROCEDURE — B5181ZA FLUOROSCOPY OF SUPERIOR VENA CAVA USING LOW OSMOLAR CONTRAST, GUIDANCE: ICD-10-PCS | Performed by: RADIOLOGY

## 2024-01-17 PROCEDURE — 83880 ASSAY OF NATRIURETIC PEPTIDE: CPT

## 2024-01-17 PROCEDURE — 80053 COMPREHEN METABOLIC PANEL: CPT

## 2024-01-17 PROCEDURE — 02H633Z INSERTION OF INFUSION DEVICE INTO RIGHT ATRIUM, PERCUTANEOUS APPROACH: ICD-10-PCS | Performed by: RADIOLOGY

## 2024-01-17 PROCEDURE — 0JH63XZ INSERTION OF TUNNELED VASCULAR ACCESS DEVICE INTO CHEST SUBCUTANEOUS TISSUE AND FASCIA, PERCUTANEOUS APPROACH: ICD-10-PCS | Performed by: RADIOLOGY

## 2024-01-17 PROCEDURE — 6370000000 HC RX 637 (ALT 250 FOR IP): Performed by: HOSPITALIST

## 2024-01-17 PROCEDURE — 36558 INSERT TUNNELED CV CATH: CPT

## 2024-01-17 PROCEDURE — 76937 US GUIDE VASCULAR ACCESS: CPT

## 2024-01-17 PROCEDURE — 6360000002 HC RX W HCPCS

## 2024-01-17 PROCEDURE — 6370000000 HC RX 637 (ALT 250 FOR IP): Performed by: INTERNAL MEDICINE

## 2024-01-17 PROCEDURE — 6370000000 HC RX 637 (ALT 250 FOR IP): Performed by: NURSE PRACTITIONER

## 2024-01-17 PROCEDURE — 2580000003 HC RX 258: Performed by: INTERNAL MEDICINE

## 2024-01-17 PROCEDURE — 36415 COLL VENOUS BLD VENIPUNCTURE: CPT

## 2024-01-17 RX ORDER — LIDOCAINE HYDROCHLORIDE 10 MG/ML
INJECTION, SOLUTION EPIDURAL; INFILTRATION; INTRACAUDAL; PERINEURAL PRN
Status: COMPLETED | OUTPATIENT
Start: 2024-01-17 | End: 2024-01-17

## 2024-01-17 RX ADMIN — LIDOCAINE HYDROCHLORIDE 9 ML: 10 INJECTION, SOLUTION EPIDURAL; INFILTRATION; INTRACAUDAL; PERINEURAL at 11:23

## 2024-01-17 RX ADMIN — LEVOTHYROXINE SODIUM 50 MCG: 50 TABLET ORAL at 07:15

## 2024-01-17 RX ADMIN — MILRINONE LACTATE IN DEXTROSE 0.25 MCG/KG/MIN: 0.2 INJECTION, SOLUTION INTRAVENOUS at 10:50

## 2024-01-17 RX ADMIN — AMIODARONE HYDROCHLORIDE 100 MG: 200 TABLET ORAL at 12:52

## 2024-01-17 RX ADMIN — METOPROLOL SUCCINATE 12.5 MG: 25 TABLET, EXTENDED RELEASE ORAL at 12:52

## 2024-01-17 RX ADMIN — SODIUM CHLORIDE, PRESERVATIVE FREE 10 ML: 5 INJECTION INTRAVENOUS at 12:53

## 2024-01-17 RX ADMIN — ALLOPURINOL 50 MG: 100 TABLET ORAL at 12:53

## 2024-01-17 RX ADMIN — EMPAGLIFLOZIN 10 MG: 10 TABLET, FILM COATED ORAL at 12:51

## 2024-01-17 RX ADMIN — TAMSULOSIN HYDROCHLORIDE 0.4 MG: 0.4 CAPSULE ORAL at 12:52

## 2024-01-17 RX ADMIN — WATER 2000 MG: 1 INJECTION INTRAMUSCULAR; INTRAVENOUS; SUBCUTANEOUS at 14:25

## 2024-01-17 RX ADMIN — Medication 1000 UNITS: at 12:51

## 2024-01-17 RX ADMIN — POTASSIUM CHLORIDE 40 MEQ: 750 TABLET, FILM COATED, EXTENDED RELEASE ORAL at 12:51

## 2024-01-17 RX ADMIN — BUMETANIDE 3 MG: 1 TABLET ORAL at 12:51

## 2024-01-17 RX ADMIN — APIXABAN 5 MG: 5 TABLET, FILM COATED ORAL at 12:52

## 2024-01-17 ASSESSMENT — PAIN SCALES - GENERAL
PAINLEVEL_OUTOF10: 0

## 2024-01-17 ASSESSMENT — PULMONARY FUNCTION TESTS
PIF_VALUE: 21
PIF_VALUE: 22

## 2024-01-17 NOTE — DISCHARGE SUMMARY
Discharge Summary       PATIENT ID: Ingrid Kennedy  MRN: [id number]   YOB: 1950    DATE OF ADMISSION: 1/8/2024  2:41 PM    DATE OF DISCHARGE: 1/17/24   PRIMARY CARE PROVIDER: Elisa Loyd MD     ATTENDING PHYSICIAN: monique jessica  DISCHARGING PROVIDER: Monique Jessica MD    To contact this individual call [phone] and ask the  to page.  If unavailable ask to be transferred the Adult Hospitalist Department.    CONSULTATIONS: IP CONSULT TO HEART FAILURE NURSE/COORDINATOR  IP CONSULT TO DIETITIAN  IP CONSULT TO INFECTIOUS DISEASES  IP CONSULT TO CARDIOLOGY  IP CONSULT TO CARDIOLOGY  IP CONSULT HOME HEALTH    PROCEDURES/SURGERIES: * No surgery found *    ADMITTING DIAGNOSES & HOSPITAL COURSE:     Ingrid Kennedy is a 73 y.o. male with chronic HFrEF/dilated cardiomyopathy s/p ICD (F Dr Major), HTN, HLD, paroxysmal atrial fibrillation/atrial tachycardia on apixaban, ventricular tachycardia, BPH s/p TURP, CKD Stage 3/cardiorenal syndrome, GERRI on CPAP, s/p bilateral TKRs c/b R knee hardware infection (July 2023, Ortho Dr Hunt), and hypothyroidism who was advised by [facility] team to come to the ED due to fatigue, SOB, dizziness, and tremors that started on/about 1/5/24. Pt was just hospitalized at [facility] 12/9-12/28 for acute on chronic HFrEF and was sent home on on milrinone gtt via R chest wall CVC as a bridge to LVAD evaluation. Pt's daughter is at bedside who reports that home health nurse has been changing the dressing and pt denies any pain/tenderness/redness or discharge from the CVC. He denies any f/n/v, CP, cough, headaches, recent falls, injuries, LOC, numbness, tingling, focal weakness, diarrhea/constipation, dysuria. He denies recent COVID/flu exposures/infections     Acute on chronic HFrEF/dilated cardiomyopathy s/p ICD, NYHA Class IV, Stage D  - on milrinone gtt at discharge   - continue on bumex 3 mg bid, metoprolol, Farxiga  - no ACEi/ARB due to CKD  - Echo LV EF 15-20%, Left

## 2024-01-17 NOTE — PLAN OF CARE
Problem: Discharge Planning  Goal: Discharge to home or other facility with appropriate resources  1/12/2024 2333 by Noreen Alcantara, CHARLIE  Outcome: Progressing  Flowsheets (Taken 1/12/2024 2333)  Discharge to home or other facility with appropriate resources:   Identify barriers to discharge with patient and caregiver   Identify discharge learning needs (meds, wound care, etc)   Arrange for needed discharge resources and transportation as appropriate     Problem: ABCDS Injury Assessment  Goal: Absence of physical injury  1/12/2024 2333 by Noreen Alcantara RN  Outcome: Progressing     Problem: Safety - Adult  Goal: Free from fall injury  1/12/2024 2333 by Noreen Alcantara RN  Outcome: Progressing     
  Problem: Discharge Planning  Goal: Discharge to home or other facility with appropriate resources  1/13/2024 2242 by Noreen Alcantara RN  Outcome: Progressing  Flowsheets (Taken 1/13/2024 2242)  Discharge to home or other facility with appropriate resources:   Identify barriers to discharge with patient and caregiver   Arrange for needed discharge resources and transportation as appropriate   Identify discharge learning needs (meds, wound care, etc)     Problem: ABCDS Injury Assessment  Goal: Absence of physical injury  1/13/2024 2242 by Noreen Alcantara RN  Outcome: Progressing     Problem: Pain  Goal: Verbalizes/displays adequate comfort level or baseline comfort level  Outcome: Progressing  Flowsheets (Taken 1/13/2024 2242)  Verbalizes/displays adequate comfort level or baseline comfort level:   Encourage patient to monitor pain and request assistance   Assess pain using appropriate pain scale   Implement non-pharmacological measures as appropriate and evaluate response   Administer analgesics based on type and severity of pain and evaluate response     
  Problem: Discharge Planning  Goal: Discharge to home or other facility with appropriate resources  1/15/2024 1423 by Lorri Al RN  Outcome: Progressing  1/15/2024 0823 by Franko Sutton RN  Outcome: Progressing  Flowsheets  Taken 1/15/2024 0800 by Lorri Al RN  Discharge to home or other facility with appropriate resources: Identify barriers to discharge with patient and caregiver  Taken 1/14/2024 2000 by Franko Sutton RN  Discharge to home or other facility with appropriate resources: Identify barriers to discharge with patient and caregiver     Problem: ABCDS Injury Assessment  Goal: Absence of physical injury  1/15/2024 1423 by Lorri Al RN  Outcome: Progressing  1/15/2024 0823 by Franko Sutton RN  Outcome: Progressing     Problem: Chronic Conditions and Co-morbidities  Goal: Patient's chronic conditions and co-morbidity symptoms are monitored and maintained or improved  1/15/2024 1423 by Lorri Al RN  Outcome: Progressing  1/15/2024 0823 by Franko Sutton RN  Outcome: Progressing  Flowsheets  Taken 1/15/2024 0800 by Lorri Al RN  Care Plan - Patient's Chronic Conditions and Co-Morbidity Symptoms are Monitored and Maintained or Improved: Monitor and assess patient's chronic conditions and comorbid symptoms for stability, deterioration, or improvement  Taken 1/14/2024 2000 by Franko Sutton RN  Care Plan - Patient's Chronic Conditions and Co-Morbidity Symptoms are Monitored and Maintained or Improved: Monitor and assess patient's chronic conditions and comorbid symptoms for stability, deterioration, or improvement     Problem: Safety - Adult  Goal: Free from fall injury  1/15/2024 1423 by Lorri Al RN  Outcome: Progressing  1/15/2024 0823 by Franko Sutton RN  Outcome: Progressing     Problem: Physical Therapy - Adult  Goal: By Discharge: Performs mobility at highest level of function for planned discharge setting.  See evaluation for individualized goals.  Description: FUNCTIONAL 
  Problem: Discharge Planning  Goal: Discharge to home or other facility with appropriate resources  1/16/2024 0100 by Franko Sutton RN  Outcome: Progressing  Flowsheets (Taken 1/15/2024 2000)  Discharge to home or other facility with appropriate resources: Identify barriers to discharge with patient and caregiver  1/15/2024 1423 by Lorri Al RN  Outcome: Progressing     Problem: ABCDS Injury Assessment  Goal: Absence of physical injury  1/16/2024 0100 by Franko Sutton RN  Outcome: Progressing  1/15/2024 1423 by Lorri Al RN  Outcome: Progressing  Flowsheets (Taken 1/15/2024 1405 by Lisbeth Barney RN)  Absence of Physical Injury: Implement safety measures based on patient assessment     Problem: Chronic Conditions and Co-morbidities  Goal: Patient's chronic conditions and co-morbidity symptoms are monitored and maintained or improved  1/16/2024 0100 by Franko Sutton RN  Outcome: Progressing  Flowsheets (Taken 1/15/2024 2000)  Care Plan - Patient's Chronic Conditions and Co-Morbidity Symptoms are Monitored and Maintained or Improved: Monitor and assess patient's chronic conditions and comorbid symptoms for stability, deterioration, or improvement  1/15/2024 1423 by Lorri Al RN  Outcome: Progressing     Problem: Physical Therapy - Adult  Goal: By Discharge: Performs mobility at highest level of function for planned discharge setting.  See evaluation for individualized goals.  Description: FUNCTIONAL STATUS PRIOR TO ADMISSION: Patient was modified independent using a single point cane for functional mobility.    HOME SUPPORT PRIOR TO ADMISSION: The patient lived with family, but did not require assistance.    Physical Therapy Goals  Initiated 1/10/2024  1.  Patient will transfer from bed to chair and chair to bed with modified independence using the least restrictive device within 7 day(s).  2.  Patient will ambulate with modified independence for 150 feet with the least restrictive device within 7 
  Problem: Discharge Planning  Goal: Discharge to home or other facility with appropriate resources  1/17/2024 1530 by Yin Hagan RN  Outcome: Progressing  1/17/2024 1530 by Yin Hagan RN  Outcome: Progressing  1/17/2024 0550 by Radha Martinez RN  Outcome: Progressing  Flowsheets (Taken 1/16/2024 2000)  Discharge to home or other facility with appropriate resources: Identify barriers to discharge with patient and caregiver     Problem: ABCDS Injury Assessment  Goal: Absence of physical injury  1/17/2024 1530 by Yin Hagan RN  Outcome: Progressing  1/17/2024 1530 by Yin Hagan RN  Outcome: Progressing  1/17/2024 0550 by Radha Martinez RN  Outcome: Progressing  Flowsheets (Taken 1/16/2024 2000)  Absence of Physical Injury: Implement safety measures based on patient assessment     Problem: Chronic Conditions and Co-morbidities  Goal: Patient's chronic conditions and co-morbidity symptoms are monitored and maintained or improved  1/17/2024 1530 by Yin Hagan RN  Outcome: Progressing  1/17/2024 1530 by Yin Hagan RN  Outcome: Progressing  1/17/2024 0550 by Radha Martinez RN  Outcome: Progressing  Flowsheets (Taken 1/16/2024 2000)  Care Plan - Patient's Chronic Conditions and Co-Morbidity Symptoms are Monitored and Maintained or Improved: Monitor and assess patient's chronic conditions and comorbid symptoms for stability, deterioration, or improvement     Problem: Safety - Adult  Goal: Free from fall injury  1/17/2024 1530 by Yin Hagan RN  Outcome: Progressing  1/17/2024 1530 by Yin Hagan RN  Outcome: Progressing  1/17/2024 0550 by Radha Martinez RN  Outcome: Progressing  Flowsheets (Taken 1/16/2024 2000)  Free From Fall Injury: Instruct family/caregiver on patient safety     Problem: Pain  Goal: Verbalizes/displays adequate comfort level or baseline comfort level  1/17/2024 1530 by Yin Hagan RN  Outcome: Progressing  1/17/2024 1530 by Yin Hagan RN  Outcome: 
  Problem: Discharge Planning  Goal: Discharge to home or other facility with appropriate resources  Outcome: Progressing     Problem: ABCDS Injury Assessment  Goal: Absence of physical injury  Outcome: Progressing     Problem: Chronic Conditions and Co-morbidities  Goal: Patient's chronic conditions and co-morbidity symptoms are monitored and maintained or improved  Outcome: Progressing     Problem: Occupational Therapy - Adult  Goal: By Discharge: Performs self-care activities at highest level of function for planned discharge setting.  See evaluation for individualized goals.  Description: FUNCTIONAL STATUS PRIOR TO ADMISSION:  Patient was ambulatory using a SPC.     HOME SUPPORT: Patient lived with friends/family and did not require assistance with ADL tasks.    Occupational Therapy Goals:  Initiated 1/9/2024  1.  Patient will perform standing grooming routine with Modified [place] within 7 day(s).  2.  Patient will perform upper and lower body bathing with Modified [place] within 7 day(s).  3.  Patient will perform upper and lower body dressing with Modified [place] within 7 day(s).  4.  Patient will perform toilet transfers with Modified [place]  within 7 day(s).  5.  Patient will perform all aspects of toileting with Modified [place] within 7 day(s).  6.  Patient will participate in upper extremity therapeutic exercise/activities with Supervision for 10 minutes within 7 day(s).        1/9/2024 1241 by Salma Nguyễn OT  Outcome: Progressing     Problem: Safety - Adult  Goal: Free from fall injury  Outcome: Progressing     
  Problem: Discharge Planning  Goal: Discharge to home or other facility with appropriate resources  Outcome: Progressing     Problem: ABCDS Injury Assessment  Goal: Absence of physical injury  Outcome: Progressing     Problem: Chronic Conditions and Co-morbidities  Goal: Patient's chronic conditions and co-morbidity symptoms are monitored and maintained or improved  Outcome: Progressing     Problem: Safety - Adult  Goal: Free from fall injury  Outcome: Progressing     Problem: Pain  Goal: Verbalizes/displays adequate comfort level or baseline comfort level  Outcome: Progressing     
  Problem: Discharge Planning  Goal: Discharge to home or other facility with appropriate resources  Outcome: Progressing  Flowsheets (Taken 1/14/2024 2000)  Discharge to home or other facility with appropriate resources: Identify barriers to discharge with patient and caregiver     Problem: ABCDS Injury Assessment  Goal: Absence of physical injury  Outcome: Progressing     Problem: Chronic Conditions and Co-morbidities  Goal: Patient's chronic conditions and co-morbidity symptoms are monitored and maintained or improved  Outcome: Progressing  Flowsheets (Taken 1/14/2024 2000)  Care Plan - Patient's Chronic Conditions and Co-Morbidity Symptoms are Monitored and Maintained or Improved: Monitor and assess patient's chronic conditions and comorbid symptoms for stability, deterioration, or improvement     Problem: Safety - Adult  Goal: Free from fall injury  Outcome: Progressing     Problem: Pain  Goal: Verbalizes/displays adequate comfort level or baseline comfort level  Outcome: Progressing  Flowsheets (Taken 1/15/2024 0730 by Lorri Al, CHARLIE)  Verbalizes/displays adequate comfort level or baseline comfort level:   Encourage patient to monitor pain and request assistance   Assess pain using appropriate pain scale     
  Problem: Discharge Planning  Goal: Discharge to home or other facility with appropriate resources  Outcome: Progressing  Flowsheets (Taken 1/16/2024 2000)  Discharge to home or other facility with appropriate resources: Identify barriers to discharge with patient and caregiver     Problem: ABCDS Injury Assessment  Goal: Absence of physical injury  Outcome: Progressing  Flowsheets (Taken 1/16/2024 2000)  Absence of Physical Injury: Implement safety measures based on patient assessment     Problem: Chronic Conditions and Co-morbidities  Goal: Patient's chronic conditions and co-morbidity symptoms are monitored and maintained or improved  Outcome: Progressing  Flowsheets (Taken 1/16/2024 2000)  Care Plan - Patient's Chronic Conditions and Co-Morbidity Symptoms are Monitored and Maintained or Improved: Monitor and assess patient's chronic conditions and comorbid symptoms for stability, deterioration, or improvement     Problem: Safety - Adult  Goal: Free from fall injury  Outcome: Progressing  Flowsheets (Taken 1/16/2024 2000)  Free From Fall Injury: Instruct family/caregiver on patient safety     Problem: Pain  Goal: Verbalizes/displays adequate comfort level or baseline comfort level  Outcome: Progressing     
  Problem: Physical Therapy - Adult  Goal: By Discharge: Performs mobility at highest level of function for planned discharge setting.  See evaluation for individualized goals.  Description: FUNCTIONAL STATUS PRIOR TO ADMISSION: Patient was modified independent using a single point cane for functional mobility.    HOME SUPPORT PRIOR TO ADMISSION: The patient lived with family, but did not require assistance.    Physical Therapy Goals  Initiated 1/10/2024  1.  Patient will transfer from bed to chair and chair to bed with modified independence using the least restrictive device within 7 day(s).  2.  Patient will ambulate with modified independence for 150 feet with the least restrictive device within 7 day(s).   3.  Patient will ascend/descend 13 stairs with B handrail(s) with contact guard assist within 7 day(s).    Outcome: Progressing       PHYSICAL THERAPY TREATMENT    Patient: Ingrid Kennedy (73 y.o. male)  Date: 1/12/2024  Diagnosis: Shortness of breath [R06.02]  Acute on chronic systolic heart failure (HCC) [I50.23]  Pleural effusion [J90]  Chronic congestive heart failure, unspecified heart failure type (HCC) [I50.9] Acute on chronic systolic heart failure (HCC)      Precautions: Fall Risk                    ASSESSMENT:  Patient continues to benefit from skilled PT services and is progressing towards goals. Pt is mobilizing well with no c/o SOB. Pt had no LOB. Pt tolerated gait on room air SpO2 98-92%. Pt should ambulate with nursing or family over the weekend to maintain mobility and independence          PLAN:  Patient continues to benefit from skilled intervention to address the above impairments.  Continue treatment per established plan of care.    Recommendation for discharge: (in order for the patient to meet his/her long term goals): No skilled physical therapy    Other factors to consider for discharge: no additional factors    IF patient discharges home will need the following DME: patient owns DME 
  Problem: Physical Therapy - Adult  Goal: By Discharge: Performs mobility at highest level of function for planned discharge setting.  See evaluation for individualized goals.  Description: FUNCTIONAL STATUS PRIOR TO ADMISSION: Patient was modified independent using a single point cane for functional mobility.    HOME SUPPORT PRIOR TO ADMISSION: The patient lived with family, but did not require assistance.    Physical Therapy Goals  Initiated 1/10/2024  1.  Patient will transfer from bed to chair and chair to bed with modified independence using the least restrictive device within 7 day(s).  2.  Patient will ambulate with modified independence for 150 feet with the least restrictive device within 7 day(s).   3.  Patient will ascend/descend 13 stairs with B handrail(s) with contact guard assist within 7 day(s).    Outcome: Progressing       PHYSICAL THERAPY TREATMENT    Patient: Ingrid Kennedy (73 y.o. male)  Date: 1/15/2024  Diagnosis: Shortness of breath [R06.02]  Acute on chronic systolic heart failure (HCC) [I50.23]  Pleural effusion [J90]  Chronic congestive heart failure, unspecified heart failure type (HCC) [I50.9] Acute on chronic systolic heart failure (HCC)      Precautions: Fall Risk                    ASSESSMENT:  Patient continues to benefit from skilled PT services and is progressing towards goals. Pt is agreeable to therapy. Pt had no LOB with gait. Pt needs to be ambulating with nursing to maintain strength and activity tolerance. Reduced frequency to 3x week. Will initiate stair training.          PLAN:  Patient continues to benefit from skilled intervention to address the above impairments.  Continue treatment per established plan of care.    Recommendation for discharge: (in order for the patient to meet his/her long term goals): No skilled physical therapy    Other factors to consider for discharge: no additional factors    IF patient discharges home will need the following DME: patient owns DME 
INFECTIOUS DISEASE discharge plan:    Diagnosis: Klebsiella Pneumoniae Bacteremia    Antibiotic: ceftriaxone IV 2 g Q 24 H through 1/24/24    PICC line insertion for outpatient antibiotic.     Routine PICC/ Romie/ Portacath Care including PRN catheter flow management    Weekly labs with results fax to # [phone]. Call critical lab values to [phone].   [x] CBC w/diff  [x] BUN/Creatinine  [x] AST, ALT  [] CPK  [] CRP, ESR  [] Vancomycin trough level goal  drawn every Monday and Thursday. Pharm D consult for Vancomycin dosing.     Home Health to pull PICC line after last dose or send to IR for Romie removal    May send to IR for line evaluation or replacement PRN Phone # [phone]    Follow up with PCP       
injury  1/11/2024 1051 by Boo Pearce, RN  Outcome: Progressing  1/11/2024 0352 by Demetrius Field, RN  Outcome: Progressing     
Family  Type of Home: House  Home Layout: Two level, Bed/Bath upstairs  Home Access: Stairs to enter with rails  Entrance Stairs - Number of Steps: 3  Entrance Stairs - Rails: Both  Bathroom Shower/Tub: Tub/Shower unit  Home Equipment: Cane  Has the patient had two or more falls in the past year or any fall with injury in the past year?: No  ADL Assistance: Independent  Homemaking Assistance: Independent  Ambulation Assistance: Independent  Transfer Assistance: Independent    Cognitive/Behavioral Status:  Orientation  Overall Orientation Status: Within Normal Limits    Strength:    Strength: Generally decreased, functional    Tone & Sensation:   Tone: Normal  Sensation: Intact    Coordination:  Coordination: Within functional limits    Range Of Motion:  AROM: Within functional limits       Functional Mobility:  Bed Mobility:  Bed Mobility Training  Bed Mobility Training: Yes  Supine to Sit: Modified independent (HOB elevated)  Scooting: Independent  Transfers:  Transfer Training  Transfer Training: Yes  Sit to Stand: Stand-by assistance  Stand to Sit: Stand-by assistance  Balance:   Balance  Sitting: Intact  Standing: Impaired  Standing - Static: Good  Standing - Dynamic: Good;Fair  Ambulation/Gait Training:  Gait  Overall Level of Assistance: Stand-by assistance  Distance (ft): 519 Feet  Assistive Device: Cane, straight;Gait belt  Interventions: Safety awareness training;Verbal cues  Base of Support: Widened  Step Length: Left shortened;Right shortened  Gait Abnormalities: Decreased step clearance  6 MWT results: (Specify if any supplemental oxygen is used, the type, pre, during and post sats.)  Vitals:  Tomasa Rating of Perceived exertion (0-10 point scale):   Pre HR 79 bpm Beginnin   Pre SpO2 100% 2L NC     Mid walk: 0   Post  bpm End: 0   Post SpO2 98% 2L NC    Distance ambulated: 519 ft    Assistive Device: straight cane and portable oxygen        Normative data:   Men 40-80 years old = 1889 feet; Women 
Patient  Education Provided: Role of Therapy;Plan of Care;Precautions;Transfer Training;ADL Adaptive Strategies  Education Method: Demonstration;Verbal  Barriers to Learning: None  Education Outcome: Verbalized understanding;Continued education needed    Thank you for this referral.  MICHELLE Almanza, OTR/L  Minutes: 28    Occupational Therapy Evaluation Charge Determination   History Examination Decision-Making   LOW Complexity : Brief history review  LOW Complexity: 1-3 Performance deficits relating to physical, cognitive, or psychosocial skills that result in activity limitations and/or participation restrictions MEDIUM Complexity: Patient may present with comorbidities that affect occupational performance. Minimal to moderate modifications of tasks or assist (eg. physical or verbal) with assist is necessary to enable pt to complete eval   Based on the above components, the patient evaluation is determined to be of the following complexity level: Low

## 2024-01-17 NOTE — PROGRESS NOTES
TRANSFER - OUT REPORT:    Verbal report given to CHARLIE Esqueda on Ingrid Kennedy  being transferred to  for routine progression of patient care       Report consisted of patient's Situation, Background, Assessment and   Recommendations(SBAR).     Information from the following report(s) Nurse Handoff Report, MAR, and Event Log was reviewed with the receiving nurse.           Lines:   CVC Double Lumen 01/17/24 Right Subclavian (Active)       Peripheral IV 01/09/24 Left;Posterior Forearm (Active)   Site Assessment Clean, dry & intact 01/17/24 0400   Line Status Infusing 01/17/24 0400   Line Care Connections checked and tightened 01/17/24 0400   Phlebitis Assessment No symptoms 01/17/24 0400   Infiltration Assessment 0 01/17/24 0400   Alcohol Cap Used Yes 01/17/24 0400   Dressing Status Clean, dry & intact 01/17/24 0400   Dressing Type Transparent 01/17/24 0400        Opportunity for questions and clarification was provided.      Patient transported with:  Tech

## 2024-01-17 NOTE — CARE COORDINATION
Transition of Care Plan  RUR 22%  Insurance  Medicare part A and B    Plan  home with milrinone  drip and may require home IV Abx and home health     NISHA today at 2:30 pm    Prior Level of Functioning: Independent with adl's and iadl's   lives with family in two level home   Family assists as needed.     If SNF or IPR: Date FOC offered: n/a  Date FOC received: n/a  Accepting facility: n/a    Home Health   CM will send referral to [facility] [phone] for SN when order received  Patient has been serviced by the agency int he past.      Home Infusion   [facility] [phone] has serviced patient and past.   Patient to discharge home  with milrinone as a bridge to LVAD candidacy and may require Home IV abx  CM sent referral to determine benefit .Agency accepted patient and is checking benefits.     Final IV ABX order and Milrinone oder and PICC line report  will be faxed attached when received.       DME needed: none  Transportation at discharge: Family to transport     IM/IMM Medicare/ letter given: 1/10/24    Caregiver Contact:   Friend (family) Khloe Pollockwell  [phone]     Discharge Caregiver contacted prior to discharge? yes will contact family  Care Conference needed? No    Barriers to discharge:    Awaiting ID plan/orders for IV abx if needed upon discharge  Awaiting orders for home milrinone  Home health      CM will provide 2nd Medicare letter will be  prior to discharge    CM will send referral to [facility] when order received       SMOOTH LARA                  
Per IDR, AMI Mon/Tues. Dispo:  Home    CM continuing to follow.    WILFRED MastW, CRM  [phone]  
Transition of Care Plan:    RUR: 18%  Prior Level of Functioning: home ith assist of family, HH via [facility] and [facility] home milrinone  Disposition: same as above with addition of home IV abx      Transportation at discharge: family      IM/IMM Medicare/ letter given:   Is patient a  and connected with [facility]?    If yes, was  transfer form completed and VA notified? no  Caregiver Contact:   Discharge Caregiver contacted prior to discharge?   Care Conference needed?   Barriers to discharge:   2:44pm  Patient awaiting transport for romie catheter placement.. Romie orders placed at 12pm.  Home milrinone orders entered noon today.  Infusion co states these orders needed in advance to get ins authorization.  Discharge order placed prior to completion of plan of care.  Patient's BCBS policy termed on 12/31/23.  Patient now has to meet Medicare guidelines.    Charge nurse notified.    Patient can't be discharged today as infusion services have not been finalized.     SMOOTH Mast, CRM  [phone]  
Transition of Care Plan:    RUR: 18%  Prior Level of Functioning: independent  Disposition: home with family Home IV milrinone     Inns auth obtained for milrinone  [facility] has accepted.  [facility] (Yas, RN) informed of patient d/c and patient accepted for services.    CHARLIE Pablo with [facility] had milrinone shipped here to patient's room. Per CHARLIE Pablo, she sent video to patient's dter and dtr-in-law. CM spoke with family earlier.    CM spoke with family and d/c plans finalized.      DME needed: none  Transportation at discharge: family    IM/IMM Medicare/ letter given:   Is patient a [place] and connected with VA?    If yes, was [place] transfer form completed and VA notified?   Caregiver Contact:   Discharge Caregiver contacted prior to discharge?   Care Conference needed?   Barriers to discharge: none.    SMOOTH Mast, CRM  [phone]   
Transition of Care Plan:    RUR: 22%  Prior Level of Functioning:   Disposition: home with family  Home with IV abx and home with milrinone as bridge to LVAD candidacy    Patient is known to [facility] [phone]   and   [facility] [phone]    If SNF or IPR: Date FOC offered: n/a  Date FOC received: n/a  Accepting facility: n/a      DME needed: none  Transportation at discharge: Family to transport    IM/IMM Medicare/ letter given: 1/10/24  Is patient a [place] and connected with VA? no   If yes, was  transfer form completed and VA notified?   Caregiver Contact: family Khloe Douglas      Discharge Caregiver contacted prior to discharge?   Care Conference needed?   Barriers to discharge:  Medical stability  Awaiting ID plan/orders for IV abx  Awaiting orders for home milrinone    Per IDR, patient will be here over the weekend.    CM continuing to follow.    SMOOTH Mast, CRM  [phone]  
provided with a Choice of Provider? Patient   The Patient and/Or Patient Representative agree with the Discharge Plan? Yes   Freedom of Choice list was provided with basic dialogue that supports the patient's individualized plan of care/goals, treatment preferences, and shares the quality data associated with the providers?  Yes     Chart reviewed.  Patient admitted here 1/8/24 with complaints of fatigue, SOB, dizziness and tremors (which started 1/5/24). abnormal labs, sob pleural effusion, CHF.  The ptient has a past medical hx of GERRI, Cardiomyopathy,  HTN, HLD, paroxysmal atrial fib/atrial tachycardia CKD stage 3/cardiorenal syndrome. S/p bilateral TKR's, Hypothyroidism.    CM met with the patient and a friend/family member Kalpesh Sims.      Demographics verified.   The patient lives with family here (to get medical care not available in his hometown of [place].  The patient is employed full time () however is on medical leave at this time.    DME: [employer]    From his previous hospitalization, he was d/c'd home 12/28/24 with home health services ([facility] [phone]--SN/PT) and [facility] [phone]) milrinone drip.The patient was d/c'd home with a Erick catheter.     Per IDR today, the erick catheter was removed.  ID consulted. Plan for repeat blood cultures (Thurs 1/11/24) along with replacing Erick catheter.  Home IV abx anticipated.        Question posed in IDR:   Can patient receive iv abx and milrinone and can home health agency handle both meds?  D/C planned Mon/Tues of next week.     CM continuing to follow.    SMOOTH [place], CRM  [phone]

## 2024-01-18 ENCOUNTER — HOME CARE VISIT (OUTPATIENT)
Facility: HOME HEALTH | Age: 74
End: 2024-01-18

## 2024-01-18 ENCOUNTER — HOME CARE VISIT (OUTPATIENT)
Facility: HOME HEALTH | Age: 74
End: 2024-01-18
Payer: COMMERCIAL

## 2024-01-18 VITALS
DIASTOLIC BLOOD PRESSURE: 62 MMHG | WEIGHT: 162.2 LBS | BODY MASS INDEX: 23.22 KG/M2 | HEART RATE: 81 BPM | SYSTOLIC BLOOD PRESSURE: 112 MMHG | HEIGHT: 70 IN | RESPIRATION RATE: 16 BRPM | TEMPERATURE: 98.7 F | OXYGEN SATURATION: 96 %

## 2024-01-18 PROCEDURE — 0221000100 HH NO PAY CLAIM PROCEDURE

## 2024-01-18 PROCEDURE — G0299 HHS/HOSPICE OF RN EA 15 MIN: HCPCS

## 2024-01-18 ASSESSMENT — ENCOUNTER SYMPTOMS
DYSPNEA ACTIVITY LEVEL: AFTER AMBULATING LESS THAN 10 FT
ABDOMINAL DISTENTION: 0
NAUSEA: 0
COUGH: 0
VOMITING: 0
SHORTNESS OF BREATH: 0

## 2024-01-18 NOTE — PROGRESS NOTES
[facility]  [facility] in [place], [place]  Outpatient Clinic Note      Patient name: Ingrid Kennedy  Patient : 1950  Patient MRN: [id number]  Date of service: 24    Chief Complaint   Patient presents with    Shortness of Breath     On stairs    Edema    Follow-up          ASSESSMENT:  Ingrid Kennedy is a 73 y.o. man with acute on chronic systolic heart failure, stage D, NYHA class IV; uptitrating GDMT limited by CKD;   Discussed at LVAD multidisciplinary meeting . Not currently a candidate for LVAD due to renal dysfunction, Creatinine >1.8. Will plan for home with Milrinone as bridge to LVAD candidacy, re evaluating over the next 3 months. Will need outpatient sleep study, PFTs, and neurocognitive studies.    Now appears euvolemic.  Treat for blood stream infection, NISHA negative for vegetation. Continue milrinone      INTERVAL HISTORY:  24 Ingrid Kennedy presents for hospital follow up accompanied by his daughter. He states he feels tired today since being discharged on Wednesday for his Klebsiella bacteremia. He denies worsening HF symptoms except for some mild edema in his ankles, he denies issues with his PICC line, IV milrinone or IV abx. He has not yet scheduled some outstanding items for his LVAD evaluation but states he will do so. Labs from  will be drawn on Monday.     RECOMMENDATIONS:  Medical Therapy for Heart Failure  Continue Milrinone at 0.25 mcg/kg/min.   Beta-blocker: continue Toprol XL to 12.5 mg daily.  Continue hold parameters   ACEi/ARB/ARNI: Continue to hold Entresto for renal dysfunction  MRA: Hold Spironolactone 25 mg daily for AMITA  SGLT2i: continue Farxiga 10 mg daily outpatient  Continue allopurinol 50mg daily   HF labs weekly     Volume Management  Continue PO Bumex 3 mg PO BID.  Potassium replacement  Keep K>4 and Mg>2  Fluid restriction to 2000 cc daily.      Bacteremia   BC  + for klebsiella pneumonia and Enterobacteriaceae, BC

## 2024-01-18 NOTE — PROGRESS NOTES
Hospitalist Progress Note  Aissatou Matthews MD  Answering service: [phone]        Date of Service:  2024  NAME:  Ingrid Kennedy  :  1950  MRN:  [id number]      Admission Summary:     Ingrid Kennedy is a 73 y.o. 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 Pt was just hospitalized at [facility] - for acute on chronic HFrEF and was sent home on on milrinone gtt via R chest wall CVC as a bridge to LVAD evaluation. Pt's daughter is at bedside who reports that home health nurse has been changing the dressing and pt denies any pain/tenderness/redness or discharge from the CVC. He denies any f/n/v, CP, cough, headaches, recent falls, injuries, LOC, numbness, tingling, focal weakness, diarrhea/constipation, dysuria. He denies recent COVID/flu exposures/infections        Interval history / Subjective:     Patient seen and examined at the bedside.  He stated that he is feeling better. No left side chest pain or difficulty of breathing      Assessment & Plan:     Acute on chronic HFrEF/dilated cardiomyopathy s/p ICD, NYHA Class IV, Stage D  - admit inpatient intermediate care/CVSU  - on milrinone gtt via PIV for now; bridge to LVAD evaluation per AHF   - given bumetanide 3mg IV x1 in ED by AHF  - on bumex 3 mg bid, metoprolol, Jardiance  - hold Farxiga due to CKD  - no ACEi/ARB due to CKD  - Echo LV EF 15-20%, Left ventricle is moderately dilated. Normal wall thickness. Severe global hypokinesis present. Severe MR, severe 
                                                                                                Hospitalist Progress Note  Aissatou Matthews MD  Answering service: [phone]        Date of Service:  2024  NAME:  Ingrid Kennedy  :  1950  MRN:  [id number]      Admission Summary:     Ingrid Kennedy is a 73 y.o. 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 Pt was just hospitalized at [facility] - for acute on chronic HFrEF and was sent home on on milrinone gtt via R chest wall CVC as a bridge to LVAD evaluation. Pt's daughter is at bedside who reports that home health nurse has been changing the dressing and pt denies any pain/tenderness/redness or discharge from the CVC. He denies any f/n/v, CP, cough, headaches, recent falls, injuries, LOC, numbness, tingling, focal weakness, diarrhea/constipation, dysuria. He denies recent COVID/flu exposures/infections        Interval history / Subjective:     Patient seen and examined at the bedside.  He stated that he is feeling better today. No left side chest pain or difficulty of breathing      Assessment & Plan:     Acute on chronic HFrEF/dilated cardiomyopathy s/p ICD, NYHA Class IV, Stage D  - admit inpatient intermediate care/CVSU  - on milrinone gtt via PIV for now; bridge to LVAD evaluation per AHF   - given bumetanide 3mg IV x1 in ED by AHF  - on bumex 3 mg bid, metoprolol, Jardiance  - hold Farxiga due to CKD  - no ACEi/ARB due to CKD  - Echo LV EF 15-20%, Left ventricle is moderately dilated. Normal wall thickness. Severe global hypokinesis present. Severe MR, 
                                                                                                Hospitalist Progress Note  Evert Jacobson MD  Answering service: [phone]        Date of Service:  2024  NAME:  Ingrid Kennedy  :  1950  MRN:  [id number]      Admission Summary:     Ingrid Kennedy is a 73 y.o. male with chronic HFrEF/dilated cardiomyopathy s/p ICD (F Dr Major), HTN, HLD, paroxysmal atrial fibrillation/atrial tachycardia on apixaban, ventricular tachycardia, BPH s/p TURP, CKD Stage 3/cardiorenal syndrome, GERRI on CPAP, s/p bilateral TKRs c/b R knee hardware infection (2023, Ortho Dr Hunt), and hypothyroidism who was advised by [facility] team to come to the ED due to fatigue, SOB, dizziness, and tremors that started on/about 24. Pt was just hospitalized at [facility] - for acute on chronic HFrEF and was sent home on on milrinone gtt via R chest wall CVC as a bridge to LVAD evaluation. Pt's daughter is at bedside who reports that home health nurse has been changing the dressing and pt denies any pain/tenderness/redness or discharge from the CVC. He denies any f/n/v, CP, cough, headaches, recent falls, injuries, LOC, numbness, tingling, focal weakness, diarrhea/constipation, dysuria. He denies recent COVID/flu exposures/infections        Interval history / Subjective:   This is a follow up for bacteremia, line infection, acute on chronic HFrEF  No specific complaint today.  No CP or SOB  Reviewed again the need for NISHA.  His ICD placed at [facility]     Assessment & Plan:     Acute on chronic HFrEF/dilated cardiomyopathy s/p ICD, NYHA Class IV, Stage D  - admit inpatient intermediate care / CVSU  - on milrinone gtt via PIV for now; bridge to LVAD evaluation per AHF   - given bumetanide 3mg IV x1 in ED by F  - continue on bumex 3 mg bid, metoprolol, Jardiance  - hold Farxiga due to CKD  - no ACEi/ARB due to CKD  - Echo LV EF 15-20%, Left ventricle is moderately dilated. Normal wall 
                                                                                                Hospitalist Progress Note  Scarlet Bueno MD  Answering service: [phone]        Date of Service:  1/10/2024  NAME:  Ingrid Kennedy  :  1950  MRN:  [id number]      Admission Summary:   Ingrid Kennedy is a 73 y.o. male with chronic HFrEF/dilated cardiomyopathy s/p ICD (F Dr Major), HTN, HLD, paroxysmal atrial fibrillation/atrial tachycardia on apixaban, ventricular tachycardia, BPH s/p TURP, CKD Stage 3/cardiorenal syndrome, GERRI on CPAP, s/p bilateral TKRs c/b R knee hardware infection (2023, Ortho Dr Hunt), and hypothyroidism who was advised by F team to come to the ED due to fatigue, SOB, dizziness, and tremors that started on/about 24. Pt was just hospitalized at [facility] - for acute on chronic HFrEF and was sent home on on milrinone gtt via R chest wall CVC as a bridge to LVAD evaluation. Pt's daughter is at bedside who reports that home health nurse has been changing the dressing and pt denies any pain/tenderness/redness or discharge from the CVC. He denies any f/n/v, CP, cough, headaches, recent falls, injuries, LOC, numbness, tingling, focal weakness, diarrhea/constipation, dysuria. He denies recent COVID/flu exposures/infections        Interval history / Subjective:   Fever chills last night  Blood culture 12 + klebsiella      Assessment & Plan:      Acute on chronic HFrEF/dilated cardiomyopathy s/p ICD, NYHA Class IV, Stage D  - admit inpatient intermediate care/CVSU  - F team consulted  - continue milrinone gtt via PIV for now; bridge to LVAD evaluation per F   - given bumetanide 3mg IV x1 in ED by [facility]; will defer further diuresis to F  - hold Farxiga due to CKD  - no ACEi/ARB due to CKD  - continue Toprol XL  - check limited TTE     Line infection  Bacteremia : 1/2 + Klebsiella   - has R chest wall CVC in place since  for home iv milrinone last admission   - check BC x2 :1/2 + 
                                                                                                Hospitalist Progress Note  Scarlet Bueno MD  Answering service: [phone]        Date of Service:  2024  NAME:  Ingrid Kennedy  :  1950  MRN:  [id number]      Admission Summary:   Ingrid Kennedy is a 73 y.o. male with chronic HFrEF/dilated cardiomyopathy s/p ICD (F Dr Major), HTN, HLD, paroxysmal atrial fibrillation/atrial tachycardia on apixaban, ventricular tachycardia, BPH s/p TURP, CKD Stage 3/cardiorenal syndrome, GERRI on CPAP, s/p bilateral TKRs c/b R knee hardware infection (2023, Ortho Dr Hunt), and hypothyroidism who was advised by F team to come to the ED due to fatigue, SOB, dizziness, and tremors that started on/about 24. Pt was just hospitalized at [facility] - for acute on chronic HFrEF and was sent home on on milrinone gtt via R chest wall CVC as a bridge to LVAD evaluation. Pt's daughter is at bedside who reports that home health nurse has been changing the dressing and pt denies any pain/tenderness/redness or discharge from the CVC. He denies any f/n/v, CP, cough, headaches, recent falls, injuries, LOC, numbness, tingling, focal weakness, diarrhea/constipation, dysuria. He denies recent COVID/flu exposures/infections        Interval history / Subjective:   Fever chills last night  Blood culture 12 + klebsiella      Assessment & Plan:      Acute on chronic HFrEF/dilated cardiomyopathy s/p ICD, NYHA Class IV, Stage D  - admit inpatient intermediate care/CVSU  - F team consulted  - continue milrinone gtt via PIV for now; bridge to LVAD evaluation per F   - given bumetanide 3mg IV x1 in ED by [facility]; will defer further diuresis to F  - hold Farxiga due to CKD  - no ACEi/ARB due to CKD  - continue Toprol XL  - check limited TTE     Line infection  Bacteremia : 1/2 + Klebsiella   - has R chest wall CVC in place since  for home iv milrinone last admission   - check BC x2 :1/2 + 
  Physician Progress Note      PATIENT:               CORTEZ MENDIETA  [facility] #:                  [id number]  :                       1950  ADMIT DATE:       2024 2:41 PM  DISCH DATE:  RESPONDING  PROVIDER #:        Aissatou Matthews MD          QUERY TEXT:    Patient admitted with CHF.  Noted documentation of Acute Kidney Injury  and   has HX of CKD stage 3.  In order to support the diagnosis of AMITA, please   include additional clinical indicators in your documentation.? Or please   document if the diagnosis of AMITA has been ruled out after further study.      The medical record reflects the following:  Risk Factors: HX of CKD  Clinical Indicators:  24 12:22   ??ckd 4  BUN,BUNPL: 43 (H)  Creatinine: 2.58 (H)  Est, Glom Filt Rate: 26 (L)    24 05:37  BUN,BUNPL: 41 (H)  Creatinine: 2.21 (H)  Est, Glom Filt Rate: 31 (L)    01/10/24 03:45  BUN,BUNPL: 48 (H)  Creatinine: 2.45 (H)  Est, Glom Filt Rate: 27 (L)       cardiology note  AMITA on CKD stage 3, cardiorenal syndrome    Treatment: lab monitoring    Thank you  Sherlyn Mccullough RN, CDI. CCDS, CRCR  Certified  Clinical Documentation   O: [phone]  [email or web address]  I can also be reached by perfect serve      Defined by Kidney Disease Improving Global Outcomes (KDIGO) clinical practice   guideline for acute kidney injury:  -Increase in SCr by greater than or equal to 0.3 mg/dl within 48 hours; or  -Increase or decrease in SCr to greater than or equal to 1.5 times baseline,   which is known or presumed to have occurred within the prior 7 days; or  -Urine volume < 0.5ml/kg/h for 6 hours.  Options provided:  -- CKD 3 only with Acute kidney injury ruled out after study  -- Acute kidney injury evidenced by, Please document evidence as well as a   numerical baseline creatinine, if known.  -- Other - I will add my own diagnosis  -- Disagree - Not applicable / Not valid  -- Disagree - Clinically unable to determine / 
  Pt arrives by stretcher to noninvasive cardiology department accompanied by TARUN Barney RN for NISHA procedure. All assessments completed and consent was reviewed.  Education given was regarding procedure, medications to be given, potential side effects of medications to be given, post-procedure management and follow-up. Opportunity for questions was provided and all questions and concerns were addressed. Patient and patient contact verbalized understanding of education.   
1600 Pt's B/P runs low, 92/52, 98/63, 92/65, and 99/58. MD notified. Dr. Bueno held night time Bumex.    Bedside and Verbal shift change report given to Vince (oncoming nurse) by Lorri (offgoing nurse). Report included the following information Nurse Handoff Report, Index, Intake/Output, MAR, and Cardiac Rhythm SR .     
2200: Pt BP 80/55, MAP 59, HR 90. Pt asymptomatic. BP cycling q30 minutes. NP aware.    0000: BP and MAPs stabilized, last BP 94/65, MAP 75, HR 78. Pt asymptomatic.    0600: SBP in the 100s since midnight. Pt denies headache, CP, SOB.  
[facility]  [facility] in [place], [place]  Inpatient Progress Note      Patient name: Ingrid Kennedy  Patient : 1950  Patient MRN: [id number]  Date of service: 24    Reason for Referral:  Chronic inotropes;  acute heart failure      ASSESSMENT:  Ingrid Kennedy is a 73 y.o. man with acute on chronic systolic heart failure, stage D, NYHA class IV; uptitrating GDMT limited by CKD;   Discussed at LVAD multidisciplinary meeting . Not currently a candidate for LVAD due to renal dysfunction, Creatinine >1.8. Will plan for home with Milrinone as bridge to LVAD candidacy, re evaluating over the next 3 months. Will need outpatient sleep study, PFTs, and neurocognitive studies.    Now appears euvolemic.  Treat for blood stream infection.  Continue milrinone      INTERVAL HISTORY:  -W 166-->167  -Net neg 2 L   -afebrile  -labs reviewed, cr stable  -Ingrid Kennedy is feeling better.  More energy.  Not having SOB. No c/o pain.        RECOMMENDATIONS:  Medical Therapy for Heart Failure  Continue Milrinone at 0.25 mcg/kg/min.   Beta-blocker: continue decreased Toprol XL to 12.5 mg daily.  Continue hold parameters   ACEi/ARB/ARNI: Continue to hold Entresto for renal dysfunction  MRA: Hold Spironolactone 25 mg daily for AMITA  SGLT2i: continue Farxiga 10 mg daily outpatient.  Jardiance while here  Continue allopurinol 50mg daily      Volume Management  Continue PO Bumex 3 mg PO BID.  Potassium replacement  Keep K>4 and Mg>2  Fluid restriction to 2000 cc daily.       AMITA on CKD  Continue above meds;  follow creatinine in labs.      Anticoagulation and Antiplatelet Therapy  Anticoagulation with Apixaban  No indication for Aspirin while anticoagulated     Antiarrhythmic and Device Therapy  Amiodarone for PAT/VT per EP.  Has been on Amiodarone for many years, now with thyroid toxicity. Patient states EP is weaning him off.   ICD interrogation q3 months.     S/p Bilateral knee replacements 
[facility]  [facility] in [place], [place]  Inpatient Progress Note      Patient name: Ingrid Kennedy  Patient : 1950  Patient MRN: [id number]  Date of service: 24    Reason for Referral:  Chronic inotropes;  acute heart failure      ASSESSMENT:  Ingrid Kennedy is a 73 y.o. man with acute on chronic systolic heart failure, stage D, NYHA class IV; uptitrating GDMT limited by CKD;   Discussed at LVAD multidisciplinary meeting . Not currently a candidate for LVAD due to renal dysfunction, Creatinine >1.8. Will plan for home with Milrinone as bridge to LVAD candidacy, re evaluating over the next 3 months. Will need outpatient sleep study, PFTs, and neurocognitive studies.    Now appears euvolemic. Kidney function improving. Treat for blood stream infection.  Continue milrinone.  Plan for NISHA to evaluate for vegetations on Monday.      INTERVAL HISTORY:  -W 162--> 161  -Net neg 1.9 L   -afebrile  -labs reviewed, 1.68 today, pro-BNP stable  -Ingrid Kennedy is feeling better.  More energy.  Not having SOB. No c/o pain.        RECOMMENDATIONS:  Medical Therapy for Heart Failure  Continue Milrinone at 0.25 mcg/kg/min.   Beta-blocker: continue decreased Toprol XL to 12.5 mg daily.  Continue hold parameters   ACEi/ARB/ARNI: Continue to hold Entresto for renal dysfunction  MRA: Hold Spironolactone 25 mg daily for AMITA  SGLT2i: continue Farxiga 10 mg daily outpatient.  Jardiance while here  Continue allopurinol 50mg daily   HF labs daily     Volume Management  Continue PO Bumex 3 mg PO BID.  Potassium replacement  Keep K>4 and Mg>2  Fluid restriction to 2000 cc daily.      Bacteremia   BC  + for klebsiella pneumonia and Enterobacteriaceae, BC  NTD  Line already  removed.  UA neg.  Had been on suppressive abx for a suspected knee infection and those were stopped last admission last month.   Will need new erick catheter when cleared   Antibiotics per ID- on ceftriaxone  ID 
[facility]  [facility] in [place], [place]  Inpatient Progress Note      Patient name: Ingrid Kennedy  Patient : 1950  Patient MRN: [id number]  Date of service: 01/15/24    Reason for Referral:  Management of inotropes, chronic HFrEF      ASSESSMENT:  Ingrid Kennedy is a 73 y.o. man with acute on chronic systolic heart failure, stage D, NYHA class IV; uptitrating GDMT limited by CKD;   Discussed at LVAD multidisciplinary meeting . Not currently a candidate for LVAD due to renal dysfunction, Creatinine >1.8. Will plan for home with Milrinone as bridge to LVAD candidacy, re evaluating over the next 3 months. Will need outpatient sleep study, PFTs, and neurocognitive studies.    Now appears euvolemic.  Treat for blood stream infection.  Continue milrinone.  Plan for NISHA to evaluate for vegetations on Monday.      INTERVAL HISTORY:  NAEO  NISHA pending today  Creatinine stable   PBNP trending down       RECOMMENDATIONS:  Medical Therapy for Heart Failure  Continue Milrinone at 0.25 mcg/kg/min.   Beta-blocker: continue Toprol XL to 12.5 mg daily.  Continue hold parameters   ACEi/ARB/ARNI: Continue to hold Entresto for renal dysfunction  MRA: Hold Spironolactone 25 mg daily for AMITA  SGLT2i: continue Farxiga 10 mg daily outpatient.  Jardiance while here  Continue allopurinol 50mg daily   HF labs daily     Volume Management  Continue PO Bumex 3 mg PO BID.  Potassium replacement  Keep K>4 and Mg>2  Fluid restriction to 2000 cc daily.      Bacteremia   BC  + for klebsiella pneumonia and Enterobacteriaceae, BC  NTD  Line already  removed.  UA neg.  Had been on suppressive abx for a suspected knee infection and those were stopped last admission last month.   Will need new erick catheter when cleared   Antibiotics per ID- on ceftriaxone  ID recommends NISHA to assess ICD wires since Klebsiella at high risk for adherence.  Plan for today         AMITA on CKD  Trend creatinine in 
Discharge order has been placed for this patient pending his getting a new Romie catheter placed. I have called IR 2 times to get an ETA for the procedure. No answer both times.   
Hospital follow-up PCP transitional care appointment has been made with Dr.Sharon Loyd  for Wednesday January 24th, 2024 at 10:00a.m.  Pending patient discharge.  Elisa Schaefer, Care Management Assistant   
Infectious Diseases Follow Up    2024      Assessment & Plan:     73 y.o. male with chronic HFrEF/dilated cardiomyopathy s/p ICD (F Dr Major), HTN, HLD, paroxysmal atrial fibrillation/atrial tachycardia on apixaban, ventricular tachycardia, BPH s/p TURP, CKD Stage 3/cardiorenal syndrome, GERRI on CPAP, s/p bilateral TKRs c/b R knee hardware infection (2023, Ortho Dr Hunt), and hypothyroidism who was advised by F team to come to the ED and admitted for acute on chronic HFreF and concern for infection.  Pt was recently hospitalized at [facility] - for acute on chronic HFrEF and was sent home on on milrinone gtt via R chest wall CVC (placed on ) as a bridge to LVAD evaluation.    Klebsiella Pneumoniae bacteremia  Source likely tunneled catheter placed 23  Hx ICD  Positive Blood Cx   Line removed   Repeat Blood Cx  pending  TTE  negative for vegetation. However in setting of ICD and in view of potential LVAD candidate, Dr. Wick recommends NISHA to assess ICD wires since Klebsiella has high risk for adherence.   S/p vancomycin   Currently on Zosyn, narrowed to ceftriaxone. Anticipate discharge on IV antibiotics.     HEreF/pafib  On milrinone  On amiodarone  Cardiology following    CKD III  Crea 2.22, stable.  Avoid nephrotoxic drugs    Hx bilateral knee replacement  S/p rifampin, cipro              Subjective:     Patient up in chair, no complaints.     Objective:     Vitals: /89   Pulse 80   Temp 98.3 °F (36.8 °C) (Oral)   Resp 21   Ht 1.778 m (5' 10\")   Wt 76.2 kg (167 lb 15.9 oz)   SpO2 100%   PF 99 L/min   BMI 24.10 kg/m²      Tmax:  Temp (24hrs), Av.2 °F (36.8 °C), Min:97.9 °F (36.6 °C), Max:98.4 °F (36.9 °C)      Exam:   Patient is intubated:  no    Exam:    General:  Alert, cooperative, NAD, appears stated age   Eyes:  Sclera anicteric. Pupils equally round and reactive to light.   Mouth/Throat: Mucous membranes normal, oral pharynx clear   Neck: Supple 
Infectious Diseases Follow Up    2024      Assessment & Plan:     73 y.o. male with chronic HFrEF/dilated cardiomyopathy s/p ICD (F Dr Major), HTN, HLD, paroxysmal atrial fibrillation/atrial tachycardia on apixaban, ventricular tachycardia, BPH s/p TURP, CKD Stage 3/cardiorenal syndrome, GERRI on CPAP, s/p bilateral TKRs c/b R knee hardware infection (2023, Ortho Dr Hunt), and hypothyroidism who was advised by [facility] team to come to the ED and admitted for acute on chronic HFreF and concern for infection.  Pt was recently hospitalized at [facility] - for acute on chronic HFrEF and was sent home on on milrinone gtt via R chest wall CVC (placed on ) as a bridge to LVAD evaluation.    Klebsiella Pneumoniae bacteremia  Source likely tunneled catheter placed 23  Hx ICD  Positive Blood Cx   Line removed   Repeat Blood Cx  NGTD  TTE  negative for vegetation.   NISHA  no vegetation on valves, ICD wired  S/p vancomycin   Currently on Zosyn, narrowed to ceftriaxone. Discharge on ceftriaxone through 24. Abx discharge plan in place.   Patient lives with step-daughter Elizabeth and assists with line management. Due to recent possible line infection and LVAD workup, would recommend re-education of line management    HEreF/pafib  On milrinone  On amiodarone  Cardiology following    CKD III  Crea 1.65, stable.  Avoid nephrotoxic drugs    Hx bilateral knee replacement  S/p rifampin, cipro              Subjective:     Patient in bed, no complaints. Discussed antibiotic plan. Step-daughter Elizabeth assists with line management at home. With recent possible line infection would recommend re-education of line management.     Objective:     Vitals: /81   Pulse 71   Temp 98 °F (36.7 °C) (Oral)   Resp 22   Ht 1.778 m (5' 10\")   Wt 73.2 kg (161 lb 6 oz)   SpO2 100%   PF 99 L/min   BMI 23.16 kg/m²      Tmax:  Temp (24hrs), Av °F (36.7 °C), Min:97.6 °F (36.4 °C), Max:98.3 °F (36.8 
NISHA requested to evaluate for vegetations/ICD leads. NPO after midnight Sunday; will try to coordinate on Monday with Dr. Danielson.  
NISHA scheduled for 2:30 PM today.  Reviewed procedure, risks/benefits. Pt agreeable to proceed.   
Occupational Therapy:     Chart reviewed in prep for OT tx session. Attempted to see pt, however currently RENEE for procedure. Will defer and follow as able and appropriate.     Thank you,   Salma Nguyễn, OTD, OTR/L    
Patient received a discharge order. The patient was made aware of the discharge and agreed to going home. The patient's IV was removed without any bleeding or complications. The patient was given discharge instructions which included new, changed, and discontinued medication, where they can  their prescriptions, side effects of new medications,  follow up appointments, signs and symptoms of heart attack and stroke, HF tips, when to seek emergency care, and how to access Mychart.     The patient's daughter brought in his portable IV pump and connected the patient to his Milrinone gtt via the Romie catheter and programmed the pump. She used gloves and a proper aseptic technique.     The patient's belongings were all packed up and the patient verified they had everything that belonged to him including the box of medication delivered for his at home care.  The patient was wheeled down to the discharge area via wheelchair. Vital signs were all stable at the time of discharge.     
Patient seen in ED after his arrival.    Does appear to have some mild fluid overload, but appears disproportionate to his symptoms.   Question whether he is failing inotrope support.  BP currently stable in the ED.  HR in the 90s.      Agree with IV diuresis and follow labs/symptoms.  RHC when euvolemic to further evaluate status on milrinone.  Currently on 0.25mcg/kg/min.  will hold on increasing dose at this time due to risk of hypotension, tachycardia/arrhythmia.        Full note tomorrow.      Sunitha Metcalf NP DNP, RN, [facility]-BC  [facility]  [facility]  [address], [address]  Phone: [phone]          
Physical Therapy (Delayed Entry)    Chart reviewed and attempts made to evaluation pt. Pt initially eating breakfast and later undergoing bedside procedure (ECHO?). Will follow up as able and appropriate. Anticipate pt will require 6MWT as he is being considered for LVAD with EF 15%.    EYAL CAMPBELL, PT    
Physical Therapy: Defer    Chart reviewed and attempted to see patient for PT treatment.  Pt currently RENEE for procedure.  Will defer and continue to follow.    Lynette Crabtree, PT, DPT   
Pt stable from a HF perspective to discharge home.  Continue milrinone at 0.25 mcg/kg/min and GDMT. Antibiotics as per ID.   HH orders placed, awaiting insurance approval since pt's former insurance ended 12/31/23.  Will follow from a far.  Please page AHF if needed.    Marly Ku, AGACNP-BC, FNP-BC  
TRANSFER - OUT REPORT:    Verbal report given to elaine Yates RN on Ingrid Kennedy being transferred to [facility] for routine progression of patient care       Report consisted of patient's Situation, Background, Assessment and   Recommendations(SBAR).     Information from the following report(s) Nurse Handoff Report, MAR, and Cardiac Rhythm Atrial paced with idioventricular rhythm  was reviewed with the receiving nurse.    Opportunity for questions and clarification was provided.      Patient transported with:   Monitor on stretcher back to unit; pt awake, alert, and oriented.  VSS.  Pt denies chest pain, SOB, and dizziness.  Right wrist dsg dry and intact; gauze and tegaderm intact.    
reviewed since prior progress note. Most recent are:  Vitals:    01/15/24 1010   BP:    Pulse: 78   Resp:    Temp:    SpO2:          Intake/Output Summary (Last 24 hours) at 1/15/2024 1113  Last data filed at 1/15/2024 0800  Gross per 24 hour   Intake 800 ml   Output 3850 ml   Net -3050 ml          Physical Examination:     I had a face to face encounter with this patient and independently examined them on 1/15/2024 as outlined below:          General: awake, NAD,  HEENT: NCAT, PERRL, MMM  Neck: supple, no masses  Chest: tachypneic, coarse BS bilaterally   CVS: regular rhythm, normal rate, + murmur, trace BLE edema  Abd: +BS, soft, NT, ND  MSK: DENIS  Neuro/Psych: pleasant mood and affect, responds appropriately to questions and commands, tremulous  Skin: warm, dry, R chest wall CVC nontender, no discharge/erythema with clean dressing              Data Review:    Review and/or order of clinical lab test    I have independently reviewed and interpreted patient's lab and all other diagnostic data    Notes reviewed from all clinical/nonclinical/nursing services involved in patient's clinical care. Care coordination discussions were held with appropriate clinical/nonclinical/ nursing providers based on care coordination needs.     Labs:     Recent Labs     01/13/24 0247 01/14/24 0226   WBC 4.4 4.5   HGB 9.9* 10.2*   HCT 29.8* 30.4*    225       Recent Labs     01/13/24  0247 01/14/24  0226 01/15/24  0519    145 145   K 3.7 3.5 3.3*   * 113* 112*   CO2 24 24 27   BUN 33* 31* 29*       Recent Labs     01/13/24  0247 01/14/24  0226 01/15/24  0519   ALT 22 19 18   GLOB 3.5 3.4 3.6       No results for input(s): \"INR\", \"APTT\" in the last 72 hours.    Invalid input(s): \"PTP\"   No results for input(s): \"TIBC\", \"FERR\" in the last 72 hours.    Invalid input(s): \"FE\", \"PSAT\"     No results found for: \"FOL\", \"RBCF\"   No results for input(s): \"PH\", \"PCO2\", \"PO2\" in the last 72 hours.  No results for input(s): 
40 mEq  40 mEq Oral BID    allopurinol (ZYLOPRIM) tablet 50 mg  50 mg Oral Daily    milrinone (PRIMACOR) 20 mg in dextrose 5 % 100 mL infusion  0.25 mcg/kg/min (Order-Specific) IntraVENous Continuous    amiodarone (CORDARONE) tablet 100 mg  100 mg Oral Daily    apixaban (ELIQUIS) tablet 5 mg  5 mg Oral BID    Vitamin D (CHOLECALCIFEROL) tablet 1,000 Units  1,000 Units Oral Daily    levothyroxine (SYNTHROID) tablet 50 mcg  50 mcg Oral Daily    tamsulosin (FLOMAX) capsule 0.4 mg  0.4 mg Oral Daily    sodium chloride flush 0.9 % injection 5-40 mL  5-40 mL IntraVENous 2 times per day    sodium chloride flush 0.9 % injection 5-40 mL  5-40 mL IntraVENous PRN    0.9 % sodium chloride infusion   IntraVENous PRN    ondansetron (ZOFRAN-ODT) disintegrating tablet 4 mg  4 mg Oral Q8H PRN    Or    ondansetron (ZOFRAN) injection 4 mg  4 mg IntraVENous Q6H PRN    polyethylene glycol (GLYCOLAX) packet 17 g  17 g Oral Daily PRN    acetaminophen (TYLENOL) tablet 650 mg  650 mg Oral Q6H PRN    Or    acetaminophen (TYLENOL) suppository 650 mg  650 mg Rectal Q6H PRN     ______________________________________________________________________  EXPECTED LENGTH OF STAY: 8  ACTUAL LENGTH OF STAY:          8                 Monique Jessica MD    
discussed during hospitalization and he was scheduled for visit in December 2023 back at [facility] in [place] for CardioMEMS implant and LVAD evaluation.      He has since relocated to [place], [place] to live with close family friends which he refers to has his adopted daughter and her family, in total 5 adults. He has no family in [place] and was interested in LVAD evaluation near a support system. His daughter who is present today lives in [place] but travels back and forth to visit. He has been in [place] since November 29 th. He reports between hospitalizations he does well and has no limitations in his daily activities. Decompensations occur quickly. He says the day of admission he was out taking a boat ride and developed progressive shortness of breath. He had noticed swelling. He does monitor weight and blood pressure daily and has been compliant with medications. He is on GDMT therapy with Carvedilol, Entresto, Farxiga and Spironolactone. He reports compliance with diet. He does not smoke or drink alcohol. \"     This admission HPI:    Patient presented to ED with increased complaints of fatigue and SOB.  Not able to work with home PT like he had been. Mildly overloaded on presentation, but not enough to correlate with symptoms.      LIFE GOALS:  Lifestyle goals reviewed with the patient.     IMAGING STUDIES REVIEWED:  Echo 12/11/2023    Left Ventricle: Severely reduced left ventricular systolic function with a visually estimated EF of 20 - 25%. Left ventricle is moderately dilated. Normal wall thickness. Severe global hypokinesis present.    Mitral Valve: Mild regurgitation.    Pulmonary Arteries: Mild to moderate pulmonary hypertension present. Estimated PA pressure of 50 mmhg.    Pericardium:  No pericardial effusion. Left pleural effusion.     ECG 12/10/2023: A-fib, 100 bpm, PVCs,  ms,  ms     REVIEW OF SYSTEMS:  Review of Symptoms:  Constitutional: negative   Eyes: negative  Ears, 
hours.    Invalid input(s): \"CPKMB\", \"CKNDX\", \"TROIQ\"  Lab Results   Component Value Date/Time    CHOL 126 01/09/2024 05:37 AM    HDL 48 01/09/2024 05:37 AM     No results found for: \"GLUCPOC\"  [unfilled]      Medications Reviewed:     Current Facility-Administered Medications   Medication Dose Route Frequency    cefTRIAXone (ROCEPHIN) 2,000 mg in sterile water 20 mL IV syringe  2,000 mg IntraVENous Q24H    benzonatate (TESSALON) capsule 100 mg  100 mg Oral TID PRN    metoprolol succinate (TOPROL XL) extended release tablet 12.5 mg  12.5 mg Oral Daily    empagliflozin (JARDIANCE) tablet 10 mg  10 mg Oral Daily    bumetanide (BUMEX) tablet 3 mg  3 mg Oral BID    potassium chloride (KLOR-CON) extended release tablet 40 mEq  40 mEq Oral BID    allopurinol (ZYLOPRIM) tablet 50 mg  50 mg Oral Daily    milrinone (PRIMACOR) 20 mg in dextrose 5 % 100 mL infusion  0.25 mcg/kg/min (Order-Specific) IntraVENous Continuous    amiodarone (CORDARONE) tablet 100 mg  100 mg Oral Daily    apixaban (ELIQUIS) tablet 5 mg  5 mg Oral BID    Vitamin D (CHOLECALCIFEROL) tablet 1,000 Units  1,000 Units Oral Daily    levothyroxine (SYNTHROID) tablet 50 mcg  50 mcg Oral Daily    tamsulosin (FLOMAX) capsule 0.4 mg  0.4 mg Oral Daily    sodium chloride flush 0.9 % injection 5-40 mL  5-40 mL IntraVENous 2 times per day    sodium chloride flush 0.9 % injection 5-40 mL  5-40 mL IntraVENous PRN    0.9 % sodium chloride infusion   IntraVENous PRN    ondansetron (ZOFRAN-ODT) disintegrating tablet 4 mg  4 mg Oral Q8H PRN    Or    ondansetron (ZOFRAN) injection 4 mg  4 mg IntraVENous Q6H PRN    polyethylene glycol (GLYCOLAX) packet 17 g  17 g Oral Daily PRN    acetaminophen (TYLENOL) tablet 650 mg  650 mg Oral Q6H PRN    Or    acetaminophen (TYLENOL) suppository 650 mg  650 mg Rectal Q6H PRN     ______________________________________________________________________  EXPECTED LENGTH OF STAY: 20  ACTUAL LENGTH OF STAY:          5                 Evert Jacobson 
chloride (KLOR-CON) extended release tablet 40 mEq, 40 mEq, Oral, BID, Sunitha Metcalf, APRN - NP, 40 mEq at 01/11/24 0917    allopurinol (ZYLOPRIM) tablet 50 mg, 50 mg, Oral, Daily, Sunitha Metcalf APRN - NP, 50 mg at 01/11/24 0916    milrinone (PRIMACOR) 20 mg in dextrose 5 % 100 mL infusion, 0.25 mcg/kg/min (Order-Specific), IntraVENous, Continuous, Atri, Helene FALK MD, Last Rate: 5.3 mL/hr at 01/11/24 0028, 0.25 mcg/kg/min at 01/11/24 0028    amiodarone (CORDARONE) tablet 100 mg, 100 mg, Oral, Daily, Ivy Ham MD, 100 mg at 01/11/24 0916    apixaban (ELIQUIS) tablet 5 mg, 5 mg, Oral, BID, Ivy Ham MD, 5 mg at 01/11/24 0917    Vitamin D (CHOLECALCIFEROL) tablet 1,000 Units, 1,000 Units, Oral, Daily, Ivy Ham MD, 1,000 Units at 01/11/24 0917    levothyroxine (SYNTHROID) tablet 50 mcg, 50 mcg, Oral, Daily, Ivy Ham MD, 50 mcg at 01/11/24 0520    tamsulosin (FLOMAX) capsule 0.4 mg, 0.4 mg, Oral, Daily, Scarlet Bueno MD, 0.4 mg at 01/11/24 0916    sodium chloride flush 0.9 % injection 5-40 mL, 5-40 mL, IntraVENous, 2 times per day, Ivy Ham MD, 10 mL at 01/11/24 0920    sodium chloride flush 0.9 % injection 5-40 mL, 5-40 mL, IntraVENous, PRN, Ivy Ham MD    0.9 % sodium chloride infusion, , IntraVENous, PRN, Ivy Ham MD    ondansetron (ZOFRAN-ODT) disintegrating tablet 4 mg, 4 mg, Oral, Q8H PRN **OR** ondansetron (ZOFRAN) injection 4 mg, 4 mg, IntraVENous, Q6H PRN, Ivy Ham MD    polyethylene glycol (GLYCOLAX) packet 17 g, 17 g, Oral, Daily PRN, Ivy Ham MD    acetaminophen (TYLENOL) tablet 650 mg, 650 mg, Oral, Q6H PRN **OR** acetaminophen (TYLENOL) suppository 650 mg, 650 mg, Rectal, Q6H PRN, Ivy Ham MD    PATIENT CARE TEAM:  Patient Care Team:  Elisa Loyd MD as PCP - General (Internal Medicine)  Elisa Loyd MD [facility]     Thank you for allowing me to participate in this patient's care.    MARYCRUZ Jeffries - NP 
[address], [address]  Phone: [phone]      
ondansetron (ZOFRAN) injection 4 mg, 4 mg, IntraVENous, Q6H PRN, Ivy Ham MD    polyethylene glycol (GLYCOLAX) packet 17 g, 17 g, Oral, Daily PRN, Ivy Ham MD    acetaminophen (TYLENOL) tablet 650 mg, 650 mg, Oral, Q6H PRN **OR** acetaminophen (TYLENOL) suppository 650 mg, 650 mg, Rectal, Q6H PRN, Ivy Ham MD    PATIENT CARE TEAM:  Patient Care Team:  Elisa Loyd MD as PCP - General (Internal Medicine)  Elisa Loyd MD as PCP - Empaneled Provider     Thank you for allowing me to participate in this patient's care.    MARYCRUZ Molina - NP   [facility]  [facility]  [address], [address]  Phone: [phone]

## 2024-01-19 ENCOUNTER — TRANSCRIBE ORDERS (OUTPATIENT)
Facility: HOSPITAL | Age: 74
End: 2024-01-19

## 2024-01-19 ENCOUNTER — OFFICE VISIT (OUTPATIENT)
Age: 74
End: 2024-01-19
Payer: MEDICARE

## 2024-01-19 ENCOUNTER — HOME HEALTH ADMISSION (OUTPATIENT)
Dept: HOME HEALTH SERVICES | Facility: HOME HEALTH | Age: 74
End: 2024-01-19
Payer: MEDICARE

## 2024-01-19 ENCOUNTER — PATIENT MESSAGE (OUTPATIENT)
Age: 74
End: 2024-01-19

## 2024-01-19 VITALS
RESPIRATION RATE: 18 BRPM | TEMPERATURE: 97.5 F | OXYGEN SATURATION: 94 % | DIASTOLIC BLOOD PRESSURE: 88 MMHG | BODY MASS INDEX: 24.28 KG/M2 | HEART RATE: 65 BPM | WEIGHT: 169.2 LBS | SYSTOLIC BLOOD PRESSURE: 130 MMHG

## 2024-01-19 VITALS
HEART RATE: 81 BPM | BODY MASS INDEX: 23.23 KG/M2 | HEIGHT: 70 IN | RESPIRATION RATE: 16 BRPM | DIASTOLIC BLOOD PRESSURE: 62 MMHG | SYSTOLIC BLOOD PRESSURE: 112 MMHG | OXYGEN SATURATION: 96 % | TEMPERATURE: 98.7 F | WEIGHT: 162.26 LBS

## 2024-01-19 VITALS
WEIGHT: 165 LBS | BODY MASS INDEX: 23.62 KG/M2 | OXYGEN SATURATION: 96 % | HEART RATE: 95 BPM | HEIGHT: 70 IN | DIASTOLIC BLOOD PRESSURE: 62 MMHG | SYSTOLIC BLOOD PRESSURE: 124 MMHG

## 2024-01-19 DIAGNOSIS — T82.7XXD BACTEREMIA ASSOCIATED WITH INTRAVASCULAR LINE, SUBSEQUENT ENCOUNTER: ICD-10-CM

## 2024-01-19 DIAGNOSIS — R06.02 SHORTNESS OF BREATH: ICD-10-CM

## 2024-01-19 DIAGNOSIS — Z09 HOSPITAL DISCHARGE FOLLOW-UP: ICD-10-CM

## 2024-01-19 DIAGNOSIS — I50.9 CONGESTIVE HEART FAILURE, UNSPECIFIED HF CHRONICITY, UNSPECIFIED HEART FAILURE TYPE (HCC): Primary | ICD-10-CM

## 2024-01-19 DIAGNOSIS — I42.0 DILATED CARDIOMYOPATHY (HCC): Primary | ICD-10-CM

## 2024-01-19 DIAGNOSIS — I50.22 CHRONIC SYSTOLIC HEART FAILURE (HCC): ICD-10-CM

## 2024-01-19 DIAGNOSIS — N18.31 STAGE 3A CHRONIC KIDNEY DISEASE (HCC): ICD-10-CM

## 2024-01-19 DIAGNOSIS — R78.81 BACTEREMIA ASSOCIATED WITH INTRAVASCULAR LINE, SUBSEQUENT ENCOUNTER: ICD-10-CM

## 2024-01-19 DIAGNOSIS — I50.22 CHRONIC HFREF (HEART FAILURE WITH REDUCED EJECTION FRACTION) (HCC): Primary | ICD-10-CM

## 2024-01-19 DIAGNOSIS — Z95.810 AICD (AUTOMATIC CARDIOVERTER/DEFIBRILLATOR) PRESENT: ICD-10-CM

## 2024-01-19 DIAGNOSIS — Z79.899 RECEIVING INOTROPIC MEDICATION: ICD-10-CM

## 2024-01-19 PROCEDURE — G8484 FLU IMMUNIZE NO ADMIN: HCPCS | Performed by: NURSE PRACTITIONER

## 2024-01-19 PROCEDURE — 99214 OFFICE O/P EST MOD 30 MIN: CPT | Performed by: NURSE PRACTITIONER

## 2024-01-19 PROCEDURE — 99214 OFFICE O/P EST MOD 30 MIN: CPT | Performed by: SPECIALIST

## 2024-01-19 PROCEDURE — G8427 DOCREV CUR MEDS BY ELIG CLIN: HCPCS | Performed by: NURSE PRACTITIONER

## 2024-01-19 PROCEDURE — 1111F DSCHRG MED/CURRENT MED MERGE: CPT | Performed by: NURSE PRACTITIONER

## 2024-01-19 PROCEDURE — 1123F ACP DISCUSS/DSCN MKR DOCD: CPT | Performed by: NURSE PRACTITIONER

## 2024-01-19 PROCEDURE — 1036F TOBACCO NON-USER: CPT | Performed by: NURSE PRACTITIONER

## 2024-01-19 PROCEDURE — 3017F COLORECTAL CA SCREEN DOC REV: CPT | Performed by: NURSE PRACTITIONER

## 2024-01-19 PROCEDURE — G8420 CALC BMI NORM PARAMETERS: HCPCS | Performed by: NURSE PRACTITIONER

## 2024-01-19 RX ORDER — LANOLIN ALCOHOL/MO/W.PET/CERES
3 CREAM (GRAM) TOPICAL DAILY
Qty: 15 TABLET | Refills: 1 | Status: SHIPPED | OUTPATIENT
Start: 2024-01-19

## 2024-01-19 ASSESSMENT — PATIENT HEALTH QUESTIONNAIRE - PHQ9
SUM OF ALL RESPONSES TO PHQ QUESTIONS 1-9: 0
SUM OF ALL RESPONSES TO PHQ QUESTIONS 1-9: 0
2. FEELING DOWN, DEPRESSED OR HOPELESS: 0
SUM OF ALL RESPONSES TO PHQ9 QUESTIONS 1 & 2: 0
1. LITTLE INTEREST OR PLEASURE IN DOING THINGS: 0
SUM OF ALL RESPONSES TO PHQ QUESTIONS 1-9: 0
SUM OF ALL RESPONSES TO PHQ QUESTIONS 1-9: 0

## 2024-01-19 ASSESSMENT — ENCOUNTER SYMPTOMS
SHORTNESS OF BREATH: 1
HEMOPTYSIS: 0
DYSPNEA ACTIVITY LEVEL: AFTER AMBULATING LESS THAN 10 FT

## 2024-01-19 NOTE — PATIENT INSTRUCTIONS
Medication changes:    Start melatonin at bedtime    Please take this to your pharmacy to notify them of the change in medications.     Testing Ordered:    Labs to continue with home health    As part of your ongoing LVAD evaluation:     An order for a PULMONARY FUNCTION TEST was previously placed to be done SOONEST AVAILABLE. Kristi contact coordination of care at [phone] to schedule.      New refferal for sleep medicine sent to [facility] (per pt request) Please contact [facility] at [phone] they have    Follow up with Urology as previously recommended, referral has already been sent but please call [facility] at [phone] to make appt    Follow up with neurocognitive as previously recommended. Please call to schedule appt at [phone]    Call [facility] to follow up with Dr Rossi at [phone]      Other Recommendations:      Ensure your drinking an adequate amount of water with a goal of 6-8 eight ounce glasses (1.5-2 liters) of fluid daily. Your urine should be clear and light yellow straw colored.      If your blood pressure begins to consistently run below 90/60 and/or you begin to experience dizziness or lightheadedness, please contact the [facility] at [phone].       Follow up as schedule on 2/9 at 930am with [facility]      Please monitor your weights daily upon waking and after using the bathroom. Keep a written records of your weights and bring to your next appointment. If you have a weight gain of 3 or more pounds overnight OR 5 or more pounds in one week please contact our office.       Thank you for allowing us the privilege of being a part of your healthcare team! Please do not hesitate to contact our office at [phone] with any questions or concerns.

## 2024-01-19 NOTE — HOME HEALTH
Severe allergic reaction, anaphylaxis, which requires immediate medical attention.    Patient at risk for falls Yes:   Recommended requesting PT orders due to fall risk YES:   Patient response to recommended requesting of PT orders: agreeable     Interdisciplinary communication with:     Written Teaching Material Utilized:  hospital discharge instructions     Patient/caregiver instructed on plan of care and are agreeable to plan of care at this time.     Discharge planning discussed with patient and caregiver. Discharge planning as follows: When goals are met. Patient/caregiver did verbalize agreement with discharge planning.     Specific plan for next visit: Educate in s/s of heart failure exacerbation and when to call MD JOHNNY or 911, reinforce milrinone IV therapy, reinforce Ceftriaxone IV push therapy, weekly labs and CVC dressing change due on 1/22/24

## 2024-01-19 NOTE — PROGRESS NOTES
MILRINONE WITH 10ML EVERY 24 HOURS. FLUSH PRE/POST LAB DRAW WITH 10-20ML    sterile water SOLN 19.2 mL with cefTRIAXone 2 g SOLR 2,000 mg 2,000 mg, IntraVENous, EVERY 24 HOURS, INFUSE OVER 10 MINUTES IN LUMEN NOT INFUSING MILRINONE.     tamsulosin (FLOMAX) 0.4 mg, Oral, DAILY    Vitamin D3 25 mcg, Oral, DAILY      No Known Allergies  Past Medical History:   Diagnosis Date    AICD (automatic cardioverter/defibrillator) present     CKD (chronic kidney disease)     Dilated cardiomyopathy (HCC)     Low left ventricular ejection fraction     GERRI on CPAP      Past Surgical History:   Procedure Laterality Date    CARDIAC PROCEDURE N/A 12/18/2023    Right heart cath performed by Ananda Walton MD at [facility] CARDIAC CATH LAB    CARDIAC PROCEDURE N/A 12/27/2023    Right heart cath performed by Ananda Walton MD at [facility] CARDIAC CATH LAB    IR INSERT TUNNELED CV CATH WO PORT < 5YRS  12/26/2023    IR INSERT TUNNELED CV CATH WO PORT < 5YRS 12/26/2023 [facility] RAD ANGIO IR    IR TUNNELED CATHETER PLACEMENT GREATER THAN 5 YEARS  1/17/2024    IR TUNNELED CATHETER PLACEMENT GREATER THAN 5 YEARS 1/17/2024 [facility] RAD ANGIO IR    TOTAL KNEE ARTHROPLASTY       History reviewed. No pertinent family history.  Social History     Tobacco Use    Smoking status: Never    Smokeless tobacco: Never   Substance Use Topics    Alcohol use: Never       Review of Systems   Constitutional: Positive for weight loss.   Respiratory:  Positive for shortness of breath.    Psychiatric/Behavioral:  The patient has insomnia.    All other systems reviewed and are negative.       /62 (Site: Left Upper Arm, Position: Sitting, Cuff Size: Medium Adult)   Pulse 95   Ht 1.778 m (5' 10\")   Wt 74.8 kg (165 lb)   SpO2 96%   BMI 23.68 kg/m²    Physical Exam  Vitals and nursing note reviewed.   Constitutional:       Appearance: Normal appearance.   HENT:      Head: Normocephalic.      Right Ear: External ear normal.      Left Ear: External ear normal.      Nose: Nose

## 2024-01-19 NOTE — TELEPHONE ENCOUNTER
Spoke with patient and his daughter at visit today. Pt. Stated he did not recall meeting SW at his last hospitalization. SW asked if his daughter, Shannan who has been helping with his Mychart, his disability and FMLA as well as legal issues, received SW Mychart message including the provider's letter stating he was hospitalized in early January. Pt. Daughter was able to view the message and see the attachment, but had trouble opening it. ZOHAIB resent after visit this afternoon and gave pt. A paper copy to take with him.

## 2024-01-20 ENCOUNTER — HOME CARE VISIT (OUTPATIENT)
Dept: HOME HEALTH SERVICES | Facility: HOME HEALTH | Age: 74
End: 2024-01-20
Payer: MEDICARE

## 2024-01-20 ENCOUNTER — HOME CARE VISIT (OUTPATIENT)
Facility: HOME HEALTH | Age: 74
End: 2024-01-20
Payer: COMMERCIAL

## 2024-01-20 VITALS
OXYGEN SATURATION: 96 % | DIASTOLIC BLOOD PRESSURE: 62 MMHG | RESPIRATION RATE: 16 BRPM | HEART RATE: 60 BPM | TEMPERATURE: 98.8 F | SYSTOLIC BLOOD PRESSURE: 114 MMHG

## 2024-01-20 PROCEDURE — G0299 HHS/HOSPICE OF RN EA 15 MIN: HCPCS

## 2024-01-20 ASSESSMENT — ENCOUNTER SYMPTOMS
DYSPNEA ACTIVITY LEVEL: AFTER AMBULATING 10 - 20 FT
PAIN LOCATION - PAIN QUALITY: ACHY

## 2024-01-20 NOTE — HOME HEALTH
prevent rehospitalization.     Diagnosis: CHF/klebsiella pneumoniae bacteremia     Subjective: Patient reports no pain.      Caregiver: friend Mari. Caregiver assists with: Medications, Meals, Bathing, ADL, IADL, Transportation and Housekeeping. Caregiver is available in evening and nightime hours. Caregiver is not present at this visit.     Medications reconciled and all medications are available in the home this visit. The following education was provided regarding medications: medication interactions and look alike medications.      Patient/CG able to demonstrate knowledge through teach back with 754 percent accuracy. No discrepancies or severe medication interactions noted.      High risk med teaching was performed on:      High alert medication teaching on eliquis, anticoagulant therapy education; purpose, dose, and frequency, food/drug interactions, risks of co-administration with other medications such as ASA, NSAID, and herbals, bleeding prevention strategies such as the use of a soft toothbrush, gentle flossing, use of electric razor, on the potential for increased bleeding from falls and lacerations, to notify medical providers of anticoagulant therapy, consider carrying an ID card, signs and symptoms of abnormal bleeding such as unexplained bruising, dizziness/lightheadedness, red or tarry looking stool, blood in urine, blood in vomit and to call home care agency and/or physician for any problems or concerns.      High alert medication teaching on milrinone, Inotropic therapy education Purpose, dose, and frequency, Monitor weight daily; Report weight gain of greater than 2 pounds in a day or 5 pounds or more in a week, Consume a low sodium diet according to MD recommendation, Side effects such as nausea, vomiting, fever, tingly feeling, headache, leg cramps and Complications such as hypertension, hypotension, severe irregular heartbeat, chest pain, severe shortness of breath, acute MI.      High alert

## 2024-01-22 ENCOUNTER — HOME CARE VISIT (OUTPATIENT)
Facility: HOME HEALTH | Age: 74
End: 2024-01-22
Payer: MEDICARE

## 2024-01-22 VITALS
TEMPERATURE: 97.3 F | RESPIRATION RATE: 18 BRPM | OXYGEN SATURATION: 99 % | SYSTOLIC BLOOD PRESSURE: 130 MMHG | HEART RATE: 45 BPM | DIASTOLIC BLOOD PRESSURE: 70 MMHG

## 2024-01-22 PROCEDURE — G0493 RN CARE EA 15 MIN HH/HOSPICE: HCPCS

## 2024-01-22 NOTE — HOME HEALTH
Subjective: \"I'm not having any pain.\"  Falls since last visit No(if yes complete the Fall Tracking Form and include bsrifallreport):   Caregiver involvement changes:   Home health supplies by type and quantity ordered/delivered this visit include: Supplies in the home    Clinician asked if patient has had any physician contact since last home care visit and patient states: NO  Clinician asked if patient has any new or changed medications and patient states:  NO   If Yes, were medications reconciled? N/A   Was the certifying physician notified of changes in medications? N/A     Clinical assessment (what this visit means for the patient overall and need for ongoing skilled care) and progress or lack of progress towards SPECIFIC goals: Pt has a double lumen PICC line to RUST with continuous Inotrope infusing in purple lumen. Next visit scheduled for Monday 1/29. Pt would benefit from further HH visits for PICC line dressing change and Inotrope bag change. SN performed PICC line dressing change, assessments, Lab draw, education and VS. SN alexis labs from red lumen. SN did not change Inotrope line.    Written Teaching Material Utilized: N/A    Interdisciplinary communication with: N/A    Discharge planning as follows: Per physician order and When goals are met    Specific plan for next visit: Dressing change

## 2024-01-23 ENCOUNTER — TELEPHONE (OUTPATIENT)
Age: 74
End: 2024-01-23

## 2024-01-23 LAB
ALBUMIN SERPL-MCNC: 4 G/DL (ref 3.8–4.8)
ALBUMIN/GLOB SERPL: 1.4 {RATIO} (ref 1.2–2.2)
ALP SERPL-CCNC: 89 IU/L (ref 44–121)
ALT SERPL-CCNC: 29 IU/L (ref 0–44)
AST SERPL-CCNC: 26 IU/L (ref 0–40)
BASOPHILS # BLD AUTO: 0 X10E3/UL (ref 0–0.2)
BASOPHILS NFR BLD AUTO: 1 %
BILIRUB SERPL-MCNC: 0.8 MG/DL (ref 0–1.2)
BUN SERPL-MCNC: 27 MG/DL (ref 8–27)
BUN/CREAT SERPL: 14 (ref 10–24)
CALCIUM SERPL-MCNC: 9 MG/DL (ref 8.6–10.2)
CHLORIDE SERPL-SCNC: 101 MMOL/L (ref 96–106)
CO2 SERPL-SCNC: 20 MMOL/L (ref 20–29)
CREAT SERPL-MCNC: 1.94 MG/DL (ref 0.76–1.27)
EGFRCR SERPLBLD CKD-EPI 2021: 36 ML/MIN/1.73
EOSINOPHIL # BLD AUTO: 0.1 X10E3/UL (ref 0–0.4)
EOSINOPHIL NFR BLD AUTO: 1 %
ERYTHROCYTE [DISTWIDTH] IN BLOOD BY AUTOMATED COUNT: 15.8 % (ref 11.6–15.4)
GLOBULIN SER CALC-MCNC: 2.8 G/DL (ref 1.5–4.5)
GLUCOSE SERPL-MCNC: 157 MG/DL (ref 70–99)
HCT VFR BLD AUTO: 32.4 % (ref 37.5–51)
HGB BLD-MCNC: 11 G/DL (ref 13–17.7)
IMM GRANULOCYTES # BLD AUTO: 0 X10E3/UL (ref 0–0.1)
IMM GRANULOCYTES NFR BLD AUTO: 0 %
LYMPHOCYTES # BLD AUTO: 1.6 X10E3/UL (ref 0.7–3.1)
LYMPHOCYTES NFR BLD AUTO: 43 %
MAGNESIUM SERPL-MCNC: 1.8 MG/DL (ref 1.6–2.3)
MCH RBC QN AUTO: 28.4 PG (ref 26.6–33)
MCHC RBC AUTO-ENTMCNC: 34 G/DL (ref 31.5–35.7)
MCV RBC AUTO: 84 FL (ref 79–97)
MONOCYTES # BLD AUTO: 0.3 X10E3/UL (ref 0.1–0.9)
MONOCYTES NFR BLD AUTO: 7 %
NEUTROPHILS # BLD AUTO: 1.7 X10E3/UL (ref 1.4–7)
NEUTROPHILS NFR BLD AUTO: 48 %
PLATELET # BLD AUTO: 235 X10E3/UL (ref 150–450)
POTASSIUM SERPL-SCNC: 3.8 MMOL/L (ref 3.5–5.2)
PROT SERPL-MCNC: 6.8 G/DL (ref 6–8.5)
RBC # BLD AUTO: 3.88 X10E6/UL (ref 4.14–5.8)
SODIUM SERPL-SCNC: 138 MMOL/L (ref 134–144)
WBC # BLD AUTO: 3.6 X10E3/UL (ref 3.4–10.8)

## 2024-01-24 ENCOUNTER — OFFICE VISIT (OUTPATIENT)
Age: 74
End: 2024-01-24
Payer: MEDICARE

## 2024-01-24 ENCOUNTER — NURSE ONLY (OUTPATIENT)
Age: 74
End: 2024-01-24
Payer: MEDICARE

## 2024-01-24 VITALS
BODY MASS INDEX: 23.99 KG/M2 | HEIGHT: 70 IN | DIASTOLIC BLOOD PRESSURE: 80 MMHG | OXYGEN SATURATION: 99 % | WEIGHT: 167.6 LBS | HEART RATE: 91 BPM | SYSTOLIC BLOOD PRESSURE: 110 MMHG | RESPIRATION RATE: 17 BRPM | TEMPERATURE: 97.8 F

## 2024-01-24 DIAGNOSIS — I43 CARDIAC AMYLOIDOSIS (HCC): ICD-10-CM

## 2024-01-24 DIAGNOSIS — Z09 HOSPITAL DISCHARGE FOLLOW-UP: ICD-10-CM

## 2024-01-24 DIAGNOSIS — E85.4 CARDIAC AMYLOIDOSIS (HCC): ICD-10-CM

## 2024-01-24 DIAGNOSIS — E44.1 MALNUTRITION OF MILD DEGREE (HCC): ICD-10-CM

## 2024-01-24 DIAGNOSIS — I48.11 LONGSTANDING PERSISTENT ATRIAL FIBRILLATION (HCC): ICD-10-CM

## 2024-01-24 DIAGNOSIS — N18.32 STAGE 3B CHRONIC KIDNEY DISEASE (HCC): ICD-10-CM

## 2024-01-24 DIAGNOSIS — E03.9 ACQUIRED HYPOTHYROIDISM: ICD-10-CM

## 2024-01-24 DIAGNOSIS — Z00.00 MEDICARE ANNUAL WELLNESS VISIT, SUBSEQUENT: Primary | ICD-10-CM

## 2024-01-24 DIAGNOSIS — Z12.5 SCREENING FOR PROSTATE CANCER: ICD-10-CM

## 2024-01-24 DIAGNOSIS — E11.9 TYPE 2 DIABETES MELLITUS WITHOUT COMPLICATION, WITHOUT LONG-TERM CURRENT USE OF INSULIN (HCC): ICD-10-CM

## 2024-01-24 PROBLEM — M54.50 CHRONIC LEFT-SIDED LOW BACK PAIN WITHOUT SCIATICA: Status: ACTIVE | Noted: 2017-08-15

## 2024-01-24 PROBLEM — R79.89 SERUM CREATININE RAISED: Status: ACTIVE | Noted: 2023-09-25

## 2024-01-24 PROBLEM — E78.5 HYPERLIPIDEMIA: Status: ACTIVE | Noted: 2017-08-15

## 2024-01-24 PROBLEM — I34.0 NONRHEUMATIC MITRAL VALVE REGURGITATION: Status: ACTIVE | Noted: 2023-10-06

## 2024-01-24 PROBLEM — D64.9 NORMOCYTIC ANEMIA: Status: ACTIVE | Noted: 2023-09-25

## 2024-01-24 PROBLEM — R35.1 BENIGN PROSTATIC HYPERPLASIA WITH NOCTURIA: Status: ACTIVE | Noted: 2017-08-15

## 2024-01-24 PROBLEM — G89.29 CHRONIC LEFT-SIDED LOW BACK PAIN WITHOUT SCIATICA: Status: ACTIVE | Noted: 2017-08-15

## 2024-01-24 PROBLEM — N40.1 BENIGN PROSTATIC HYPERPLASIA WITH NOCTURIA: Status: ACTIVE | Noted: 2017-08-15

## 2024-01-24 PROBLEM — I47.19 ATRIAL TACHYCARDIA: Status: ACTIVE | Noted: 2023-09-29

## 2024-01-24 PROBLEM — I36.1 NONRHEUMATIC TRICUSPID VALVE REGURGITATION: Status: ACTIVE | Noted: 2023-10-06

## 2024-01-24 PROBLEM — D63.8 ANEMIA OF CHRONIC DISEASE: Status: ACTIVE | Noted: 2023-10-06

## 2024-01-24 PROBLEM — I47.20 VT (VENTRICULAR TACHYCARDIA) (HCC): Status: ACTIVE | Noted: 2023-09-29

## 2024-01-24 PROBLEM — Z96.653 HISTORY OF BILATERAL KNEE REPLACEMENT: Status: ACTIVE | Noted: 2023-10-06

## 2024-01-24 PROBLEM — E83.42 HYPOMAGNESEMIA: Status: ACTIVE | Noted: 2023-09-26

## 2024-01-24 PROBLEM — I35.1 NONRHEUMATIC AORTIC VALVE INSUFFICIENCY: Status: ACTIVE | Noted: 2023-10-06

## 2024-01-24 PROBLEM — I48.0 PAROXYSMAL ATRIAL FIBRILLATION (HCC): Status: ACTIVE | Noted: 2023-09-25

## 2024-01-24 PROBLEM — J18.9 PNEUMONIA OF RIGHT LOWER LOBE DUE TO INFECTIOUS ORGANISM: Status: ACTIVE | Noted: 2023-10-08

## 2024-01-24 PROBLEM — I27.22 PULMONARY HYPERTENSION DUE TO LEFT HEART DISEASE (HCC): Status: ACTIVE | Noted: 2023-10-06

## 2024-01-24 PROBLEM — R35.1 NOCTURIA: Status: ACTIVE | Noted: 2017-08-23

## 2024-01-24 PROBLEM — E87.6 HYPOKALEMIA: Status: ACTIVE | Noted: 2023-09-26

## 2024-01-24 PROBLEM — I10 ESSENTIAL HYPERTENSION: Status: ACTIVE | Noted: 2017-08-15

## 2024-01-24 PROBLEM — Z95.810 ICD (IMPLANTABLE CARDIOVERTER-DEFIBRILLATOR) IN PLACE: Status: ACTIVE | Noted: 2023-09-29

## 2024-01-24 PROBLEM — N17.9 AKI (ACUTE KIDNEY INJURY) (HCC): Status: ACTIVE | Noted: 2023-10-07

## 2024-01-24 PROBLEM — E03.8 SUBCLINICAL HYPOTHYROIDISM: Status: ACTIVE | Noted: 2023-09-30

## 2024-01-24 PROCEDURE — 2022F DILAT RTA XM EVC RTNOPTHY: CPT | Performed by: INTERNAL MEDICINE

## 2024-01-24 PROCEDURE — G8420 CALC BMI NORM PARAMETERS: HCPCS | Performed by: INTERNAL MEDICINE

## 2024-01-24 PROCEDURE — 1111F DSCHRG MED/CURRENT MED MERGE: CPT | Performed by: INTERNAL MEDICINE

## 2024-01-24 PROCEDURE — 1036F TOBACCO NON-USER: CPT | Performed by: INTERNAL MEDICINE

## 2024-01-24 PROCEDURE — 99214 OFFICE O/P EST MOD 30 MIN: CPT | Performed by: INTERNAL MEDICINE

## 2024-01-24 PROCEDURE — 3046F HEMOGLOBIN A1C LEVEL >9.0%: CPT | Performed by: INTERNAL MEDICINE

## 2024-01-24 PROCEDURE — 3079F DIAST BP 80-89 MM HG: CPT | Performed by: INTERNAL MEDICINE

## 2024-01-24 PROCEDURE — 1123F ACP DISCUSS/DSCN MKR DOCD: CPT | Performed by: INTERNAL MEDICINE

## 2024-01-24 PROCEDURE — 3074F SYST BP LT 130 MM HG: CPT | Performed by: INTERNAL MEDICINE

## 2024-01-24 PROCEDURE — G8484 FLU IMMUNIZE NO ADMIN: HCPCS | Performed by: INTERNAL MEDICINE

## 2024-01-24 PROCEDURE — G0439 PPPS, SUBSEQ VISIT: HCPCS | Performed by: INTERNAL MEDICINE

## 2024-01-24 PROCEDURE — 3017F COLORECTAL CA SCREEN DOC REV: CPT | Performed by: INTERNAL MEDICINE

## 2024-01-24 PROCEDURE — G8427 DOCREV CUR MEDS BY ELIG CLIN: HCPCS | Performed by: INTERNAL MEDICINE

## 2024-01-24 ASSESSMENT — PATIENT HEALTH QUESTIONNAIRE - PHQ9
SUM OF ALL RESPONSES TO PHQ QUESTIONS 1-9: 0
1. LITTLE INTEREST OR PLEASURE IN DOING THINGS: 0
SUM OF ALL RESPONSES TO PHQ QUESTIONS 1-9: 0
SUM OF ALL RESPONSES TO PHQ QUESTIONS 1-9: 0
SUM OF ALL RESPONSES TO PHQ9 QUESTIONS 1 & 2: 0
2. FEELING DOWN, DEPRESSED OR HOPELESS: 0
SUM OF ALL RESPONSES TO PHQ QUESTIONS 1-9: 0

## 2024-01-24 NOTE — PROGRESS NOTES
Room 20     Identified pt with two pt identifiers(name and ). Reviewed record in preparation for visit and have obtained necessary documentation. All patient medications has been reviewed.  Chief Complaint   Patient presents with    Follow-Up from Hospital     Acute on chronic systolic heart failure, [facility] 24-24    Medicare AWV       Health Maintenance Due   Topic    COVID-19 Vaccine (1)    Pneumococcal 65+ years Vaccine (1 - PCV)    DTaP/Tdap/Td vaccine (1 - Tdap)    Shingles vaccine (1 of 2)    Respiratory Syncytial Virus (RSV) Pregnant or age 60 yrs+ (1 - 1-dose 60+ series)    Flu vaccine (1)    Annual Wellness Visit (Medicare)      Health Maintenance Review: Patient reminded of \"due or due soon\" health maintenance. I have asked the patient to contact his/her primary care provider (PCP) for follow-up on his/her health maintenance.        Wt Readings from Last 3 Encounters:   24 76 kg (167 lb 9.6 oz)   24 76.7 kg (169 lb 3.2 oz)   24 74.8 kg (165 lb)     Temp Readings from Last 3 Encounters:   24 97.8 °F (36.6 °C) (Temporal)   24 97.3 °F (36.3 °C) (Temporal)   24 98 °F (36.7 °C) (Oral)     BP Readings from Last 3 Encounters:   24 110/80   24 130/70   24 (!) 150/76     Pulse Readings from Last 3 Encounters:   24 91   24 (!) 45   24 72       1. \"Have you been to the ER, urgent care clinic since your last visit?  Hospitalized since your last visit?\" Yes When: 24-21 Where: [facility] Reason for visit: Acute on chronic systolic heart failure.      2. \"Have you seen or consulted any other health care providers outside of the [facility] since your last visit?\" No     3. For patients aged 45-75: Has the patient had a colonoscopy / FIT/ Cologuard? Yes - Care Gap present. Most recent result on file      Patient is accompanied by daughter I have received verbal consent from Ingrid Kennedy to discuss any/all medical information

## 2024-01-24 NOTE — ASSESSMENT & PLAN NOTE
Monitored by specialist- no acute findings meriting change in the plan  This is not a for sure diagnosis. He is being evaluated by the [facility].

## 2024-01-24 NOTE — ASSESSMENT & PLAN NOTE
Monitored by specialist- no acute findings meriting change in the plan  Lab Results   Component Value Date    CREATININE 1.94 (H) 01/22/2024    BUN 27 01/22/2024     01/22/2024    K 3.8 01/22/2024     01/22/2024    CO2 20 01/22/2024

## 2024-01-24 NOTE — PROGRESS NOTES
Medicare Annual Wellness Visit    Ingrid Kennedy is here for Follow-Up from Hospital (Acute on chronic systolic heart failure, [facility] 1/8/24-1/17/24) and Medicare AWV    Assessment & Plan   1. Medicare annual wellness visit, subsequent   Anticipatory guidance discussed.   Immunizations reviewed  HM updated.   2. Hospital discharge follow-up  -     [place] DISCHARGE MEDS RECONCILED W/ CURRENT OUTPATIENT MED LIST  3. Malnutrition of mild degree (HCC)  Assessment & Plan:    Will continue to monitor.   4. Type 2 diabetes mellitus without complication, without long-term current use of insulin (HCC)  Assessment & Plan:  Well-controlled, continue current plan pending work up below  Hemoglobin A1C   Date Value Ref Range Status   12/12/2023 6.1 (H) 4.0 - 5.6 % Final     Comment:     (NOTE)  HbA1C Interpretive Ranges  <5.7              Normal  5.7 - 6.4         Consider Prediabetes  >6.5              Consider Diabetes     5. Acquired hypothyroidism  -     TSH + Free T4 Panel; Future  6. Longstanding persistent atrial fibrillation (HCC)  Assessment & Plan:  Monitored by specialist- no acute findings meriting change in the plan  [facility].   7. Stage 3b chronic kidney disease (HCC)  Assessment & Plan:  Monitored by specialist- no acute findings meriting change in the plan  Lab Results   Component Value Date    CREATININE 1.94 (H) 01/22/2024    BUN 27 01/22/2024     01/22/2024    K 3.8 01/22/2024     01/22/2024    CO2 20 01/22/2024   8. Cardiac amyloidosis (HCC)  Assessment & Plan:  Monitored by specialist- no acute findings meriting change in the plan  This is not a for sure diagnosis. He is being evaluated by the AF.   9. Screening for prostate cancer  -     PSA Screening; Future    He has an appointment with urology on 2/8/2024    I have discussed the diagnosis with the patient and the intended treatment plan as seen in the above orders. The patient has received an after-visit summary and questions

## 2024-01-24 NOTE — ASSESSMENT & PLAN NOTE
Monitored by specialist- no acute findings meriting change in the plan  He is being monitored by the [facility].

## 2024-01-24 NOTE — PATIENT INSTRUCTIONS
arms.     Lightheadedness or sudden weakness.     A fast or irregular heartbeat.   After you call 911, the  may tell you to chew 1 adult-strength or 2 to 4 low-dose aspirin. Wait for an ambulance. Do not try to drive yourself.  Watch closely for changes in your health, and be sure to contact your doctor if you have any problems.  Where can you learn more?  Go to https://www.morphCARD.net/patientEd and enter F075 to learn more about \"A Healthy Heart: Care Instructions.\"  Current as of: June 25, 2023               Content Version: 13.9  © 3327-9569 [employer].   Care instructions adapted under license by [employer]. If you have questions about a medical condition or this instruction, always ask your healthcare professional. [employer] disclaims any warranty or liability for your use of this information.      Personalized Preventive Plan for Ingrid Kennedy - 1/24/2024  Medicare offers a range of preventive health benefits. Some of the tests and screenings are paid in full while other may be subject to a deductible, co-insurance, and/or copay.    Some of these benefits include a comprehensive review of your medical history including lifestyle, illnesses that may run in your family, and various assessments and screenings as appropriate.    After reviewing your medical record and screening and assessments performed today your provider may have ordered immunizations, labs, imaging, and/or referrals for you.  A list of these orders (if applicable) as well as your Preventive Care list are included within your After Visit Summary for your review.    Other Preventive Recommendations:    A preventive eye exam performed by an eye specialist is recommended every 1-2 years to screen for glaucoma; cataracts, macular degeneration, and other eye disorders.  A preventive dental visit is recommended every 6 months.  Try to get at least 150 minutes of exercise per week or 10,000 steps per day on a

## 2024-01-25 LAB
PSA SERPL-MCNC: 7.3 NG/ML (ref 0.01–4)
T4 FREE SERPL-MCNC: 1.2 NG/DL (ref 0.8–1.5)
TSH SERPL DL<=0.05 MIU/L-ACNC: 7.79 UIU/ML (ref 0.36–3.74)

## 2024-01-26 ENCOUNTER — HOSPITAL ENCOUNTER (OUTPATIENT)
Facility: HOSPITAL | Age: 74
End: 2024-01-26
Payer: MEDICARE

## 2024-01-26 ENCOUNTER — TELEPHONE (OUTPATIENT)
Age: 74
End: 2024-01-26

## 2024-01-26 DIAGNOSIS — R06.02 SHORTNESS OF BREATH: ICD-10-CM

## 2024-01-26 DIAGNOSIS — I42.0 DILATED CARDIOMYOPATHY (HCC): ICD-10-CM

## 2024-01-26 DIAGNOSIS — E03.9 ACQUIRED HYPOTHYROIDISM: Primary | ICD-10-CM

## 2024-01-26 PROCEDURE — 94726 PLETHYSMOGRAPHY LUNG VOLUMES: CPT

## 2024-01-26 PROCEDURE — 94060 EVALUATION OF WHEEZING: CPT

## 2024-01-26 PROCEDURE — 94729 DIFFUSING CAPACITY: CPT

## 2024-01-26 PROCEDURE — 94010 BREATHING CAPACITY TEST: CPT

## 2024-01-26 RX ORDER — LEVOTHYROXINE SODIUM 0.07 MG/1
75 TABLET ORAL DAILY
Qty: 30 TABLET | Refills: 5 | Status: SHIPPED | OUTPATIENT
Start: 2024-01-26

## 2024-01-26 NOTE — TELEPHONE ENCOUNTER
Received genetic testing results from [facility].  Letter sent to patient and copy placed in chart in media.

## 2024-01-30 ENCOUNTER — HOME CARE VISIT (OUTPATIENT)
Facility: HOME HEALTH | Age: 74
End: 2024-01-30
Payer: MEDICARE

## 2024-01-30 ENCOUNTER — TELEPHONE (OUTPATIENT)
Age: 74
End: 2024-01-30

## 2024-01-30 ENCOUNTER — HOME CARE VISIT (OUTPATIENT)
Dept: HOME HEALTH SERVICES | Facility: HOME HEALTH | Age: 74
End: 2024-01-30
Payer: MEDICARE

## 2024-01-30 DIAGNOSIS — E03.9 ACQUIRED HYPOTHYROIDISM: ICD-10-CM

## 2024-01-30 PROCEDURE — G0300 HHS/HOSPICE OF LPN EA 15 MIN: HCPCS

## 2024-01-30 RX ORDER — LEVOTHYROXINE SODIUM 0.07 MG/1
75 TABLET ORAL DAILY
Qty: 90 TABLET | Refills: 0 | Status: SHIPPED | OUTPATIENT
Start: 2024-01-30

## 2024-01-30 NOTE — TELEPHONE ENCOUNTER
Call from Leigh Ann with Home health:    During visit patient noted to be increasingly short of breath with activity, heart rate elevated with walking to around 128 patient desated to 87% took almost 10 mins to recover O2 back up to 93% and heart rate to stabilize.  BP with HH- 130/87, todays weight 165lb, no swelling noted. Patient reports that he has been worsening over the last week.     No issues with medication, patient taking all meds as prescribed, milrinone infusing without difficulty.    Will review with Iwona Moran NP, APRN - NP Macalma, Andrea M, RN  Caller: Unspecified (Today,  1:46 PM)  Lets see if he can get in tomorrow on [facility] schedule    Called  for return call, sent my chart message as well to schedule appt  Patient coming in for visit at 1130 on 1/31

## 2024-01-31 ENCOUNTER — OFFICE VISIT (OUTPATIENT)
Age: 74
End: 2024-01-31
Payer: MEDICARE

## 2024-01-31 VITALS
DIASTOLIC BLOOD PRESSURE: 74 MMHG | BODY MASS INDEX: 24.91 KG/M2 | RESPIRATION RATE: 18 BRPM | HEIGHT: 70 IN | HEART RATE: 74 BPM | WEIGHT: 174 LBS | OXYGEN SATURATION: 94 % | SYSTOLIC BLOOD PRESSURE: 112 MMHG

## 2024-01-31 VITALS
HEART RATE: 72 BPM | DIASTOLIC BLOOD PRESSURE: 75 MMHG | RESPIRATION RATE: 18 BRPM | TEMPERATURE: 98.1 F | OXYGEN SATURATION: 94 % | SYSTOLIC BLOOD PRESSURE: 134 MMHG

## 2024-01-31 DIAGNOSIS — E87.70 HYPERVOLEMIA, UNSPECIFIED HYPERVOLEMIA TYPE: ICD-10-CM

## 2024-01-31 DIAGNOSIS — I50.23 ACUTE ON CHRONIC SYSTOLIC (CONGESTIVE) HEART FAILURE (HCC): Primary | ICD-10-CM

## 2024-01-31 DIAGNOSIS — Z79.899 RECEIVING INOTROPIC MEDICATION: ICD-10-CM

## 2024-01-31 DIAGNOSIS — R06.02 SHORTNESS OF BREATH: ICD-10-CM

## 2024-01-31 PROCEDURE — 3074F SYST BP LT 130 MM HG: CPT | Performed by: NURSE PRACTITIONER

## 2024-01-31 PROCEDURE — 3017F COLORECTAL CA SCREEN DOC REV: CPT | Performed by: NURSE PRACTITIONER

## 2024-01-31 PROCEDURE — 1123F ACP DISCUSS/DSCN MKR DOCD: CPT | Performed by: NURSE PRACTITIONER

## 2024-01-31 PROCEDURE — 3078F DIAST BP <80 MM HG: CPT | Performed by: NURSE PRACTITIONER

## 2024-01-31 PROCEDURE — 1036F TOBACCO NON-USER: CPT | Performed by: NURSE PRACTITIONER

## 2024-01-31 PROCEDURE — G8420 CALC BMI NORM PARAMETERS: HCPCS | Performed by: NURSE PRACTITIONER

## 2024-01-31 PROCEDURE — G8427 DOCREV CUR MEDS BY ELIG CLIN: HCPCS | Performed by: NURSE PRACTITIONER

## 2024-01-31 PROCEDURE — 1111F DSCHRG MED/CURRENT MED MERGE: CPT | Performed by: NURSE PRACTITIONER

## 2024-01-31 PROCEDURE — G8484 FLU IMMUNIZE NO ADMIN: HCPCS | Performed by: NURSE PRACTITIONER

## 2024-01-31 PROCEDURE — 99215 OFFICE O/P EST HI 40 MIN: CPT | Performed by: NURSE PRACTITIONER

## 2024-01-31 RX ORDER — METOLAZONE 2.5 MG/1
2.5 TABLET ORAL DAILY PRN
Qty: 3 TABLET | Refills: 0 | Status: SHIPPED | OUTPATIENT
Start: 2024-01-31

## 2024-01-31 RX ORDER — BUMETANIDE 2 MG/1
4 TABLET ORAL 2 TIMES DAILY
Qty: 120 TABLET | Refills: 0 | Status: SHIPPED | OUTPATIENT
Start: 2024-01-31 | End: 2024-03-01

## 2024-01-31 ASSESSMENT — ENCOUNTER SYMPTOMS
DYSPNEA ACTIVITY LEVEL: WHILE SPEAKING
EYE PAIN: 0
COUGH: 0
SORE THROAT: 0
STOOL DESCRIPTION: FORMED
BLOOD IN STOOL: 0
CHEST TIGHTNESS: 0
ABDOMINAL PAIN: 0
ABDOMINAL DISTENTION: 0
SHORTNESS OF BREATH: 1

## 2024-01-31 ASSESSMENT — PATIENT HEALTH QUESTIONNAIRE - PHQ9
SUM OF ALL RESPONSES TO PHQ QUESTIONS 1-9: 0
SUM OF ALL RESPONSES TO PHQ9 QUESTIONS 1 & 2: 0
SUM OF ALL RESPONSES TO PHQ QUESTIONS 1-9: 0
1. LITTLE INTEREST OR PLEASURE IN DOING THINGS: 0

## 2024-01-31 NOTE — PATIENT INSTRUCTIONS
Medication changes:    Take metolazone prior to bumex dose tonight or tomorrow morning (but not both)  Increase bumex to 4mg twice a day  Take additional 40meq of potassium (2 tabs) for two days (in addition to regular dosing for 40meq twice a day)    Please keep track of weights and symptoms, we will reach out Friday to review if improvement with increased medication.    Please take this to your pharmacy to notify them of the change in medications.     Testing Ordered:    Labs with Home Health    Other Recommendations:      Please record blood pressure and heart rate daily before medication and two hours after medication, please record weight daily upon waking/after using the bathroom. Keep a written records of your weights and blood pressure and bring to your next appointment. If you have a weight gain of 3 or more pounds overnight OR 5 or more pounds in one week, new/worsened shortness of breath or swelling, or if your blood pressure begins to consistently run below 90/60 and/or you begin to experience dizziness or lightheadedness please contact our office at [phone] option 2.    Ensure your drinking an adequate amount of water with a goal of 6-8 eight ounce glasses (1.5-2 liters) of fluid daily. Your urine should be clear and light yellow straw colored.     Follow up as scheduled next friday with [facility]    Thank you for allowing us the privilege of being a part of your healthcare team! Please do not hesitate to contact our office at [phone] option 2 with any questions or concerns.

## 2024-01-31 NOTE — PROGRESS NOTES
ADVANCED HEART FAILURE CENTER  [facility] in [place], [place]  Outpatient Clinic Note      Patient name: Ingrid Kennedy  Patient : 1950  Patient MRN: [id number]  Date of service: 24    Chief Complaint   Patient presents with    Shortness of Breath     W/wo exertion    Leg Swelling     Bilateral leg and feet    Congestive Heart Failure    Follow-up          ASSESSMENT:  Ingrid Kennedy is a 73 y.o. man with acute on chronic systolic heart failure, stage D, NYHA class IV; uptitrating GDMT limited by CKD;   Discussed at LVAD multidisciplinary meeting . Not currently a candidate for LVAD due to renal dysfunction, Creatinine >1.8. Will plan for home with Milrinone as bridge to LVAD candidacy, re evaluating over the next 3 months. Will need outpatient sleep study, PFTs, and neurocognitive studies.    Recent treatment for blood stream infection, NISHA negative for vegetation. Continue milrinone  Now fluid overloaded.  He does not wish to be admitted at this time.  Will try out patient diuresis.  Low threshold to admit to hospital if he does not respond to outpatient diuretics       INTERVAL HISTORY:  -VSS  -labs :  K 3.8; creat 1.94; Mg 1.8  -weight up 5lbs  -Ingrid Kennedy is not feeling well.  He has a lot of SOB with minimal activity.  His daughter states this has been for about 1 week.  Increased swelling, fatigue, and orthopnea.  No dizziness, chest pain, palpitations.  At home they increased his bumex to 4mg in AM (from 3mg) and kept 3mg in the PM prior to this visit.  He does not think labs were drawn with HH visit this week.        .  RECOMMENDATIONS:  Medical Therapy for Heart Failure  Continue Milrinone at 0.25 mcg/kg/min.   Beta-blocker: continue Toprol XL to 12.5 mg daily.    ACEi/ARB/ARNI: Continue to hold Entresto for renal dysfunction  MRA: Hold Spironolactone for AMITA  SGLT2i: continue Farxiga 10 mg daily outpatient  Continue allopurinol 50mg daily   HF labs weekly - he does not

## 2024-02-01 ENCOUNTER — TELEPHONE (OUTPATIENT)
Age: 74
End: 2024-02-01

## 2024-02-01 NOTE — TELEPHONE ENCOUNTER
1343: Attempted to contact patient's step daughter, Khloe, to discuss remaining LVAD evaluation items:    -Neurocognitive testing (contact [phone] for scheduling)  -Urology follow up (contact [phone] for scheduling)  -Sleep medicine follow up (ensure scheduled with pulmonary associates)    Message left. Will await return call. Audelia Castañeda RN

## 2024-02-01 NOTE — HOME HEALTH
Subjective: Patient states he is getting SOB upon ambulation  Falls since last visit No(if yes complete the Fall Tracking Form and include bsrifallreport):   Caregiver involvement changes: N/a  Home health supplies by type and quantity ordered/delivered this visit include: N/A    Clinician asked if patient has had any physician contact since last home care visit and patient states: NO  Clinician asked if patient has any new or changed medications and patient states:  N/A   If Yes, were medications reconciled? N/A   Was the certifying physician notified of changes in medications? N/A no    Clinical assessment (what this visit means for the patient overall and need for ongoing skilled care) and progress or lack of progress towards SPECIFIC goals: Patient is 73 year old male with central line and hx of CHF living in two story home with daughter as caregivers regular evening and weekends.   Medication education done this visit includes n/a.  Education given to patient on energy conservation.   Patient response to visit includes understanding of need to contact MD with worsening symptoms.   Needs continued SN to prevent infection and rehospitalization due to illness/diagnosis.   Needs continued SN for CL care.         Written Teaching Material Utilized: N/A    Interdisciplinary communication with: N/A    Discharge planning as follows: Per physician order    Specific plan for next visit: CL dressing change

## 2024-02-02 ENCOUNTER — TELEPHONE (OUTPATIENT)
Age: 74
End: 2024-02-02

## 2024-02-02 NOTE — TELEPHONE ENCOUNTER
----- Message from Surendra Hale RN sent at 2/1/2024  8:37 AM EST -----  Check in Friday on weights/symptoms after increasing bumex and metolazone use    Called LM for return call, my chart message sent as well.      Spoke Khloe (on [facility]) who brought patient on phone:  Swelling improved today  Breathing was better last night, alittle worse today-- but overall better  Urine output the same. No dizziness    Confirmed bumex 4mg BID, patient took metolazone dose yesterday morning    1/31- 170lb (home weight day of visit)  2/1- 168lb  2/2- 168lb    Will send to Sunitha PALOMINO for review     Sunitha Metcafl, APRN - NP  You18 minutes ago (3:34 PM)     Ok, great.  He should drop a few more pounds tomorrow, and hopefully he'll feel a lot better over the weekend.  Let's call them Monday, please.       Called back to Khloe to relay above information, she verbalized understanding. Educated that they have access to oncall provider if weight goes back up or symtpoms return. She verbalized understanding.

## 2024-02-05 ENCOUNTER — HOME CARE VISIT (OUTPATIENT)
Dept: HOME HEALTH SERVICES | Facility: HOME HEALTH | Age: 74
End: 2024-02-05
Payer: MEDICARE

## 2024-02-05 ENCOUNTER — HOME CARE VISIT (OUTPATIENT)
Facility: HOME HEALTH | Age: 74
End: 2024-02-05
Payer: MEDICARE

## 2024-02-05 ENCOUNTER — TELEPHONE (OUTPATIENT)
Age: 74
End: 2024-02-05

## 2024-02-05 ENCOUNTER — HOSPITAL ENCOUNTER (OUTPATIENT)
Facility: HOSPITAL | Age: 74
Setting detail: SPECIMEN
Discharge: HOME | End: 2024-02-08

## 2024-02-05 LAB
ALBUMIN SERPL-MCNC: 3.4 G/DL (ref 3.5–5)
ALBUMIN/GLOB SERPL: 1.1 (ref 1.1–2.2)
ALP SERPL-CCNC: 127 U/L (ref 45–117)
ALT SERPL-CCNC: 55 U/L (ref 12–78)
ANION GAP SERPL CALC-SCNC: 9 MMOL/L (ref 5–15)
AST SERPL-CCNC: 23 U/L (ref 15–37)
BASOPHILS # BLD: 0 K/UL (ref 0–0.1)
BASOPHILS NFR BLD: 1 % (ref 0–1)
BILIRUB SERPL-MCNC: 1.7 MG/DL (ref 0.2–1)
BUN SERPL-MCNC: 48 MG/DL (ref 6–20)
BUN/CREAT SERPL: 21 (ref 12–20)
CALCIUM SERPL-MCNC: 8.9 MG/DL (ref 8.5–10.1)
CHLORIDE SERPL-SCNC: 104 MMOL/L (ref 97–108)
CO2 SERPL-SCNC: 30 MMOL/L (ref 21–32)
CREAT SERPL-MCNC: 2.31 MG/DL (ref 0.7–1.3)
DIFFERENTIAL METHOD BLD: ABNORMAL
EOSINOPHIL # BLD: 0.1 K/UL (ref 0–0.4)
EOSINOPHIL NFR BLD: 3 % (ref 0–7)
ERYTHROCYTE [DISTWIDTH] IN BLOOD BY AUTOMATED COUNT: 18.3 % (ref 11.5–14.5)
GLOBULIN SER CALC-MCNC: 3.1 G/DL (ref 2–4)
GLUCOSE SERPL-MCNC: 180 MG/DL (ref 65–100)
HCT VFR BLD AUTO: 32.5 % (ref 36.6–50.3)
HGB BLD-MCNC: 10.9 G/DL (ref 12.1–17)
IMM GRANULOCYTES # BLD AUTO: 0 K/UL (ref 0–0.04)
IMM GRANULOCYTES NFR BLD AUTO: 0 % (ref 0–0.5)
LYMPHOCYTES # BLD: 1.3 K/UL (ref 0.8–3.5)
LYMPHOCYTES NFR BLD: 39 % (ref 12–49)
MAGNESIUM SERPL-MCNC: 1.7 MG/DL (ref 1.6–2.4)
MCH RBC QN AUTO: 28.1 PG (ref 26–34)
MCHC RBC AUTO-ENTMCNC: 33.5 G/DL (ref 30–36.5)
MCV RBC AUTO: 83.8 FL (ref 80–99)
MONOCYTES # BLD: 0.3 K/UL (ref 0–1)
MONOCYTES NFR BLD: 8 % (ref 5–13)
NEUTS SEG # BLD: 1.6 K/UL (ref 1.8–8)
NEUTS SEG NFR BLD: 49 % (ref 32–75)
NRBC # BLD: 0 K/UL (ref 0–0.01)
NRBC BLD-RTO: 0 PER 100 WBC
NT PRO BNP: 5960 PG/ML
PLATELET # BLD AUTO: 214 K/UL (ref 150–400)
PMV BLD AUTO: 10.2 FL (ref 8.9–12.9)
POTASSIUM SERPL-SCNC: 2.7 MMOL/L (ref 3.5–5.1)
PROT SERPL-MCNC: 6.5 G/DL (ref 6.4–8.2)
RBC # BLD AUTO: 3.88 M/UL (ref 4.1–5.7)
SODIUM SERPL-SCNC: 143 MMOL/L (ref 136–145)
WBC # BLD AUTO: 3.2 K/UL (ref 4.1–11.1)

## 2024-02-05 PROCEDURE — G0299 HHS/HOSPICE OF RN EA 15 MIN: HCPCS

## 2024-02-05 RX ORDER — BUMETANIDE 2 MG/1
4 TABLET ORAL 2 TIMES DAILY
Qty: 360 TABLET | Refills: 0 | Status: ON HOLD | OUTPATIENT
Start: 2024-02-05

## 2024-02-05 RX ORDER — CHOLECALCIFEROL (VITAMIN D3) 25 MCG
1000 TABLET ORAL DAILY
Qty: 90 TABLET | Refills: 1 | Status: ON HOLD | OUTPATIENT
Start: 2024-02-05

## 2024-02-05 NOTE — TELEPHONE ENCOUNTER
Requested Prescriptions     Signed Prescriptions Disp Refills    dapagliflozin (FARXIGA) 10 MG tablet 90 tablet 0     Sig: Take 1 tablet by mouth Daily with supper     Authorizing Provider: DANIEL JOHNSON     Ordering User: SHEMAR CABRERA    bumetanide (BUMEX) 2 MG tablet 360 tablet 0     Sig: Take 2 tablets by mouth 2 times daily     Authorizing Provider: DANIEL JOHNSON     Ordering User: SHEMAR CABRERA    Cholecalciferol (VITAMIN D3) 25 MCG TABS 90 tablet 1     Sig: Take 1 tablet by mouth daily     Authorizing Provider: DANIEL JOHNSON     Ordering User: SHEMAR CABRERA

## 2024-02-05 NOTE — TELEPHONE ENCOUNTER
Spoke with COCO Douglas regarding critical potassium. Instructed family he needs to take an additional 80 meq of potassium today, split in 2 doses of 40 meq 4 hours apart. Tomorrow will take Potassium 40 meq TID and then Wednesday reduced back to 40 meq BID.   
narrow base of support/pain/impaired postural control/decreased strength/impaired balance/cognition

## 2024-02-06 ENCOUNTER — TELEPHONE (OUTPATIENT)
Age: 74
End: 2024-02-06

## 2024-02-06 NOTE — TELEPHONE ENCOUNTER
Telephone Call RE:  Appointment reminder     Outcome:     [] Patient confirmed appointment   [] Patient rescheduled appointment for    [] Unable to reach  [x] Left message              [] Other:       on both numbers.

## 2024-02-06 NOTE — TELEPHONE ENCOUNTER
1144: Spoke with Khloe who confirmed patient scheduled for urology appointment on 2/8/24 at [facility]. She stated patient was seen by pulmonary associates sleep medicine last week with plan for sleep study in March. She stated she has been attempting to reach [facility] for scheduling, but has been playing phone tag with scheduling staff. She confirmed she does have contact information and will plan to reach out again today to attempt to schedule. She confirmed visit on Friday and had no further questions at this time.     1202: Contacted [facility] to request records from office visit. Per office staff will fax office note from 1/26/24. Fax number provided. Will await records.     1215: Records received and given to provider for review. Audelia Castañeda RN

## 2024-02-06 NOTE — TELEPHONE ENCOUNTER
Called and left message to follow up on weights, BP, symptoms over the weekend. Sent my chart message as well.

## 2024-02-07 VITALS
DIASTOLIC BLOOD PRESSURE: 60 MMHG | BODY MASS INDEX: 23.39 KG/M2 | TEMPERATURE: 100 F | OXYGEN SATURATION: 95 % | HEART RATE: 68 BPM | RESPIRATION RATE: 20 BRPM | SYSTOLIC BLOOD PRESSURE: 110 MMHG | WEIGHT: 163 LBS

## 2024-02-07 ASSESSMENT — ENCOUNTER SYMPTOMS: PAIN LOCATION - PAIN QUALITY: ACHY

## 2024-02-07 NOTE — HOME HEALTH
Subjective: i'm trying to get my prescriptions together   Falls since last visit No(if yes complete the Fall Tracking Form and include bsrifallreport):   Caregiver involvement changes: No  Home health supplies by type and quantity ordered/delivered this visit include: N/A    Clinician asked if patient has had any physician contact since last home care visit and patient states: NO  Clinician asked if patient has any new or changed medications and patient states:  NO   If Yes, were medications reconciled? NO   Was the certifying physician notified of changes in medications? NO     Clinical assessment (what this visit means for the patient overall and need for ongoing skilled care) and progress or lack of progress towards SPECIFIC goals: Pt with CHF on milronone drip, central line dressing changed today and weekly labs sent to [facility]. Pt continues to need skilled nursing for assessment,med education weekly central line dressing chnages and lab draws to prevent HF complications and rehospitalization until HF goals are met .  Written Teaching Material Utilized: N/A    Interdisciplinary communication with: N/A     Discharge planning as follows: Is no longer homebound and Will discharge when the patient has reached their maximum functional potential and maximum safety in their home, when goals are  met     Specific plan for next visit: labs, dressing change, weight and blood sugar monitoring

## 2024-02-08 ASSESSMENT — ENCOUNTER SYMPTOMS
EYE PAIN: 0
SHORTNESS OF BREATH: 1
ABDOMINAL PAIN: 0
CHEST TIGHTNESS: 0
BLOOD IN STOOL: 0
COUGH: 0
SORE THROAT: 0
ABDOMINAL DISTENTION: 0

## 2024-02-08 NOTE — PROGRESS NOTES
ADVANCED HEART FAILURE CENTER  [facility] in [place], [place]  Outpatient Clinic Note      Patient name: Ingrid Kennedy  Patient : 1950  Patient MRN: [id number]  Date of service: 24    No chief complaint on file.         ASSESSMENT:  Ingrid Kennedy is a 73 y.o. man with acute on chronic systolic heart failure, stage D, NYHA class IV; uptitrating GDMT limited by CKD;   Discussed at LVAD multidisciplinary meeting . Not currently a candidate for LVAD due to renal dysfunction, Creatinine >1.8. Will plan for home with Milrinone as bridge to LVAD candidacy, re evaluating over the next 3 months. Will need outpatient sleep study, PFTs, and neurocognitive studies.    Recent treatment for blood stream infection, NISHA negative for vegetation. Continue milrinone  Remains fluid overloaded.  He did not successfully diurese with outpatient diuresis and labs showed worsened renal function and hypokalemia, so do not feel it's safe to be more aggressive without hospital monitoring.   I recommend he present to the hospital for admission of IV diuresis and possible titration of milrinone.  Does not appear to have another drive line infection, but temp is down.        INTERVAL HISTORY:  -BP up - did not take his medications yet this morning   -labs : K 2.7; creat up to 2.31; Mg 1.7; pBNP down to 5960; t bili up to 1.7  -genetics with [facility]  -weight down 3lbs from last visit.  Home weights are slightly up   -Ingrid Kennedy is not feeling well.  He is now having SOB at rest.  Swelling is up.  He is not sleeping due to orthopnea.  Recently having some bladder spasm like pains and urology increased his flomax yesterday.  No pain.  No picc line concerns.  He is reluctantly willing to go to the hospital to be admitted.     .  RECOMMENDATIONS:    Present to hospital for admission for acute on chronic systolic heart failure      Medical Therapy for Heart Failure  Continue Milrinone at 0.25 mcg/kg/min.

## 2024-02-09 ENCOUNTER — TELEPHONE (OUTPATIENT)
Age: 74
End: 2024-02-09

## 2024-02-09 ENCOUNTER — OFFICE VISIT (OUTPATIENT)
Age: 74
End: 2024-02-09
Payer: MEDICARE

## 2024-02-09 ENCOUNTER — APPOINTMENT (OUTPATIENT)
Facility: HOSPITAL | Age: 74
DRG: 286 | End: 2024-02-09
Payer: MEDICARE

## 2024-02-09 ENCOUNTER — HOSPITAL ENCOUNTER (INPATIENT)
Facility: HOSPITAL | Age: 74
LOS: 11 days | Discharge: HOME HEALTH | DRG: 286 | End: 2024-02-20
Attending: STUDENT IN AN ORGANIZED HEALTH CARE EDUCATION/TRAINING PROGRAM | Admitting: FAMILY MEDICINE
Payer: MEDICARE

## 2024-02-09 VITALS
OXYGEN SATURATION: 99 % | TEMPERATURE: 96.3 F | SYSTOLIC BLOOD PRESSURE: 138 MMHG | HEART RATE: 79 BPM | BODY MASS INDEX: 24.48 KG/M2 | DIASTOLIC BLOOD PRESSURE: 92 MMHG | HEIGHT: 70 IN | WEIGHT: 171 LBS | RESPIRATION RATE: 18 BRPM

## 2024-02-09 DIAGNOSIS — I50.9 HEART FAILURE, UNSPECIFIED HF CHRONICITY, UNSPECIFIED HEART FAILURE TYPE (HCC): ICD-10-CM

## 2024-02-09 DIAGNOSIS — Z79.899 RECEIVING INOTROPIC MEDICATION: ICD-10-CM

## 2024-02-09 DIAGNOSIS — I50.9 CONGESTIVE HEART FAILURE, UNSPECIFIED HF CHRONICITY, UNSPECIFIED HEART FAILURE TYPE (HCC): ICD-10-CM

## 2024-02-09 DIAGNOSIS — I50.23 ACUTE ON CHRONIC SYSTOLIC (CONGESTIVE) HEART FAILURE (HCC): Primary | ICD-10-CM

## 2024-02-09 DIAGNOSIS — I50.9 ACUTE ON CHRONIC CONGESTIVE HEART FAILURE, UNSPECIFIED HEART FAILURE TYPE (HCC): Primary | ICD-10-CM

## 2024-02-09 LAB
ALBUMIN SERPL-MCNC: 3.9 G/DL (ref 3.5–5)
ALBUMIN/GLOB SERPL: 1 (ref 1.1–2.2)
ALP SERPL-CCNC: 157 U/L (ref 45–117)
ALT SERPL-CCNC: 88 U/L (ref 12–78)
ANION GAP SERPL CALC-SCNC: 6 MMOL/L (ref 5–15)
APPEARANCE UR: CLEAR
AST SERPL-CCNC: 51 U/L (ref 15–37)
BACTERIA URNS QL MICRO: NEGATIVE /HPF
BASOPHILS # BLD: 0 K/UL (ref 0–0.1)
BASOPHILS NFR BLD: 1 % (ref 0–1)
BILIRUB SERPL-MCNC: 1.9 MG/DL (ref 0.2–1)
BILIRUB UR QL: NEGATIVE
BUN SERPL-MCNC: 50 MG/DL (ref 6–20)
BUN/CREAT SERPL: 21 (ref 12–20)
CALCIUM SERPL-MCNC: 9.2 MG/DL (ref 8.5–10.1)
CHLORIDE SERPL-SCNC: 109 MMOL/L (ref 97–108)
CO2 SERPL-SCNC: 27 MMOL/L (ref 21–32)
COLOR UR: NORMAL
COMMENT:: NORMAL
CREAT SERPL-MCNC: 2.37 MG/DL (ref 0.7–1.3)
DIFFERENTIAL METHOD BLD: ABNORMAL
EOSINOPHIL # BLD: 0.1 K/UL (ref 0–0.4)
EOSINOPHIL NFR BLD: 2 % (ref 0–7)
EPITH CASTS URNS QL MICRO: NORMAL /LPF
ERYTHROCYTE [DISTWIDTH] IN BLOOD BY AUTOMATED COUNT: 18.3 % (ref 11.5–14.5)
GLOBULIN SER CALC-MCNC: 3.9 G/DL (ref 2–4)
GLUCOSE SERPL-MCNC: 99 MG/DL (ref 65–100)
GLUCOSE UR STRIP.AUTO-MCNC: NEGATIVE MG/DL
HCT VFR BLD AUTO: 34.3 % (ref 36.6–50.3)
HGB BLD-MCNC: 11.8 G/DL (ref 12.1–17)
HGB UR QL STRIP: NEGATIVE
HYALINE CASTS URNS QL MICRO: NORMAL /LPF (ref 0–5)
IMM GRANULOCYTES # BLD AUTO: 0 K/UL (ref 0–0.04)
IMM GRANULOCYTES NFR BLD AUTO: 1 % (ref 0–0.5)
KETONES UR QL STRIP.AUTO: NEGATIVE MG/DL
LEUKOCYTE ESTERASE UR QL STRIP.AUTO: NEGATIVE
LYMPHOCYTES # BLD: 1.4 K/UL (ref 0.8–3.5)
LYMPHOCYTES NFR BLD: 34 % (ref 12–49)
MAGNESIUM SERPL-MCNC: 2 MG/DL (ref 1.6–2.4)
MCH RBC QN AUTO: 28.8 PG (ref 26–34)
MCHC RBC AUTO-ENTMCNC: 34.4 G/DL (ref 30–36.5)
MCV RBC AUTO: 83.7 FL (ref 80–99)
MONOCYTES # BLD: 0.4 K/UL (ref 0–1)
MONOCYTES NFR BLD: 9 % (ref 5–13)
NEUTS SEG # BLD: 2.2 K/UL (ref 1.8–8)
NEUTS SEG NFR BLD: 53 % (ref 32–75)
NITRITE UR QL STRIP.AUTO: NEGATIVE
NRBC # BLD: 0 K/UL (ref 0–0.01)
NRBC BLD-RTO: 0 PER 100 WBC
NT PRO BNP: 9240 PG/ML
PH UR STRIP: 5 (ref 5–8)
PLATELET # BLD AUTO: 245 K/UL (ref 150–400)
PMV BLD AUTO: 10.7 FL (ref 8.9–12.9)
POTASSIUM SERPL-SCNC: 3.4 MMOL/L (ref 3.5–5.1)
PROT SERPL-MCNC: 7.8 G/DL (ref 6.4–8.2)
PROT UR STRIP-MCNC: NEGATIVE MG/DL
RBC # BLD AUTO: 4.1 M/UL (ref 4.1–5.7)
RBC #/AREA URNS HPF: NORMAL /HPF (ref 0–5)
SODIUM SERPL-SCNC: 142 MMOL/L (ref 136–145)
SP GR UR REFRACTOMETRY: 1.01 (ref 1–1.03)
SPECIMEN HOLD: NORMAL
SPECIMEN HOLD: NORMAL
TROPONIN I SERPL HS-MCNC: 121 NG/L (ref 0–76)
URATE SERPL-MCNC: 8.2 MG/DL (ref 3.5–7.2)
UROBILINOGEN UR QL STRIP.AUTO: 0.2 EU/DL (ref 0.2–1)
WBC # BLD AUTO: 4.1 K/UL (ref 4.1–11.1)
WBC URNS QL MICRO: NORMAL /HPF (ref 0–4)

## 2024-02-09 PROCEDURE — 84550 ASSAY OF BLOOD/URIC ACID: CPT

## 2024-02-09 PROCEDURE — 6370000000 HC RX 637 (ALT 250 FOR IP): Performed by: STUDENT IN AN ORGANIZED HEALTH CARE EDUCATION/TRAINING PROGRAM

## 2024-02-09 PROCEDURE — 3075F SYST BP GE 130 - 139MM HG: CPT | Performed by: NURSE PRACTITIONER

## 2024-02-09 PROCEDURE — 96374 THER/PROPH/DIAG INJ IV PUSH: CPT

## 2024-02-09 PROCEDURE — 83880 ASSAY OF NATRIURETIC PEPTIDE: CPT

## 2024-02-09 PROCEDURE — 3017F COLORECTAL CA SCREEN DOC REV: CPT | Performed by: NURSE PRACTITIONER

## 2024-02-09 PROCEDURE — 2060000000 HC ICU INTERMEDIATE R&B

## 2024-02-09 PROCEDURE — 36415 COLL VENOUS BLD VENIPUNCTURE: CPT

## 2024-02-09 PROCEDURE — 99285 EMERGENCY DEPT VISIT HI MDM: CPT

## 2024-02-09 PROCEDURE — 1036F TOBACCO NON-USER: CPT | Performed by: NURSE PRACTITIONER

## 2024-02-09 PROCEDURE — 96376 TX/PRO/DX INJ SAME DRUG ADON: CPT

## 2024-02-09 PROCEDURE — 6360000002 HC RX W HCPCS: Performed by: NURSE PRACTITIONER

## 2024-02-09 PROCEDURE — G8484 FLU IMMUNIZE NO ADMIN: HCPCS | Performed by: NURSE PRACTITIONER

## 2024-02-09 PROCEDURE — 1123F ACP DISCUSS/DSCN MKR DOCD: CPT | Performed by: NURSE PRACTITIONER

## 2024-02-09 PROCEDURE — 2580000003 HC RX 258: Performed by: FAMILY MEDICINE

## 2024-02-09 PROCEDURE — 80053 COMPREHEN METABOLIC PANEL: CPT

## 2024-02-09 PROCEDURE — 84484 ASSAY OF TROPONIN QUANT: CPT

## 2024-02-09 PROCEDURE — G8427 DOCREV CUR MEDS BY ELIG CLIN: HCPCS | Performed by: NURSE PRACTITIONER

## 2024-02-09 PROCEDURE — 87040 BLOOD CULTURE FOR BACTERIA: CPT

## 2024-02-09 PROCEDURE — 81001 URINALYSIS AUTO W/SCOPE: CPT

## 2024-02-09 PROCEDURE — 71046 X-RAY EXAM CHEST 2 VIEWS: CPT

## 2024-02-09 PROCEDURE — G8420 CALC BMI NORM PARAMETERS: HCPCS | Performed by: NURSE PRACTITIONER

## 2024-02-09 PROCEDURE — 1111F DSCHRG MED/CURRENT MED MERGE: CPT | Performed by: NURSE PRACTITIONER

## 2024-02-09 PROCEDURE — 83735 ASSAY OF MAGNESIUM: CPT

## 2024-02-09 PROCEDURE — 6370000000 HC RX 637 (ALT 250 FOR IP): Performed by: FAMILY MEDICINE

## 2024-02-09 PROCEDURE — 85025 COMPLETE CBC W/AUTO DIFF WBC: CPT

## 2024-02-09 PROCEDURE — 6360000002 HC RX W HCPCS: Performed by: STUDENT IN AN ORGANIZED HEALTH CARE EDUCATION/TRAINING PROGRAM

## 2024-02-09 PROCEDURE — 99215 OFFICE O/P EST HI 40 MIN: CPT | Performed by: NURSE PRACTITIONER

## 2024-02-09 PROCEDURE — 93005 ELECTROCARDIOGRAM TRACING: CPT | Performed by: NURSE PRACTITIONER

## 2024-02-09 PROCEDURE — 3080F DIAST BP >= 90 MM HG: CPT | Performed by: NURSE PRACTITIONER

## 2024-02-09 PROCEDURE — 76770 US EXAM ABDO BACK WALL COMP: CPT

## 2024-02-09 RX ORDER — TAMSULOSIN HYDROCHLORIDE 0.4 MG/1
0.8 CAPSULE ORAL DAILY
Status: DISCONTINUED | OUTPATIENT
Start: 2024-02-09 | End: 2024-02-09

## 2024-02-09 RX ORDER — ACETAMINOPHEN 650 MG/1
650 SUPPOSITORY RECTAL EVERY 6 HOURS PRN
Status: DISCONTINUED | OUTPATIENT
Start: 2024-02-09 | End: 2024-02-20 | Stop reason: HOSPADM

## 2024-02-09 RX ORDER — POTASSIUM CHLORIDE 750 MG/1
20 TABLET, FILM COATED, EXTENDED RELEASE ORAL ONCE
Status: COMPLETED | OUTPATIENT
Start: 2024-02-09 | End: 2024-02-09

## 2024-02-09 RX ORDER — TAMSULOSIN HYDROCHLORIDE 0.4 MG/1
0.4 CAPSULE ORAL DAILY
Status: DISCONTINUED | OUTPATIENT
Start: 2024-02-10 | End: 2024-02-20 | Stop reason: HOSPADM

## 2024-02-09 RX ORDER — METOPROLOL SUCCINATE 25 MG/1
12.5 TABLET, EXTENDED RELEASE ORAL DAILY
Status: DISCONTINUED | OUTPATIENT
Start: 2024-02-09 | End: 2024-02-20 | Stop reason: HOSPADM

## 2024-02-09 RX ORDER — MILRINONE LACTATE 0.2 MG/ML
0.3 INJECTION, SOLUTION INTRAVENOUS CONTINUOUS
Status: DISCONTINUED | OUTPATIENT
Start: 2024-02-09 | End: 2024-02-16 | Stop reason: SDUPTHER

## 2024-02-09 RX ORDER — BUMETANIDE 0.25 MG/ML
2 INJECTION INTRAMUSCULAR; INTRAVENOUS ONCE
Status: COMPLETED | OUTPATIENT
Start: 2024-02-09 | End: 2024-02-09

## 2024-02-09 RX ORDER — LEVOTHYROXINE SODIUM 0.07 MG/1
75 TABLET ORAL DAILY
Status: DISCONTINUED | OUTPATIENT
Start: 2024-02-09 | End: 2024-02-20 | Stop reason: HOSPADM

## 2024-02-09 RX ORDER — SODIUM CHLORIDE 0.9 % (FLUSH) 0.9 %
5-40 SYRINGE (ML) INJECTION EVERY 12 HOURS SCHEDULED
Status: DISCONTINUED | OUTPATIENT
Start: 2024-02-09 | End: 2024-02-20 | Stop reason: HOSPADM

## 2024-02-09 RX ORDER — VITAMIN B COMPLEX
1000 TABLET ORAL DAILY
Status: DISCONTINUED | OUTPATIENT
Start: 2024-02-09 | End: 2024-02-20 | Stop reason: HOSPADM

## 2024-02-09 RX ORDER — ALLOPURINOL 100 MG/1
50 TABLET ORAL DAILY
Status: DISCONTINUED | OUTPATIENT
Start: 2024-02-09 | End: 2024-02-20 | Stop reason: HOSPADM

## 2024-02-09 RX ORDER — SODIUM CHLORIDE 0.9 % (FLUSH) 0.9 %
5-40 SYRINGE (ML) INJECTION PRN
Status: DISCONTINUED | OUTPATIENT
Start: 2024-02-09 | End: 2024-02-20 | Stop reason: HOSPADM

## 2024-02-09 RX ORDER — SODIUM CHLORIDE 9 MG/ML
INJECTION, SOLUTION INTRAVENOUS PRN
Status: DISCONTINUED | OUTPATIENT
Start: 2024-02-09 | End: 2024-02-20 | Stop reason: HOSPADM

## 2024-02-09 RX ORDER — ONDANSETRON 4 MG/1
4 TABLET, ORALLY DISINTEGRATING ORAL EVERY 8 HOURS PRN
Status: DISCONTINUED | OUTPATIENT
Start: 2024-02-09 | End: 2024-02-20 | Stop reason: HOSPADM

## 2024-02-09 RX ORDER — ONDANSETRON 2 MG/ML
4 INJECTION INTRAMUSCULAR; INTRAVENOUS EVERY 6 HOURS PRN
Status: DISCONTINUED | OUTPATIENT
Start: 2024-02-09 | End: 2024-02-20 | Stop reason: HOSPADM

## 2024-02-09 RX ORDER — AMIODARONE HYDROCHLORIDE 200 MG/1
100 TABLET ORAL DAILY
Status: DISCONTINUED | OUTPATIENT
Start: 2024-02-09 | End: 2024-02-09 | Stop reason: DRUGHIGH

## 2024-02-09 RX ORDER — AMIODARONE HYDROCHLORIDE 200 MG/1
200 TABLET ORAL DAILY
Status: DISCONTINUED | OUTPATIENT
Start: 2024-02-09 | End: 2024-02-10

## 2024-02-09 RX ORDER — POTASSIUM CHLORIDE 750 MG/1
40 TABLET, FILM COATED, EXTENDED RELEASE ORAL 2 TIMES DAILY
Status: DISCONTINUED | OUTPATIENT
Start: 2024-02-09 | End: 2024-02-15

## 2024-02-09 RX ORDER — POLYETHYLENE GLYCOL 3350 17 G/17G
17 POWDER, FOR SOLUTION ORAL DAILY PRN
Status: DISCONTINUED | OUTPATIENT
Start: 2024-02-09 | End: 2024-02-20 | Stop reason: HOSPADM

## 2024-02-09 RX ORDER — ACETAMINOPHEN 325 MG/1
650 TABLET ORAL EVERY 6 HOURS PRN
Status: DISCONTINUED | OUTPATIENT
Start: 2024-02-09 | End: 2024-02-20 | Stop reason: HOSPADM

## 2024-02-09 RX ADMIN — EMPAGLIFLOZIN 10 MG: 10 TABLET, FILM COATED ORAL at 19:07

## 2024-02-09 RX ADMIN — BUMETANIDE 2 MG: 0.25 INJECTION INTRAMUSCULAR; INTRAVENOUS at 13:13

## 2024-02-09 RX ADMIN — POTASSIUM CHLORIDE 20 MEQ: 750 TABLET, FILM COATED, EXTENDED RELEASE ORAL at 13:13

## 2024-02-09 RX ADMIN — BUMETANIDE 0.5 MG/HR: 0.25 INJECTION INTRAMUSCULAR; INTRAVENOUS at 15:37

## 2024-02-09 RX ADMIN — Medication 1000 UNITS: at 19:07

## 2024-02-09 RX ADMIN — AMIODARONE HYDROCHLORIDE 200 MG: 200 TABLET ORAL at 19:07

## 2024-02-09 RX ADMIN — POTASSIUM CHLORIDE 40 MEQ: 750 TABLET, FILM COATED, EXTENDED RELEASE ORAL at 21:52

## 2024-02-09 RX ADMIN — APIXABAN 5 MG: 5 TABLET, FILM COATED ORAL at 21:52

## 2024-02-09 RX ADMIN — METOPROLOL SUCCINATE 12.5 MG: 25 TABLET, EXTENDED RELEASE ORAL at 19:06

## 2024-02-09 RX ADMIN — SODIUM CHLORIDE, PRESERVATIVE FREE 10 ML: 5 INJECTION INTRAVENOUS at 21:53

## 2024-02-09 ASSESSMENT — PATIENT HEALTH QUESTIONNAIRE - PHQ9
SUM OF ALL RESPONSES TO PHQ9 QUESTIONS 1 & 2: 0
SUM OF ALL RESPONSES TO PHQ QUESTIONS 1-9: 0
2. FEELING DOWN, DEPRESSED OR HOPELESS: 0
SUM OF ALL RESPONSES TO PHQ QUESTIONS 1-9: 0
1. LITTLE INTEREST OR PLEASURE IN DOING THINGS: 0

## 2024-02-09 ASSESSMENT — PAIN SCALES - GENERAL
PAINLEVEL_OUTOF10: 0
PAINLEVEL_OUTOF10: 0

## 2024-02-09 NOTE — H&P
Frequency of Alcohol Consumption: Never     Average Number of Drinks: Patient does not drink     Frequency of Binge Drinking: Never   Financial Resource Strain: Low Risk  (12/6/2023)    Overall Financial Resource Strain (CARDIA)     Difficulty of Paying Living Expenses: Not hard at all   Food Insecurity: No Food Insecurity (1/8/2024)    Hunger Vital Sign     Worried About Running Out of Food in the Last Year: Never true     Ran Out of Food in the Last Year: Never true   Transportation Needs: No Transportation Needs (1/18/2024)    OASIS : Transportation     Lack of Transportation (Medical): No     Lack of Transportation (Non-Medical): No     Patient Unable or Declines to Respond: No   Physical Activity: Insufficiently Active (1/24/2024)    Exercise Vital Sign     Days of Exercise per Week: 5 days     Minutes of Exercise per Session: 20 min   Stress: Not on file   Social Connections: Feeling Socially Integrated (1/18/2024)    OASIS : Social Isolation     Frequency of experiencing loneliness or isolation: Never   Intimate Partner Violence: Not on file   Depression: Not at risk (2/9/2024)    PHQ-2     PHQ-2 Score: 0   Housing Stability: Low Risk  (1/8/2024)    Housing Stability Vital Sign     Unable to Pay for Housing in the Last Year: No     Number of Places Lived in the Last Year: 1     Unstable Housing in the Last Year: No   Interpersonal Safety: Not At Risk (1/8/2024)    Interpersonal Safety ([facility] HRSN)     How often does anyone, including family and friends, physically hurt you?: Never     How often does anyone, including family and friends, scream or curse at you?: Not on file     How often does anyone, including family and friends, insult or talk down to you?: Not on file     How often does anyone, including family and friends, threaten you with harm?: Not on file   Utilities: Not At Risk (1/8/2024)    [facility] Utilities     Threatened with loss of utilities: No        Medications were reconciled to the best

## 2024-02-09 NOTE — ED PROVIDER NOTES
within normal limits   COMPREHENSIVE METABOLIC PANEL - Abnormal; Notable for the following components:    Potassium 3.4 (*)     Chloride 109 (*)     BUN 50 (*)     Creatinine 2.37 (*)     Bun/Cre Ratio 21 (*)     Est, Glom Filt Rate 28 (*)     Total Bilirubin 1.9 (*)     ALT 88 (*)     AST 51 (*)     Alk Phosphatase 157 (*)     Albumin/Globulin Ratio 1.0 (*)     All other components within normal limits   BRAIN NATRIURETIC PEPTIDE - Abnormal; Notable for the following components:    NT Pro-BNP 9,240 (*)     All other components within normal limits   TROPONIN - Abnormal; Notable for the following components:    Troponin, High Sensitivity 121 (*)     All other components within normal limits   URINE CULTURE HOLD SAMPLE   MAGNESIUM   EXTRA TUBES HOLD   URINALYSIS WITH MICROSCOPIC       IMAGING RESULTS:  XR CHEST (2 VW)   Final Result   Stable cardiomegaly. Trace bilateral pleural fluid.              MEDICATIONS GIVEN:  Medications   bumetanide (BUMEX) 12.5 mg in 50 mL infusion (0.5 mg/hr IntraVENous New Bag 2/9/24 1537)   bumetanide (BUMEX) injection 2 mg (2 mg IntraVENous Given 2/9/24 1313)   potassium chloride (KLOR-CON) extended release tablet 20 mEq (20 mEq Oral Given 2/9/24 1313)       CONSULTS:  None    ED Course as of 02/09/24 1556   Fri Feb 09, 2024   1023 Advanced heart failure cardiology clinic sent in patient recommending admission of IV diuresis and possible titration of milrinone. Currently on .25mcg/kg/min.  Tried and failed outpatient diuresis    10:23 AM  I have evaluated the patient as the Provider in Rapid Medical Evaluation (RME). I have reviewed his vital signs and the triage nurse assessment. I have talked with the patient and any available family and advised that I am the provider in triage and have ordered the appropriate study to initiate their work up based on the clinical presentation during my assessment. I have advised that the patient will be accommodated in the Main ED as soon as

## 2024-02-09 NOTE — ED TRIAGE NOTES
Pt arrived from HF clinic with CC SOB, BLE Swelling and is on a milrinone gtt. Failed outpatient diuresis.

## 2024-02-09 NOTE — TELEPHONE ENCOUNTER
1545: Attempted to contact [facility] to request records from 2/8/24 visit. Per automated system office available only for emergencies Friday afternoons. Unable to leave message. Records request faxed to [phone] this date. Will await response. Audelia Castañeda RN

## 2024-02-10 ENCOUNTER — APPOINTMENT (OUTPATIENT)
Facility: HOSPITAL | Age: 74
DRG: 286 | End: 2024-02-10
Attending: FAMILY MEDICINE
Payer: MEDICARE

## 2024-02-10 LAB
ALBUMIN SERPL-MCNC: 3.3 G/DL (ref 3.5–5)
ALBUMIN/GLOB SERPL: 0.9 (ref 1.1–2.2)
ALP SERPL-CCNC: 148 U/L (ref 45–117)
ALT SERPL-CCNC: 78 U/L (ref 12–78)
ANION GAP SERPL CALC-SCNC: 7 MMOL/L (ref 5–15)
AST SERPL-CCNC: 47 U/L (ref 15–37)
BILIRUB SERPL-MCNC: 2.1 MG/DL (ref 0.2–1)
BUN SERPL-MCNC: 49 MG/DL (ref 6–20)
BUN/CREAT SERPL: 23 (ref 12–20)
CALCIUM SERPL-MCNC: 9.2 MG/DL (ref 8.5–10.1)
CHLORIDE SERPL-SCNC: 112 MMOL/L (ref 97–108)
CO2 SERPL-SCNC: 24 MMOL/L (ref 21–32)
COMMENT:: NORMAL
CREAT SERPL-MCNC: 2.17 MG/DL (ref 0.7–1.3)
ECHO AR MAX VEL PISA: 4.3 M/S
ECHO AV PEAK GRADIENT: 7 MMHG
ECHO AV PEAK VELOCITY: 1.3 M/S
ECHO AV REGURGITANT PHT: 510.2 MILLISECOND
ECHO BSA: 1.91 M2
ECHO EST RA PRESSURE: 10 MMHG
ECHO LV FRACTIONAL SHORTENING: 5 % (ref 28–44)
ECHO LV INTERNAL DIMENSION DIASTOLE INDEX: 3.35 CM/M2
ECHO LV INTERNAL DIMENSION DIASTOLIC: 6.4 CM (ref 4.2–5.9)
ECHO LV INTERNAL DIMENSION SYSTOLIC INDEX: 3.19 CM/M2
ECHO LV INTERNAL DIMENSION SYSTOLIC: 6.1 CM
ECHO RIGHT VENTRICULAR SYSTOLIC PRESSURE (RVSP): 82 MMHG
ECHO TV REGURGITANT MAX VELOCITY: 4.23 M/S
ECHO TV REGURGITANT PEAK GRADIENT: 71 MMHG
EKG ATRIAL RATE: 93 BPM
EKG DIAGNOSIS: NORMAL
EKG P-R INTERVAL: 232 MS
EKG Q-T INTERVAL: 416 MS
EKG QRS DURATION: 132 MS
EKG QTC CALCULATION (BAZETT): 517 MS
EKG R AXIS: 159 DEGREES
EKG T AXIS: 38 DEGREES
EKG VENTRICULAR RATE: 93 BPM
GLOBULIN SER CALC-MCNC: 3.8 G/DL (ref 2–4)
GLUCOSE SERPL-MCNC: 108 MG/DL (ref 65–100)
MAGNESIUM SERPL-MCNC: 2 MG/DL (ref 1.6–2.4)
NT PRO BNP: 9350 PG/ML
POTASSIUM SERPL-SCNC: 3.7 MMOL/L (ref 3.5–5.1)
PROT SERPL-MCNC: 7.1 G/DL (ref 6.4–8.2)
SODIUM SERPL-SCNC: 143 MMOL/L (ref 136–145)
SPECIMEN HOLD: NORMAL

## 2024-02-10 PROCEDURE — 6360000002 HC RX W HCPCS: Performed by: FAMILY MEDICINE

## 2024-02-10 PROCEDURE — 80053 COMPREHEN METABOLIC PANEL: CPT

## 2024-02-10 PROCEDURE — 94760 N-INVAS EAR/PLS OXIMETRY 1: CPT

## 2024-02-10 PROCEDURE — 99223 1ST HOSP IP/OBS HIGH 75: CPT | Performed by: INTERNAL MEDICINE

## 2024-02-10 PROCEDURE — 93010 ELECTROCARDIOGRAM REPORT: CPT | Performed by: INTERNAL MEDICINE

## 2024-02-10 PROCEDURE — 83880 ASSAY OF NATRIURETIC PEPTIDE: CPT

## 2024-02-10 PROCEDURE — 93308 TTE F-UP OR LMTD: CPT | Performed by: INTERNAL MEDICINE

## 2024-02-10 PROCEDURE — 6360000002 HC RX W HCPCS

## 2024-02-10 PROCEDURE — 93321 DOPPLER ECHO F-UP/LMTD STD: CPT

## 2024-02-10 PROCEDURE — 6370000000 HC RX 637 (ALT 250 FOR IP): Performed by: INTERNAL MEDICINE

## 2024-02-10 PROCEDURE — 2580000003 HC RX 258: Performed by: FAMILY MEDICINE

## 2024-02-10 PROCEDURE — 6370000000 HC RX 637 (ALT 250 FOR IP): Performed by: FAMILY MEDICINE

## 2024-02-10 PROCEDURE — 93321 DOPPLER ECHO F-UP/LMTD STD: CPT | Performed by: INTERNAL MEDICINE

## 2024-02-10 PROCEDURE — 93325 DOPPLER ECHO COLOR FLOW MAPG: CPT | Performed by: INTERNAL MEDICINE

## 2024-02-10 PROCEDURE — 2060000000 HC ICU INTERMEDIATE R&B

## 2024-02-10 PROCEDURE — 83735 ASSAY OF MAGNESIUM: CPT

## 2024-02-10 PROCEDURE — 6360000002 HC RX W HCPCS: Performed by: INTERNAL MEDICINE

## 2024-02-10 RX ORDER — ALPRAZOLAM 0.5 MG/1
0.5 TABLET ORAL 4 TIMES DAILY PRN
Status: DISCONTINUED | OUTPATIENT
Start: 2024-02-10 | End: 2024-02-20 | Stop reason: HOSPADM

## 2024-02-10 RX ORDER — AMIODARONE HYDROCHLORIDE 200 MG/1
100 TABLET ORAL DAILY
Status: DISCONTINUED | OUTPATIENT
Start: 2024-02-11 | End: 2024-02-20 | Stop reason: HOSPADM

## 2024-02-10 RX ADMIN — EMPAGLIFLOZIN 10 MG: 10 TABLET, FILM COATED ORAL at 08:25

## 2024-02-10 RX ADMIN — Medication 1000 UNITS: at 08:25

## 2024-02-10 RX ADMIN — POTASSIUM CHLORIDE 40 MEQ: 750 TABLET, FILM COATED, EXTENDED RELEASE ORAL at 08:26

## 2024-02-10 RX ADMIN — ALPRAZOLAM 0.5 MG: 0.5 TABLET ORAL at 17:32

## 2024-02-10 RX ADMIN — APIXABAN 5 MG: 5 TABLET, FILM COATED ORAL at 08:25

## 2024-02-10 RX ADMIN — BUMETANIDE 0.5 MG/HR: 0.25 INJECTION INTRAMUSCULAR; INTRAVENOUS at 05:24

## 2024-02-10 RX ADMIN — METOPROLOL SUCCINATE 12.5 MG: 25 TABLET, EXTENDED RELEASE ORAL at 08:26

## 2024-02-10 RX ADMIN — MILRINONE LACTATE IN DEXTROSE 0.25 MCG/KG/MIN: 0.2 INJECTION, SOLUTION INTRAVENOUS at 00:05

## 2024-02-10 RX ADMIN — AMIODARONE HYDROCHLORIDE 200 MG: 200 TABLET ORAL at 08:25

## 2024-02-10 RX ADMIN — APIXABAN 5 MG: 5 TABLET, FILM COATED ORAL at 21:06

## 2024-02-10 RX ADMIN — POTASSIUM CHLORIDE 40 MEQ: 750 TABLET, FILM COATED, EXTENDED RELEASE ORAL at 21:06

## 2024-02-10 RX ADMIN — LEVOTHYROXINE SODIUM 75 MCG: 0.07 TABLET ORAL at 08:26

## 2024-02-10 RX ADMIN — BUMETANIDE 1 MG/HR: 0.25 INJECTION INTRAMUSCULAR; INTRAVENOUS at 23:27

## 2024-02-10 RX ADMIN — SODIUM CHLORIDE, PRESERVATIVE FREE 10 ML: 5 INJECTION INTRAVENOUS at 08:26

## 2024-02-10 RX ADMIN — TAMSULOSIN HYDROCHLORIDE 0.4 MG: 0.4 CAPSULE ORAL at 08:26

## 2024-02-10 RX ADMIN — MILRINONE LACTATE IN DEXTROSE 0.3 MCG/KG/MIN: 0.2 INJECTION, SOLUTION INTRAVENOUS at 16:07

## 2024-02-10 RX ADMIN — ALLOPURINOL 50 MG: 100 TABLET ORAL at 08:32

## 2024-02-10 RX ADMIN — SODIUM CHLORIDE, PRESERVATIVE FREE 10 ML: 5 INJECTION INTRAVENOUS at 21:07

## 2024-02-10 RX ADMIN — BUMETANIDE 1 MG/HR: 0.25 INJECTION INTRAMUSCULAR; INTRAVENOUS at 16:08

## 2024-02-10 ASSESSMENT — PAIN SCALES - GENERAL: PAINLEVEL_OUTOF10: 0

## 2024-02-10 NOTE — ED NOTES
Bedside and Verbal shift change report given to Valorie (oncoming nurse) by Eleonora (offgoing nurse). Report included the following information Nurse Handoff Report, Index, ED Encounter Summary, ED SBAR, and Adult Overview.

## 2024-02-10 NOTE — CONSULTS
ADVANCED HEART FAILURE CENTER  [facility] in [place], [place]  Inpatient Progress Note      Patient name: Ingrid Kennedy  Patient : 1950  Patient MRN: [id number]  Consulting MD: Evert Jacobson MD  Date of service: 02/10/24    REASON FOR REFERRAL:  Management of heart failure    ASSESSMENT:  Ingrid Kennedy is a 73 y.o. male admitted with acute on chronic systolic heart failure, stage D, NYHA class IV. GDMT limited by renal dysfunction.  Discussed at LVAD multidisciplinary meeting 23. Not currently a candidate for LVAD due to renal dysfunction, Creatinine >1.8. On Milrinone as bridge to LVAD candidacy. Still needs study, PFTs, and neurocognitive studies.     RECOMMENDATIONS:  Medical Therapy for Heart Failure  Beta-blocker: Continue Toprol XL 12.5 mg daily  ACEi/ARB/ARNI: Unable to tolerate due to renal dysfunction  Hydralazine/Nitrate: Unable to tolerate due to hypotension  MRA: Unable to tolerate due to renal dysfunction  SGLT2i: Continue Jardiance 10 mg daily  Other HF drugs: Increase Milrinone to 0.3 mcg/kg/min    Volume Management  Increase Bumex drip to 1 mg/h; goal net negative 2 liters per day (target weight 155-160 lbs)  Keep K>4 and Mg>2  Fluid restriction to 2000 cc daily, strict I/Os, daily standing weight  Allopurinol 50 mg daily    Anticoagulation and Antiplatelet Therapy  Anticoagulation with Apixaban for PAF    Antiarrhythmic and Device Therapy  Continue amiodarone 100 mg daily, check thyroid profile q3mos and CXR/PFT/DLCO annually   ICD interrogation per routine    Laboratory and Imaging Studies  Echo ordered  ECG Reviewed  Labs: CMP, NT pro-BNP daily    Physical activity and education  Ambulate daily  Heart failure education by nurse navigator  Advanced care plan and document POA    Plans at or after hospital discharge  Schedule sleep study  Follow-up in AHF Clinic within 7 days from discharge      HISTORY OF PRESENT ILLNESS:  I had the pleasure of seeing Ingrid Kennedy at the 
Rec'd consult for AMITA/CKD thru perfect serve    Pt followed by [facility]    I have notified them and they will see the pt    Gilbert Felipe MD  [facility]  
Associates:  [email or web address]  [email or web address]    [place] office:  [address] [place] [address]  Phone: [phone]  Fax :     [phone]    [place] office:  [address] [place] [address]  Phone - [phone]  Fax - [phone]

## 2024-02-10 NOTE — ED NOTES
TRANSFER - OUT REPORT:    Verbal report given to CHARLIE Garcia on Ingrid Kennedy  being transferred to [facility]  for routine progression of patient care       Report consisted of patient's Situation, Background, Assessment and   Recommendations(SBAR).     Information from the following report(s) Nurse Handoff Report, ED Encounter Summary, ED SBAR, MAR, Recent Results, Med Rec Status, and Cardiac Rhythm NSR  was reviewed with the receiving nurse.    [place] Fall Assessment:    Presents to emergency department  because of falls (Syncope, seizure, or loss of consciousness): No  Age > 70: Yes  Altered Mental Status, Intoxication with alcohol or substance confusion (Disorientation, impaired judgment, poor safety awaremess, or inability to follow instructions): No  Impaired Mobility: Ambulates or transfers with assistive devices or assistance; Unable to ambulate or transer.: Yes  Nursing Judgement: Yes          Lines:   CVC Double Lumen 01/17/24 Right Subclavian (Active)       Peripheral IV 02/09/24 Left;Posterior Forearm (Active)        Opportunity for questions and clarification was provided.      Patient transported with:  Monitor, O2 @ 2lpm, Registered Nurse, and Tech

## 2024-02-11 LAB
ALBUMIN SERPL-MCNC: 3.1 G/DL (ref 3.5–5)
ALBUMIN/GLOB SERPL: 1 (ref 1.1–2.2)
ALP SERPL-CCNC: 160 U/L (ref 45–117)
ALT SERPL-CCNC: 78 U/L (ref 12–78)
ANION GAP SERPL CALC-SCNC: 6 MMOL/L (ref 5–15)
AST SERPL-CCNC: 46 U/L (ref 15–37)
BILIRUB SERPL-MCNC: 1.5 MG/DL (ref 0.2–1)
BUN SERPL-MCNC: 54 MG/DL (ref 6–20)
BUN/CREAT SERPL: 23 (ref 12–20)
CALCIUM SERPL-MCNC: 8.2 MG/DL (ref 8.5–10.1)
CHLORIDE SERPL-SCNC: 111 MMOL/L (ref 97–108)
CO2 SERPL-SCNC: 27 MMOL/L (ref 21–32)
CREAT SERPL-MCNC: 2.39 MG/DL (ref 0.7–1.3)
GLOBULIN SER CALC-MCNC: 3.1 G/DL (ref 2–4)
GLUCOSE SERPL-MCNC: 94 MG/DL (ref 65–100)
MAGNESIUM SERPL-MCNC: 1.9 MG/DL (ref 1.6–2.4)
NT PRO BNP: 6807 PG/ML
POTASSIUM SERPL-SCNC: 3.7 MMOL/L (ref 3.5–5.1)
PROT SERPL-MCNC: 6.2 G/DL (ref 6.4–8.2)
SODIUM SERPL-SCNC: 144 MMOL/L (ref 136–145)

## 2024-02-11 PROCEDURE — 6370000000 HC RX 637 (ALT 250 FOR IP): Performed by: FAMILY MEDICINE

## 2024-02-11 PROCEDURE — P9047 ALBUMIN (HUMAN), 25%, 50ML: HCPCS | Performed by: INTERNAL MEDICINE

## 2024-02-11 PROCEDURE — 94760 N-INVAS EAR/PLS OXIMETRY 1: CPT

## 2024-02-11 PROCEDURE — 99233 SBSQ HOSP IP/OBS HIGH 50: CPT | Performed by: INTERNAL MEDICINE

## 2024-02-11 PROCEDURE — 6360000002 HC RX W HCPCS: Performed by: INTERNAL MEDICINE

## 2024-02-11 PROCEDURE — 2060000000 HC ICU INTERMEDIATE R&B

## 2024-02-11 PROCEDURE — 80053 COMPREHEN METABOLIC PANEL: CPT

## 2024-02-11 PROCEDURE — 83735 ASSAY OF MAGNESIUM: CPT

## 2024-02-11 PROCEDURE — 83880 ASSAY OF NATRIURETIC PEPTIDE: CPT

## 2024-02-11 PROCEDURE — 51798 US URINE CAPACITY MEASURE: CPT

## 2024-02-11 PROCEDURE — 36415 COLL VENOUS BLD VENIPUNCTURE: CPT

## 2024-02-11 PROCEDURE — 6370000000 HC RX 637 (ALT 250 FOR IP): Performed by: INTERNAL MEDICINE

## 2024-02-11 PROCEDURE — 2580000003 HC RX 258: Performed by: FAMILY MEDICINE

## 2024-02-11 RX ORDER — ALBUMIN (HUMAN) 12.5 G/50ML
12.5 SOLUTION INTRAVENOUS ONCE
Status: COMPLETED | OUTPATIENT
Start: 2024-02-11 | End: 2024-02-11

## 2024-02-11 RX ADMIN — POTASSIUM CHLORIDE 40 MEQ: 750 TABLET, FILM COATED, EXTENDED RELEASE ORAL at 09:38

## 2024-02-11 RX ADMIN — APIXABAN 5 MG: 5 TABLET, FILM COATED ORAL at 09:38

## 2024-02-11 RX ADMIN — SODIUM CHLORIDE, PRESERVATIVE FREE 10 ML: 5 INJECTION INTRAVENOUS at 21:47

## 2024-02-11 RX ADMIN — Medication 1000 UNITS: at 09:38

## 2024-02-11 RX ADMIN — ALBUMIN (HUMAN) 12.5 G: 0.25 INJECTION, SOLUTION INTRAVENOUS at 09:37

## 2024-02-11 RX ADMIN — AMIODARONE HYDROCHLORIDE 100 MG: 200 TABLET ORAL at 09:38

## 2024-02-11 RX ADMIN — SODIUM CHLORIDE, PRESERVATIVE FREE 10 ML: 5 INJECTION INTRAVENOUS at 09:38

## 2024-02-11 RX ADMIN — APIXABAN 5 MG: 5 TABLET, FILM COATED ORAL at 21:47

## 2024-02-11 RX ADMIN — MILRINONE LACTATE IN DEXTROSE 0.3 MCG/KG/MIN: 0.2 INJECTION, SOLUTION INTRAVENOUS at 16:10

## 2024-02-11 RX ADMIN — EMPAGLIFLOZIN 10 MG: 10 TABLET, FILM COATED ORAL at 09:38

## 2024-02-11 RX ADMIN — TAMSULOSIN HYDROCHLORIDE 0.4 MG: 0.4 CAPSULE ORAL at 09:38

## 2024-02-11 RX ADMIN — LEVOTHYROXINE SODIUM 75 MCG: 0.07 TABLET ORAL at 07:22

## 2024-02-11 RX ADMIN — MILRINONE LACTATE IN DEXTROSE 0.3 MCG/KG/MIN: 0.2 INJECTION, SOLUTION INTRAVENOUS at 01:54

## 2024-02-11 RX ADMIN — POTASSIUM CHLORIDE 40 MEQ: 750 TABLET, FILM COATED, EXTENDED RELEASE ORAL at 21:47

## 2024-02-11 RX ADMIN — ALLOPURINOL 50 MG: 100 TABLET ORAL at 09:37

## 2024-02-11 ASSESSMENT — PAIN SCALES - GENERAL
PAINLEVEL_OUTOF10: 0
PAINLEVEL_OUTOF10: 0

## 2024-02-11 NOTE — CARE COORDINATION
02/11/24 1046   Readmission Assessment   Number of Days since last admission? 8-30 days   Previous Disposition Home with Home Health   Who is being Interviewed Patient   What was the patient's/caregiver's perception as to why they think they needed to return back to the hospital? Other (Comment)   Did you visit your Primary Care Physician after you left the hospital, before you returned this time? Yes   Did you see a specialist, such as Cardiac, Pulmonary, Orthopedic Physician, etc. after you left the hospital? Yes   Who advised the patient to return to the hospital? Physician's Nurse/Office staff   Does the patient report anything that got in the way of taking their medications? No   In our efforts to provide the best possible care to you and others like you, can you think of anything that we could have done to help you after you left the hospital the first time, so that you might not have needed to return so soon? Other (Comment)     ABDULLAHI Kumari CCM  Care Management

## 2024-02-11 NOTE — CARE COORDINATION
Care Management Initial Assessment       RUR: 24%  Readmission? Yes - Admit 1/8-1/17 and d/c w Home w Home Health  1st IM letter given? Yes   1st  letter given: No    CM met w patient at bedside to introduce self/role as CM and verify the Facesheet. Patient was friendly, alert and oriented to all spheres w logical thought process. Patient lives w his family and uses a single point cane for mobility and also has a WC.  He is open to [facility] and would like to be referred back upon d/c. He denies any concerns about d/c home once medically ready and all questions have been answered. Anticipated d/c plan is return home w supportive family and resume HH.  Family can transport.     [facility]     02/11/24 1027   Service Assessment   Patient Orientation Alert and Oriented;Person;Place;Situation   Cognition Alert   History Provided By Patient   Primary Caregiver Self   Support Systems Family Members   PCP Verified by CM Yes  (Elisa Evert)   Prior Functional Level Mobility;Assistance with the following:   Can patient return to prior living arrangement Yes   Ability to make needs known: Good   Family able to assist with home care needs: Yes   Would you like for me to discuss the discharge plan with any other family members/significant others, and if so, who? Yes  (Step daughterKhloe [phone])   Financial Resources Medicare   Social/Functional History   Lives With Family   Type of Home House   Home Layout Two level   Home Access Stairs to enter with rails   Entrance Stairs - Number of Steps 5   Entrance Stairs - Rails Both   Bathroom Shower/Tub Tub/Shower unit   Bathroom Equipment Grab bars in shower   Home Equipment Wheelchair-manual;Cane   Active  No   Patient's  Info Family   Occupation Retired   Discharge Planning   Type of Residence House   Type of Home Care Services PT;Nursing Services   Services At/After Discharge   Mode of Transport at Discharge Other

## 2024-02-12 ENCOUNTER — HOME CARE VISIT (OUTPATIENT)
Facility: HOME HEALTH | Age: 74
End: 2024-02-12
Payer: MEDICARE

## 2024-02-12 LAB
ALBUMIN SERPL-MCNC: 3.1 G/DL (ref 3.5–5)
ALBUMIN/GLOB SERPL: 1 (ref 1.1–2.2)
ALP SERPL-CCNC: 149 U/L (ref 45–117)
ALT SERPL-CCNC: 69 U/L (ref 12–78)
ANION GAP SERPL CALC-SCNC: 8 MMOL/L (ref 5–15)
AST SERPL-CCNC: 36 U/L (ref 15–37)
BILIRUB SERPL-MCNC: 1.3 MG/DL (ref 0.2–1)
BUN SERPL-MCNC: 51 MG/DL (ref 6–20)
BUN/CREAT SERPL: 23 (ref 12–20)
CALCIUM SERPL-MCNC: 8.1 MG/DL (ref 8.5–10.1)
CHLORIDE SERPL-SCNC: 110 MMOL/L (ref 97–108)
CO2 SERPL-SCNC: 27 MMOL/L (ref 21–32)
CREAT SERPL-MCNC: 2.21 MG/DL (ref 0.7–1.3)
GLOBULIN SER CALC-MCNC: 3.1 G/DL (ref 2–4)
GLUCOSE SERPL-MCNC: 116 MG/DL (ref 65–100)
MAGNESIUM SERPL-MCNC: 1.8 MG/DL (ref 1.6–2.4)
NT PRO BNP: 4415 PG/ML
POTASSIUM SERPL-SCNC: 3.5 MMOL/L (ref 3.5–5.1)
PROT SERPL-MCNC: 6.2 G/DL (ref 6.4–8.2)
SODIUM SERPL-SCNC: 145 MMOL/L (ref 136–145)

## 2024-02-12 PROCEDURE — 6360000002 HC RX W HCPCS: Performed by: INTERNAL MEDICINE

## 2024-02-12 PROCEDURE — 2580000003 HC RX 258: Performed by: FAMILY MEDICINE

## 2024-02-12 PROCEDURE — 2700000000 HC OXYGEN THERAPY PER DAY

## 2024-02-12 PROCEDURE — 6370000000 HC RX 637 (ALT 250 FOR IP): Performed by: INTERNAL MEDICINE

## 2024-02-12 PROCEDURE — 94760 N-INVAS EAR/PLS OXIMETRY 1: CPT

## 2024-02-12 PROCEDURE — 83880 ASSAY OF NATRIURETIC PEPTIDE: CPT

## 2024-02-12 PROCEDURE — 6370000000 HC RX 637 (ALT 250 FOR IP): Performed by: FAMILY MEDICINE

## 2024-02-12 PROCEDURE — 6370000000 HC RX 637 (ALT 250 FOR IP): Performed by: NURSE PRACTITIONER

## 2024-02-12 PROCEDURE — 80053 COMPREHEN METABOLIC PANEL: CPT

## 2024-02-12 PROCEDURE — 2060000000 HC ICU INTERMEDIATE R&B

## 2024-02-12 PROCEDURE — 99232 SBSQ HOSP IP/OBS MODERATE 35: CPT | Performed by: NURSE PRACTITIONER

## 2024-02-12 PROCEDURE — 83735 ASSAY OF MAGNESIUM: CPT

## 2024-02-12 PROCEDURE — 36415 COLL VENOUS BLD VENIPUNCTURE: CPT

## 2024-02-12 RX ORDER — POTASSIUM CHLORIDE 750 MG/1
40 TABLET, FILM COATED, EXTENDED RELEASE ORAL ONCE
Status: COMPLETED | OUTPATIENT
Start: 2024-02-12 | End: 2024-02-12

## 2024-02-12 RX ADMIN — SODIUM CHLORIDE, PRESERVATIVE FREE 10 ML: 5 INJECTION INTRAVENOUS at 08:56

## 2024-02-12 RX ADMIN — ALPRAZOLAM 0.5 MG: 0.5 TABLET ORAL at 00:02

## 2024-02-12 RX ADMIN — POTASSIUM CHLORIDE 40 MEQ: 750 TABLET, FILM COATED, EXTENDED RELEASE ORAL at 08:56

## 2024-02-12 RX ADMIN — METOPROLOL SUCCINATE 12.5 MG: 25 TABLET, EXTENDED RELEASE ORAL at 08:56

## 2024-02-12 RX ADMIN — APIXABAN 5 MG: 5 TABLET, FILM COATED ORAL at 20:25

## 2024-02-12 RX ADMIN — MILRINONE LACTATE IN DEXTROSE 0.3 MCG/KG/MIN: 0.2 INJECTION, SOLUTION INTRAVENOUS at 07:26

## 2024-02-12 RX ADMIN — ALLOPURINOL 50 MG: 100 TABLET ORAL at 08:55

## 2024-02-12 RX ADMIN — APIXABAN 5 MG: 5 TABLET, FILM COATED ORAL at 08:56

## 2024-02-12 RX ADMIN — MILRINONE LACTATE IN DEXTROSE 0.3 MCG/KG/MIN: 0.2 INJECTION, SOLUTION INTRAVENOUS at 20:28

## 2024-02-12 RX ADMIN — Medication 1000 UNITS: at 08:56

## 2024-02-12 RX ADMIN — LEVOTHYROXINE SODIUM 75 MCG: 0.07 TABLET ORAL at 07:26

## 2024-02-12 RX ADMIN — EMPAGLIFLOZIN 10 MG: 10 TABLET, FILM COATED ORAL at 08:56

## 2024-02-12 RX ADMIN — POTASSIUM CHLORIDE 40 MEQ: 750 TABLET, FILM COATED, EXTENDED RELEASE ORAL at 20:25

## 2024-02-12 RX ADMIN — TAMSULOSIN HYDROCHLORIDE 0.4 MG: 0.4 CAPSULE ORAL at 08:55

## 2024-02-12 RX ADMIN — SODIUM CHLORIDE, PRESERVATIVE FREE 10 ML: 5 INJECTION INTRAVENOUS at 20:25

## 2024-02-12 RX ADMIN — AMIODARONE HYDROCHLORIDE 100 MG: 200 TABLET ORAL at 08:55

## 2024-02-12 RX ADMIN — POTASSIUM CHLORIDE 40 MEQ: 750 TABLET, FILM COATED, EXTENDED RELEASE ORAL at 13:43

## 2024-02-12 NOTE — NURSE NAVIGATOR
Heart Failure Nurse Navigator rounds completed.    HF NN provided introduction to self and nurse navigator role. Living With Heart Failure booklet given to patient, along with weight calendar, signs/symptoms magnet, and card with QR codes for HF video resources.       Reviewed daily weights.  Patient currently does daily weights and blood pressure and documents his readings in his phone.  Reviewed low salt diet.  Patient's family prepares low salt diet for him. Reviewed signs and symptoms of heart failure.     Advised patient that follow up appointment will be scheduled and placed on Discharge Instructions prior to discharge. Patient given [employer] [employer] [facility] flyer. Will continue to follow until discharge.        Time spent with patient discussing HF education:  10 minutes   
(flecanide, disopyrimide, sotalol, dronedarone)  Diabetes medications (thiasolidinediones, saxagliptin, alogliptin)  NSAIDs and LEVY 2 inhibitors    Consider vaccinations for respiratory illnesses (flu, pneumonia, covid) [Class 2b]    For eligible patients with LVEF < 35% consider discharge with wearable defibrillation [Class 2b] and/or referral for ICD implantation [Class 1] for prevention of sudden cardiac death.    Due to severely reduced LVEF < 25% and/or recurrent hospitalizations this patient may benefit from referral to Advanced Heart Failure Program to assess suitability for advanced therapies, such as left-ventricular assist device, heart transplantation, palliative inotropes or palliation [Class 1]. To obtain in-patient consultation or refer to [facility] Advanced Heart Failure Center, call [phone]    Patient Education:     Teach back in heart failure education provided, including information about medical therapy, lifestyle modifications, diet and fluid restrictions, physical activity.  Educational resources provided: “Living with Heart Failure” booklet; Signs/Symptoms magnet; Weight Calendar; [facility] flyer; Preparation for Successful Discharge Checklist.  Information provided about HF support group.  Heart failure avoiding triggers on discharge instructions.      Plan for Transitional Care:    Post discharge follow up phone call to be made within 48-72 hours of discharge.  Patient will follow-up with PCP.  Patient will follow-up with Primary Cardiologist.  Obstructive sleep apnea screening done and patient was referred to Sleep Medicine.  Referral/follow-up with Cardiac Rehabilitation.  Referral/follow-up with Advanced Heart Failure Specialist.  Referral/follow-up with Palliative Care Specialist.      Heart Failure Nurse Navigator  Phone: [phone]  /  [phone]    *Recommendations listed above are based on 2022 AHA/ACC/HFSA Guideline for the Management of Heart Failure: A Report of

## 2024-02-12 NOTE — TELEPHONE ENCOUNTER
1344: Contacted [facility] spoke with Carley who stated will fax records. Fax number provided. Will await records.     1400: Records received and given to provider for review. See scanned report in media.     Audelia Castañeda RN  VAD Coordinator

## 2024-02-13 PROBLEM — Z79.899 RECEIVING INOTROPIC MEDICATION: Status: ACTIVE | Noted: 2024-02-13

## 2024-02-13 LAB
ALBUMIN SERPL-MCNC: 3 G/DL (ref 3.5–5)
ALBUMIN/GLOB SERPL: 1 (ref 1.1–2.2)
ALP SERPL-CCNC: 122 U/L (ref 45–117)
ALT SERPL-CCNC: 56 U/L (ref 12–78)
ANION GAP SERPL CALC-SCNC: 4 MMOL/L (ref 5–15)
AST SERPL-CCNC: 31 U/L (ref 15–37)
BILIRUB SERPL-MCNC: 1.2 MG/DL (ref 0.2–1)
BUN SERPL-MCNC: 41 MG/DL (ref 6–20)
BUN/CREAT SERPL: 19 (ref 12–20)
CALCIUM SERPL-MCNC: 8.4 MG/DL (ref 8.5–10.1)
CHLORIDE SERPL-SCNC: 109 MMOL/L (ref 97–108)
CO2 SERPL-SCNC: 31 MMOL/L (ref 21–32)
CREAT SERPL-MCNC: 2.11 MG/DL (ref 0.7–1.3)
GLOBULIN SER CALC-MCNC: 3 G/DL (ref 2–4)
GLUCOSE SERPL-MCNC: 90 MG/DL (ref 65–100)
MAGNESIUM SERPL-MCNC: 2.1 MG/DL (ref 1.6–2.4)
NT PRO BNP: 2983 PG/ML
POTASSIUM SERPL-SCNC: 3.4 MMOL/L (ref 3.5–5.1)
PROT SERPL-MCNC: 6 G/DL (ref 6.4–8.2)
SODIUM SERPL-SCNC: 144 MMOL/L (ref 136–145)

## 2024-02-13 PROCEDURE — 6370000000 HC RX 637 (ALT 250 FOR IP): Performed by: INTERNAL MEDICINE

## 2024-02-13 PROCEDURE — 80053 COMPREHEN METABOLIC PANEL: CPT

## 2024-02-13 PROCEDURE — 83735 ASSAY OF MAGNESIUM: CPT

## 2024-02-13 PROCEDURE — 99233 SBSQ HOSP IP/OBS HIGH 50: CPT | Performed by: INTERNAL MEDICINE

## 2024-02-13 PROCEDURE — 6370000000 HC RX 637 (ALT 250 FOR IP): Performed by: FAMILY MEDICINE

## 2024-02-13 PROCEDURE — 83880 ASSAY OF NATRIURETIC PEPTIDE: CPT

## 2024-02-13 PROCEDURE — APPSS45 APP SPLIT SHARED TIME 31-45 MINUTES: Performed by: NURSE PRACTITIONER

## 2024-02-13 PROCEDURE — 6360000002 HC RX W HCPCS: Performed by: INTERNAL MEDICINE

## 2024-02-13 PROCEDURE — 36415 COLL VENOUS BLD VENIPUNCTURE: CPT

## 2024-02-13 PROCEDURE — 6360000002 HC RX W HCPCS: Performed by: NURSE PRACTITIONER

## 2024-02-13 PROCEDURE — 94760 N-INVAS EAR/PLS OXIMETRY 1: CPT

## 2024-02-13 PROCEDURE — 2580000003 HC RX 258: Performed by: FAMILY MEDICINE

## 2024-02-13 PROCEDURE — 2700000000 HC OXYGEN THERAPY PER DAY

## 2024-02-13 PROCEDURE — 2060000000 HC ICU INTERMEDIATE R&B

## 2024-02-13 RX ADMIN — AMIODARONE HYDROCHLORIDE 100 MG: 200 TABLET ORAL at 09:07

## 2024-02-13 RX ADMIN — TAMSULOSIN HYDROCHLORIDE 0.4 MG: 0.4 CAPSULE ORAL at 09:08

## 2024-02-13 RX ADMIN — METOPROLOL SUCCINATE 12.5 MG: 25 TABLET, EXTENDED RELEASE ORAL at 10:05

## 2024-02-13 RX ADMIN — EMPAGLIFLOZIN 10 MG: 10 TABLET, FILM COATED ORAL at 09:08

## 2024-02-13 RX ADMIN — POTASSIUM CHLORIDE 40 MEQ: 750 TABLET, FILM COATED, EXTENDED RELEASE ORAL at 20:31

## 2024-02-13 RX ADMIN — BUMETANIDE 0.5 MG/HR: 0.25 INJECTION INTRAMUSCULAR; INTRAVENOUS at 04:54

## 2024-02-13 RX ADMIN — APIXABAN 5 MG: 5 TABLET, FILM COATED ORAL at 20:31

## 2024-02-13 RX ADMIN — APIXABAN 5 MG: 5 TABLET, FILM COATED ORAL at 09:09

## 2024-02-13 RX ADMIN — ALLOPURINOL 50 MG: 100 TABLET ORAL at 09:08

## 2024-02-13 RX ADMIN — Medication 1000 UNITS: at 09:08

## 2024-02-13 RX ADMIN — MILRINONE LACTATE IN DEXTROSE 0.3 MCG/KG/MIN: 0.2 INJECTION, SOLUTION INTRAVENOUS at 11:51

## 2024-02-13 RX ADMIN — SODIUM CHLORIDE, PRESERVATIVE FREE 10 ML: 5 INJECTION INTRAVENOUS at 21:00

## 2024-02-13 RX ADMIN — LEVOTHYROXINE SODIUM 75 MCG: 0.07 TABLET ORAL at 04:54

## 2024-02-13 RX ADMIN — POTASSIUM CHLORIDE 40 MEQ: 750 TABLET, FILM COATED, EXTENDED RELEASE ORAL at 09:07

## 2024-02-13 ASSESSMENT — PAIN SCALES - GENERAL
PAINLEVEL_OUTOF10: 0

## 2024-02-14 LAB
ALBUMIN SERPL-MCNC: 3.1 G/DL (ref 3.5–5)
ALBUMIN/GLOB SERPL: 0.9 (ref 1.1–2.2)
ALP SERPL-CCNC: 134 U/L (ref 45–117)
ALT SERPL-CCNC: 53 U/L (ref 12–78)
ANION GAP SERPL CALC-SCNC: 5 MMOL/L (ref 5–15)
AST SERPL-CCNC: 25 U/L (ref 15–37)
BILIRUB SERPL-MCNC: 1.2 MG/DL (ref 0.2–1)
BUN SERPL-MCNC: 40 MG/DL (ref 6–20)
BUN/CREAT SERPL: 21 (ref 12–20)
CALCIUM SERPL-MCNC: 8.4 MG/DL (ref 8.5–10.1)
CHLORIDE SERPL-SCNC: 107 MMOL/L (ref 97–108)
CO2 SERPL-SCNC: 30 MMOL/L (ref 21–32)
CREAT SERPL-MCNC: 1.91 MG/DL (ref 0.7–1.3)
GLOBULIN SER CALC-MCNC: 3.3 G/DL (ref 2–4)
GLUCOSE SERPL-MCNC: 96 MG/DL (ref 65–100)
MAGNESIUM SERPL-MCNC: 1.9 MG/DL (ref 1.6–2.4)
NT PRO BNP: 3921 PG/ML
POTASSIUM SERPL-SCNC: 3.2 MMOL/L (ref 3.5–5.1)
PROT SERPL-MCNC: 6.4 G/DL (ref 6.4–8.2)
SODIUM SERPL-SCNC: 142 MMOL/L (ref 136–145)

## 2024-02-14 PROCEDURE — 97165 OT EVAL LOW COMPLEX 30 MIN: CPT

## 2024-02-14 PROCEDURE — 2500000003 HC RX 250 WO HCPCS: Performed by: HOSPITALIST

## 2024-02-14 PROCEDURE — 2580000003 HC RX 258: Performed by: FAMILY MEDICINE

## 2024-02-14 PROCEDURE — 36415 COLL VENOUS BLD VENIPUNCTURE: CPT

## 2024-02-14 PROCEDURE — APPSS30 APP SPLIT SHARED TIME 16-30 MINUTES: Performed by: NURSE PRACTITIONER

## 2024-02-14 PROCEDURE — 83735 ASSAY OF MAGNESIUM: CPT

## 2024-02-14 PROCEDURE — 6360000002 HC RX W HCPCS: Performed by: NURSE PRACTITIONER

## 2024-02-14 PROCEDURE — 97530 THERAPEUTIC ACTIVITIES: CPT

## 2024-02-14 PROCEDURE — 6370000000 HC RX 637 (ALT 250 FOR IP): Performed by: INTERNAL MEDICINE

## 2024-02-14 PROCEDURE — 6360000002 HC RX W HCPCS: Performed by: INTERNAL MEDICINE

## 2024-02-14 PROCEDURE — 97535 SELF CARE MNGMENT TRAINING: CPT

## 2024-02-14 PROCEDURE — 80053 COMPREHEN METABOLIC PANEL: CPT

## 2024-02-14 PROCEDURE — 6370000000 HC RX 637 (ALT 250 FOR IP): Performed by: NURSE PRACTITIONER

## 2024-02-14 PROCEDURE — 99233 SBSQ HOSP IP/OBS HIGH 50: CPT | Performed by: INTERNAL MEDICINE

## 2024-02-14 PROCEDURE — 2060000000 HC ICU INTERMEDIATE R&B

## 2024-02-14 PROCEDURE — 6370000000 HC RX 637 (ALT 250 FOR IP): Performed by: FAMILY MEDICINE

## 2024-02-14 PROCEDURE — 83880 ASSAY OF NATRIURETIC PEPTIDE: CPT

## 2024-02-14 RX ORDER — GUAIFENESIN 200 MG/10ML
200 LIQUID ORAL EVERY 4 HOURS PRN
Status: DISCONTINUED | OUTPATIENT
Start: 2024-02-14 | End: 2024-02-20 | Stop reason: HOSPADM

## 2024-02-14 RX ORDER — POTASSIUM CHLORIDE 750 MG/1
40 TABLET, FILM COATED, EXTENDED RELEASE ORAL ONCE
Status: COMPLETED | OUTPATIENT
Start: 2024-02-14 | End: 2024-02-14

## 2024-02-14 RX ADMIN — AMIODARONE HYDROCHLORIDE 100 MG: 200 TABLET ORAL at 08:48

## 2024-02-14 RX ADMIN — GUAIFENESIN 200 MG: 200 SOLUTION ORAL at 12:08

## 2024-02-14 RX ADMIN — BUMETANIDE 0.5 MG/HR: 0.25 INJECTION INTRAMUSCULAR; INTRAVENOUS at 23:42

## 2024-02-14 RX ADMIN — EMPAGLIFLOZIN 10 MG: 10 TABLET, FILM COATED ORAL at 08:49

## 2024-02-14 RX ADMIN — GUAIFENESIN 200 MG: 200 SOLUTION ORAL at 20:38

## 2024-02-14 RX ADMIN — MILRINONE LACTATE IN DEXTROSE 0.3 MCG/KG/MIN: 0.2 INJECTION, SOLUTION INTRAVENOUS at 19:40

## 2024-02-14 RX ADMIN — TAMSULOSIN HYDROCHLORIDE 0.4 MG: 0.4 CAPSULE ORAL at 08:49

## 2024-02-14 RX ADMIN — POTASSIUM CHLORIDE 40 MEQ: 750 TABLET, FILM COATED, EXTENDED RELEASE ORAL at 12:08

## 2024-02-14 RX ADMIN — MILRINONE LACTATE IN DEXTROSE 0.3 MCG/KG/MIN: 0.2 INJECTION, SOLUTION INTRAVENOUS at 03:21

## 2024-02-14 RX ADMIN — Medication 1000 UNITS: at 08:49

## 2024-02-14 RX ADMIN — SODIUM CHLORIDE, PRESERVATIVE FREE 10 ML: 5 INJECTION INTRAVENOUS at 08:49

## 2024-02-14 RX ADMIN — POTASSIUM CHLORIDE 40 MEQ: 750 TABLET, FILM COATED, EXTENDED RELEASE ORAL at 08:49

## 2024-02-14 RX ADMIN — APIXABAN 5 MG: 5 TABLET, FILM COATED ORAL at 08:49

## 2024-02-14 RX ADMIN — ALLOPURINOL 50 MG: 100 TABLET ORAL at 08:49

## 2024-02-14 RX ADMIN — METOPROLOL SUCCINATE 12.5 MG: 25 TABLET, EXTENDED RELEASE ORAL at 08:49

## 2024-02-14 RX ADMIN — APIXABAN 5 MG: 5 TABLET, FILM COATED ORAL at 20:38

## 2024-02-14 RX ADMIN — LEVOTHYROXINE SODIUM 75 MCG: 0.07 TABLET ORAL at 07:40

## 2024-02-14 RX ADMIN — SODIUM CHLORIDE, PRESERVATIVE FREE 10 ML: 5 INJECTION INTRAVENOUS at 20:38

## 2024-02-14 RX ADMIN — POTASSIUM CHLORIDE 40 MEQ: 750 TABLET, FILM COATED, EXTENDED RELEASE ORAL at 20:38

## 2024-02-14 RX ADMIN — BUMETANIDE 0.5 MG/HR: 0.25 INJECTION INTRAMUSCULAR; INTRAVENOUS at 03:22

## 2024-02-14 ASSESSMENT — PAIN SCALES - GENERAL
PAINLEVEL_OUTOF10: 0

## 2024-02-15 PROBLEM — I50.9 ACUTE ON CHRONIC CONGESTIVE HEART FAILURE (HCC): Status: ACTIVE | Noted: 2024-02-15

## 2024-02-15 LAB
ALBUMIN SERPL-MCNC: 3.1 G/DL (ref 3.5–5)
ALBUMIN/GLOB SERPL: 1 (ref 1.1–2.2)
ALP SERPL-CCNC: 118 U/L (ref 45–117)
ALT SERPL-CCNC: 44 U/L (ref 12–78)
ANION GAP SERPL CALC-SCNC: 4 MMOL/L (ref 5–15)
AST SERPL-CCNC: 23 U/L (ref 15–37)
BACTERIA SPEC CULT: NORMAL
BACTERIA SPEC CULT: NORMAL
BILIRUB SERPL-MCNC: 1.4 MG/DL (ref 0.2–1)
BUN SERPL-MCNC: 31 MG/DL (ref 6–20)
BUN/CREAT SERPL: 16 (ref 12–20)
CALCIUM SERPL-MCNC: 8.5 MG/DL (ref 8.5–10.1)
CHLORIDE SERPL-SCNC: 105 MMOL/L (ref 97–108)
CO2 SERPL-SCNC: 31 MMOL/L (ref 21–32)
CREAT SERPL-MCNC: 1.96 MG/DL (ref 0.7–1.3)
ECHO BSA: 1.91 M2
GLOBULIN SER CALC-MCNC: 3 G/DL (ref 2–4)
GLUCOSE SERPL-MCNC: 98 MG/DL (ref 65–100)
MAGNESIUM SERPL-MCNC: 1.7 MG/DL (ref 1.6–2.4)
NT PRO BNP: 6237 PG/ML
POTASSIUM SERPL-SCNC: 3.1 MMOL/L (ref 3.5–5.1)
PROT SERPL-MCNC: 6.1 G/DL (ref 6.4–8.2)
SERVICE CMNT-IMP: NORMAL
SERVICE CMNT-IMP: NORMAL
SODIUM SERPL-SCNC: 140 MMOL/L (ref 136–145)

## 2024-02-15 PROCEDURE — 2700000000 HC OXYGEN THERAPY PER DAY

## 2024-02-15 PROCEDURE — 6360000002 HC RX W HCPCS: Performed by: NURSE PRACTITIONER

## 2024-02-15 PROCEDURE — 6370000000 HC RX 637 (ALT 250 FOR IP): Performed by: NURSE PRACTITIONER

## 2024-02-15 PROCEDURE — 83735 ASSAY OF MAGNESIUM: CPT

## 2024-02-15 PROCEDURE — C1894 INTRO/SHEATH, NON-LASER: HCPCS | Performed by: INTERNAL MEDICINE

## 2024-02-15 PROCEDURE — 6370000000 HC RX 637 (ALT 250 FOR IP): Performed by: INTERNAL MEDICINE

## 2024-02-15 PROCEDURE — 80053 COMPREHEN METABOLIC PANEL: CPT

## 2024-02-15 PROCEDURE — 94760 N-INVAS EAR/PLS OXIMETRY 1: CPT

## 2024-02-15 PROCEDURE — 2580000003 HC RX 258: Performed by: FAMILY MEDICINE

## 2024-02-15 PROCEDURE — 93451 RIGHT HEART CATH: CPT | Performed by: INTERNAL MEDICINE

## 2024-02-15 PROCEDURE — 2060000000 HC ICU INTERMEDIATE R&B

## 2024-02-15 PROCEDURE — C1713 ANCHOR/SCREW BN/BN,TIS/BN: HCPCS | Performed by: INTERNAL MEDICINE

## 2024-02-15 PROCEDURE — C1769 GUIDE WIRE: HCPCS | Performed by: INTERNAL MEDICINE

## 2024-02-15 PROCEDURE — 2709999900 HC NON-CHARGEABLE SUPPLY: Performed by: INTERNAL MEDICINE

## 2024-02-15 PROCEDURE — APPSS30 APP SPLIT SHARED TIME 16-30 MINUTES: Performed by: NURSE PRACTITIONER

## 2024-02-15 PROCEDURE — B2101ZZ FLUOROSCOPY OF SINGLE CORONARY ARTERY USING LOW OSMOLAR CONTRAST: ICD-10-PCS | Performed by: INTERNAL MEDICINE

## 2024-02-15 PROCEDURE — 4A023N6 MEASUREMENT OF CARDIAC SAMPLING AND PRESSURE, RIGHT HEART, PERCUTANEOUS APPROACH: ICD-10-PCS | Performed by: INTERNAL MEDICINE

## 2024-02-15 PROCEDURE — 6370000000 HC RX 637 (ALT 250 FOR IP): Performed by: FAMILY MEDICINE

## 2024-02-15 PROCEDURE — 6360000002 HC RX W HCPCS: Performed by: INTERNAL MEDICINE

## 2024-02-15 PROCEDURE — 36415 COLL VENOUS BLD VENIPUNCTURE: CPT

## 2024-02-15 PROCEDURE — 2500000003 HC RX 250 WO HCPCS: Performed by: HOSPITALIST

## 2024-02-15 PROCEDURE — 83880 ASSAY OF NATRIURETIC PEPTIDE: CPT

## 2024-02-15 PROCEDURE — 99233 SBSQ HOSP IP/OBS HIGH 50: CPT | Performed by: INTERNAL MEDICINE

## 2024-02-15 PROCEDURE — C1725 CATH, TRANSLUMIN NON-LASER: HCPCS | Performed by: INTERNAL MEDICINE

## 2024-02-15 RX ORDER — POTASSIUM CHLORIDE 29.8 MG/ML
20 INJECTION INTRAVENOUS ONCE
Status: COMPLETED | OUTPATIENT
Start: 2024-02-15 | End: 2024-02-15

## 2024-02-15 RX ORDER — POTASSIUM CHLORIDE 750 MG/1
40 TABLET, FILM COATED, EXTENDED RELEASE ORAL 3 TIMES DAILY
Status: DISCONTINUED | OUTPATIENT
Start: 2024-02-15 | End: 2024-02-17

## 2024-02-15 RX ORDER — METOLAZONE 5 MG/1
5 TABLET ORAL ONCE
Status: COMPLETED | OUTPATIENT
Start: 2024-02-15 | End: 2024-02-15

## 2024-02-15 RX ORDER — LANOLIN ALCOHOL/MO/W.PET/CERES
400 CREAM (GRAM) TOPICAL DAILY
Status: DISCONTINUED | OUTPATIENT
Start: 2024-02-15 | End: 2024-02-20 | Stop reason: HOSPADM

## 2024-02-15 RX ADMIN — POTASSIUM CHLORIDE 40 MEQ: 750 TABLET, FILM COATED, EXTENDED RELEASE ORAL at 16:27

## 2024-02-15 RX ADMIN — POTASSIUM CHLORIDE 40 MEQ: 750 TABLET, FILM COATED, EXTENDED RELEASE ORAL at 09:05

## 2024-02-15 RX ADMIN — TAMSULOSIN HYDROCHLORIDE 0.4 MG: 0.4 CAPSULE ORAL at 09:05

## 2024-02-15 RX ADMIN — APIXABAN 5 MG: 5 TABLET, FILM COATED ORAL at 09:05

## 2024-02-15 RX ADMIN — ALLOPURINOL 50 MG: 100 TABLET ORAL at 09:05

## 2024-02-15 RX ADMIN — POTASSIUM CHLORIDE 40 MEQ: 750 TABLET, FILM COATED, EXTENDED RELEASE ORAL at 21:07

## 2024-02-15 RX ADMIN — LEVOTHYROXINE SODIUM 75 MCG: 0.07 TABLET ORAL at 06:28

## 2024-02-15 RX ADMIN — Medication 1000 UNITS: at 09:05

## 2024-02-15 RX ADMIN — METOLAZONE 5 MG: 5 TABLET ORAL at 16:27

## 2024-02-15 RX ADMIN — GUAIFENESIN 200 MG: 200 SOLUTION ORAL at 21:32

## 2024-02-15 RX ADMIN — AMIODARONE HYDROCHLORIDE 100 MG: 200 TABLET ORAL at 09:05

## 2024-02-15 RX ADMIN — APIXABAN 5 MG: 5 TABLET, FILM COATED ORAL at 21:07

## 2024-02-15 RX ADMIN — MILRINONE LACTATE IN DEXTROSE 0.3 MCG/KG/MIN: 0.2 INJECTION, SOLUTION INTRAVENOUS at 12:55

## 2024-02-15 RX ADMIN — SODIUM CHLORIDE, PRESERVATIVE FREE 10 ML: 5 INJECTION INTRAVENOUS at 21:08

## 2024-02-15 RX ADMIN — METOPROLOL SUCCINATE 12.5 MG: 25 TABLET, EXTENDED RELEASE ORAL at 09:05

## 2024-02-15 RX ADMIN — POTASSIUM CHLORIDE 20 MEQ: 29.8 INJECTION, SOLUTION INTRAVENOUS at 09:20

## 2024-02-15 RX ADMIN — EMPAGLIFLOZIN 10 MG: 10 TABLET, FILM COATED ORAL at 09:05

## 2024-02-15 RX ADMIN — SODIUM CHLORIDE, PRESERVATIVE FREE 10 ML: 5 INJECTION INTRAVENOUS at 09:06

## 2024-02-15 RX ADMIN — MAGNESIUM OXIDE TAB 400 MG (241.3 MG ELEMENTAL MG) 400 MG: 400 (241.3 MG) TAB at 09:10

## 2024-02-15 ASSESSMENT — PAIN SCALES - GENERAL
PAINLEVEL_OUTOF10: 0

## 2024-02-16 LAB
ALBUMIN SERPL-MCNC: 2.8 G/DL (ref 3.5–5)
ALBUMIN/GLOB SERPL: 0.8 (ref 1.1–2.2)
ALP SERPL-CCNC: 106 U/L (ref 45–117)
ALT SERPL-CCNC: 35 U/L (ref 12–78)
ANION GAP SERPL CALC-SCNC: 4 MMOL/L (ref 5–15)
AST SERPL-CCNC: 21 U/L (ref 15–37)
BASOPHILS # BLD: 0 K/UL (ref 0–0.1)
BASOPHILS NFR BLD: 1 % (ref 0–1)
BILIRUB SERPL-MCNC: 1.1 MG/DL (ref 0.2–1)
BUN SERPL-MCNC: 31 MG/DL (ref 6–20)
BUN/CREAT SERPL: 16 (ref 12–20)
CALCIUM SERPL-MCNC: 8.3 MG/DL (ref 8.5–10.1)
CHLORIDE SERPL-SCNC: 100 MMOL/L (ref 97–108)
CO2 SERPL-SCNC: 35 MMOL/L (ref 21–32)
CREAT SERPL-MCNC: 1.94 MG/DL (ref 0.7–1.3)
DIFFERENTIAL METHOD BLD: ABNORMAL
EOSINOPHIL # BLD: 0 K/UL (ref 0–0.4)
EOSINOPHIL NFR BLD: 1 % (ref 0–7)
ERYTHROCYTE [DISTWIDTH] IN BLOOD BY AUTOMATED COUNT: 17.3 % (ref 11.5–14.5)
GLOBULIN SER CALC-MCNC: 3.5 G/DL (ref 2–4)
GLUCOSE SERPL-MCNC: 125 MG/DL (ref 65–100)
HCT VFR BLD AUTO: 30.3 % (ref 36.6–50.3)
HGB BLD-MCNC: 10.4 G/DL (ref 12.1–17)
IMM GRANULOCYTES # BLD AUTO: 0 K/UL (ref 0–0.04)
IMM GRANULOCYTES NFR BLD AUTO: 0 % (ref 0–0.5)
LYMPHOCYTES # BLD: 0.9 K/UL (ref 0.8–3.5)
LYMPHOCYTES NFR BLD: 26 % (ref 12–49)
MAGNESIUM SERPL-MCNC: 1.9 MG/DL (ref 1.6–2.4)
MCH RBC QN AUTO: 28.2 PG (ref 26–34)
MCHC RBC AUTO-ENTMCNC: 34.3 G/DL (ref 30–36.5)
MCV RBC AUTO: 82.1 FL (ref 80–99)
MONOCYTES # BLD: 0.6 K/UL (ref 0–1)
MONOCYTES NFR BLD: 17 % (ref 5–13)
NEUTS SEG # BLD: 1.9 K/UL (ref 1.8–8)
NEUTS SEG NFR BLD: 55 % (ref 32–75)
NRBC # BLD: 0 K/UL (ref 0–0.01)
NRBC BLD-RTO: 0 PER 100 WBC
NT PRO BNP: 7347 PG/ML
PLATELET # BLD AUTO: 162 K/UL (ref 150–400)
PMV BLD AUTO: 10.4 FL (ref 8.9–12.9)
POTASSIUM SERPL-SCNC: 3.1 MMOL/L (ref 3.5–5.1)
PROT SERPL-MCNC: 6.3 G/DL (ref 6.4–8.2)
RBC # BLD AUTO: 3.69 M/UL (ref 4.1–5.7)
SODIUM SERPL-SCNC: 139 MMOL/L (ref 136–145)
WBC # BLD AUTO: 3.3 K/UL (ref 4.1–11.1)

## 2024-02-16 PROCEDURE — 2580000003 HC RX 258: Performed by: FAMILY MEDICINE

## 2024-02-16 PROCEDURE — 2060000000 HC ICU INTERMEDIATE R&B

## 2024-02-16 PROCEDURE — 80053 COMPREHEN METABOLIC PANEL: CPT

## 2024-02-16 PROCEDURE — 99233 SBSQ HOSP IP/OBS HIGH 50: CPT | Performed by: INTERNAL MEDICINE

## 2024-02-16 PROCEDURE — 6370000000 HC RX 637 (ALT 250 FOR IP): Performed by: INTERNAL MEDICINE

## 2024-02-16 PROCEDURE — 6360000002 HC RX W HCPCS: Performed by: NURSE PRACTITIONER

## 2024-02-16 PROCEDURE — 36415 COLL VENOUS BLD VENIPUNCTURE: CPT

## 2024-02-16 PROCEDURE — 6370000000 HC RX 637 (ALT 250 FOR IP): Performed by: FAMILY MEDICINE

## 2024-02-16 PROCEDURE — 94760 N-INVAS EAR/PLS OXIMETRY 1: CPT

## 2024-02-16 PROCEDURE — 2700000000 HC OXYGEN THERAPY PER DAY

## 2024-02-16 PROCEDURE — 97535 SELF CARE MNGMENT TRAINING: CPT

## 2024-02-16 PROCEDURE — 6370000000 HC RX 637 (ALT 250 FOR IP): Performed by: NURSE PRACTITIONER

## 2024-02-16 PROCEDURE — P9045 ALBUMIN (HUMAN), 5%, 250 ML: HCPCS | Performed by: NURSE PRACTITIONER

## 2024-02-16 PROCEDURE — 83735 ASSAY OF MAGNESIUM: CPT

## 2024-02-16 PROCEDURE — 83880 ASSAY OF NATRIURETIC PEPTIDE: CPT

## 2024-02-16 PROCEDURE — APPNB60 APP NON BILLABLE TIME 46-60 MINS: Performed by: NURSE PRACTITIONER

## 2024-02-16 PROCEDURE — 85025 COMPLETE CBC W/AUTO DIFF WBC: CPT

## 2024-02-16 PROCEDURE — 6360000002 HC RX W HCPCS: Performed by: INTERNAL MEDICINE

## 2024-02-16 PROCEDURE — 36592 COLLECT BLOOD FROM PICC: CPT

## 2024-02-16 PROCEDURE — 2500000003 HC RX 250 WO HCPCS: Performed by: HOSPITALIST

## 2024-02-16 RX ORDER — BUMETANIDE 0.25 MG/ML
2 INJECTION INTRAMUSCULAR; INTRAVENOUS 2 TIMES DAILY
Status: DISCONTINUED | OUTPATIENT
Start: 2024-02-16 | End: 2024-02-17

## 2024-02-16 RX ORDER — POTASSIUM CHLORIDE 29.8 MG/ML
20 INJECTION INTRAVENOUS ONCE
Status: COMPLETED | OUTPATIENT
Start: 2024-02-16 | End: 2024-02-16

## 2024-02-16 RX ORDER — ALBUMIN, HUMAN INJ 5% 5 %
12.5 SOLUTION INTRAVENOUS ONCE
Status: COMPLETED | OUTPATIENT
Start: 2024-02-16 | End: 2024-02-16

## 2024-02-16 RX ORDER — MILRINONE LACTATE 0.2 MG/ML
0.25 INJECTION, SOLUTION INTRAVENOUS CONTINUOUS
Status: DISCONTINUED | OUTPATIENT
Start: 2024-02-16 | End: 2024-02-18

## 2024-02-16 RX ADMIN — MILRINONE LACTATE IN DEXTROSE 0.3 MCG/KG/MIN: 0.2 INJECTION, SOLUTION INTRAVENOUS at 04:31

## 2024-02-16 RX ADMIN — Medication 1000 UNITS: at 09:42

## 2024-02-16 RX ADMIN — AMIODARONE HYDROCHLORIDE 100 MG: 200 TABLET ORAL at 09:47

## 2024-02-16 RX ADMIN — GUAIFENESIN 200 MG: 200 SOLUTION ORAL at 04:14

## 2024-02-16 RX ADMIN — TAMSULOSIN HYDROCHLORIDE 0.4 MG: 0.4 CAPSULE ORAL at 09:42

## 2024-02-16 RX ADMIN — GUAIFENESIN 200 MG: 200 SOLUTION ORAL at 20:06

## 2024-02-16 RX ADMIN — POTASSIUM CHLORIDE 40 MEQ: 750 TABLET, FILM COATED, EXTENDED RELEASE ORAL at 20:07

## 2024-02-16 RX ADMIN — ALLOPURINOL 50 MG: 100 TABLET ORAL at 09:44

## 2024-02-16 RX ADMIN — MILRINONE LACTATE IN DEXTROSE 0.25 MCG/KG/MIN: 200 INJECTION, SOLUTION INTRAVENOUS at 19:22

## 2024-02-16 RX ADMIN — POTASSIUM CHLORIDE 40 MEQ: 750 TABLET, FILM COATED, EXTENDED RELEASE ORAL at 09:42

## 2024-02-16 RX ADMIN — BUMETANIDE 2 MG: 0.25 INJECTION INTRAMUSCULAR; INTRAVENOUS at 20:07

## 2024-02-16 RX ADMIN — APIXABAN 5 MG: 5 TABLET, FILM COATED ORAL at 20:08

## 2024-02-16 RX ADMIN — SODIUM CHLORIDE, PRESERVATIVE FREE 10 ML: 5 INJECTION INTRAVENOUS at 09:42

## 2024-02-16 RX ADMIN — POTASSIUM CHLORIDE 40 MEQ: 750 TABLET, FILM COATED, EXTENDED RELEASE ORAL at 13:17

## 2024-02-16 RX ADMIN — LEVOTHYROXINE SODIUM 75 MCG: 0.07 TABLET ORAL at 04:31

## 2024-02-16 RX ADMIN — POTASSIUM CHLORIDE 20 MEQ: 29.8 INJECTION, SOLUTION INTRAVENOUS at 07:03

## 2024-02-16 RX ADMIN — APIXABAN 5 MG: 5 TABLET, FILM COATED ORAL at 09:44

## 2024-02-16 RX ADMIN — ALBUMIN (HUMAN) 12.5 G: 12.5 INJECTION, SOLUTION INTRAVENOUS at 11:32

## 2024-02-16 RX ADMIN — EMPAGLIFLOZIN 10 MG: 10 TABLET, FILM COATED ORAL at 09:42

## 2024-02-16 RX ADMIN — BUMETANIDE 0.5 MG/HR: 0.25 INJECTION INTRAMUSCULAR; INTRAVENOUS at 00:30

## 2024-02-16 RX ADMIN — MAGNESIUM OXIDE TAB 400 MG (241.3 MG ELEMENTAL MG) 400 MG: 400 (241.3 MG) TAB at 09:44

## 2024-02-16 RX ADMIN — SODIUM CHLORIDE, PRESERVATIVE FREE 10 ML: 5 INJECTION INTRAVENOUS at 19:23

## 2024-02-16 ASSESSMENT — PAIN SCALES - GENERAL
PAINLEVEL_OUTOF10: 0

## 2024-02-17 LAB
ALBUMIN SERPL-MCNC: 3 G/DL (ref 3.5–5)
ALBUMIN/GLOB SERPL: 0.8 (ref 1.1–2.2)
ALP SERPL-CCNC: 103 U/L (ref 45–117)
ALT SERPL-CCNC: 35 U/L (ref 12–78)
ANION GAP SERPL CALC-SCNC: 6 MMOL/L (ref 5–15)
AST SERPL-CCNC: 19 U/L (ref 15–37)
BILIRUB SERPL-MCNC: 1 MG/DL (ref 0.2–1)
BUN SERPL-MCNC: 38 MG/DL (ref 6–20)
BUN/CREAT SERPL: 18 (ref 12–20)
CALCIUM SERPL-MCNC: 8.8 MG/DL (ref 8.5–10.1)
CHLORIDE SERPL-SCNC: 96 MMOL/L (ref 97–108)
CO2 SERPL-SCNC: 37 MMOL/L (ref 21–32)
CREAT SERPL-MCNC: 2.08 MG/DL (ref 0.7–1.3)
GLOBULIN SER CALC-MCNC: 3.6 G/DL (ref 2–4)
GLUCOSE SERPL-MCNC: 98 MG/DL (ref 65–100)
MAGNESIUM SERPL-MCNC: 1.8 MG/DL (ref 1.6–2.4)
NT PRO BNP: 4602 PG/ML
POTASSIUM SERPL-SCNC: 2.8 MMOL/L (ref 3.5–5.1)
PROT SERPL-MCNC: 6.6 G/DL (ref 6.4–8.2)
SODIUM SERPL-SCNC: 139 MMOL/L (ref 136–145)

## 2024-02-17 PROCEDURE — 2580000003 HC RX 258: Performed by: FAMILY MEDICINE

## 2024-02-17 PROCEDURE — 6370000000 HC RX 637 (ALT 250 FOR IP): Performed by: NURSE PRACTITIONER

## 2024-02-17 PROCEDURE — 6370000000 HC RX 637 (ALT 250 FOR IP): Performed by: INTERNAL MEDICINE

## 2024-02-17 PROCEDURE — P9045 ALBUMIN (HUMAN), 5%, 250 ML: HCPCS | Performed by: NURSE PRACTITIONER

## 2024-02-17 PROCEDURE — 6360000002 HC RX W HCPCS: Performed by: NURSE PRACTITIONER

## 2024-02-17 PROCEDURE — P9047 ALBUMIN (HUMAN), 25%, 50ML: HCPCS | Performed by: STUDENT IN AN ORGANIZED HEALTH CARE EDUCATION/TRAINING PROGRAM

## 2024-02-17 PROCEDURE — 36592 COLLECT BLOOD FROM PICC: CPT

## 2024-02-17 PROCEDURE — 2060000000 HC ICU INTERMEDIATE R&B

## 2024-02-17 PROCEDURE — 94760 N-INVAS EAR/PLS OXIMETRY 1: CPT

## 2024-02-17 PROCEDURE — 6360000002 HC RX W HCPCS: Performed by: STUDENT IN AN ORGANIZED HEALTH CARE EDUCATION/TRAINING PROGRAM

## 2024-02-17 PROCEDURE — 6370000000 HC RX 637 (ALT 250 FOR IP): Performed by: FAMILY MEDICINE

## 2024-02-17 PROCEDURE — 2500000003 HC RX 250 WO HCPCS: Performed by: HOSPITALIST

## 2024-02-17 PROCEDURE — 83735 ASSAY OF MAGNESIUM: CPT

## 2024-02-17 PROCEDURE — 99233 SBSQ HOSP IP/OBS HIGH 50: CPT | Performed by: NURSE PRACTITIONER

## 2024-02-17 PROCEDURE — 80053 COMPREHEN METABOLIC PANEL: CPT

## 2024-02-17 PROCEDURE — 36415 COLL VENOUS BLD VENIPUNCTURE: CPT

## 2024-02-17 PROCEDURE — 83880 ASSAY OF NATRIURETIC PEPTIDE: CPT

## 2024-02-17 PROCEDURE — P9047 ALBUMIN (HUMAN), 25%, 50ML: HCPCS | Performed by: NURSE PRACTITIONER

## 2024-02-17 PROCEDURE — 2700000000 HC OXYGEN THERAPY PER DAY

## 2024-02-17 RX ORDER — POTASSIUM CHLORIDE 29.8 MG/ML
20 INJECTION INTRAVENOUS ONCE
Status: COMPLETED | OUTPATIENT
Start: 2024-02-17 | End: 2024-02-17

## 2024-02-17 RX ORDER — ALBUMIN (HUMAN) 12.5 G/50ML
SOLUTION INTRAVENOUS
Status: DISPENSED
Start: 2024-02-17 | End: 2024-02-18

## 2024-02-17 RX ORDER — ALBUMIN (HUMAN) 12.5 G/50ML
25 SOLUTION INTRAVENOUS ONCE
Status: COMPLETED | OUTPATIENT
Start: 2024-02-17 | End: 2024-02-17

## 2024-02-17 RX ORDER — POTASSIUM CHLORIDE 750 MG/1
60 TABLET, FILM COATED, EXTENDED RELEASE ORAL 3 TIMES DAILY
Status: DISCONTINUED | OUTPATIENT
Start: 2024-02-17 | End: 2024-02-18

## 2024-02-17 RX ORDER — BUMETANIDE 1 MG/1
2 TABLET ORAL 2 TIMES DAILY
Status: DISCONTINUED | OUTPATIENT
Start: 2024-02-17 | End: 2024-02-19

## 2024-02-17 RX ORDER — ALBUMIN, HUMAN INJ 5% 5 %
25 SOLUTION INTRAVENOUS ONCE
Status: COMPLETED | OUTPATIENT
Start: 2024-02-17 | End: 2024-02-17

## 2024-02-17 RX ADMIN — ACETAMINOPHEN 650 MG: 325 TABLET ORAL at 11:54

## 2024-02-17 RX ADMIN — GUAIFENESIN 200 MG: 200 SOLUTION ORAL at 20:29

## 2024-02-17 RX ADMIN — ALBUMIN (HUMAN) 25 G: 0.25 INJECTION, SOLUTION INTRAVENOUS at 19:13

## 2024-02-17 RX ADMIN — METOPROLOL SUCCINATE 12.5 MG: 25 TABLET, EXTENDED RELEASE ORAL at 09:02

## 2024-02-17 RX ADMIN — ALBUMIN (HUMAN) 25 G: 12.5 INJECTION, SOLUTION INTRAVENOUS at 20:30

## 2024-02-17 RX ADMIN — MAGNESIUM OXIDE TAB 400 MG (241.3 MG ELEMENTAL MG) 400 MG: 400 (241.3 MG) TAB at 09:03

## 2024-02-17 RX ADMIN — BUMETANIDE 2 MG: 1 TABLET ORAL at 09:02

## 2024-02-17 RX ADMIN — SODIUM CHLORIDE, PRESERVATIVE FREE 10 ML: 5 INJECTION INTRAVENOUS at 20:29

## 2024-02-17 RX ADMIN — GUAIFENESIN 200 MG: 200 SOLUTION ORAL at 11:54

## 2024-02-17 RX ADMIN — ALLOPURINOL 50 MG: 100 TABLET ORAL at 09:03

## 2024-02-17 RX ADMIN — APIXABAN 5 MG: 5 TABLET, FILM COATED ORAL at 20:26

## 2024-02-17 RX ADMIN — POTASSIUM CHLORIDE 60 MEQ: 750 TABLET, FILM COATED, EXTENDED RELEASE ORAL at 09:03

## 2024-02-17 RX ADMIN — MILRINONE LACTATE IN DEXTROSE 0.25 MCG/KG/MIN: 200 INJECTION, SOLUTION INTRAVENOUS at 15:23

## 2024-02-17 RX ADMIN — TAMSULOSIN HYDROCHLORIDE 0.4 MG: 0.4 CAPSULE ORAL at 09:03

## 2024-02-17 RX ADMIN — LEVOTHYROXINE SODIUM 75 MCG: 0.07 TABLET ORAL at 04:39

## 2024-02-17 RX ADMIN — Medication 1000 UNITS: at 09:03

## 2024-02-17 RX ADMIN — POTASSIUM CHLORIDE 20 MEQ: 29.8 INJECTION, SOLUTION INTRAVENOUS at 09:04

## 2024-02-17 RX ADMIN — ALBUMIN (HUMAN) 25 G: 0.25 INJECTION, SOLUTION INTRAVENOUS at 15:18

## 2024-02-17 RX ADMIN — APIXABAN 5 MG: 5 TABLET, FILM COATED ORAL at 09:03

## 2024-02-17 RX ADMIN — AMIODARONE HYDROCHLORIDE 100 MG: 200 TABLET ORAL at 09:02

## 2024-02-17 RX ADMIN — EMPAGLIFLOZIN 10 MG: 10 TABLET, FILM COATED ORAL at 09:02

## 2024-02-17 RX ADMIN — POTASSIUM CHLORIDE 60 MEQ: 750 TABLET, FILM COATED, EXTENDED RELEASE ORAL at 15:07

## 2024-02-17 RX ADMIN — POTASSIUM CHLORIDE 60 MEQ: 750 TABLET, FILM COATED, EXTENDED RELEASE ORAL at 20:26

## 2024-02-17 ASSESSMENT — PAIN SCALES - GENERAL
PAINLEVEL_OUTOF10: 0

## 2024-02-17 NOTE — ACP (ADVANCE CARE PLANNING)
Advance Care Planning     Advance Care Planning (ACP) Physician/NP/PA Conversation    Date of Conversation: 2/9/2024  Conducted with: Patient with Decision Making Capacity    Healthcare Decision Maker:    Primary Decision Maker: Shannan Kennedy - Child - [phone]    Secondary Decision Maker: Khloe Douglas - Juan Child - [phone]  Click here to complete Healthcare Decision Makers including selection of the Healthcare Decision Maker Relationship (ie \"Primary\").     Today we documented Decision Maker(s) consistent with Legal Next of Kin hierarchy.    Care Preferences:    Hospitalization:  \"If your health worsens and it becomes clear that your chance of recovery is unlikely, what would be your preference regarding hospitalization?\"  The patient would prefer hospitalization.    Ventilation:  \"If you were unable to breath on your own and your chance of recovery was unlikely, what would be your preference about the use of a ventilator (breathing machine) if it was available to you?\"  The patient would desire use of a ventilator if it were to save his life, but he would not want to be on a ventilator permanently.    Resuscitation:  \"In the event your heart stopped as a result of an underlying serious health condition, would you want attempts made to restart your heart, or would you prefer a natural death?\"  Yes, attempt to resuscitate.    ventilation preferences, hospitalization preferences, and resuscitation preferences    Conversation Outcomes / Follow-Up Plan:  ACP complete - no further action today  Reviewed DNR/DNI and patient elects Full Code (Attempt Resuscitation)    Length of Voluntary ACP Conversation in minutes:  16 minutes    Diagnosis: Heart Failure    North Garza MD

## 2024-02-18 LAB
ALBUMIN SERPL-MCNC: 3.6 G/DL (ref 3.5–5)
ALBUMIN/GLOB SERPL: 1.2 (ref 1.1–2.2)
ALP SERPL-CCNC: 80 U/L (ref 45–117)
ALT SERPL-CCNC: 24 U/L (ref 12–78)
ANION GAP SERPL CALC-SCNC: 5 MMOL/L (ref 5–15)
AST SERPL-CCNC: 15 U/L (ref 15–37)
BILIRUB SERPL-MCNC: 1.1 MG/DL (ref 0.2–1)
BUN SERPL-MCNC: 46 MG/DL (ref 6–20)
BUN/CREAT SERPL: 19 (ref 12–20)
CALCIUM SERPL-MCNC: 8.5 MG/DL (ref 8.5–10.1)
CHLORIDE SERPL-SCNC: 95 MMOL/L (ref 97–108)
CO2 SERPL-SCNC: 37 MMOL/L (ref 21–32)
CREAT SERPL-MCNC: 2.43 MG/DL (ref 0.7–1.3)
GLOBULIN SER CALC-MCNC: 3.1 G/DL (ref 2–4)
GLUCOSE SERPL-MCNC: 115 MG/DL (ref 65–100)
MAGNESIUM SERPL-MCNC: 1.8 MG/DL (ref 1.6–2.4)
NT PRO BNP: 5154 PG/ML
POTASSIUM SERPL-SCNC: 4 MMOL/L (ref 3.5–5.1)
PROT SERPL-MCNC: 6.7 G/DL (ref 6.4–8.2)
SODIUM SERPL-SCNC: 137 MMOL/L (ref 136–145)

## 2024-02-18 PROCEDURE — 6360000002 HC RX W HCPCS: Performed by: NURSE PRACTITIONER

## 2024-02-18 PROCEDURE — 94760 N-INVAS EAR/PLS OXIMETRY 1: CPT

## 2024-02-18 PROCEDURE — 83880 ASSAY OF NATRIURETIC PEPTIDE: CPT

## 2024-02-18 PROCEDURE — 36592 COLLECT BLOOD FROM PICC: CPT

## 2024-02-18 PROCEDURE — P9047 ALBUMIN (HUMAN), 25%, 50ML: HCPCS | Performed by: NURSE PRACTITIONER

## 2024-02-18 PROCEDURE — 2060000000 HC ICU INTERMEDIATE R&B

## 2024-02-18 PROCEDURE — 6370000000 HC RX 637 (ALT 250 FOR IP): Performed by: NURSE PRACTITIONER

## 2024-02-18 PROCEDURE — 6370000000 HC RX 637 (ALT 250 FOR IP): Performed by: FAMILY MEDICINE

## 2024-02-18 PROCEDURE — 6370000000 HC RX 637 (ALT 250 FOR IP): Performed by: INTERNAL MEDICINE

## 2024-02-18 PROCEDURE — 2580000003 HC RX 258: Performed by: FAMILY MEDICINE

## 2024-02-18 PROCEDURE — 80053 COMPREHEN METABOLIC PANEL: CPT

## 2024-02-18 PROCEDURE — 2700000000 HC OXYGEN THERAPY PER DAY

## 2024-02-18 PROCEDURE — 36415 COLL VENOUS BLD VENIPUNCTURE: CPT

## 2024-02-18 PROCEDURE — 83735 ASSAY OF MAGNESIUM: CPT

## 2024-02-18 PROCEDURE — 99233 SBSQ HOSP IP/OBS HIGH 50: CPT | Performed by: NURSE PRACTITIONER

## 2024-02-18 RX ORDER — MILRINONE LACTATE 0.2 MG/ML
0.3 INJECTION, SOLUTION INTRAVENOUS CONTINUOUS
Status: DISCONTINUED | OUTPATIENT
Start: 2024-02-18 | End: 2024-02-19

## 2024-02-18 RX ORDER — POTASSIUM CHLORIDE 750 MG/1
20 TABLET, FILM COATED, EXTENDED RELEASE ORAL 2 TIMES DAILY
Status: DISCONTINUED | OUTPATIENT
Start: 2024-02-18 | End: 2024-02-19

## 2024-02-18 RX ORDER — ALBUMIN (HUMAN) 12.5 G/50ML
25 SOLUTION INTRAVENOUS ONCE
Status: COMPLETED | OUTPATIENT
Start: 2024-02-18 | End: 2024-02-18

## 2024-02-18 RX ADMIN — MAGNESIUM OXIDE TAB 400 MG (241.3 MG ELEMENTAL MG) 400 MG: 400 (241.3 MG) TAB at 08:58

## 2024-02-18 RX ADMIN — POTASSIUM CHLORIDE 20 MEQ: 750 TABLET, FILM COATED, EXTENDED RELEASE ORAL at 22:29

## 2024-02-18 RX ADMIN — ALBUMIN (HUMAN) 25 G: 0.25 INJECTION, SOLUTION INTRAVENOUS at 19:18

## 2024-02-18 RX ADMIN — AMIODARONE HYDROCHLORIDE 100 MG: 200 TABLET ORAL at 08:57

## 2024-02-18 RX ADMIN — ALLOPURINOL 50 MG: 100 TABLET ORAL at 08:58

## 2024-02-18 RX ADMIN — TAMSULOSIN HYDROCHLORIDE 0.4 MG: 0.4 CAPSULE ORAL at 08:58

## 2024-02-18 RX ADMIN — EMPAGLIFLOZIN 10 MG: 10 TABLET, FILM COATED ORAL at 08:58

## 2024-02-18 RX ADMIN — SODIUM CHLORIDE, PRESERVATIVE FREE 10 ML: 5 INJECTION INTRAVENOUS at 22:29

## 2024-02-18 RX ADMIN — POTASSIUM CHLORIDE 20 MEQ: 750 TABLET, FILM COATED, EXTENDED RELEASE ORAL at 08:58

## 2024-02-18 RX ADMIN — LEVOTHYROXINE SODIUM 75 MCG: 0.07 TABLET ORAL at 07:18

## 2024-02-18 RX ADMIN — APIXABAN 5 MG: 5 TABLET, FILM COATED ORAL at 22:29

## 2024-02-18 RX ADMIN — ALBUMIN (HUMAN) 25 G: 0.25 INJECTION, SOLUTION INTRAVENOUS at 04:06

## 2024-02-18 RX ADMIN — Medication 1000 UNITS: at 08:58

## 2024-02-18 RX ADMIN — APIXABAN 5 MG: 5 TABLET, FILM COATED ORAL at 08:58

## 2024-02-18 ASSESSMENT — PAIN SCALES - GENERAL
PAINLEVEL_OUTOF10: 0

## 2024-02-18 NOTE — PLAN OF CARE
Problem: Chronic Conditions and Co-morbidities  Goal: Patient's chronic conditions and co-morbidity symptoms are monitored and maintained or improved  2/11/2024 2249 by Froy Moseley RN  Outcome: Progressing  2/11/2024 1019 by Froy Moseley RN  Outcome: Progressing  Flowsheets (Taken 2/11/2024 0750 by Cindy Means RN)  Care Plan - Patient's Chronic Conditions and Co-Morbidity Symptoms are Monitored and Maintained or Improved:   Monitor and assess patient's chronic conditions and comorbid symptoms for stability, deterioration, or improvement   Collaborate with multidisciplinary team to address chronic and comorbid conditions and prevent exacerbation or deterioration   Update acute care plan with appropriate goals if chronic or comorbid symptoms are exacerbated and prevent overall improvement and discharge     Problem: Discharge Planning  Goal: Discharge to home or other facility with appropriate resources  2/11/2024 2249 by Froy Moseley RN  Outcome: Progressing  2/11/2024 1019 by Froy Moseley RN  Outcome: Progressing  Flowsheets (Taken 2/11/2024 0750 by Cindy Means RN)  Discharge to home or other facility with appropriate resources: Identify barriers to discharge with patient and caregiver     Problem: Safety - Adult  Goal: Free from fall injury  2/11/2024 2249 by Froy Moseley RN  Outcome: Progressing  2/11/2024 1019 by Froy Moseley RN  Outcome: Progressing     Problem: Pain  Goal: Verbalizes/displays adequate comfort level or baseline comfort level  2/11/2024 2249 by Froy Moseley RN  Outcome: Progressing  2/11/2024 1019 by Froy Moseley RN  Outcome: Progressing     Problem: Respiratory - Adult  Goal: Achieves optimal ventilation and oxygenation  2/11/2024 2249 by Froy Moseley RN  Outcome: Progressing  2/11/2024 1019 by Froy Moseley RN  Outcome: Progressing     Problem: Cardiovascular - Adult  Goal: Maintains optimal cardiac output and hemodynamic stability  2/11/2024 2249 by 
  Problem: Chronic Conditions and Co-morbidities  Goal: Patient's chronic conditions and co-morbidity symptoms are monitored and maintained or improved  Outcome: Progressing     Problem: Discharge Planning  Goal: Discharge to home or other facility with appropriate resources  Outcome: Progressing     Problem: Safety - Adult  Goal: Free from fall injury  Outcome: Progressing     Problem: Pain  Goal: Verbalizes/displays adequate comfort level or baseline comfort level  Outcome: Progressing     Problem: Respiratory - Adult  Goal: Achieves optimal ventilation and oxygenation  Outcome: Progressing     Problem: Cardiovascular - Adult  Goal: Maintains optimal cardiac output and hemodynamic stability  Outcome: Progressing  Goal: Absence of cardiac dysrhythmias or at baseline  Outcome: Progressing     
  Problem: Chronic Conditions and Co-morbidities  Goal: Patient's chronic conditions and co-morbidity symptoms are monitored and maintained or improved  Outcome: Progressing  Flowsheets (Taken 2/15/2024 2000)  Care Plan - Patient's Chronic Conditions and Co-Morbidity Symptoms are Monitored and Maintained or Improved: Monitor and assess patient's chronic conditions and comorbid symptoms for stability, deterioration, or improvement     Problem: Discharge Planning  Goal: Discharge to home or other facility with appropriate resources  Outcome: Progressing  Flowsheets (Taken 2/15/2024 2000)  Discharge to home or other facility with appropriate resources: Identify barriers to discharge with patient and caregiver     Problem: Safety - Adult  Goal: Free from fall injury  Outcome: Progressing     Problem: Pain  Goal: Verbalizes/displays adequate comfort level or baseline comfort level  2/15/2024 2340 by Alice Lainez, RN  Outcome: Progressing  Flowsheets (Taken 2/15/2024 2000)  Verbalizes/displays adequate comfort level or baseline comfort level: Encourage patient to monitor pain and request assistance  2/15/2024 1105 by Iwona Friedman, RN  Outcome: Progressing     Problem: Respiratory - Adult  Goal: Achieves optimal ventilation and oxygenation  Outcome: Progressing  Flowsheets (Taken 2/15/2024 2000)  Achieves optimal ventilation and oxygenation: Assess for changes in respiratory status     Problem: Cardiovascular - Adult  Goal: Maintains optimal cardiac output and hemodynamic stability  Outcome: Progressing  Flowsheets (Taken 2/15/2024 2000)  Maintains optimal cardiac output and hemodynamic stability: Monitor blood pressure and heart rate  Goal: Absence of cardiac dysrhythmias or at baseline  Outcome: Progressing  Flowsheets (Taken 2/15/2024 2000)  Absence of cardiac dysrhythmias or at baseline: Monitor cardiac rate and rhythm     Problem: ABCDS Injury Assessment  Goal: Absence of physical injury  Outcome: 
  Problem: Chronic Conditions and Co-morbidities  Goal: Patient's chronic conditions and co-morbidity symptoms are monitored and maintained or improved  Outcome: Progressing  Flowsheets (Taken 2/17/2024 2000)  Care Plan - Patient's Chronic Conditions and Co-Morbidity Symptoms are Monitored and Maintained or Improved: Monitor and assess patient's chronic conditions and comorbid symptoms for stability, deterioration, or improvement     Problem: Discharge Planning  Goal: Discharge to home or other facility with appropriate resources  Outcome: Progressing  Flowsheets (Taken 2/17/2024 2000)  Discharge to home or other facility with appropriate resources: Identify barriers to discharge with patient and caregiver     Problem: Safety - Adult  Goal: Free from fall injury  Outcome: Progressing     Problem: Pain  Goal: Verbalizes/displays adequate comfort level or baseline comfort level  Outcome: Progressing  Flowsheets (Taken 2/17/2024 2000)  Verbalizes/displays adequate comfort level or baseline comfort level: Encourage patient to monitor pain and request assistance     Problem: Respiratory - Adult  Goal: Achieves optimal ventilation and oxygenation  Outcome: Progressing  Flowsheets (Taken 2/17/2024 2000)  Achieves optimal ventilation and oxygenation: Assess for changes in respiratory status     Problem: Cardiovascular - Adult  Goal: Maintains optimal cardiac output and hemodynamic stability  Outcome: Progressing  Flowsheets (Taken 2/17/2024 2000)  Maintains optimal cardiac output and hemodynamic stability: Monitor blood pressure and heart rate  Goal: Absence of cardiac dysrhythmias or at baseline  Outcome: Progressing  Flowsheets (Taken 2/17/2024 2000)  Absence of cardiac dysrhythmias or at baseline: Monitor cardiac rate and rhythm     Problem: ABCDS Injury Assessment  Goal: Absence of physical injury  Outcome: Progressing     
  Problem: Occupational Therapy - Adult  Goal: By Discharge: Performs self-care activities at highest level of function for planned discharge setting.  See evaluation for individualized goals.  Description: FUNCTIONAL STATUS PRIOR TO ADMISSION:  Pt was independent with BADLs at baseline using cane for mobility. Pt lives with family in a multi-story home with his bedroom/bathroom located on second floor.    HOME SUPPORT: Patient lived with family but didn't require assistance.    Occupational Therapy Goals:  Initiated 2/14/2024  1.  Patient will perform lower body dressing with Modified [place] within 7 day(s).  2.  Patient will perform grooming with [place] within 7 day(s).  3.  Patient will perform tub transfer with Modified [place] within 7 day(s).  4.  Patient will perform toilet transfers with [place]  within 7 day(s).  5.  Patient will perform all aspects of toileting with [place] within 7 day(s).  6.  Patient will participate in upper extremity therapeutic exercise/activities with Modified [place] for 10 minutes within 7 day(s).    7.  Patient will utilize energy conservation techniques during functional activities with verbal cues within 7 day(s).    Outcome: Progressing   OCCUPATIONAL THERAPY EVALUATION    Patient: Ingrid Kennedy (73 y.o. male)  Date: 2/14/2024  Primary Diagnosis: Heart failure (HCC) [I50.9]  Acute on chronic congestive heart failure, unspecified heart failure type (HCC) [I50.9]  Congestive heart failure, unspecified HF chronicity, unspecified heart failure type (HCC) [I50.9]  Procedure(s) (LRB):  Right heart cath (N/A)       Precautions:                    ASSESSMENT :  The patient is limited by decreased functional mobility, independence in ADLs, high-level IADLs, ROM, strength, activity tolerance, cognition for admission to hospital for acute on chronic systolic heart failure.     Based on the impairments listed above Pt is completing 
  Problem: Occupational Therapy - Adult  Goal: By Discharge: Performs self-care activities at highest level of function for planned discharge setting.  See evaluation for individualized goals.  Description: FUNCTIONAL STATUS PRIOR TO ADMISSION:  Pt was independent with BADLs at baseline using cane for mobility. Pt lives with family in a multi-story home with his bedroom/bathroom located on second floor.    HOME SUPPORT: Patient lived with family but didn't require assistance.    Occupational Therapy Goals:  Initiated 2/14/2024  1.  Patient will perform lower body dressing with Modified [place] within 7 day(s).  2.  Patient will perform grooming with Modified [place] within 7 day(s).  3.  Patient will perform tub transfer with Modified [place] within 7 day(s).  4.  Patient will perform toilet transfers with Modified [place]  within 7 day(s).  5.  Patient will perform all aspects of toileting with Modified [place] within 7 day(s).  6.  Patient will participate in upper extremity therapeutic exercise/activities with Modified [place] for 10 minutes within 7 day(s).    7.  Patient will utilize energy conservation techniques during functional activities with verbal cues within 7 day(s).    Outcome: Progressing   OCCUPATIONAL THERAPY TREATMENT  Patient: Ingrid Kennedy (73 y.o. male)  Date: 2/16/2024  Primary Diagnosis: Heart failure (HCC) [I50.9]  Acute on chronic congestive heart failure, unspecified heart failure type (HCC) [I50.9]  Congestive heart failure, unspecified HF chronicity, unspecified heart failure type (HCC) [I50.9]  Procedure(s) (LRB):  Right heart cath (N/A) 1 Day Post-Op   Precautions:                  Chart, occupational therapy assessment, plan of care, and goals were reviewed.      ASSESSMENT  Patient continues to benefit from skilled OT services and is progressing towards goals. Patient received reclined in bed, amenable to session. Patient completed transfer to EOB to 
Bedside and Verbal shift change report given to CHARLIE White (oncoming nurse) by CHARLIE Garcia (offgoing nurse). Report included the following information SBAR, Kardex, ED Summary, MAR, and Cardiac Rhythm NSR.     2227- Received report from CHARLIE Eugene from ED.  Patient arrived to the floor.  Patient has a R subclavian line from home milrinone drip.  Sagar blood cultures upon arrival to the floor. Signed BC form by GEORGETTE Kauffman.  Dual skin check with CHARLIE Mcduffie.      Problem: Discharge Planning  Goal: Discharge to home or other facility with appropriate resources  Outcome: Progressing  Flowsheets (Taken 2/9/2024 2726)  Discharge to home or other facility with appropriate resources: Identify barriers to discharge with patient and caregiver     Problem: Safety - Adult  Goal: Free from fall injury  Outcome: Progressing     Problem: Pain  Goal: Verbalizes/displays adequate comfort level or baseline comfort level  Outcome: Progressing     
Progressing     Problem: Occupational Therapy - Adult  Goal: By Discharge: Performs self-care activities at highest level of function for planned discharge setting.  See evaluation for individualized goals.  Description: FUNCTIONAL STATUS PRIOR TO ADMISSION:  Pt was independent with BADLs at baseline using cane for mobility. Pt lives with family in a multi-story home with his bedroom/bathroom located on second floor.    HOME SUPPORT: Patient lived with family but didn't require assistance.    Occupational Therapy Goals:  Initiated 2/14/2024  1.  Patient will perform lower body dressing with Modified [place] within 7 day(s).  2.  Patient will perform grooming with Modified [place] within 7 day(s).  3.  Patient will perform tub transfer with Modified [place] within 7 day(s).  4.  Patient will perform toilet transfers with Modified [place]  within 7 day(s).  5.  Patient will perform all aspects of toileting with Modified [place] within 7 day(s).  6.  Patient will participate in upper extremity therapeutic exercise/activities with Modified [place] for 10 minutes within 7 day(s).    7.  Patient will utilize energy conservation techniques during functional activities with verbal cues within 7 day(s).    2/16/2024 1010 by Laisha Fiore, OT  Outcome: Progressing

## 2024-02-19 LAB
ALBUMIN SERPL-MCNC: 3.5 G/DL (ref 3.5–5)
ALBUMIN/GLOB SERPL: 1.2 (ref 1.1–2.2)
ALP SERPL-CCNC: 75 U/L (ref 45–117)
ALT SERPL-CCNC: 21 U/L (ref 12–78)
ANION GAP SERPL CALC-SCNC: 4 MMOL/L (ref 5–15)
AST SERPL-CCNC: 12 U/L (ref 15–37)
BILIRUB SERPL-MCNC: 1 MG/DL (ref 0.2–1)
BUN SERPL-MCNC: 49 MG/DL (ref 6–20)
BUN/CREAT SERPL: 21 (ref 12–20)
CALCIUM SERPL-MCNC: 8.9 MG/DL (ref 8.5–10.1)
CHLORIDE SERPL-SCNC: 99 MMOL/L (ref 97–108)
CO2 SERPL-SCNC: 36 MMOL/L (ref 21–32)
CREAT SERPL-MCNC: 2.29 MG/DL (ref 0.7–1.3)
GLOBULIN SER CALC-MCNC: 2.9 G/DL (ref 2–4)
GLUCOSE SERPL-MCNC: 92 MG/DL (ref 65–100)
MAGNESIUM SERPL-MCNC: 2 MG/DL (ref 1.6–2.4)
NT PRO BNP: 5410 PG/ML
POTASSIUM SERPL-SCNC: 3.4 MMOL/L (ref 3.5–5.1)
PROT SERPL-MCNC: 6.4 G/DL (ref 6.4–8.2)
SODIUM SERPL-SCNC: 139 MMOL/L (ref 136–145)

## 2024-02-19 PROCEDURE — 6370000000 HC RX 637 (ALT 250 FOR IP): Performed by: NURSE PRACTITIONER

## 2024-02-19 PROCEDURE — 36415 COLL VENOUS BLD VENIPUNCTURE: CPT

## 2024-02-19 PROCEDURE — 6370000000 HC RX 637 (ALT 250 FOR IP): Performed by: INTERNAL MEDICINE

## 2024-02-19 PROCEDURE — 2700000000 HC OXYGEN THERAPY PER DAY

## 2024-02-19 PROCEDURE — 83880 ASSAY OF NATRIURETIC PEPTIDE: CPT

## 2024-02-19 PROCEDURE — 2060000000 HC ICU INTERMEDIATE R&B

## 2024-02-19 PROCEDURE — 99233 SBSQ HOSP IP/OBS HIGH 50: CPT | Performed by: INTERNAL MEDICINE

## 2024-02-19 PROCEDURE — 6360000002 HC RX W HCPCS: Performed by: INTERNAL MEDICINE

## 2024-02-19 PROCEDURE — 80053 COMPREHEN METABOLIC PANEL: CPT

## 2024-02-19 PROCEDURE — 6360000002 HC RX W HCPCS: Performed by: NURSE PRACTITIONER

## 2024-02-19 PROCEDURE — 6370000000 HC RX 637 (ALT 250 FOR IP): Performed by: STUDENT IN AN ORGANIZED HEALTH CARE EDUCATION/TRAINING PROGRAM

## 2024-02-19 PROCEDURE — 2500000003 HC RX 250 WO HCPCS: Performed by: HOSPITALIST

## 2024-02-19 PROCEDURE — 83735 ASSAY OF MAGNESIUM: CPT

## 2024-02-19 PROCEDURE — 2580000003 HC RX 258: Performed by: FAMILY MEDICINE

## 2024-02-19 PROCEDURE — 94760 N-INVAS EAR/PLS OXIMETRY 1: CPT

## 2024-02-19 PROCEDURE — 6370000000 HC RX 637 (ALT 250 FOR IP): Performed by: FAMILY MEDICINE

## 2024-02-19 RX ORDER — POTASSIUM CHLORIDE 750 MG/1
40 TABLET, FILM COATED, EXTENDED RELEASE ORAL 2 TIMES DAILY
Status: DISCONTINUED | OUTPATIENT
Start: 2024-02-19 | End: 2024-02-20 | Stop reason: HOSPADM

## 2024-02-19 RX ORDER — BUMETANIDE 1 MG/1
2 TABLET ORAL
Status: DISCONTINUED | OUTPATIENT
Start: 2024-02-19 | End: 2024-02-20 | Stop reason: HOSPADM

## 2024-02-19 RX ORDER — MILRINONE LACTATE 0.2 MG/ML
0.25 INJECTION, SOLUTION INTRAVENOUS CONTINUOUS
Status: DISCONTINUED | OUTPATIENT
Start: 2024-02-19 | End: 2024-02-20 | Stop reason: HOSPADM

## 2024-02-19 RX ORDER — BUMETANIDE 1 MG/1
2 TABLET ORAL EVERY 12 HOURS
Status: DISCONTINUED | OUTPATIENT
Start: 2024-02-19 | End: 2024-02-19

## 2024-02-19 RX ADMIN — SODIUM CHLORIDE, PRESERVATIVE FREE 10 ML: 5 INJECTION INTRAVENOUS at 21:51

## 2024-02-19 RX ADMIN — MAGNESIUM OXIDE TAB 400 MG (241.3 MG ELEMENTAL MG) 400 MG: 400 (241.3 MG) TAB at 08:33

## 2024-02-19 RX ADMIN — APIXABAN 5 MG: 5 TABLET, FILM COATED ORAL at 21:00

## 2024-02-19 RX ADMIN — AMIODARONE HYDROCHLORIDE 100 MG: 200 TABLET ORAL at 08:33

## 2024-02-19 RX ADMIN — ALLOPURINOL 50 MG: 100 TABLET ORAL at 08:32

## 2024-02-19 RX ADMIN — LEVOTHYROXINE SODIUM 75 MCG: 0.07 TABLET ORAL at 08:00

## 2024-02-19 RX ADMIN — SODIUM CHLORIDE, PRESERVATIVE FREE 10 ML: 5 INJECTION INTRAVENOUS at 08:33

## 2024-02-19 RX ADMIN — Medication 1000 UNITS: at 08:33

## 2024-02-19 RX ADMIN — TAMSULOSIN HYDROCHLORIDE 0.4 MG: 0.4 CAPSULE ORAL at 08:33

## 2024-02-19 RX ADMIN — GUAIFENESIN 200 MG: 200 SOLUTION ORAL at 18:15

## 2024-02-19 RX ADMIN — MILRINONE LACTATE IN DEXTROSE 0.3 MCG/KG/MIN: 200 INJECTION, SOLUTION INTRAVENOUS at 02:04

## 2024-02-19 RX ADMIN — METOPROLOL SUCCINATE 12.5 MG: 25 TABLET, EXTENDED RELEASE ORAL at 08:33

## 2024-02-19 RX ADMIN — EMPAGLIFLOZIN 10 MG: 10 TABLET, FILM COATED ORAL at 08:32

## 2024-02-19 RX ADMIN — POTASSIUM CHLORIDE 40 MEQ: 750 TABLET, FILM COATED, EXTENDED RELEASE ORAL at 21:50

## 2024-02-19 RX ADMIN — BUMETANIDE 2 MG: 1 TABLET ORAL at 16:29

## 2024-02-19 RX ADMIN — POTASSIUM CHLORIDE 20 MEQ: 750 TABLET, FILM COATED, EXTENDED RELEASE ORAL at 08:33

## 2024-02-19 RX ADMIN — APIXABAN 5 MG: 5 TABLET, FILM COATED ORAL at 08:33

## 2024-02-19 RX ADMIN — MILRINONE LACTATE IN DEXTROSE 0.25 MCG/KG/MIN: 200 INJECTION, SOLUTION INTRAVENOUS at 19:35

## 2024-02-19 ASSESSMENT — ENCOUNTER SYMPTOMS
VOMITING: 0
COUGH: 0
ABDOMINAL DISTENTION: 0
SHORTNESS OF BREATH: 0
NAUSEA: 0

## 2024-02-19 ASSESSMENT — PAIN SCALES - GENERAL
PAINLEVEL_OUTOF10: 0

## 2024-02-20 ENCOUNTER — TELEPHONE (OUTPATIENT)
Age: 74
End: 2024-02-20

## 2024-02-20 VITALS
SYSTOLIC BLOOD PRESSURE: 97 MMHG | BODY MASS INDEX: 22.03 KG/M2 | WEIGHT: 153.88 LBS | DIASTOLIC BLOOD PRESSURE: 67 MMHG | TEMPERATURE: 98.3 F | RESPIRATION RATE: 22 BRPM | HEIGHT: 70 IN | HEART RATE: 74 BPM | OXYGEN SATURATION: 94 %

## 2024-02-20 PROBLEM — I50.23 ACUTE ON CHRONIC SYSTOLIC (CONGESTIVE) HEART FAILURE (HCC): Status: RESOLVED | Noted: 2024-01-08 | Resolved: 2024-02-20

## 2024-02-20 PROBLEM — I50.9 ACUTE ON CHRONIC CONGESTIVE HEART FAILURE (HCC): Status: RESOLVED | Noted: 2024-02-15 | Resolved: 2024-02-20

## 2024-02-20 LAB
ALBUMIN SERPL-MCNC: 3.2 G/DL (ref 3.5–5)
ALBUMIN/GLOB SERPL: 1.1 (ref 1.1–2.2)
ALP SERPL-CCNC: 77 U/L (ref 45–117)
ALT SERPL-CCNC: 22 U/L (ref 12–78)
ANION GAP SERPL CALC-SCNC: 4 MMOL/L (ref 5–15)
AST SERPL-CCNC: 16 U/L (ref 15–37)
BILIRUB SERPL-MCNC: 0.8 MG/DL (ref 0.2–1)
BUN SERPL-MCNC: 48 MG/DL (ref 6–20)
BUN/CREAT SERPL: 20 (ref 12–20)
CALCIUM SERPL-MCNC: 8.4 MG/DL (ref 8.5–10.1)
CHLORIDE SERPL-SCNC: 99 MMOL/L (ref 97–108)
CO2 SERPL-SCNC: 36 MMOL/L (ref 21–32)
CREAT SERPL-MCNC: 2.38 MG/DL (ref 0.7–1.3)
GLOBULIN SER CALC-MCNC: 3 G/DL (ref 2–4)
GLUCOSE SERPL-MCNC: 88 MG/DL (ref 65–100)
MAGNESIUM SERPL-MCNC: 2 MG/DL (ref 1.6–2.4)
NT PRO BNP: 5754 PG/ML
POTASSIUM SERPL-SCNC: 3.9 MMOL/L (ref 3.5–5.1)
PROT SERPL-MCNC: 6.2 G/DL (ref 6.4–8.2)
SODIUM SERPL-SCNC: 139 MMOL/L (ref 136–145)

## 2024-02-20 PROCEDURE — 94760 N-INVAS EAR/PLS OXIMETRY 1: CPT

## 2024-02-20 PROCEDURE — 6370000000 HC RX 637 (ALT 250 FOR IP): Performed by: NURSE PRACTITIONER

## 2024-02-20 PROCEDURE — 80053 COMPREHEN METABOLIC PANEL: CPT

## 2024-02-20 PROCEDURE — 6370000000 HC RX 637 (ALT 250 FOR IP): Performed by: INTERNAL MEDICINE

## 2024-02-20 PROCEDURE — 83735 ASSAY OF MAGNESIUM: CPT

## 2024-02-20 PROCEDURE — 6360000002 HC RX W HCPCS: Performed by: INTERNAL MEDICINE

## 2024-02-20 PROCEDURE — 2700000000 HC OXYGEN THERAPY PER DAY

## 2024-02-20 PROCEDURE — 2500000003 HC RX 250 WO HCPCS: Performed by: HOSPITALIST

## 2024-02-20 PROCEDURE — 83880 ASSAY OF NATRIURETIC PEPTIDE: CPT

## 2024-02-20 PROCEDURE — 99233 SBSQ HOSP IP/OBS HIGH 50: CPT | Performed by: INTERNAL MEDICINE

## 2024-02-20 PROCEDURE — 6370000000 HC RX 637 (ALT 250 FOR IP): Performed by: FAMILY MEDICINE

## 2024-02-20 PROCEDURE — 6370000000 HC RX 637 (ALT 250 FOR IP): Performed by: STUDENT IN AN ORGANIZED HEALTH CARE EDUCATION/TRAINING PROGRAM

## 2024-02-20 PROCEDURE — 36415 COLL VENOUS BLD VENIPUNCTURE: CPT

## 2024-02-20 PROCEDURE — 2580000003 HC RX 258: Performed by: FAMILY MEDICINE

## 2024-02-20 RX ORDER — METOPROLOL SUCCINATE 25 MG/1
12.5 TABLET, EXTENDED RELEASE ORAL DAILY
Qty: 30 TABLET | Refills: 0 | Status: SHIPPED | OUTPATIENT
Start: 2024-02-21

## 2024-02-20 RX ADMIN — BUMETANIDE 2 MG: 1 TABLET ORAL at 15:31

## 2024-02-20 RX ADMIN — BUMETANIDE 2 MG: 1 TABLET ORAL at 05:20

## 2024-02-20 RX ADMIN — SODIUM CHLORIDE, PRESERVATIVE FREE 10 ML: 5 INJECTION INTRAVENOUS at 10:07

## 2024-02-20 RX ADMIN — EMPAGLIFLOZIN 10 MG: 10 TABLET, FILM COATED ORAL at 10:02

## 2024-02-20 RX ADMIN — MILRINONE LACTATE IN DEXTROSE 0.25 MCG/KG/MIN: 200 INJECTION, SOLUTION INTRAVENOUS at 15:42

## 2024-02-20 RX ADMIN — LEVOTHYROXINE SODIUM 75 MCG: 0.07 TABLET ORAL at 05:20

## 2024-02-20 RX ADMIN — Medication 1000 UNITS: at 10:01

## 2024-02-20 RX ADMIN — TAMSULOSIN HYDROCHLORIDE 0.4 MG: 0.4 CAPSULE ORAL at 10:01

## 2024-02-20 RX ADMIN — ALLOPURINOL 50 MG: 100 TABLET ORAL at 10:01

## 2024-02-20 RX ADMIN — APIXABAN 5 MG: 5 TABLET, FILM COATED ORAL at 10:01

## 2024-02-20 RX ADMIN — MAGNESIUM OXIDE TAB 400 MG (241.3 MG ELEMENTAL MG) 400 MG: 400 (241.3 MG) TAB at 10:01

## 2024-02-20 RX ADMIN — AMIODARONE HYDROCHLORIDE 100 MG: 200 TABLET ORAL at 10:00

## 2024-02-20 RX ADMIN — POTASSIUM CHLORIDE 40 MEQ: 750 TABLET, FILM COATED, EXTENDED RELEASE ORAL at 10:01

## 2024-02-20 RX ADMIN — GUAIFENESIN 200 MG: 200 SOLUTION ORAL at 10:36

## 2024-02-20 ASSESSMENT — PAIN SCALES - GENERAL
PAINLEVEL_OUTOF10: 0
PAINLEVEL_OUTOF10: 0

## 2024-02-20 ASSESSMENT — ENCOUNTER SYMPTOMS
NAUSEA: 0
SHORTNESS OF BREATH: 0
VOMITING: 0
COUGH: 0
ABDOMINAL DISTENTION: 0

## 2024-02-20 NOTE — DISCHARGE SUMMARY
Discharge Summary       PATIENT ID: Ingrid Kennedy  MRN: [id number]   YOB: 1950    DATE OF ADMISSION: 2/9/2024 10:37 AM    DATE OF DISCHARGE: 2/20/24   PRIMARY CARE PROVIDER: Elisa Loyd MD     ATTENDING PHYSICIAN: North Garza MD  DISCHARGING PROVIDER: North Garza MD    To contact this individual call [phone] and ask the  to page.  If unavailable ask to be transferred the Adult Hospitalist Department.    CONSULTATIONS: IP CONSULT TO NEPHROLOGY  IP CONSULT HOME HEALTH  IP CONSULT TO ADVANCED HEART FAILURE NURSE NAVIGATOR  IP CONSULT TO CARDIOLOGY    PROCEDURES/SURGERIES: Procedure(s):  Right heart cath    ADMITTING DIAGNOSES & HOSPITAL COURSE:   Ingrid Kennedy is a 73 y.o. male who was sent from heart failure clinic to the ER due to CHF exacerbation.  Patient with known history of chronic systolic heart failure who has been following with advanced heart failure team.  Patient was seen at the heart failure clinic this morning and patient was noted to be in acute decompensated CHF with dyspnea at rest, increasing edema and unable to sleep due to orthopnea.  Patient was subsequently sent to the emergency room for further evaluation.  Patient presented to the emergency room with stable vital signs, workup shows patient with elevated BNP of over 9000, recent BNP 4 days ago was in 5000 range.  Patient also with elevated troponin of 121 but denies chest pain.  Also noted mild elevated serum creatinine than his baseline.  Patient was already seen by advanced heart failure team in the ER and patient started on Bumex drip.  Hospitalist service requested admit the patient for further evaluation management.     Congestive heart failure  -Acute on chronic HFrEF, NYHA class IV on admission, recent echo with EF of 15 to 20%  -Patient following with heart failure team as outpatient, not a candidate for LVAD due to renal dysfunction  -Patient on chronic milrinone drip,

## 2024-02-20 NOTE — TELEPHONE ENCOUNTER
Fax received from [facility] requesting medical records from Sept 1, 2023. Document faxed to [facility] medical records on 2/20.

## 2024-02-20 NOTE — PROGRESS NOTES
Hospitalist Progress Note  North Garza MD  Answering service: [phone] OR [phone] from in house phone        Date of Service:  2/15/2024  NAME:  Ingrid Kennedy  :  1950  MRN:  [id number]      Admission Summary:   Ingrid Kennedy is a 73 y.o. male who was sent from heart failure clinic to the ER due to CHF exacerbation.  Patient with known history of chronic systolic heart failure who has been following with advanced heart failure team.  Patient was seen at the heart failure clinic this morning and patient was noted to be in acute decompensated CHF with dyspnea at rest, increasing edema and unable to sleep due to orthopnea.  Patient was subsequently sent to the emergency room for further evaluation.  Patient presented to the emergency room with stable vital signs, workup shows patient with elevated BNP of over 9000, recent BNP 4 days ago was in 5000 range.  Patient also with elevated troponin of 121 but denies chest pain.  Also noted mild elevated serum creatinine than his baseline.  Patient was already seen by advanced heart failure team in the ER and patient started on Bumex drip.  Hospitalist service requested admit the patient for further evaluation management.    Interval history / Subjective:   Seen and examined for follow up acute on chronic HF. On milrinone and bumex drips. No acute complaints at this time.      Assessment & Plan:     Acute on chronic systolic HF  -Acute on chronic HFrEF, NYHA class IV on admission, recent echo with EF of 15 to 20%  -Patient following with heart failure team as outpatient, not a candidate for LVAD due to renal dysfunction  -on bumex and milrinone drips   -Not a candidate for ACE/ARB's any due to CKD, continue Toprol and SGLT2  -Monitor intake and output, monitor daily weight.    -on Bumex drip per [facility].   Cr trending up and BP soft  -on 2 LNC, wean as 
                                                                                                Hospitalist Progress Note  North Garza MD  Answering service: [phone] OR [phone] from in house phone        Date of Service:  2024  NAME:  Ingrid Kennedy  :  1950  MRN:  [id number]      Admission Summary:   Ingrid Kennedy is a 73 y.o. male who was sent from heart failure clinic to the ER due to CHF exacerbation.  Patient with known history of chronic systolic heart failure who has been following with advanced heart failure team.  Patient was seen at the heart failure clinic this morning and patient was noted to be in acute decompensated CHF with dyspnea at rest, increasing edema and unable to sleep due to orthopnea.  Patient was subsequently sent to the emergency room for further evaluation.  Patient presented to the emergency room with stable vital signs, workup shows patient with elevated BNP of over 9000, recent BNP 4 days ago was in 5000 range.  Patient also with elevated troponin of 121 but denies chest pain.  Also noted mild elevated serum creatinine than his baseline.  Patient was already seen by advanced heart failure team in the ER and patient started on Bumex drip.  Hospitalist service requested admit the patient for further evaluation management.    Interval history / Subjective:   Seen and examined for follow up acute on chronic HF. No acute events overnight. Walking the halls. Doing well.      Assessment & Plan:     Acute on chronic systolic HF  -Acute on chronic HFrEF, NYHA class IV on admission, recent echo with EF of 15 to 20%  -Patient following with heart failure team as outpatient, not a candidate for LVAD due to renal dysfunction  -on milrinone drip  -Not a candidate for ACE/ARB's any due to CKD, continue Toprol and SGLT2  -Monitor intake and output, monitor daily weight.    -on 2 LNC, wean as tolerated.  Not on home oxygen  -RHC was done   -Not an LVAD candidate sdue to renal 
                                                                                                Hospitalist Progress Note  North Garza MD  Answering service: [phone] OR [phone] from in house phone        Date of Service:  2024  NAME:  Ingrid Kennedy  :  1950  MRN:  [id number]      Admission Summary:   Ingrid Kennedy is a 73 y.o. male who was sent from heart failure clinic to the ER due to CHF exacerbation.  Patient with known history of chronic systolic heart failure who has been following with advanced heart failure team.  Patient was seen at the heart failure clinic this morning and patient was noted to be in acute decompensated CHF with dyspnea at rest, increasing edema and unable to sleep due to orthopnea.  Patient was subsequently sent to the emergency room for further evaluation.  Patient presented to the emergency room with stable vital signs, workup shows patient with elevated BNP of over 9000, recent BNP 4 days ago was in 5000 range.  Patient also with elevated troponin of 121 but denies chest pain.  Also noted mild elevated serum creatinine than his baseline.  Patient was already seen by advanced heart failure team in the ER and patient started on Bumex drip.  Hospitalist service requested admit the patient for further evaluation management.    Interval history / Subjective:   Seen and examined for follow up acute on chronic HF. No acute complaints. Doing well. No SOB or CP.      Assessment & Plan:     Acute on chronic systolic HF  -Acute on chronic HFrEF, NYHA class IV on admission, recent echo with EF of 15 to 20%  -Patient following with heart failure team as outpatient, not a candidate for LVAD due to renal dysfunction  -on milrinone drip, dose increased today   -Not a candidate for ACE/ARB's any due to CKD, continue Toprol and SGLT2  -Monitor intake and output, monitor daily weight.    -on 2 LNC, wean as tolerated.  Not on home oxygen  -RHC was done   -Not an LVAD candidate sdue 
                                                                                                Hospitalist Progress Note  North Garza MD  Answering service: [phone] OR [phone] from in house phone        Date of Service:  2024  NAME:  Ingrid Kennedy  :  1950  MRN:  [id number]      Admission Summary:   Ingrid Kennedy is a 73 y.o. male who was sent from heart failure clinic to the ER due to CHF exacerbation.  Patient with known history of chronic systolic heart failure who has been following with advanced heart failure team.  Patient was seen at the heart failure clinic this morning and patient was noted to be in acute decompensated CHF with dyspnea at rest, increasing edema and unable to sleep due to orthopnea.  Patient was subsequently sent to the emergency room for further evaluation.  Patient presented to the emergency room with stable vital signs, workup shows patient with elevated BNP of over 9000, recent BNP 4 days ago was in 5000 range.  Patient also with elevated troponin of 121 but denies chest pain.  Also noted mild elevated serum creatinine than his baseline.  Patient was already seen by advanced heart failure team in the ER and patient started on Bumex drip.  Hospitalist service requested admit the patient for further evaluation management.    Interval history / Subjective:   Seen and examined for follow up acute on chronic HF. Blood pressure soft but looking ok. No acute complaints. Remains on 2L O2.     Assessment & Plan:     Acute on chronic systolic HF  -Acute on chronic HFrEF, NYHA class IV on admission, recent echo with EF of 15 to 20%  -Patient following with heart failure team as outpatient, not a candidate for LVAD due to renal dysfunction  -on milrinone drip, dose increased today   -Not a candidate for ACE/ARB's any due to CKD, continue Toprol and SGLT2  -Monitor intake and output, monitor daily weight.    -on 2 LNC, wean as tolerated.  Not on home oxygen  -RHC was done 
    [address], [facility] [address]  Phone: [phone]   [phone]       Nephrology Progress Note  Ingrid Kennedy     1950     [id number]  Date of Admission : 2/9/2024  02/15/24    CC:  Follow up forAKI     Assessment and Plan   AMITA on CKD3 - likely cardiorenal in etiology   - Cr stable  - cont inotropes and diuretics  - RHC for today  - daily labs, wts, I/Os     CHF exacerbation/volume overload  - improving on bumex drip  - on milrinone  - RHC this afternoon    HTN  - BP stable     Hypokalemia:  - replete PRN    Anemia  AFIB - currently paced  BPH        Interval History:  Seen and examined.  Cr stable.  Good UOP. Wt improving.  No cp or sob reported.    Review of Systems: A comprehensive review of systems was negative.    Current Medications:   Current Facility-Administered Medications   Medication Dose Route Frequency    potassium chloride 20 mEq/50 mL IVPB (Central Line)  20 mEq IntraVENous Once    magnesium oxide (MAG-OX) tablet 400 mg  400 mg Oral Daily    guaiFENesin (ROBITUSSIN) 100 MG/5ML liquid 200 mg  200 mg Oral Q4H PRN    amiodarone (CORDARONE) tablet 100 mg  100 mg Oral Daily    ALPRAZolam (XANAX) tablet 0.5 mg  0.5 mg Oral 4x Daily PRN    bumetanide (BUMEX) 12.5 mg in 50 mL infusion  0.5 mg/hr IntraVENous Continuous    allopurinol (ZYLOPRIM) tablet 50 mg  50 mg Oral Daily    apixaban (ELIQUIS) tablet 5 mg  5 mg Oral BID    Vitamin D (CHOLECALCIFEROL) tablet 1,000 Units  1,000 Units Oral Daily    empagliflozin (JARDIANCE) tablet 10 mg  10 mg Oral Daily    levothyroxine (SYNTHROID) tablet 75 mcg  75 mcg Oral Daily    metoprolol succinate (TOPROL XL) extended release tablet 12.5 mg  12.5 mg Oral Daily    potassium chloride (KLOR-CON) extended release tablet 40 mEq  40 mEq Oral BID    sodium chloride flush 0.9 % injection 5-40 mL  5-40 mL IntraVENous 2 times per day    sodium chloride flush 0.9 % injection 5-40 mL  5-40 mL IntraVENous PRN    0.9 % 
    [address], [facility] [place] [address]  Phone: [phone]   [phone]       Nephrology Progress Note  Ingrid Kennedy     1950     [id number]  Date of Admission : 2/9/2024 02/14/24    CC:  Follow up forAKI     Assessment and Plan   AMITA on CKD3 - likely cardiorenal in etiology   - Cr stable  - cont inotropes and diuretics  - RHC planned on Friday this week  - daily labs and I/os     CHF exacerbation/volume overload  - agree with Bumex 1 mg/hr as well as Milrinone GGT   - per AHF    HTN  - BP downtrending while on Bumex drip     Hypokalemia - likely from loops  Troponemia  Anemia  AFIB - currently paced  BPH        Interval History:  Seen and examined.  Cr better today.  Voiding ok. No cp or sob reported.    Review of Systems: A comprehensive review of systems was negative.    Current Medications:   Current Facility-Administered Medications   Medication Dose Route Frequency    amiodarone (CORDARONE) tablet 100 mg  100 mg Oral Daily    ALPRAZolam (XANAX) tablet 0.5 mg  0.5 mg Oral 4x Daily PRN    bumetanide (BUMEX) 12.5 mg in 50 mL infusion  0.5 mg/hr IntraVENous Continuous    allopurinol (ZYLOPRIM) tablet 50 mg  50 mg Oral Daily    apixaban (ELIQUIS) tablet 5 mg  5 mg Oral BID    Vitamin D (CHOLECALCIFEROL) tablet 1,000 Units  1,000 Units Oral Daily    empagliflozin (JARDIANCE) tablet 10 mg  10 mg Oral Daily    levothyroxine (SYNTHROID) tablet 75 mcg  75 mcg Oral Daily    metoprolol succinate (TOPROL XL) extended release tablet 12.5 mg  12.5 mg Oral Daily    potassium chloride (KLOR-CON) extended release tablet 40 mEq  40 mEq Oral BID    sodium chloride flush 0.9 % injection 5-40 mL  5-40 mL IntraVENous 2 times per day    sodium chloride flush 0.9 % injection 5-40 mL  5-40 mL IntraVENous PRN    0.9 % sodium chloride infusion   IntraVENous PRN    ondansetron (ZOFRAN-ODT) disintegrating tablet 4 mg  4 mg Oral Q8H PRN    Or    ondansetron (ZOFRAN) injection 4 mg  4 mg 
    [address], [facility] [place] [address]  Phone: [phone]   [phone]       Nephrology Progress Note  Ingrid Kennedy     1950     [id number]  Date of Admission : 2/9/2024  02/10/24    CC:  Follow up forAKI     Assessment and Plan   AMITA on CKD3 - likely cardiorenal in etiology   -sCr of 2.37  - baseline Cr between 1.7 to 2.3  - urine is bland. Previous US suggests patient has medical renal disease     CHF exacerbation/volume overload  - agree with Bumex 1 mg/hr as well as Milrinone GGT   - pro-BNP of 9240  - on home milrinone drip  - cardiology is managing bumex and milrinone     HTN  - BP downtrending while on Bumex drip     Hypokalemia - likely from loops  Troponemia  Anemia  AFIB - currently paced  BPH        Interval History:  Seen and examined. Not making much urine as per pt     Review of Systems: A comprehensive review of systems was negative.    Current Medications:   Current Facility-Administered Medications   Medication Dose Route Frequency    [START ON 2/11/2024] amiodarone (CORDARONE) tablet 100 mg  100 mg Oral Daily    ALPRAZolam (XANAX) tablet 0.5 mg  0.5 mg Oral 4x Daily PRN    bumetanide (BUMEX) 12.5 mg in 50 mL infusion  1 mg/hr IntraVENous Continuous    allopurinol (ZYLOPRIM) tablet 50 mg  50 mg Oral Daily    apixaban (ELIQUIS) tablet 5 mg  5 mg Oral BID    Vitamin D (CHOLECALCIFEROL) tablet 1,000 Units  1,000 Units Oral Daily    empagliflozin (JARDIANCE) tablet 10 mg  10 mg Oral Daily    levothyroxine (SYNTHROID) tablet 75 mcg  75 mcg Oral Daily    metoprolol succinate (TOPROL XL) extended release tablet 12.5 mg  12.5 mg Oral Daily    potassium chloride (KLOR-CON) extended release tablet 40 mEq  40 mEq Oral BID    sodium chloride flush 0.9 % injection 5-40 mL  5-40 mL IntraVENous 2 times per day    sodium chloride flush 0.9 % injection 5-40 mL  5-40 mL IntraVENous PRN    0.9 % sodium chloride infusion   IntraVENous PRN    ondansetron (ZOFRAN-ODT) 
    [address], [facility] [place] [address]  Phone: [phone]   [phone]       Nephrology Progress Note  Ingrid Kennedy     1950     [id number]  Date of Admission : 2/9/2024 02/12/24    CC:  Follow up forAKI     Assessment and Plan   AMITA on CKD3 - likely cardiorenal in etiology   - Cr stable  - cont inotropes and diuretics  - daily labs and I/os     CHF exacerbation/volume overload  - agree with Bumex 1 mg/hr as well as Milrinone GGT   - pro-BNP of 9240  - on home milrinone drip  - per HF team    HTN  - BP downtrending while on Bumex drip     Hypokalemia - likely from loops  Troponemia  Anemia  AFIB - currently paced  BPH        Interval History:  Seen and examined.  Cr stable. Bumex drip off overnight due to hypotension, now restarted.   No cp or increase in SOB    Review of Systems: A comprehensive review of systems was negative.    Current Medications:   Current Facility-Administered Medications   Medication Dose Route Frequency    amiodarone (CORDARONE) tablet 100 mg  100 mg Oral Daily    ALPRAZolam (XANAX) tablet 0.5 mg  0.5 mg Oral 4x Daily PRN    bumetanide (BUMEX) 12.5 mg in 50 mL infusion  0.5 mg/hr IntraVENous Continuous    allopurinol (ZYLOPRIM) tablet 50 mg  50 mg Oral Daily    apixaban (ELIQUIS) tablet 5 mg  5 mg Oral BID    Vitamin D (CHOLECALCIFEROL) tablet 1,000 Units  1,000 Units Oral Daily    empagliflozin (JARDIANCE) tablet 10 mg  10 mg Oral Daily    levothyroxine (SYNTHROID) tablet 75 mcg  75 mcg Oral Daily    metoprolol succinate (TOPROL XL) extended release tablet 12.5 mg  12.5 mg Oral Daily    potassium chloride (KLOR-CON) extended release tablet 40 mEq  40 mEq Oral BID    sodium chloride flush 0.9 % injection 5-40 mL  5-40 mL IntraVENous 2 times per day    sodium chloride flush 0.9 % injection 5-40 mL  5-40 mL IntraVENous PRN    0.9 % sodium chloride infusion   IntraVENous PRN    ondansetron (ZOFRAN-ODT) disintegrating tablet 4 mg  4 mg Oral 
   [facility]     Hospitalist Progress Note    NAME: Ingrid Kennedy   : 1950   MRN: [id number]     Date/Time: 2024 2:57 PM  Patient PCP: Elisa Loyd MD    Admission Summary:   Ingrid Kennedy is a 73 y.o. male who was sent from heart failure clinic to the ER due to CHF exacerbation.  Patient with known history of chronic systolic heart failure who has been following with advanced heart failure team.  Patient was seen at the heart failure clinic this morning and patient was noted to be in acute decompensated CHF with dyspnea at rest, increasing edema and unable to sleep due to orthopnea.  Patient was subsequently sent to the emergency room for further evaluation.  Patient presented to the emergency room with stable vital signs, workup shows patient with elevated BNP of over 9000, recent BNP 4 days ago was in 5000 range.  Patient also with elevated troponin of 121 but denies chest pain.  Also noted mild elevated serum creatinine than his baseline.  Patient was already seen by advanced heart failure team in the ER and patient started on Bumex drip.  Hospitalist service requested admit the patient for further evaluation management.     The patient denies any headache, blurry vision, sore throat, trouble swallowing, trouble with speech, chest pain, SOB, cough, fever, chills, N/V/D, abd pain, urinary symptoms, constipation, recent travels, sick contacts, focal or generalized neurological symptoms, falls, injuries, rashes, contact with COVID-19 diagnosed patients, hematemesis, melena, hemoptysis, hematuria, rashes, denies starting any new medications and denies any other concerns or problems besides as mentioned above.      Pt is on milrinone and bumex GTT    Assessment / Plan:       Acute on chronic systolic HF  -Acute on chronic HFrEF, NYHA class IV on admission, recent echo with EF of 15 to 20%  -Patient following with heart failure team as outpatient, not a candidate 
   [facility]     Hospitalist Progress Note    NAME: Ingrid Kennedy   : 1950   MRN: [id number]     Date/Time: 2/10/2024 10:10 AM  Patient PCP: Elisa Loyd MD    Admission Summary:   Ingrid Kennedy is a 73 y.o. male who was sent from heart failure clinic to the ER due to CHF exacerbation.  Patient with known history of chronic systolic heart failure who has been following with advanced heart failure team.  Patient was seen at the heart failure clinic this morning and patient was noted to be in acute decompensated CHF with dyspnea at rest, increasing edema and unable to sleep due to orthopnea.  Patient was subsequently sent to the emergency room for further evaluation.  Patient presented to the emergency room with stable vital signs, workup shows patient with elevated BNP of over 9000, recent BNP 4 days ago was in 5000 range.  Patient also with elevated troponin of 121 but denies chest pain.  Also noted mild elevated serum creatinine than his baseline.  Patient was already seen by advanced heart failure team in the ER and patient started on Bumex drip.  Hospitalist service requested admit the patient for further evaluation management.     The patient denies any headache, blurry vision, sore throat, trouble swallowing, trouble with speech, chest pain, SOB, cough, fever, chills, N/V/D, abd pain, urinary symptoms, constipation, recent travels, sick contacts, focal or generalized neurological symptoms, falls, injuries, rashes, contact with COVID-19 diagnosed patients, hematemesis, melena, hemoptysis, hematuria, rashes, denies starting any new medications and denies any other concerns or problems besides as mentioned above.      Assessment / Plan:       Acute on chronic systolic HF  -Acute on chronic HFrEF, NYHA class IV on admission, recent echo with EF of 15 to 20%  -Patient following with heart failure team as outpatient, not a candidate for LVAD due to renal 
   [facility]     Hospitalist Progress Note    NAME: Ingrid Kennedy   : 1950   MRN: [id number]     Date/Time: 2024 1:40 PM  Patient PCP: Elisa Loyd MD    Admission Summary:   Ingrid Kennedy is a 73 y.o. male who was sent from heart failure clinic to the ER due to CHF exacerbation.  Patient with known history of chronic systolic heart failure who has been following with advanced heart failure team.  Patient was seen at the heart failure clinic this morning and patient was noted to be in acute decompensated CHF with dyspnea at rest, increasing edema and unable to sleep due to orthopnea.  Patient was subsequently sent to the emergency room for further evaluation.  Patient presented to the emergency room with stable vital signs, workup shows patient with elevated BNP of over 9000, recent BNP 4 days ago was in 5000 range.  Patient also with elevated troponin of 121 but denies chest pain.  Also noted mild elevated serum creatinine than his baseline.  Patient was already seen by advanced heart failure team in the ER and patient started on Bumex drip.  Hospitalist service requested admit the patient for further evaluation management.     The patient denies any headache, blurry vision, sore throat, trouble swallowing, trouble with speech, chest pain, SOB, cough, fever, chills, N/V/D, abd pain, urinary symptoms, constipation, recent travels, sick contacts, focal or generalized neurological symptoms, falls, injuries, rashes, contact with COVID-19 diagnosed patients, hematemesis, melena, hemoptysis, hematuria, rashes, denies starting any new medications and denies any other concerns or problems besides as mentioned above.      Pt is on milrinone and bumex GTT  Patient feels better discussed with advanced heart failure team   RHC tentatively for tomorrow to verify adequate milrinone dosing      Assessment / Plan:       Acute on chronic systolic HF  -Acute on chronic HFrEF, NYHA 
  Physician Progress Note      PATIENT:               CORTEZ MENDIETA  CSN #:                  [id number]  :                       1950  ADMIT DATE:       2024 10:37 AM  DISCH DATE:  RESPONDING  PROVIDER #:        Monique Jessica MD          QUERY TEXT:    Pt admitted with CHF  and has anemia documented. If possible, please document   in progress notes and discharge summary further specificity regarding the   acuity and type of anemia:      The medical record reflects the following:  Risk Factors: CKD  Clinical Indicators:  nephrology-anemia    Treatment: lab monitoring  Thank you  Sherlyn Mccullough RN, CDI, CCDS, CRCR  Certified  Clinical Documentation   O: [phone]  [email or web address]  I can also be reached by perfect serve  Options provided:  -- Anemia due to CKD  -- Other - I will add my own diagnosis  -- Disagree - Not applicable / Not valid  -- Disagree - Clinically unable to determine / Unknown  -- Refer to Clinical Documentation Reviewer    PROVIDER RESPONSE TEXT:    This patient has anemia due to CKD.    Query created by: Sherlyn Mccullough on 2024 1:59 PM      QUERY TEXT:    Patient admitted with CHF, noted to have  atrial fibrillation and is   maintained on eliquis . If possible, please document in progress notes and   discharge summary if you are evaluating and/or treating any of the following:    The medical record reflects the following:  Risk Factors: a fib  Clinical Indicators:   PN  Paroxysmal atrial fibrillation  -On Toprol and amiodarone  -On Eliquis for anticoagulation    Treatment: eliquis  Thank you  Sherlyn Mccullough RN, CDI, CCDS, CRCR  Certified  Clinical Documentation   O: [phone]  [email or web address]  I can also be reached by perfect serve  Options provided:  -- Secondary hypercoagulable state in a patient with atrial fibrillation  -- Other - I will add my own diagnosis  -- Disagree - Not applicable / Not valid  -- 
0730: Bedside and Verbal shift change report given to CHARLIE Kang (oncoming nurse) by AIDE Nielsen  (offgoing nurse). Report included the following information Index, Intake/Output, MAR, Recent Results, Med Rec Status, and Cardiac Rhythm a-paced .    
0730: Bedside shift change report given to CHARLIE Bustillo (oncoming nurse) by CHARLIE George (offgoing nurse). Report included the following information Nurse Handoff Report, MAR, and Recent Results.     1400: Patient down to cath lab.    1615: Patient returned from cath lab. Right brachial site clean, dry, and intact with no bleeding or hematoma noted.    1930: Bedside shift change report given to CHARLIE Jenkins (oncoming nurse) by CHARLIE Bustillo (offgoing nurse). Report included the following information Nurse Handoff Report, MAR, and Recent Results.     
0730: Bedside shift change report given to CHARLIE Bustillo (oncoming nurse) by CHARLIE Jenkins (offgoing nurse). Report included the following information Nurse Handoff Report, MAR, and Recent Results.     0930: BP 86/44, Dr. Major notified. Bumex drip turned off per Dr. Major. Milrinone rate adjusted, see MAR.    1105: BP 84/55. Dr. Major and GEORGETTE Bustillo notified. Orders for albumin received, see MAR.     1300: /68.    1930: Bedside shift change report given to CHARLIE Jenkins (oncoming nurse) by CHARLIE Bustillo (offgoing nurse). Report included the following information Nurse Handoff Report, MAR, and Recent Results.     
0730: Bedside shift change report given to CHARLIE Bustillo (oncoming nurse) by CHARLIE Mcduffie (offgoing nurse). Report included the following information Nurse Handoff Report, MAR, and Recent Results.     0850: Patient refusing bed alarm. Patient educated on importance for calling for assistance when needed.    1800: Updated provided to family at bedside.    1930: Bedside shift change report given to CHARLIE Villalba (oncoming nurse) by CHARLIE Bustillo (offgoing nurse). Report included the following information Nurse Handoff Report, MAR, and Recent Results.     
0840: Dr. Major @ bedside, verbal orders to modify dose of bumex & mil gtt. See MAR for details. RN to enter orders.    1600: Pt admitted with central line, last changed 2/5 per patient. Changed dressing with sterile technique, see flowsheets for details.    Care plan:    Problem: Chronic Conditions and Co-morbidities  Goal: Patient's chronic conditions and co-morbidity symptoms are monitored and maintained or improved  Outcome: Progressing  Flowsheets  Taken 2/10/2024 0745 by Cindy Means RN  Care Plan - Patient's Chronic Conditions and Co-Morbidity Symptoms are Monitored and Maintained or Improved:   Monitor and assess patient's chronic conditions and comorbid symptoms for stability, deterioration, or improvement   Collaborate with multidisciplinary team to address chronic and comorbid conditions and prevent exacerbation or deterioration   Update acute care plan with appropriate goals if chronic or comorbid symptoms are exacerbated and prevent overall improvement and discharge  Taken 2/9/2024 2327 by Radha Haile RN  Care Plan - Patient's Chronic Conditions and Co-Morbidity Symptoms are Monitored and Maintained or Improved: Monitor and assess patient's chronic conditions and comorbid symptoms for stability, deterioration, or improvement     Problem: Discharge Planning  Goal: Discharge to home or other facility with appropriate resources  2/10/2024 0124 by Radha Haile RN  Outcome: Progressing  Flowsheets (Taken 2/9/2024 2327)  Discharge to home or other facility with appropriate resources: Identify barriers to discharge with patient and caregiver     Problem: Safety - Adult  Goal: Free from fall injury  2/10/2024 1236 by Cindy Means RN  Outcome: Progressing  Flowsheets (Taken 2/10/2024 1235)  Free From Fall Injury:   Instruct family/caregiver on patient safety   Based on caregiver fall risk screen, instruct family/caregiver to ask for assistance with transferring infant if caregiver 
0845: Pt BP 81/46, asymptomatic. Bumex gtt stopped. Notified MD Major, hold AM metop & give 25% 12.g albumin. RN to enter orders. See MAR for details.  
1145: Patient complaining of feeling short of breath at rest.  SP02: 93 percent on room air. Patient currently on room air; placed patient on 2 liters for comfort. Will attempt to wean to room air.    1215: Weaned patient to 1 liter of oxygen via NC.    1336: patient is now on room air; no complaints of shortness of breath at this time.    1540: CVC dressing change per hospital protocol. Educated patient on line care at home.     1730: Reviewed discharge instructions with patient and stepdaughter. Opportunity for questions and comments given. Vital signs stable; Stepdaughter applied home Milrinone infusion pump to patient. PIV and telemetry leads removed; patient belonging returned to patient. Patient transported via wheelchair to discharge area to be transported by Step daughter via car.   
ADVANCED HEART FAILURE CENTER  [facility] in [place], [place]  Inpatient Progress Note      Patient name: Ingrid Kennedy  Patient : 1950  Patient MRN: [id number]  Consulting MD: Monique Jessica MD  Date of service: 24    REASON FOR REFERRAL:  Management of heart failure      ASSESSMENT:  Ingrid Kennedy is a 73 y.o. male admitted with acute on chronic systolic heart failure, stage D, NYHA class IV. GDMT limited by renal dysfunction.  EF 15-20%  Discussed at LVAD multidisciplinary meeting 23. Not currently a candidate for LVAD due to renal dysfunction, Creatinine >1.8. On Milrinone as bridge to LVAD candidacy. Still needs further urology evaluation and neurocognitive studies.   Continue IV diuresis. Plan for RHC when euvolemic later this week      INTERVAL HISTORY:  -BP borderline low   -labs K 3.2; creat 1.94; pBNP up to 3921; t bili steady at 1.2  -weight up 2lbs?; I/O incomplete, but large output   -Ingrid Kennedy is feeling a little better. He just got back from walking in the hallway and he felt good.  Leg swelling.  Didn't sleep as well last night, but didn't take a sleeping pill.  No pain or dizziness.          RECOMMENDATIONS:  Medical Therapy for Heart Failure  Beta-blocker: Continue Toprol XL 12.5 mg daily  ACEi/ARB/ARNI: Unable to tolerate due to renal dysfunction  Hydralazine/Nitrate: Unable to tolerate due to hypotension  MRA: Unable to tolerate due to renal dysfunction  SGLT2i: Continue Jardiance 10 mg daily  Other HF drugs: Continue Milrinone 0.3 mcg/kg/min  RHC tentatively for tomorrow to verify adequate milrinone dosing     Volume Management  Continue Bumex drip at 0.5 mg/hr goal net negative 2 liters per day (target weight 155-160 lbs)  Keep K>4 and Mg>2  Fluid restriction to 2000 cc daily, strict I/Os, daily standing weight  Allopurinol 50 mg daily  Plan for RHC when closer to euvolemic.  Tentatively scheduled for Thursday.  Should be ready from volume standpoint. 
ADVANCED HEART FAILURE CENTER  [facility] in [place], [place]  Inpatient Progress Note      Patient name: Ingrid Kennedy  Patient : 1950  Patient MRN: [id number]  Consulting MD: [place], *  Date of service: 24    REASON FOR REFERRAL:  Management of heart failure      ASSESSMENT:  Ingrid Kennedy is a 73 y.o. male admitted with acute on chronic systolic heart failure, stage D, NYHA class IV. GDMT limited by renal dysfunction.  EF 15-20%  Discussed at LVAD multidisciplinary meeting 23. Not currently a candidate for LVAD due to renal dysfunction, Creatinine >1.8. On Milrinone as bridge to LVAD candidacy. Still needs further urology evaluation and neurocognitive studies.       INTERVAL HISTORY:  Hypotensive overnight but MAPs > 60 and symptomatic   Received numerous albumins  Diuretics held  Net Neg 1L  Wt up today but overall down 2lbs from Friday   Labs reviewed- Creatinine up to 2.43      RECOMMENDATIONS:  Medical Therapy for Heart Failure  Beta-blocker: Continue Toprol XL 12.5 mg daily with hold parameters  ACEi/ARB/ARNI: Unable to tolerate due to renal dysfunction  Hydralazine/Nitrate: Unable to tolerate due to hypotension  MRA: Unable to tolerate due to renal dysfunction  SGLT2i: Continue Jardiance 10 mg daily  Other HF drugs:  Increase Milrinone back to 0.3 mcg/kg/min for increasing renal function     Volume Management   PO bumex 2mg BID-  goal net negative 2 liters per day (target weight 155-160 lbs)- hold today   Keep K>4 and Mg>2  Fluid restriction to 2000 cc daily, strict I/Os, daily standing weight  Allopurinol 50 mg daily  Albumin PRN    Anticoagulation and Antiplatelet Therapy  Anticoagulation with Apixaban for PAF    Antiarrhythmic and Device Therapy  Continue amiodarone 100 mg daily, check thyroid profile q3mos and CXR/PFT/DLCO annually   ICD interrogation per routine    CKD  Avoid nephrotoxic agents.  Follow with diuresis   Appreciate nephrology 
ADVANCED HEART FAILURE CENTER  [facility] in [place], [place]  Inpatient Progress Note      Patient name: Ingrid Kennedy  Patient : 1950  Patient MRN: [id number]  Consulting MD: [place], *  Date of service: 24    REASON FOR REFERRAL:  Management of heart failure      ASSESSMENT:  Ingrid Kennedy is a 73 y.o. male admitted with acute on chronic systolic heart failure, stage D, NYHA class IV. GDMT limited by renal dysfunction.  EF 15-20%  Discussed at LVAD multidisciplinary meeting 23. Not currently a candidate for LVAD due to renal dysfunction, Creatinine >1.8, RV dysfunction, and neurocognitive dysfunction     INTERVAL HISTORY:  No acute issues overnight  HDS  Wt 153-->160-->153  Labs reviewed- Creatinine 2.29-->2.38, BNP stable at 5,700  Feels good today, ready to go home    RECOMMENDATIONS:  Medical Therapy for Heart Failure  Beta-blocker: Continue Toprol XL 12.5 mg daily with hold parameters  ACEi/ARB/ARNI: Unable to tolerate due to renal dysfunction  Hydralazine/Nitrate: Unable to tolerate due to hypotension  MRA: Unable to tolerate due to renal dysfunction  SGLT2i: Continue Jardiance 10 mg daily  Other HF drugs:  Continue milrinone at 0.25 mcg/kg/min     Volume Management  Continue PO bumex 2mg BID  Keep K>4 and Mg>2  Fluid restriction to 2000 cc daily, strict I/Os, daily standing weight  Allopurinol 50 mg daily  Albumin PRN    Anticoagulation and Antiplatelet Therapy  Anticoagulation with Apixaban for PAF    Antiarrhythmic and Device Therapy  Continue amiodarone 100 mg daily, check thyroid profile q3mos and CXR/PFT/DLCO annually   ICD interrogation per routine    CKD  Avoid nephrotoxic agents.  Follow with diuresis   Appreciate nephrology recs    Laboratory and Imaging Studies  Echo reviewed  ECG Reviewed  Labs: CMP, NT pro-BNP daily    Physical activity and education  Ambulate daily  Heart failure education by nurse navigator  Advanced care plan and document 
CATH LAB to RECOVERY ROOM REPORT    Procedure: [facility]    MD:  Satish    The procedure was diagnostic only.    Verbal Report given to Recovery Nurse on patient being transferred to Cardiac Cath Lab  for routine post-op. Patient stable upon transfer to .  Vitals, mental status, MAR, procedural summary discussed with recovery RN.    Medication given during procedure:                                              Sheaths:    Right brachial vein sheath pulled at 1525 pm, secured with a band-aid.           
Cardiac Cath Lab Procedure Area Arrival Note:    Ingrid Kennedy arrived to Cardiac Cath Lab, Procedure Area. Patient identifiers verified with NAME and DATE OF BIRTH. Procedure verified with patient. Consent forms verified. Allergies verified. Patient informed of procedure and plan of care. Questions answered with review. Patient voiced understanding of procedure and plan of care.    Patient on cardiac monitor, non-invasive blood pressure, SPO2 monitor. On NC 2L.  IV of NS on pump at 25 ml/hr. Patient status doing well without problems. Patient is A&Ox 4. Patient reports no chest pain.     Patient medicated during procedure with orders obtained and verified by Dr. Covarrubias.    Refer to patients Cardiac Cath Lab PROCEDURE REPORT for vital signs, assessment, status, and response during procedure, printed at end of case. Printed report on chart or scanned into chart.   
Cath Lab Recovery Room Arrival Note    Patient arrived to Cardiac Cath/EP Lab Recovery Room for  RHC Procedure. Staff introduced to patient. Patient identifiers verified with Name and Date of Birth. Procedure verified with patient. Consent forms reviewed and signed by patient or authorized representative and verified. Allergies verified. Patient informed of procedure and plan of care. Questions answered with review.     Patient on cardiac monitor, non-invasive blood pressure, SPO2 monitor. On 2L via NC. Patient is A&Ox4. Patient reports no complaints/no distress.     
Comprehensive Nutrition Assessment    Type and Reason for Visit: Initial (LOS)    Nutrition Recommendations/Plan:   Continue 3 Carb Choice, 2gm Na+ diet order  Will order chocolate Ensure Compact BID per pt request       Malnutrition Assessment:  Malnutrition Status:  No malnutrition (02/16/24 1410)         Nutrition Assessment:    72 yo male admitted for acute on chronic CHF.  Pmhx: CHF, CKD stage III, cardiomyopathy, HTN, hypothyroid.  Cardiology notes pt is not an LVAD candidate due to renal dysfunction.     Seeing pt for LOS.  Spoke with pt at bedside.  He reports eating > 50% all meals.  Intakes documented by nursing support this: % meals.  Pt asking for strawberry Ensure but then later requesting the \"small Ensures\".  Informed him we do not carry Ensure compact in [place], he is agreeable to chocolate BID.  States he drinks 2 Ensures/day at home.  He reports weight fluctuations PTA due to fluid.  Weight hx in EMR confirms this.      Labs: K+ 3.1        Nutritionally Significant Medications:  Milrinone, Amiodarone, Bumex, Jardiance, Synthroid, MgOx, Kcl, Vit D      Estimated Daily Nutrient Needs:  Energy Requirements Based On: Kcal/kg  Weight Used for Energy Requirements: Current  Energy (kcal/day): 2258-7650 (25-30 kcals/kg)  Weight Used for Protein Requirements: Current  Protein (g/day): 91 (1.3 gm/kg)  Method Used for Fluid Requirements: 1 ml/kcal  Fluid (ml/day): 1770    Nutrition Related Findings:   Edema: Right lower extremity, Left lower extremity            RLE Edema: Trace  LLE Edema: Trace    Last BM: 02/14/24    Wounds:   Wound Type: None      Current Nutrition Therapies:  Diet: 3 Carb Choice, 2gm Na+  Supplements: none  Meal Intake:   Patient Vitals for the past 168 hrs:   PO Meals Eaten (%)   02/16/24 0936 51 - 75%   02/14/24 1913 51 - 75%   02/14/24 1257 76 - 100%   02/14/24 1211 0%   02/14/24 0848 76 - 100%   02/13/24 0900 76 - 100%   02/13/24 0455 0%   02/12/24 2305 0%   02/12/24 2000 
[facility] tentatively scheduled with Dr. Covarrubias for Thursday at 9:30AM.    
Hospital follow-up PCP transitional care appointment has been scheduled with Dr. Elisa Loyd for February 22nd, 2024 at 1:45 pm..  Pending patient discharge.  Elisa Schaefer, Care Management Assistant   
Occupational Therapy  02/14/24    Orders received, chart reviewed and patient evaluated by occupational therapy. Pending progression with skilled acute occupational therapy, recommend:  Therapy 2 days/week in the home    Recommend with nursing patient to complete as able in order to maintain strength, endurance and independence: OOB to chair 3x/day, ADLs with supervision/setup and mobilizing to the bathroom for toileting with cane and assist. Thank you for your assistance.     Full evaluation to follow.   Gloria Kinsey, OT    
Occupational Therapy  02/15/24    Chart review completed and RN cleared pt for therapy. Pt currently leaving for cath lab. Will follow up as able and appropriate.     Thank you  Marley Salazar, OTR/L    
Occupational Therapy:  2/19/24    Chart reviewed, attempted to see pt for ot treatment session. Pt received sleeping soundly, pt woke to this therapists voice deferring therapy at this time asking to sleep.    Will defer for now and attempt back later as able/medically appropriate.     Thank you,  Alice Walls OTMARIA DE JESUS, OTR/L  
Patient is well known to our service, he was seen in [facility] clinic this morning and sent to ED for volume overload and failure of outpatient diuresis. Will start a bumex gtt at 0.5mg/hr and monitor response and renal function. Trend HF labs daily. Will continue inotropic support for now.    
Referral source:   Ingrid Kennedy at [facility] in [facility] 4 CV SERVICES UNIT.  attended rounds in the [facility] as part of the Interdisciplinary team where the patient's ongoing care was discussed. I reviewed the medical record as part of this encounter.     Outcome: Interdisciplinary team are aware of  availability and were encouraged to request Spiritual Health support as needed.      The  on-call can be reached at ([phone]).     Rev. Michelle Bautista MDiv, [facility]  Staff       
Referral source:   Ingrid Kennedy at [facility] in [facility] 4 CV SERVICES UNIT.  attended rounds in the CCU as part of the Interdisciplinary team where the patient's ongoing care was discussed. I reviewed the medical record as part of this encounter.     Outcome: Interdisciplinary team are aware of  availability and were encouraged to request Spiritual Health support as needed.      The  on-call can be reached at [phone]).     Rev. Michelle Bautista MDiv, [facility]  Staff   
Renal function stable  Will check back Monday  Call with any issues over the weekend  
TRANSFER - IN REPORT:    Verbal report received from CHARLIE Avila on Ingrid Kennedy  being received from cath lab for routine post-op. Report consisted of patient’s Situation, Background, Assessment and Recommendations(SBAR). Information from the following report(s) SBAR, Procedure Summary, Intake/Output, MAR, Recent Results, Med Rec Status, and Cardiac Rhythm sinus with PVCs  was reviewed with the receiving clinician. Opportunity for questions and clarification was provided. Assessment completed upon patient’s arrival to Cardiac Cath Lab RECOVERY AREA and care assumed.       Cardiac Cath Lab Recovery Arrival Note:    Ingrid Kennedy arrived to [facility] recovery area.  Patient procedure= RHC. Patient on cardiac monitor, non-invasive blood pressure, SPO2 monitor. On room air.  IV  of NS on pump at 25 ml/hr. Patient status doing well without problems, no fever, normal elimination, normal activities. Patient is A&Ox 4. Patient reports no complaints.    PROCEDURE SITE CHECK:    Procedure site:without any bleeding or hematoma, no pain/discomfort reported at procedure site.     No change in patient status. Continue to monitor patient and status.   
TRANSFER - OUT REPORT:    Verbal report given to CHARLIE Bustillo on Ingrid Kennedy  being transferred to [facility] for routine progression of patient care       Report consisted of patient's Situation, Background, Assessment and   Recommendations(SBAR).     Information from the following report(s) Nurse Handoff Report, Surgery Report, Intake/Output, MAR, Recent Results, Med Rec Status, and Cardiac Rhythm Sinus with BBB and PVCs  was reviewed with the receiving nurse.           Lines:   CVC Double Lumen 01/17/24 Right Subclavian (Active)   Central Line Being Utilized Yes 02/15/24 0900   Criteria for Appropriate Use Long term IV/antibiotic administration 02/15/24 0900   Site Assessment Clean, dry & intact 02/15/24 0900   Phlebitis Assessment No symptoms 02/15/24 0900   Infiltration Assessment 0 02/15/24 0900   Color/Movement/Sensation Capillary refill less than 3 sec 02/15/24 0900   Proximal Lumen Color/Status Purple;Infusing 02/15/24 0900   Distal Lumen Color/Status Red;Flushed;Alcohol cap applied 02/15/24 0900   Line Care Connections checked and tightened 02/15/24 0900   Alcohol Cap Used Yes 02/15/24 0900   Date of Last Dressing Change 02/13/24 02/15/24 0900   Dressing Type Transparent w/CHG gel 02/15/24 0900   Dressing Status Clean, dry & intact 02/15/24 0900   Dressing Intervention Dressing changed;New 02/13/24 1200       Peripheral IV 02/09/24 Left;Posterior Forearm (Active)   Site Assessment Clean, dry & intact 02/15/24 0900   Line Status Infusing 02/15/24 0900   Line Care Connections checked and tightened 02/15/24 0900   Phlebitis Assessment No symptoms 02/15/24 0900   Infiltration Assessment 0 02/15/24 0900   Alcohol Cap Used Yes 02/15/24 0900   Dressing Status Clean, dry & intact 02/15/24 0900   Dressing Type Transparent 02/15/24 0900        Opportunity for questions and clarification was provided.      Patient transported with:  Monitor and Registered Nurse, cell phone, chart       
(ZOFRAN) injection 4 mg  4 mg IntraVENous Q6H PRN    polyethylene glycol (GLYCOLAX) packet 17 g  17 g Oral Daily PRN    acetaminophen (TYLENOL) tablet 650 mg  650 mg Oral Q6H PRN    Or    acetaminophen (TYLENOL) suppository 650 mg  650 mg Rectal Q6H PRN    tamsulosin (FLOMAX) capsule 0.4 mg  0.4 mg Oral Daily    milrinone (PRIMACOR) 20 mg in dextrose 5 % 100 mL infusion  0.3 mcg/kg/min (Order-Specific) IntraVENous Continuous      No Known Allergies    Objective:  Vitals:    Vitals:    02/13/24 0755 02/13/24 0900 02/13/24 0957 02/13/24 1000   BP:  102/60  108/67   Pulse: 70  86    Resp:       Temp:  98 °F (36.7 °C)     TempSrc:  Oral     SpO2:       Weight:       Height:         Intake and Output:  02/13 0701 - 02/13 1900  In: 505.7 [P.O.:480; I.V.:25.7]  Out: 1820 [Urine:1820]  02/11 1901 - 02/13 0700  In: 2324 [P.O.:2040; I.V.:284]  Out: 4225 [Urine:4225]    Physical Examination:  Pt intubated     No  General: NAD,Conversant   Neck:  Supple, no mass  Resp:  Lungs CTA B/L, no wheezing , normal respiratory effort  CV:  RRR,  no murmur or rub,1+LE edema  GI:  Soft, NT, + Bowel sounds, no hepatosplenomegaly  Neurologic:  Non focal  Psych:             AAO x 3 appropriate affect   Skin:  No Rash  :  No     []    High complexity decision making was performed  []    Patient is at high-risk of decompensation with multiple organ involvement    Lab Data Personally Reviewed: I have reviewed all the pertinent labs, microbiology data and radiology studies during assessment.    Recent Labs     02/11/24  0144 02/12/24  0006 02/13/24  0503    145 144   K 3.7 3.5 3.4*   * 110* 109*   CO2 27 27 31   BUN 54* 51* 41*   MG 1.9 1.8 2.1   ALT 78 69 56       No results for input(s): \"WBC\", \"HGB\", \"HCT\", \"PLT\" in the last 72 hours.    No results found for: \"SDES\"  No components found for: \"CULT\"  Recent Results (from the past 24 hour(s))   Comprehensive Metabolic Panel    Collection Time: 02/13/24  5:03 AM   Result Value Ref 
IntraVENous 2 times per day    sodium chloride flush 0.9 % injection 5-40 mL  5-40 mL IntraVENous PRN    0.9 % sodium chloride infusion   IntraVENous PRN    ondansetron (ZOFRAN-ODT) disintegrating tablet 4 mg  4 mg Oral Q8H PRN    Or    ondansetron (ZOFRAN) injection 4 mg  4 mg IntraVENous Q6H PRN    polyethylene glycol (GLYCOLAX) packet 17 g  17 g Oral Daily PRN    acetaminophen (TYLENOL) tablet 650 mg  650 mg Oral Q6H PRN    Or    acetaminophen (TYLENOL) suppository 650 mg  650 mg Rectal Q6H PRN    tamsulosin (FLOMAX) capsule 0.4 mg  0.4 mg Oral Daily      No Known Allergies    Objective:  Vitals:    Vitals:    02/19/24 0554 02/19/24 0739 02/19/24 0833 02/19/24 1000   BP:  (!) 100/58 114/66    Pulse: 66 71 74 76   Resp:  16     Temp:  98 °F (36.7 °C)     TempSrc:  Oral     SpO2:  98%     Weight:       Height:         Intake and Output:  02/19 0701 - 02/19 1900  In: 250 [P.O.:250]  Out: 200 [Urine:200]  02/17 1901 - 02/19 0700  In: 936.6 [P.O.:800; I.V.:136.6]  Out: 1250 [Urine:1250]    Physical Examination:  Pt intubated     No  General: NAD,Conversant   Neck:  Supple, no mass  Resp:  Lungs CTA B/L, no wheezing , normal respiratory effort  CV:  RRR,  no murmur or rub,1+LE edema  GI:  Soft, NT, + Bowel sounds, no hepatosplenomegaly  Neurologic:  Non focal  Psych:             AAO x 3 appropriate affect   Skin:  No Rash  :  No     []    High complexity decision making was performed  []    Patient is at high-risk of decompensation with multiple organ involvement    Lab Data Personally Reviewed: I have reviewed all the pertinent labs, microbiology data and radiology studies during assessment.    Recent Labs     02/17/24  0411 02/18/24  0409 02/19/24  0422    137 139   K 2.8* 4.0 3.4*   CL 96* 95* 99   CO2 37* 37* 36*   BUN 38* 46* 49*   MG 1.8 1.8 2.0   ALT 35 24 21       No results for input(s): \"WBC\", \"HGB\", \"HCT\", \"PLT\" in the last 72 hours.    No results found for: \"SDES\"  No components found for: 
dysfunction  -Patient on chronic milrinone drip, continue  -Not a candidate for ACE/ARB's any due to CKD, continue Toprol and SGLT2  -Monitor intake and output, monitor daily weight.  Weight down to 162 from 171 lbs.  Some pedal edema, basal rales  -on Bumex drip per [facility].   Cr trending up and BP soft  -on 2.5LNC, wean as tolerated.  Not on home oxygen  -AHF planning RHC once euvolemic     AMITA on CKD stage III  -Likely cardiorenal syndrome due to CHF.  Cr bump 2.31 from recent baseline 1.6-1.9  -on bumex infusion, follow renal function closely     Elevated troponin (121)  -Likely demand ischemia in the setting of CHF and low troponin clearance from renal dysfunction  -No clinical signs symptoms of acute coronary syndrome     Hypertension  -Continue Toprol  -Monitor blood pressure     Dyslipidemia  -LDL controlled, not on statin     Paroxysmal atrial fibrillation  -On Toprol and amiodarone  -On Eliquis for anticoagulation     Hypothyroidism  -Continue Synthroid     BPH  -Continue Flomax     Obstructive sleep apnea  -On CPAP nightly     Estimated length of stay is 2 midnights.     DIET: ADULT DIET; Regular; Low Sodium (2 gm)   ISOLATION PRECAUTIONS: No active isolations  CODE STATUS: Full Code   Central Line:     DVT PROPHYLAXIS: Eliquis  FUNCTIONAL STATUS PRIOR TO HOSPITALIZATION: Fully active and ambulatory; able to carry on all self-care without restriction.  Ambulatory status/function: By self   EARLY MOBILITY ASSESSMENT: Recommend an assessment from physical therapy and/or occupational therapy  ANTICIPATED DISCHARGE: 24-48 hours.  ANTICIPATED DISPOSITION: Home  EMERGENCY CONTACT/SURROGATE DECISION MAKER:             Medical Decision Making:   I personally reviewed labs:  CBC, BMP  I personally reviewed imaging: CXR  Toxic drug monitoring:   Discussed case with: , lead RN during today's interdisciplinary rounds.      Subjective:     Chief Complaint / Reason for Physician Visit  Follow up of CHF, 
Labs     02/18/24  0409 02/19/24  0422 02/20/24  0530    139 139   K 4.0 3.4* 3.9   CL 95* 99 99   CO2 37* 36* 36*   BUN 46* 49* 48*   MG 1.8 2.0 2.0   ALT 24 21 22       No results for input(s): \"WBC\", \"HGB\", \"HCT\", \"PLT\" in the last 72 hours.    No results found for: \"SDES\"  No components found for: \"CULT\"  Recent Results (from the past 24 hour(s))   Comprehensive Metabolic Panel    Collection Time: 02/20/24  5:30 AM   Result Value Ref Range    Sodium 139 136 - 145 mmol/L    Potassium 3.9 3.5 - 5.1 mmol/L    Chloride 99 97 - 108 mmol/L    CO2 36 (H) 21 - 32 mmol/L    Anion Gap 4 (L) 5 - 15 mmol/L    Glucose 88 65 - 100 mg/dL    BUN 48 (H) 6 - 20 MG/DL    Creatinine 2.38 (H) 0.70 - 1.30 MG/DL    Bun/Cre Ratio 20 12 - 20      Est, Glom Filt Rate 28 (L) >60 ml/min/1.73m2    Calcium 8.4 (L) 8.5 - 10.1 MG/DL    Total Bilirubin 0.8 0.2 - 1.0 MG/DL    ALT 22 12 - 78 U/L    AST 16 15 - 37 U/L    Alk Phosphatase 77 45 - 117 U/L    Total Protein 6.2 (L) 6.4 - 8.2 g/dL    Albumin 3.2 (L) 3.5 - 5.0 g/dL    Globulin 3.0 2.0 - 4.0 g/dL    Albumin/Globulin Ratio 1.1 1.1 - 2.2     Brain Natriuretic Peptide    Collection Time: 02/20/24  5:30 AM   Result Value Ref Range    NT Pro-BNP 5,754 (H) <125 PG/ML   Magnesium    Collection Time: 02/20/24  5:30 AM   Result Value Ref Range    Magnesium 2.0 1.6 - 2.4 mg/dL           Total time spent with patient:  xxx   min.                               Care Plan discussed with:  Patient     Family      RN      Consulting Physician /Specialist        I have reviewed the flowsheets.  Chart and Pertinent Notes have been reviewed.   No change in PMH ,family and social history from Consult note.      Judy Marcial, MARYCRUZ - NP    
for: \"CULT\"  Recent Results (from the past 24 hour(s))   Cardiac procedure    Collection Time: 02/15/24  3:26 PM   Result Value Ref Range    Body Surface Area 1.91 m2   Comprehensive Metabolic Panel    Collection Time: 02/16/24  4:04 AM   Result Value Ref Range    Sodium 139 136 - 145 mmol/L    Potassium 3.1 (L) 3.5 - 5.1 mmol/L    Chloride 100 97 - 108 mmol/L    CO2 35 (H) 21 - 32 mmol/L    Anion Gap 4 (L) 5 - 15 mmol/L    Glucose 125 (H) 65 - 100 mg/dL    BUN 31 (H) 6 - 20 MG/DL    Creatinine 1.94 (H) 0.70 - 1.30 MG/DL    Bun/Cre Ratio 16 12 - 20      Est, Glom Filt Rate 36 (L) >60 ml/min/1.73m2    Calcium 8.3 (L) 8.5 - 10.1 MG/DL    Total Bilirubin 1.1 (H) 0.2 - 1.0 MG/DL    ALT 35 12 - 78 U/L    AST 21 15 - 37 U/L    Alk Phosphatase 106 45 - 117 U/L    Total Protein 6.3 (L) 6.4 - 8.2 g/dL    Albumin 2.8 (L) 3.5 - 5.0 g/dL    Globulin 3.5 2.0 - 4.0 g/dL    Albumin/Globulin Ratio 0.8 (L) 1.1 - 2.2     Brain Natriuretic Peptide    Collection Time: 02/16/24  4:04 AM   Result Value Ref Range    NT Pro-BNP 7,347 (H) <125 PG/ML   Magnesium    Collection Time: 02/16/24  4:04 AM   Result Value Ref Range    Magnesium 1.9 1.6 - 2.4 mg/dL   CBC with Auto Differential    Collection Time: 02/16/24  4:04 AM   Result Value Ref Range    WBC 3.3 (L) 4.1 - 11.1 K/uL    RBC 3.69 (L) 4.10 - 5.70 M/uL    Hemoglobin 10.4 (L) 12.1 - 17.0 g/dL    Hematocrit 30.3 (L) 36.6 - 50.3 %    MCV 82.1 80.0 - 99.0 FL    MCH 28.2 26.0 - 34.0 PG    MCHC 34.3 30.0 - 36.5 g/dL    RDW 17.3 (H) 11.5 - 14.5 %    Platelets 162 150 - 400 K/uL    MPV 10.4 8.9 - 12.9 FL    Nucleated RBCs 0.0 0  WBC    nRBC 0.00 0.00 - 0.01 K/uL    Neutrophils % 55 32 - 75 %    Lymphocytes % 26 12 - 49 %    Monocytes % 17 (H) 5 - 13 %    Eosinophils % 1 0 - 7 %    Basophils % 1 0 - 1 %    Immature Granulocytes 0 0.0 - 0.5 %    Neutrophils Absolute 1.9 1.8 - 8.0 K/UL    Lymphocytes Absolute 0.9 0.8 - 3.5 K/UL    Monocytes Absolute 0.6 0.0 - 1.0 K/UL    Eosinophils 
  EENT:  EOMI. Anicteric sclerae. MMM  Resp:  Diminished BS bases.  No accessory muscle use  CV:  Regular  rhythm,  +LE pitting edema  GI:  Soft, Non distended, Non tender.  +Bowel sounds  Neurologic:  Alert and oriented X 3, normal speech,   Psych:   Good insight. Not anxious nor agitated  Skin:  No rashes.  No jaundice      Central Line: CVC Double Lumen 01/17/24 Right Subclavian-Central Line Being Utilized:  (no change to previous assessment)      ________________________________________________________________________  Care Plan discussed with:    Comments   Patient x    Family      RN     Care Manager     Consultant                        Multidiciplinary team rounds were held today with , nursing, pharmacist and clinical coordinator.  Patient's plan of care was discussed; medications were reviewed and discharge planning was addressed.     ________________________________________________________________________  Total NON critical care TIME:   35  Minutes    Total CRITICAL CARE TIME Spent:   Minutes non procedure based      Comments   >50% of visit spent in counseling and coordination of care x     This includes time during multidisciplinary rounds if indicated above   ________________________________________________________________________  Monique Jessica MD     Procedures: see electronic medical records for all procedures/Xrays and details which were not copied into this note but were reviewed prior to creation of Plan.      LABS:  I reviewed today's most current labs and imaging studies.  Pertinent labs include:  No results for input(s): \"WBC\", \"HGB\", \"HCT\", \"PLT\" in the last 72 hours.    Recent Labs     02/11/24  0144 02/12/24  0006 02/13/24  0503    145 144   K 3.7 3.5 3.4*   * 110* 109*   CO2 27 27 31   BUN 54* 51* 41*   MG 1.9 1.8 2.1   ALT 78 69 56       
clinical care. Care coordination discussions were held with appropriate clinical/nonclinical/ nursing providers based on care coordination needs.     Labs:   No results for input(s): \"WBC\", \"HGB\", \"HCT\", \"PLT\" in the last 72 hours.    Recent Labs     02/17/24 0411 02/18/24 0409 02/19/24 0422    137 139   K 2.8* 4.0 3.4*   CL 96* 95* 99   CO2 37* 37* 36*   BUN 38* 46* 49*   MG 1.8 1.8 2.0       Recent Labs     02/17/24 0411 02/18/24 0409 02/19/24  0422   ALT 35 24 21   GLOB 3.6 3.1 2.9       No results for input(s): \"INR\", \"APTT\" in the last 72 hours.    Invalid input(s): \"PTP\"   No results for input(s): \"TIBC\", \"FERR\" in the last 72 hours.    Invalid input(s): \"FE\", \"PSAT\"   No results found for: \"FOL\", \"RBCF\"   No results for input(s): \"PH\", \"PCO2\", \"PO2\" in the last 72 hours.  No results for input(s): \"CPK\" in the last 72 hours.    Invalid input(s): \"CPKMB\", \"CKNDX\", \"TROIQ\"  Lab Results   Component Value Date/Time    CHOL 126 01/09/2024 05:37 AM    HDL 48 01/09/2024 05:37 AM     No results found for: \"GLUCPOC\"        Medications Reviewed:     Current Facility-Administered Medications   Medication Dose Route Frequency    milrinone (PRIMACOR) 20 mg in dextrose 5 % 100 mL infusion  0.3 mcg/kg/min IntraVENous Continuous    potassium chloride (KLOR-CON) extended release tablet 20 mEq  20 mEq Oral BID    [Held by provider] bumetanide (BUMEX) tablet 2 mg  2 mg Oral BID    magnesium oxide (MAG-OX) tablet 400 mg  400 mg Oral Daily    guaiFENesin (ROBITUSSIN) 100 MG/5ML liquid 200 mg  200 mg Oral Q4H PRN    amiodarone (CORDARONE) tablet 100 mg  100 mg Oral Daily    ALPRAZolam (XANAX) tablet 0.5 mg  0.5 mg Oral 4x Daily PRN    allopurinol (ZYLOPRIM) tablet 50 mg  50 mg Oral Daily    apixaban (ELIQUIS) tablet 5 mg  5 mg Oral BID    Vitamin D (CHOLECALCIFEROL) tablet 1,000 Units  1,000 Units Oral Daily    empagliflozin (JARDIANCE) tablet 10 mg  10 mg Oral Daily    levothyroxine (SYNTHROID) tablet 75 mcg  75 mcg 
coordination needs.     Labs:     Recent Labs     02/16/24 0404   WBC 3.3*   HGB 10.4*   HCT 30.3*        Recent Labs     02/14/24  0337 02/15/24  0423 02/16/24  0404    140 139   K 3.2* 3.1* 3.1*    105 100   CO2 30 31 35*   BUN 40* 31* 31*   MG 1.9 1.7 1.9       Recent Labs     02/14/24  0337 02/15/24  0423 02/16/24  0404   ALT 53 44 35   GLOB 3.3 3.0 3.5       No results for input(s): \"INR\", \"APTT\" in the last 72 hours.    Invalid input(s): \"PTP\"   No results for input(s): \"TIBC\", \"FERR\" in the last 72 hours.    Invalid input(s): \"FE\", \"PSAT\"   No results found for: \"FOL\", \"RBCF\"   No results for input(s): \"PH\", \"PCO2\", \"PO2\" in the last 72 hours.  No results for input(s): \"CPK\" in the last 72 hours.    Invalid input(s): \"CPKMB\", \"CKNDX\", \"TROIQ\"  Lab Results   Component Value Date/Time    CHOL 126 01/09/2024 05:37 AM    HDL 48 01/09/2024 05:37 AM     No results found for: \"GLUCPOC\"        Medications Reviewed:     Current Facility-Administered Medications   Medication Dose Route Frequency    magnesium oxide (MAG-OX) tablet 400 mg  400 mg Oral Daily    potassium chloride (KLOR-CON) extended release tablet 40 mEq  40 mEq Oral TID    guaiFENesin (ROBITUSSIN) 100 MG/5ML liquid 200 mg  200 mg Oral Q4H PRN    amiodarone (CORDARONE) tablet 100 mg  100 mg Oral Daily    ALPRAZolam (XANAX) tablet 0.5 mg  0.5 mg Oral 4x Daily PRN    bumetanide (BUMEX) 12.5 mg in 50 mL infusion  0.5 mg/hr IntraVENous Continuous    allopurinol (ZYLOPRIM) tablet 50 mg  50 mg Oral Daily    apixaban (ELIQUIS) tablet 5 mg  5 mg Oral BID    Vitamin D (CHOLECALCIFEROL) tablet 1,000 Units  1,000 Units Oral Daily    empagliflozin (JARDIANCE) tablet 10 mg  10 mg Oral Daily    levothyroxine (SYNTHROID) tablet 75 mcg  75 mcg Oral Daily    metoprolol succinate (TOPROL XL) extended release tablet 12.5 mg  12.5 mg Oral Daily    sodium chloride flush 0.9 % injection 5-40 mL  5-40 mL IntraVENous 2 times per day    sodium chloride flush 
hypokinesis present.    Mitral Valve: Mild regurgitation.    Pulmonary Arteries: Mild to moderate pulmonary hypertension present. Estimated PA pressure of 50 mmhg.    Pericardium:  No pericardial effusion. Left pleural effusion.    [facility] 12/27/2023 (milrinone 0.25 mcg/kg/min): RA 10, RV 60/7, RVEDP 11, PA 59/24 (39), PCW 25, PA saturation 56.9%, prison 2.42, TDCI 2.24  [facility] 12/18/2023: RA 11, RV 58/8, RVEDP 14, PA 62/34 (47), PCW 32, PA saturation 52.4%, prison 2.01, TDCI 1.37      REVIEW OF SYSTEMS:  General: Denies fever.  Ear, nose and throat: Denies difficulty hearing, sinus problems, nosebleeds  Cardiovascular: see above in the interval history  Respiratory: Denies cough, wheezing, sputum production, hemoptysis.  Gastrointestinal: Denies heartburn, constipation, diarrhea, abdominal pain, nausea, blood in stool  Kidney and bladder: Positive for bladder spasms  Musculoskeletal: Denies joint pain, muscle weakness  Skin and hair: Denies change in existing skin lesions    PHYSICAL EXAM:  /72   Pulse 75   Temp 97.9 °F (36.6 °C) (Oral)   Resp 10   Ht 1.778 m (5' 10\")   Wt 73.9 kg (162 lb 14.7 oz)   SpO2 100%   BMI 23.38 kg/m²     Constitutional:  Well Nourished and well developed, no acute distress  Eyes:  Conjunctiva: Normal  Bilateral Lids: Normal  Ears, Nose, Mouth and Throat:  Cyanosis: Absent  Pallor: Absent  Neck:  Jugular Venous Distension: 8 cm  Respiratory:  Auscultation: Clear to auscultation; No Rales, Wheezes or Rhonchi  Effort: Non-labored respirations  Cardiovascular:  Murmur: No Murmur  Cardiac sounds: Normal S1 and S2  Rhythm: Regular  Gallop: None  Edema: 1+ BLE edema  Vascular:  Radial: 2+ bilateral  Gastrointestinal:  Normoactive bowel sounds  Soft, Non-tender, Non distended  Musculoskeletal:  no deformity  Extremities:  No clubbing or cyanosis  Skin:  Inspection: No rash, no ulcers  Neurologic/Psychiatric:  Orientation: Oriented to time, place and person  Mood and Affect: Appropriate      PAST 
1/17/2024    IR TUNNELED CATHETER PLACEMENT GREATER THAN 5 YEARS 1/17/2024 [facility] ANGIO IR    TOTAL KNEE ARTHROPLASTY         FAMILY HISTORY:  No family history on file.    SOCIAL HISTORY:  Social History     Socioeconomic History    Marital status:    Tobacco Use    Smoking status: Never    Smokeless tobacco: Never   Vaping Use    Vaping Use: Never used   Substance and Sexual Activity    Alcohol use: Never    Drug use: Never    Sexual activity: Not Currently     Partners: Female     Social Determinants of Health     Financial Resource Strain: Low Risk  (12/6/2023)    Overall Financial Resource Strain (CARDIA)     Difficulty of Paying Living Expenses: Not hard at all   Food Insecurity: No Food Insecurity (2/12/2024)    Hunger Vital Sign     Worried About Running Out of Food in the Last Year: Never true     Ran Out of Food in the Last Year: Never true   Transportation Needs: No Transportation Needs (2/12/2024)    PRAPARE - Transportation     Lack of Transportation (Medical): No     Lack of Transportation (Non-Medical): No   Physical Activity: Insufficiently Active (1/24/2024)    Exercise Vital Sign     Days of Exercise per Week: 5 days     Minutes of Exercise per Session: 20 min   Social Connections: Feeling Socially Integrated (1/18/2024)    OASIS : Social Isolation     Frequency of experiencing loneliness or isolation: Never   Housing Stability: Low Risk  (2/12/2024)    Housing Stability Vital Sign     Unable to Pay for Housing in the Last Year: No     Number of Places Lived in the Last Year: 1     Unstable Housing in the Last Year: No       LABORATORY RESULTS:     No results found for: \"TSH\", \"TSH2\", \"TSH3\", \"TSHELE\"    ALLERGY:  No Known Allergies     CURRENT MEDICATIONS:    Current Facility-Administered Medications:     milrinone (PRIMACOR) 20 mg in dextrose 5 % 100 mL infusion, 0.25 mcg/kg/min, IntraVENous, Continuous, Iwona Whittingtno, APRN - NP, Last Rate: 5.3 mL/hr at 02/16/24 1015, 0.25 
History    Marital status:    Tobacco Use    Smoking status: Never    Smokeless tobacco: Never   Vaping Use    Vaping Use: Never used   Substance and Sexual Activity    Alcohol use: Never    Drug use: Never    Sexual activity: Not Currently     Partners: Female     Social Determinants of Health     Financial Resource Strain: Low Risk  (12/6/2023)    Overall Financial Resource Strain (CARDIA)     Difficulty of Paying Living Expenses: Not hard at all   Food Insecurity: No Food Insecurity (2/12/2024)    Hunger Vital Sign     Worried About Running Out of Food in the Last Year: Never true     Ran Out of Food in the Last Year: Never true   Transportation Needs: No Transportation Needs (2/12/2024)    PRAPARE - Transportation     Lack of Transportation (Medical): No     Lack of Transportation (Non-Medical): No   Physical Activity: Insufficiently Active (1/24/2024)    Exercise Vital Sign     Days of Exercise per Week: 5 days     Minutes of Exercise per Session: 20 min   Social Connections: Feeling Socially Integrated (1/18/2024)    OASIS : Social Isolation     Frequency of experiencing loneliness or isolation: Never   Housing Stability: Low Risk  (2/12/2024)    Housing Stability Vital Sign     Unable to Pay for Housing in the Last Year: No     Number of Places Lived in the Last Year: 1     Unstable Housing in the Last Year: No       LABORATORY RESULTS:     No results found for: \"TSH\", \"TSH2\", \"TSH3\", \"TSHELE\"    ALLERGY:  No Known Allergies     CURRENT MEDICATIONS:    Current Facility-Administered Medications:     potassium chloride (KLOR-CON) extended release tablet 60 mEq, 60 mEq, Oral, TID, Pk, Iwona B, APRN - NP    potassium chloride 20 mEq/50 mL IVPB (Central Line), 20 mEq, IntraVENous, Once, Pk, Iwona B, APRN - NP    milrinone (PRIMACOR) 20 mg in dextrose 5 % 100 mL infusion, 0.25 mcg/kg/min, IntraVENous, Continuous, Pk, Iwona B, APRN - NP, Last Rate: 5.3 mL/hr at 02/16/24 1922, 0.25 
IR    TOTAL KNEE ARTHROPLASTY         FAMILY HISTORY:  No family history on file.    SOCIAL HISTORY:  Social History     Socioeconomic History    Marital status:    Tobacco Use    Smoking status: Never    Smokeless tobacco: Never   Vaping Use    Vaping Use: Never used   Substance and Sexual Activity    Alcohol use: Never    Drug use: Never    Sexual activity: Not Currently     Partners: Female     Social Determinants of Health     Financial Resource Strain: Low Risk  (12/6/2023)    Overall Financial Resource Strain (CARDIA)     Difficulty of Paying Living Expenses: Not hard at all   Food Insecurity: No Food Insecurity (2/12/2024)    Hunger Vital Sign     Worried About Running Out of Food in the Last Year: Never true     Ran Out of Food in the Last Year: Never true   Transportation Needs: No Transportation Needs (2/12/2024)    PRAPARE - Transportation     Lack of Transportation (Medical): No     Lack of Transportation (Non-Medical): No   Physical Activity: Insufficiently Active (1/24/2024)    Exercise Vital Sign     Days of Exercise per Week: 5 days     Minutes of Exercise per Session: 20 min   Social Connections: Feeling Socially Integrated (1/18/2024)    OASIS : Social Isolation     Frequency of experiencing loneliness or isolation: Never   Housing Stability: Low Risk  (2/12/2024)    Housing Stability Vital Sign     Unable to Pay for Housing in the Last Year: No     Number of Places Lived in the Last Year: 1     Unstable Housing in the Last Year: No       LABORATORY RESULTS:     No results found for: \"TSH\", \"TSH2\", \"TSH3\", \"TSHELE\"    ALLERGY:  No Known Allergies     CURRENT MEDICATIONS:    Current Facility-Administered Medications:     potassium chloride (KLOR-CON) extended release tablet 40 mEq, 40 mEq, Oral, BID, [place] MD    bumetanide (BUMEX) tablet 2 mg, 2 mg, Oral, BID Pedrito WORTHINGTON Lavone A, MD    milrinone (PRIMACOR) 20 mg in dextrose 5 % 100 mL infusion, 0.25 mcg/kg/min, 
Sexual Activity    Alcohol use: Never    Drug use: Never    Sexual activity: Not Currently     Partners: Female     Social Determinants of Health     Financial Resource Strain: Low Risk  (12/6/2023)    Overall Financial Resource Strain (CARDIA)     Difficulty of Paying Living Expenses: Not hard at all   Food Insecurity: No Food Insecurity (2/12/2024)    Hunger Vital Sign     Worried About Running Out of Food in the Last Year: Never true     Ran Out of Food in the Last Year: Never true   Transportation Needs: No Transportation Needs (2/12/2024)    PRAPARE - Transportation     Lack of Transportation (Medical): No     Lack of Transportation (Non-Medical): No   Physical Activity: Insufficiently Active (1/24/2024)    Exercise Vital Sign     Days of Exercise per Week: 5 days     Minutes of Exercise per Session: 20 min   Social Connections: Feeling Socially Integrated (1/18/2024)    OASIS : Social Isolation     Frequency of experiencing loneliness or isolation: Never   Housing Stability: Low Risk  (2/12/2024)    Housing Stability Vital Sign     Unable to Pay for Housing in the Last Year: No     Number of Places Lived in the Last Year: 1     Unstable Housing in the Last Year: No       LABORATORY RESULTS:     No results found for: \"TSH\", \"TSH2\", \"TSH3\", \"TSHELE\"    ALLERGY:  No Known Allergies     CURRENT MEDICATIONS:    Current Facility-Administered Medications:     amiodarone (CORDARONE) tablet 100 mg, 100 mg, Oral, Daily, Nuria Major MD, 100 mg at 02/12/24 0855    ALPRAZolam (XANAX) tablet 0.5 mg, 0.5 mg, Oral, 4x Daily PRN, Evert Jacosbon MD, 0.5 mg at 02/12/24 0002    bumetanide (BUMEX) 12.5 mg in 50 mL infusion, 0.5 mg/hr, IntraVENous, Continuous, Suntiha Metcalf APRN - NP, Last Rate: 2 mL/hr at 02/12/24 0920, 0.5 mg/hr at 02/12/24 0920    allopurinol (ZYLOPRIM) tablet 50 mg, 50 mg, Oral, Daily, Klever Dejesus MD, 50 mg at 02/12/24 0855    apixaban (ELIQUIS) tablet 5 mg, 5 mg, Oral, BID, Klever Dejesus MD, 5 
mg, Oral, 4x Daily PRN, Evert Jacobson MD, 0.5 mg at 02/12/24 0002    bumetanide (BUMEX) 12.5 mg in 50 mL infusion, 0.5 mg/hr, IntraVENous, Continuous, Sunitha Metcalf, APRN - NP, Last Rate: 2 mL/hr at 02/14/24 2342, 0.5 mg/hr at 02/14/24 2342    allopurinol (ZYLOPRIM) tablet 50 mg, 50 mg, Oral, Daily, Klever Dejesus MD, 50 mg at 02/15/24 0905    apixaban (ELIQUIS) tablet 5 mg, 5 mg, Oral, BID, Klever Dejesus MD, 5 mg at 02/15/24 0905    Vitamin D (CHOLECALCIFEROL) tablet 1,000 Units, 1,000 Units, Oral, Daily, Klever Dejesus MD, 1,000 Units at 02/15/24 0905    empagliflozin (JARDIANCE) tablet 10 mg, 10 mg, Oral, Daily, Klever Dejesus MD, 10 mg at 02/15/24 0905    levothyroxine (SYNTHROID) tablet 75 mcg, 75 mcg, Oral, Daily, Klever Dejesus MD, 75 mcg at 02/15/24 0628    metoprolol succinate (TOPROL XL) extended release tablet 12.5 mg, 12.5 mg, Oral, Daily, Klever Dejesus MD, 12.5 mg at 02/15/24 0905    potassium chloride (KLOR-CON) extended release tablet 40 mEq, 40 mEq, Oral, BID, Klever Dejesus MD, 40 mEq at 02/15/24 0905    sodium chloride flush 0.9 % injection 5-40 mL, 5-40 mL, IntraVENous, 2 times per day, Klever Dejesus MD, 10 mL at 02/15/24 0906    sodium chloride flush 0.9 % injection 5-40 mL, 5-40 mL, IntraVENous, PRN, Klever Dejesus MD    0.9 % sodium chloride infusion, , IntraVENous, PRN, Klever Dejesus MD    ondansetron (ZOFRAN-ODT) disintegrating tablet 4 mg, 4 mg, Oral, Q8H PRN **OR** ondansetron (ZOFRAN) injection 4 mg, 4 mg, IntraVENous, Q6H PRN, Klever Dejesus MD    polyethylene glycol (GLYCOLAX) packet 17 g, 17 g, Oral, Daily PRN, Klever Dejesus MD    acetaminophen (TYLENOL) tablet 650 mg, 650 mg, Oral, Q6H PRN **OR** acetaminophen (TYLENOL) suppository 650 mg, 650 mg, Rectal, Q6H PRN, Klever Dejesus MD    tamsulosin (FLOMAX) capsule 0.4 mg, 0.4 mg, Oral, Daily, Klever Dejesus MD, 0.4 mg at 02/15/24 0905    milrinone (PRIMACOR) 20 mg in dextrose 5 % 100 mL infusion, 0.3 mcg/kg/min (Order-Specific), IntraVENous, Continuous, 
Rate: 6.3 mL/hr at 02/13/24 1151, 0.3 mcg/kg/min at 02/13/24 1151    PATIENT CARE TEAM:  Patient Care Team:  Elisa Loyd MD as PCP - General (Internal Medicine)  Elisa Loyd MD as PCP - Empaneled Provider  Merly, Lisbteh B, RN as Care Transitions Nurse  Lisbeth Moreland RN as Care Transitions Nurse     Thank you for allowing me to participate in this patient's care.    Sunitha Metcalf NP  DNP, RN, [facility]  [facility]  [facility]  [address], [address]  Phone: [phone]

## 2024-02-20 NOTE — CARE COORDINATION
Transition of Care Plan:    RUR: 24%  Prior Level of Functioning: Lives w family, uses a cane for mobility. Open to [employer] .  [facility]  and return home w family.   If SNF or IPR: Date FOC offered: NA  Follow up appointments: [facility]  DME needed: None  Transportation at discharge: Family  IM/IMM Medicare/ letter given:   Is patient a [facility] and connected with [facility]?    If yes, was [facility] transfer form completed and VA notified?   Caregiver Contact: Step daughterKhloe [phone]   Discharge Caregiver contacted prior to discharge?   Care Conference needed?   Barriers to discharge:  Medical stability    Patient discussed in IDRs this date and being followed by the heart failure team and anticipated d/c is today. Patient is a NOBLE w [employer] [facility] .    Update 12:33pm: CM met w patient at bedside in [facility] to discuss the d/c plan w [facility] and review an important message from Medicare.  Patient acknowledged understanding and reports he will call a family member for transportation home. All questions have been answered and CM wished patient well. Sent clinicals to [facility] for melrinone and left a v/m for liaisonBev ([phone]). Also updated [facility]  of d/c.    Message sent to HF nurse requesting order for milrinone.  Once rec'd will link to [facility] referral in [facility].      Update 2:10pm: Linked Infusion order for [facility] in [facility].    Update 3:35pm: [facility] has everything they need for resumption of care.  They have also contacted family and they will bring a bag of milrinone from home along w batteries. Family reported they are very familiar w this process and feel prepared w transportation home. CM met w patient again and his visitor to update him on this plan and he acknowledged understanding.     Alyx Judge, MSW CCM  Care Management  .

## 2024-02-21 ENCOUNTER — TELEPHONE (OUTPATIENT)
Age: 74
End: 2024-02-21

## 2024-02-21 ENCOUNTER — HOME CARE VISIT (OUTPATIENT)
Facility: HOME HEALTH | Age: 74
End: 2024-02-21
Payer: MEDICARE

## 2024-02-21 VITALS
OXYGEN SATURATION: 93 % | WEIGHT: 159 LBS | TEMPERATURE: 99.5 F | RESPIRATION RATE: 16 BRPM | HEIGHT: 70 IN | BODY MASS INDEX: 22.76 KG/M2 | DIASTOLIC BLOOD PRESSURE: 60 MMHG | HEART RATE: 77 BPM | SYSTOLIC BLOOD PRESSURE: 102 MMHG

## 2024-02-21 PROCEDURE — G0299 HHS/HOSPICE OF RN EA 15 MIN: HCPCS

## 2024-02-22 ASSESSMENT — ENCOUNTER SYMPTOMS
PAIN LOCATION - PAIN QUALITY: ACHY
DYSPNEA ACTIVITY LEVEL: AFTER AMBULATING LESS THAN 10 FT
HEMOPTYSIS: 0

## 2024-02-22 NOTE — HOME HEALTH
Resumption of care completed on 73 year old male patient of Dr. Nuria Major after rehospitalization at [facility] from 2/9-2/20/24 for acute decompensated CHF with dyspnea at rest, increasing edema and unable to sleep due to orthopnea, elevated BNP of over 9000, elevated troponin of 121 but denies chest pain, mild elevated serum creatinine than his baseline. Patient treated with a bumex drip. Double lumen CVC to right subclavian intact and dated 2/20/24. Patient discharged home with continuous milrinone therapy with slight dosage change d/t increased weight. Previous admission for central line associated klebsiella pneumoniae bacteremia infection, CVC removed on 1/9/24 and new Double lumen CVC to right subclavian placed on 1/16/24. Previous admission to [facility] from 12/11-12/28/23 for chronic heart failure exacerbation, increased shortness of breath and urinary retention where patient was started on milrinone therapy. Patient not a candidate for LVAD due to his kidney function. PMH of chronic systolic heart failure due to non ischemic cardiomyopathy, HTN, HLD, PAF/Atrial tachycardia, VT, GERRI not on CPAP, CKD stage 3, BPH s/p TURP and hypothyroidism, [employer] Dual Chamber ICD, bilateral knee replacements in June and July of 2023, then shortly after he developed a right knee infection and was started on Rifampin and Cipro. Patient lives with friend Mari in multi-level home. Moved to [place] from [facility] in [facility] 2023.. Patient ambulates with cane but is a high fall risk related to weakness and caregivers require training to assist patient. No wheelchair in home to assess ability to wheel self. Mari not home with patient. Per patient Mari is adminstering milrinone via CADD pump with no difficulty. Reeducated patient on SASH method to line not infusing milrinone. NO SASH MAT in home. Milrinone bag to be changed every 48 hours, next change due on 2/22/24. Skin intact. Lungs clear. Puffy ankles noted.

## 2024-02-23 ENCOUNTER — HOME CARE VISIT (OUTPATIENT)
Facility: HOME HEALTH | Age: 74
End: 2024-02-23
Payer: MEDICARE

## 2024-02-23 ENCOUNTER — TELEPHONE (OUTPATIENT)
Age: 74
End: 2024-02-23

## 2024-02-23 PROCEDURE — G0299 HHS/HOSPICE OF RN EA 15 MIN: HCPCS

## 2024-02-23 NOTE — TELEPHONE ENCOUNTER
Spoke with sushila banegas [facility] nurse. She states that when she saw patient today he reported chest pressure that started this morning and increased sob. She notes that blood pressure was 102/70. On assessment lung sounds were clear. She states that swelling was present but no worse than usual. She states that she believes patients weight was 154.8lbs.         Spoke with Khloe. Used two patient identifiers. She spoke with patient while on phone and notes that patient is not having chest pressure any more. Patient reported to her that sob is a little bit worse today. Patient is having a cough but that was ongoing in the hospital. She also notes that patient is having pain in buttocks from lying down frequently. Notified her that patient should try to change positions when sitting or lying down at least every two hours. Khloe confirms patient has been taking bumex 4mg BID. She states that weight has been consistent at 154 lbs for today and yesterday. Advised her that if patients sob worsens, cannot catch breath, or has persistent chest pain he should present back to ER for further evaluation. Otherwise I will call back later with further instructions by provider.       Marly Ku, APRN - NP  YouJust now (2:09 PM)     DM  He should also take an additional 20 mEq of K when he takes the metolazone.     Marly Ku, MARYCRUZ - NP  You2 minutes ago (2:07 PM)     DM  Since he is slightly more SOB today and is already taking a higher dosage of Bumex, lets order metolazone 2.5 mg x1 dose.  If his SOB gets worse or things do not improve despite metolazone or if chest pressure comes back he should go to the ER; otherwise lets follow up with him Monday.     Spoke with khloe. Used two patient identifiers. Reviewed above information per CHOLO Ku NP.  She states understanding of instructions. She notes that she already has metolazone at home. Confirmed Mondays appointment.

## 2024-02-23 NOTE — PROGRESS NOTES
ADVANCED HEART FAILURE CENTER  [facility] in [place], [place]  Inpatient Progress Note      Patient name: Ingrid Kennedy  Patient : 1950  Patient MRN: [id number]  Consulting MD: No att. providers found  Date of service: 24    REASON FOR REFERRAL:  Management of heart failure      ASSESSMENT:  Ingrid Kennedy is a 73 y.o. male with acute on chronic systolic heart failure, stage D, NYHA class IV. GDMT limited by renal dysfunction.  EF 15-20%  Discussed at LVAD multidisciplinary meeting 23. Not currently a candidate for LVAD due to renal dysfunction, Creatinine >1.8, RV dysfunction, and neurocognitive dysfunction     CURRENT VISIT 24  Mr Kennedy presents to HF clinic for post discharge follow up.  Friday he had called the office for c/o slightly worsening SOB.  Pt advised to take 2.5 mg metolazone x 1 dose.  Today he feels better but still some intermittent SOB. Having some dyspnea on exertion, walking a block would make him SOB, says he is more SOB at night then during the day. Having some intermittent back pain, seeing his PCP for this on Thursday.  Denies chest pain, palpitations, dizziness, or syncope.  No issues with PICC line or milrinone infusion. Weights have been stable.  Spoke for a while about quality of life and introducing the concept of hospice.  Daughter and patient will speak with the rest of the family and we will possibly st up a family meeting through zoom at the next appointment, patient will let us know if he wants to do that.  He is compliant with all medications     RECOMMENDATIONS:  Medical Therapy for Heart Failure  Continue milrinone at 0.25 mcg/kg/min  Beta-blocker: Continue Toprol XL 12.5 mg daily with hold parameters to hold for SBP<100  ACEi/ARB/ARNI: Unable to tolerate due to renal dysfunction  Hydralazine/Nitrate: Unable to tolerate due to hypotension  MRA: Unable to tolerate due to renal dysfunction  SGLT2i: Continue Jardiance 10 mg daily  Continue PO

## 2024-02-26 ENCOUNTER — OFFICE VISIT (OUTPATIENT)
Age: 74
End: 2024-02-26
Payer: MEDICARE

## 2024-02-26 VITALS
BODY MASS INDEX: 22.76 KG/M2 | WEIGHT: 159 LBS | HEART RATE: 75 BPM | TEMPERATURE: 98.2 F | DIASTOLIC BLOOD PRESSURE: 78 MMHG | OXYGEN SATURATION: 97 % | RESPIRATION RATE: 18 BRPM | SYSTOLIC BLOOD PRESSURE: 112 MMHG | HEIGHT: 70 IN

## 2024-02-26 DIAGNOSIS — Z79.899 RECEIVING INOTROPIC MEDICATION: ICD-10-CM

## 2024-02-26 DIAGNOSIS — E87.6 HYPOKALEMIA: ICD-10-CM

## 2024-02-26 DIAGNOSIS — I50.22 CHRONIC SYSTOLIC HEART FAILURE (HCC): ICD-10-CM

## 2024-02-26 DIAGNOSIS — Z09 HOSPITAL DISCHARGE FOLLOW-UP: Primary | ICD-10-CM

## 2024-02-26 PROCEDURE — 1123F ACP DISCUSS/DSCN MKR DOCD: CPT | Performed by: NURSE PRACTITIONER

## 2024-02-26 PROCEDURE — 1111F DSCHRG MED/CURRENT MED MERGE: CPT | Performed by: NURSE PRACTITIONER

## 2024-02-26 PROCEDURE — G8420 CALC BMI NORM PARAMETERS: HCPCS | Performed by: NURSE PRACTITIONER

## 2024-02-26 PROCEDURE — G8427 DOCREV CUR MEDS BY ELIG CLIN: HCPCS | Performed by: NURSE PRACTITIONER

## 2024-02-26 PROCEDURE — 3017F COLORECTAL CA SCREEN DOC REV: CPT | Performed by: NURSE PRACTITIONER

## 2024-02-26 PROCEDURE — 99215 OFFICE O/P EST HI 40 MIN: CPT | Performed by: NURSE PRACTITIONER

## 2024-02-26 PROCEDURE — G8484 FLU IMMUNIZE NO ADMIN: HCPCS | Performed by: NURSE PRACTITIONER

## 2024-02-26 PROCEDURE — 1036F TOBACCO NON-USER: CPT | Performed by: NURSE PRACTITIONER

## 2024-02-26 PROCEDURE — 3078F DIAST BP <80 MM HG: CPT | Performed by: NURSE PRACTITIONER

## 2024-02-26 PROCEDURE — 3074F SYST BP LT 130 MM HG: CPT | Performed by: NURSE PRACTITIONER

## 2024-02-26 RX ORDER — POTASSIUM CHLORIDE 20 MEQ/1
TABLET, EXTENDED RELEASE ORAL
Qty: 150 TABLET | Refills: 1 | Status: SHIPPED | OUTPATIENT
Start: 2024-02-26

## 2024-02-26 RX ORDER — METOLAZONE 2.5 MG/1
2.5 TABLET ORAL PRN
Qty: 10 TABLET | Refills: 0 | Status: SHIPPED | OUTPATIENT
Start: 2024-02-26

## 2024-02-26 ASSESSMENT — ENCOUNTER SYMPTOMS
SHORTNESS OF BREATH: 1
BACK PAIN: 1

## 2024-02-26 ASSESSMENT — PATIENT HEALTH QUESTIONNAIRE - PHQ9
SUM OF ALL RESPONSES TO PHQ QUESTIONS 1-9: 0
SUM OF ALL RESPONSES TO PHQ QUESTIONS 1-9: 0
SUM OF ALL RESPONSES TO PHQ9 QUESTIONS 1 & 2: 0
SUM OF ALL RESPONSES TO PHQ QUESTIONS 1-9: 0
SUM OF ALL RESPONSES TO PHQ QUESTIONS 1-9: 0
2. FEELING DOWN, DEPRESSED OR HOPELESS: 0
1. LITTLE INTEREST OR PLEASURE IN DOING THINGS: 0

## 2024-02-26 NOTE — PATIENT INSTRUCTIONS
Medication changes:    Please take metolazone 2.5mg 30 minutes prior to taking bumex tomorrow. Also take one additional potassium tablet in the morning when taking metolazone.     Please call or send a Mobile Captain message on Thursday to report weight logs and symptoms [phone] option 2.     Please take this to your pharmacy to notify them of the change in medications.     Testing Ordered:    Labs with home health    Other Recommendations:      Please record blood pressure and heart rate daily before medication and two hours after medication, please record weight daily upon waking/after using the bathroom. Keep a written records of your weights and blood pressure and bring to your next appointment. If you have a weight gain of 3 or more pounds overnight OR 5 or more pounds in one week, new/worsened shortness of breath or swelling, or if your blood pressure begins to consistently run below 90/60 and/or you begin to experience dizziness or lightheadedness please contact our office at [phone] option 2.    Ensure your drinking an adequate amount of water with a goal of 6-8 eight ounce glasses (1.5-2 liters) of fluid daily. Your urine should be clear and light yellow straw colored.     Follow up 2 weeks with NP  with [facility]    Thank you for allowing us the privilege of being a part of your healthcare team! Please do not hesitate to contact our office at [phone] option 2 with any questions or concerns.

## 2024-02-27 ENCOUNTER — HOME CARE VISIT (OUTPATIENT)
Facility: HOME HEALTH | Age: 74
End: 2024-02-27
Payer: MEDICARE

## 2024-02-27 VITALS
TEMPERATURE: 98.2 F | RESPIRATION RATE: 20 BRPM | BODY MASS INDEX: 22.21 KG/M2 | SYSTOLIC BLOOD PRESSURE: 102 MMHG | HEART RATE: 81 BPM | OXYGEN SATURATION: 97 % | WEIGHT: 154.8 LBS | DIASTOLIC BLOOD PRESSURE: 70 MMHG

## 2024-02-27 VITALS
TEMPERATURE: 97.3 F | DIASTOLIC BLOOD PRESSURE: 64 MMHG | RESPIRATION RATE: 18 BRPM | HEART RATE: 73 BPM | BODY MASS INDEX: 22.1 KG/M2 | SYSTOLIC BLOOD PRESSURE: 126 MMHG | WEIGHT: 154 LBS | OXYGEN SATURATION: 93 %

## 2024-02-27 PROCEDURE — G0299 HHS/HOSPICE OF RN EA 15 MIN: HCPCS

## 2024-02-27 ASSESSMENT — ENCOUNTER SYMPTOMS
DYSPNEA ACTIVITY LEVEL: AT REST
DYSPNEA ACTIVITY LEVEL: AFTER AMBULATING LESS THAN 10 FT

## 2024-02-27 NOTE — HOME HEALTH
Subjective: I'm feeling better since I've been out of the hospital.  Falls since last visit No(if yes complete the Fall Tracking Form and include bsrifallreport):   Caregiver involvement changes: n/a  Home health supplies by type and quantity ordered/delivered this visit include: n/a    Clinician asked if patient has had any physician contact since last home care visit and patient states: NO  Clinician asked if patient has any new or changed medications and patient states:  NO   If Yes, were medications reconciled? N/A   Was the certifying physician notified of changes in medications? N/A     Clinical assessment (what this visit means for the patient overall and need for ongoing skilled care) and progress or lack of progress towards SPECIFIC goals: Patient remains at risk for infection and/or rehospitalization due to presence of subclavian catheter and due to cardiopulmonary status.  Skilled nursing needed for continued management of subclavian catheter, to include dressing changes, assessment, and lab draws, and for cardiopulmonary assessment.    Written Teaching Material Utilized: N/A    Interdisciplinary communication with: N/A for the purpose of n/a    Discharge planning as follows: Is no longer homebound, Per physician order and When goals are met    Specific plan for next visit: cardiopulmonary status, dressing change, lab draw

## 2024-02-27 NOTE — HOME HEALTH
Subjective: I've been having a little SOB and chest pressure today   Falls since last visit No(if yes complete the Fall Tracking Form and include bsrifallreport):   Caregiver involvement changes: NO  Home health supplies by type and quantity ordered/delivered this visit include: N/A    Clinician asked if patient has had any physician contact since last home care visit and patient states: NO  Clinician asked if patient has any new or changed medications and patient states:  NO   If Yes, were medications reconciled? NO   Was the certifying physician notified of changes in medications? NO     Clinical assessment (what this visit means for the patient overall and need for ongoing skilled care) and progress or lack of progress towards SPECIFIC goals: Pt with CHF, on milronone drip and recently discharged from the hospital. SN seeing pt for education on disease process and assessment. Pt with some complaints of SOB ,left sided chest pressure and soft SBP. Spoke with Columba GUERRA from the advanced HF clinic and told her my concerns. Columba stated that she would speak with the pt's provider and call the pt  back with advisement. Writer called pt and made him aware. Pt continues to need SN until goals are met to prevent rehospitalization.    Written Teaching Material Utilized: N/A    Interdisciplinary communication with: please see above    Discharge planning as follows: Is no longer homebound, Per physician order and When goals are met    Specific plan for next visit: central line dressing changes, labs, disease process education

## 2024-02-28 LAB
ALBUMIN SERPL-MCNC: 4.2 G/DL (ref 3.8–4.8)
ALBUMIN/GLOB SERPL: 1.6 {RATIO} (ref 1.2–2.2)
ALP SERPL-CCNC: 88 IU/L (ref 44–121)
ALT SERPL-CCNC: 17 IU/L (ref 0–44)
AST SERPL-CCNC: 18 IU/L (ref 0–40)
BASOPHILS # BLD AUTO: 0 X10E3/UL (ref 0–0.2)
BASOPHILS NFR BLD AUTO: 0 %
BILIRUB SERPL-MCNC: 1.1 MG/DL (ref 0–1.2)
BUN SERPL-MCNC: 40 MG/DL (ref 8–27)
BUN/CREAT SERPL: 19 (ref 10–24)
CALCIUM SERPL-MCNC: 9.2 MG/DL (ref 8.6–10.2)
CHLORIDE SERPL-SCNC: 102 MMOL/L (ref 96–106)
CO2 SERPL-SCNC: 24 MMOL/L (ref 20–29)
CREAT SERPL-MCNC: 2.15 MG/DL (ref 0.76–1.27)
EGFRCR SERPLBLD CKD-EPI 2021: 32 ML/MIN/1.73
EOSINOPHIL # BLD AUTO: 0.1 X10E3/UL (ref 0–0.4)
EOSINOPHIL NFR BLD AUTO: 2 %
ERYTHROCYTE [DISTWIDTH] IN BLOOD BY AUTOMATED COUNT: 15.8 % (ref 11.6–15.4)
GLOBULIN SER CALC-MCNC: 2.6 G/DL (ref 1.5–4.5)
GLUCOSE SERPL-MCNC: 172 MG/DL (ref 70–99)
HCT VFR BLD AUTO: 33.7 % (ref 37.5–51)
HGB BLD-MCNC: 10.9 G/DL (ref 13–17.7)
IMM GRANULOCYTES # BLD AUTO: 0 X10E3/UL (ref 0–0.1)
IMM GRANULOCYTES NFR BLD AUTO: 0 %
LYMPHOCYTES # BLD AUTO: 1.5 X10E3/UL (ref 0.7–3.1)
LYMPHOCYTES NFR BLD AUTO: 43 %
MAGNESIUM SERPL-MCNC: 1.8 MG/DL (ref 1.6–2.3)
MCH RBC QN AUTO: 27.2 PG (ref 26.6–33)
MCHC RBC AUTO-ENTMCNC: 32.3 G/DL (ref 31.5–35.7)
MCV RBC AUTO: 84 FL (ref 79–97)
MONOCYTES # BLD AUTO: 0.3 X10E3/UL (ref 0.1–0.9)
MONOCYTES NFR BLD AUTO: 8 %
NEUTROPHILS # BLD AUTO: 1.7 X10E3/UL (ref 1.4–7)
NEUTROPHILS NFR BLD AUTO: 47 %
NT-PROBNP SERPL-MCNC: 7358 PG/ML (ref 0–376)
PLATELET # BLD AUTO: 262 X10E3/UL (ref 150–450)
POTASSIUM SERPL-SCNC: 3.7 MMOL/L (ref 3.5–5.2)
PROT SERPL-MCNC: 6.8 G/DL (ref 6–8.5)
RBC # BLD AUTO: 4.01 X10E6/UL (ref 4.14–5.8)
SODIUM SERPL-SCNC: 145 MMOL/L (ref 134–144)
WBC # BLD AUTO: 3.6 X10E3/UL (ref 3.4–10.8)

## 2024-02-29 ENCOUNTER — NURSE ONLY (OUTPATIENT)
Age: 74
End: 2024-02-29
Payer: MEDICARE

## 2024-02-29 ENCOUNTER — OFFICE VISIT (OUTPATIENT)
Age: 74
End: 2024-02-29
Payer: MEDICARE

## 2024-02-29 ENCOUNTER — TELEPHONE (OUTPATIENT)
Age: 74
End: 2024-02-29

## 2024-02-29 VITALS
SYSTOLIC BLOOD PRESSURE: 100 MMHG | HEIGHT: 70 IN | HEART RATE: 53 BPM | TEMPERATURE: 97.7 F | BODY MASS INDEX: 22.19 KG/M2 | DIASTOLIC BLOOD PRESSURE: 80 MMHG | WEIGHT: 155 LBS | OXYGEN SATURATION: 99 % | RESPIRATION RATE: 20 BRPM

## 2024-02-29 DIAGNOSIS — E11.65 TYPE 2 DIABETES MELLITUS WITH HYPERGLYCEMIA, WITHOUT LONG-TERM CURRENT USE OF INSULIN (HCC): ICD-10-CM

## 2024-02-29 DIAGNOSIS — I50.42 CHRONIC COMBINED SYSTOLIC AND DIASTOLIC CONGESTIVE HEART FAILURE (HCC): Primary | ICD-10-CM

## 2024-02-29 DIAGNOSIS — G89.29 CHRONIC BILATERAL LOW BACK PAIN WITHOUT SCIATICA: ICD-10-CM

## 2024-02-29 DIAGNOSIS — M54.50 CHRONIC BILATERAL LOW BACK PAIN WITHOUT SCIATICA: ICD-10-CM

## 2024-02-29 DIAGNOSIS — Z09 HOSPITAL DISCHARGE FOLLOW-UP: ICD-10-CM

## 2024-02-29 DIAGNOSIS — E03.2 HYPOTHYROIDISM DUE TO MEDICATION: ICD-10-CM

## 2024-02-29 PROCEDURE — G8428 CUR MEDS NOT DOCUMENT: HCPCS | Performed by: INTERNAL MEDICINE

## 2024-02-29 PROCEDURE — 1111F DSCHRG MED/CURRENT MED MERGE: CPT | Performed by: INTERNAL MEDICINE

## 2024-02-29 PROCEDURE — 1123F ACP DISCUSS/DSCN MKR DOCD: CPT | Performed by: INTERNAL MEDICINE

## 2024-02-29 PROCEDURE — 99214 OFFICE O/P EST MOD 30 MIN: CPT | Performed by: INTERNAL MEDICINE

## 2024-02-29 PROCEDURE — 1036F TOBACCO NON-USER: CPT | Performed by: INTERNAL MEDICINE

## 2024-02-29 PROCEDURE — G8484 FLU IMMUNIZE NO ADMIN: HCPCS | Performed by: INTERNAL MEDICINE

## 2024-02-29 PROCEDURE — G8420 CALC BMI NORM PARAMETERS: HCPCS | Performed by: INTERNAL MEDICINE

## 2024-02-29 PROCEDURE — 3074F SYST BP LT 130 MM HG: CPT | Performed by: INTERNAL MEDICINE

## 2024-02-29 PROCEDURE — 3046F HEMOGLOBIN A1C LEVEL >9.0%: CPT | Performed by: INTERNAL MEDICINE

## 2024-02-29 PROCEDURE — 3079F DIAST BP 80-89 MM HG: CPT | Performed by: INTERNAL MEDICINE

## 2024-02-29 PROCEDURE — 3017F COLORECTAL CA SCREEN DOC REV: CPT | Performed by: INTERNAL MEDICINE

## 2024-02-29 PROCEDURE — 2022F DILAT RTA XM EVC RTNOPTHY: CPT | Performed by: INTERNAL MEDICINE

## 2024-02-29 NOTE — PROGRESS NOTES
Post-Discharge Transitional Care Follow Up      Ingrid Kennedy   YOB: 1950    Date of Office Visit:  2/29/2024  Date of Hospital Admission: 2/9/24  Date of Hospital Discharge: 2/20/24  Readmission Risk Score (high >=14%. Medium >=10%):Readmission Risk Score: 23.8    Care management risk score Rising risk (score 2-5) and Complex Care (Scores >=6): No Risk Score On File     Non face to face  following discharge, date last encounter closed (first attempt may have been earlier): 02/21/2024     Call initiated 2 business days of discharge: Yes     1. Chronic combined systolic and diastolic congestive heart failure (HCC)  Ingrid Kennedy is a 73 y.o. male with acute on chronic systolic heart failure, stage D, NYHA class IV. GDMT limited by renal dysfunction.  EF 15-20%  Discussed at LVAD multidisciplinary meeting 12/22/23. Not currently a candidate for LVAD due to renal dysfunction, Creatinine >1.8, RV dysfunction, and neurocognitive dysfunction.  He continues to use a Milrinone drip at home.   He continues with Bumex 4 mgs BID  Basline weight is ~154-155  Recent labs show that his BnP is ~7000  Continues to follow up with the [facility].   2. Hospital discharge follow-up  -     MD DISCHARGE MEDS RECONCILED W/ CURRENT OUTPATIENT MED LIST  3. Hypothyroidism due to medication  -     TSH + Free T4 Panel; Future   Recently had increase of Levothyroxine from 50 mcgs to 75 mcgs.    Will recheck today.   4. Chronic bilateral low back pain without sciatica  Acute Back Pain Care  1. Salon Pas 4% Lidocaine patches. Apply directly to the area of discomfort.    Leave on for 12 hours. Off for 12 hours.   2. Tylenol 500 mgs every 6-8 hours if needed for pain.   3. Warm compress or heating pad. Avoid high temperatures and getting burned. Monitor closely.   4. Gentle stretching exercises as per the after visit summary. Do only the upper body stretches. NOTHING on the ground.   5. Return as needed.   5. Type 2 diabetes mellitus with

## 2024-02-29 NOTE — PROGRESS NOTES
Identified pt with two pt identifiers(name and ).    Chief Complaint   Patient presents with    Follow-Up from Hospital      to  for heart failure  [facility] Maintenance Due   Topic    COVID-19 Vaccine (1)    Pneumococcal 65+ years Vaccine (1 - PCV)    Diabetic foot exam     Diabetic retinal exam     DTaP/Tdap/Td vaccine (1 - Tdap)    Shingles vaccine (1 of 2)    Respiratory Syncytial Virus (RSV) Pregnant or age 60 yrs+ (1 - 1-dose 60+ series)    Flu vaccine (1)       Wt Readings from Last 3 Encounters:   24 70.3 kg (155 lb)   24 69.9 kg (154 lb)   24 72.1 kg (159 lb)     Temp Readings from Last 3 Encounters:   24 97.7 °F (36.5 °C) (Temporal)   24 97.3 °F (36.3 °C) (Temporal)   24 98.2 °F (36.8 °C) (Oral)     BP Readings from Last 3 Encounters:   24 100/80   24 126/64   24 112/78     Pulse Readings from Last 3 Encounters:   24 53   24 73   24 75           Depression Screening:  :         2024     9:27 AM 2024     9:30 AM 2024     1:18 PM 2024    10:22 AM 2024    10:07 AM 2024     9:10 AM 2023     4:38 PM   PHQ-9 Questionaire   Little interest or pleasure in doing things 0 0 0 0 0 0 0   Feeling down, depressed, or hopeless 0 0 0 0 0 0 0   PHQ-9 Total Score 0 0 0 0 0 0 0        Fall Risk Assessment:  :         2024     9:30 AM 2024    10:18 AM 2024    10:07 AM 2023     2:56 PM 2023     1:25 PM   Fall Risk   2 or more falls in past year? no no no no no   Fall with injury in past year? no yes no yes yes        Abuse Screening:  :          No data to display                 Coordination of Care Questionnaire:  :     \"Have you been to the ER, urgent care clinic since your last visit?  Hospitalized since your last visit?\"      Chief Complaint   Patient presents with    Follow-Up from Hospital      to  for heart failure  [facility]       “Have you seen or

## 2024-02-29 NOTE — TELEPHONE ENCOUNTER
----- Message from Raegan Martinez RN sent at 2/26/2024  3:05 PM EST -----  Call to check in on weights after taking metolazone 2.5mg on Tuesday     Call placed to Ingrid Kennedy to follow up regarding weights Voicemail message left requesting return of my phone call.    Second message left for the patient's caregiver, Khloe Douglas to please return call regarding Mr Kennedy' weights.    3/1/2024 @6523  I spoke to Khloe Douglas, Ingrid Kennedy' caregiver who gave me the following information regarding weights and blood pressures:    Tues 2/27: 154 lb, /72, HR 98    Weds 2/28: 151.8 lb, /66, HR 98    Thurs 2/29: 155 lb, /80, HR 53 (not home measurements, from PCP visit)    Fri 3/1 151.2 lb, /75, HR 80    Patient denied dizziness, had some SOB yesterday while out of the house at appointments.

## 2024-02-29 NOTE — PATIENT INSTRUCTIONS
Acute Back Pain Care  1. Salon Pas 4% Lidocaine patches. Apply directly to the area of discomfort.    Leave on for 12 hours. Off for 12 hours.   2. Tylenol 500 mgs every 6-8 hours if needed for pain.   3. Warm compress or heating pad. Avoid high temperatures and getting burned. Monitor closely.   4. Gentle stretching exercises as per the after visit summary. Do only the upper body stretches. NOTHING on the ground.   5. Return as needed.

## 2024-03-01 DIAGNOSIS — E03.2 HYPOTHYROIDISM DUE TO MEDICATION: Primary | ICD-10-CM

## 2024-03-01 LAB
T4 FREE SERPL-MCNC: 1.7 NG/DL (ref 0.8–1.5)
TSH SERPL DL<=0.05 MIU/L-ACNC: 4.2 UIU/ML (ref 0.36–3.74)

## 2024-03-01 RX ORDER — LEVOTHYROXINE SODIUM 88 UG/1
88 TABLET ORAL DAILY
Qty: 30 TABLET | Refills: 5 | Status: SHIPPED | OUTPATIENT
Start: 2024-03-01

## 2024-03-01 NOTE — TELEPHONE ENCOUNTER
Call placed to Ingrid Kennedy. Voicemail message left requesting review of My Chart message regarding Metolazone.

## 2024-03-06 ENCOUNTER — HOME CARE VISIT (OUTPATIENT)
Facility: HOME HEALTH | Age: 74
End: 2024-03-06
Payer: MEDICARE

## 2024-03-06 ENCOUNTER — HOME CARE VISIT (OUTPATIENT)
Dept: HOME HEALTH SERVICES | Facility: HOME HEALTH | Age: 74
End: 2024-03-06
Payer: MEDICARE

## 2024-03-06 ENCOUNTER — TELEPHONE (OUTPATIENT)
Age: 74
End: 2024-03-06

## 2024-03-06 NOTE — TELEPHONE ENCOUNTER
----- Message from MARYCRUZ Campoverde NP sent at 3/6/2024 11:29 AM EST -----  Regarding: RE: Metolazone  Contact: [phone]  Lets do standing metolazone 2.5 mg twice a week (every Wednesday and Saturday).  With hold parameters for weight < 150.  The days he takes metolazone he should take an extra 20mEq potassium. Continue Bumex 4 mg BID.  Have her call back in a week with weights and symptoms.  ----- Message -----  From: Minna Coleman RN  Sent: 3/6/2024  10:35 AM EST  To: MARYCRUZ Campoverde NP  Subject: FW: Stop Metolazone                            ---  Voicemail left requesting patient or caregiver return my call regarding medication changes.    ----- Message -----  From: Ingrid Kennedy  Sent: 3/5/2024   5:25 PM EST  To: #  Subject: Stop Metolazone                                  March 1st  151.2.  110/75.  80     March 2nd  149.6.  105/76. 93     March 3rd  153.2.      106/72.   94     March 4th  156.    124/83.   93  He had some breathing issues and took and additional 2mg pill     March 5th  152.6.    112/84. 114  Breathing heavy today on and off    ---  I spoke with Khloe Douglas regarding the above medication instructions for metalozone, potassium and bumex. She verbalized understanding of the instructions and stated she would report weights and symptoms via My Chart.

## 2024-03-06 NOTE — HOME HEALTH
writer called Pt lastnight to confirm appt time and VM left on Khloe's phone. No response. Writer called both the numbers listed in pt's chart and sent a text message wit no response. This visit will be documented as a missed visit.  MD Quiñones made aware via inMicrotuneet.

## 2024-03-06 NOTE — HOME HEALTH
Visit entered in for Elizabeth Greenfield RN - paper visit has been scanned into computer for 1/20/24.

## 2024-03-08 ENCOUNTER — TELEPHONE (OUTPATIENT)
Age: 74
End: 2024-03-08

## 2024-03-08 LAB
BUN SERPL-MCNC: 37 MG/DL (ref 8–27)
BUN/CREAT SERPL: 17 (ref 10–24)
CALCIUM SERPL-MCNC: 8.8 MG/DL (ref 8.6–10.2)
CHLORIDE SERPL-SCNC: 104 MMOL/L (ref 96–106)
CO2 SERPL-SCNC: 18 MMOL/L (ref 20–29)
CREAT SERPL-MCNC: 2.17 MG/DL (ref 0.76–1.27)
EGFRCR SERPLBLD CKD-EPI 2021: 32 ML/MIN/1.73
GLUCOSE SERPL-MCNC: 110 MG/DL (ref 70–99)
MAGNESIUM SERPL-MCNC: 1.9 MG/DL (ref 1.6–2.3)
POTASSIUM SERPL-SCNC: 4.3 MMOL/L (ref 3.5–5.2)
SODIUM SERPL-SCNC: 142 MMOL/L (ref 134–144)

## 2024-03-08 RX ORDER — METOLAZONE 2.5 MG/1
TABLET ORAL
Qty: 8 TABLET | Refills: 0 | Status: SHIPPED | OUTPATIENT
Start: 2024-03-11

## 2024-03-08 NOTE — TELEPHONE ENCOUNTER
----- Message from Elisa Loyd MD sent at 3/8/2024  6:49 AM EST -----  [employer] processor reports that this patient has not reviewed his labs and recommendations. Please reach out to him as this is very important.   Thanks!

## 2024-03-09 LAB
ERYTHROCYTE [DISTWIDTH] IN BLOOD BY AUTOMATED COUNT: 16.4 % (ref 11.6–15.4)
HCT VFR BLD AUTO: 35.6 % (ref 37.5–51)
HGB BLD-MCNC: 12.2 G/DL (ref 13–17.7)
MCH RBC QN AUTO: 28.6 PG (ref 26.6–33)
MCHC RBC AUTO-ENTMCNC: 34.3 G/DL (ref 31.5–35.7)
MCV RBC AUTO: 84 FL (ref 79–97)
MORPHOLOGY BLD-IMP: NORMAL
NT-PROBNP SERPL-MCNC: ABNORMAL PG/ML (ref 0–376)
PLATELET # BLD AUTO: 88 X10E3/UL (ref 150–450)
RBC # BLD AUTO: 4.26 X10E6/UL (ref 4.14–5.8)
WBC # BLD AUTO: 2.6 X10E3/UL (ref 3.4–10.8)

## 2024-03-11 ENCOUNTER — HOME CARE VISIT (OUTPATIENT)
Facility: HOME HEALTH | Age: 74
End: 2024-03-11
Payer: MEDICARE

## 2024-03-11 ENCOUNTER — HOSPITAL ENCOUNTER (OUTPATIENT)
Facility: HOSPITAL | Age: 74
Setting detail: SPECIMEN
Discharge: HOME | End: 2024-03-14

## 2024-03-11 LAB
ALBUMIN SERPL-MCNC: 3.6 G/DL (ref 3.5–5)
ALBUMIN/GLOB SERPL: 1.1 (ref 1.1–2.2)
ALP SERPL-CCNC: 111 U/L (ref 45–117)
ALT SERPL-CCNC: 57 U/L (ref 12–78)
ANION GAP SERPL CALC-SCNC: 10 MMOL/L (ref 5–15)
AST SERPL-CCNC: 30 U/L (ref 15–37)
BASOPHILS # BLD: 0 K/UL (ref 0–0.1)
BASOPHILS NFR BLD: 1 % (ref 0–1)
BILIRUB SERPL-MCNC: 2.1 MG/DL (ref 0.2–1)
BUN SERPL-MCNC: 40 MG/DL (ref 6–20)
BUN/CREAT SERPL: 15 (ref 12–20)
CALCIUM SERPL-MCNC: 8.8 MG/DL (ref 8.5–10.1)
CHLORIDE SERPL-SCNC: 103 MMOL/L (ref 97–108)
CO2 SERPL-SCNC: 30 MMOL/L (ref 21–32)
CREAT SERPL-MCNC: 2.67 MG/DL (ref 0.7–1.3)
DIFFERENTIAL METHOD BLD: ABNORMAL
EOSINOPHIL # BLD: 0.1 K/UL (ref 0–0.4)
EOSINOPHIL NFR BLD: 2 % (ref 0–7)
ERYTHROCYTE [DISTWIDTH] IN BLOOD BY AUTOMATED COUNT: 17.9 % (ref 11.5–14.5)
GLOBULIN SER CALC-MCNC: 3.2 G/DL (ref 2–4)
GLUCOSE SERPL-MCNC: 186 MG/DL (ref 65–100)
HCT VFR BLD AUTO: 34.2 % (ref 36.6–50.3)
HGB BLD-MCNC: 11.6 G/DL (ref 12.1–17)
IMM GRANULOCYTES # BLD AUTO: 0 K/UL (ref 0–0.04)
IMM GRANULOCYTES NFR BLD AUTO: 0 % (ref 0–0.5)
LYMPHOCYTES # BLD: 1.5 K/UL (ref 0.8–3.5)
LYMPHOCYTES NFR BLD: 44 % (ref 12–49)
MAGNESIUM SERPL-MCNC: 1.8 MG/DL (ref 1.6–2.4)
MCH RBC QN AUTO: 27.8 PG (ref 26–34)
MCHC RBC AUTO-ENTMCNC: 33.9 G/DL (ref 30–36.5)
MCV RBC AUTO: 81.8 FL (ref 80–99)
MONOCYTES # BLD: 0.3 K/UL (ref 0–1)
MONOCYTES NFR BLD: 8 % (ref 5–13)
NEUTS SEG # BLD: 1.6 K/UL (ref 1.8–8)
NEUTS SEG NFR BLD: 45 % (ref 32–75)
NRBC # BLD: 0 K/UL (ref 0–0.01)
NRBC BLD-RTO: 0 PER 100 WBC
NT PRO BNP: 6728 PG/ML
PLATELET # BLD AUTO: 188 K/UL (ref 150–400)
PMV BLD AUTO: 10.6 FL (ref 8.9–12.9)
POTASSIUM SERPL-SCNC: 2.8 MMOL/L (ref 3.5–5.1)
PROT SERPL-MCNC: 6.8 G/DL (ref 6.4–8.2)
RBC # BLD AUTO: 4.18 M/UL (ref 4.1–5.7)
SODIUM SERPL-SCNC: 143 MMOL/L (ref 136–145)
WBC # BLD AUTO: 3.4 K/UL (ref 4.1–11.1)

## 2024-03-11 PROCEDURE — G0299 HHS/HOSPICE OF RN EA 15 MIN: HCPCS

## 2024-03-12 VITALS
WEIGHT: 148.2 LBS | HEART RATE: 64 BPM | DIASTOLIC BLOOD PRESSURE: 90 MMHG | OXYGEN SATURATION: 90 % | BODY MASS INDEX: 21.26 KG/M2 | TEMPERATURE: 98.7 F | SYSTOLIC BLOOD PRESSURE: 120 MMHG

## 2024-03-12 ASSESSMENT — ENCOUNTER SYMPTOMS: PAIN LOCATION - PAIN QUALITY: ACHY

## 2024-03-14 ENCOUNTER — OFFICE VISIT (OUTPATIENT)
Age: 74
End: 2024-03-14
Payer: MEDICARE

## 2024-03-14 VITALS
TEMPERATURE: 97.3 F | BODY MASS INDEX: 22.48 KG/M2 | HEART RATE: 90 BPM | HEIGHT: 70 IN | SYSTOLIC BLOOD PRESSURE: 92 MMHG | OXYGEN SATURATION: 96 % | DIASTOLIC BLOOD PRESSURE: 60 MMHG | WEIGHT: 157 LBS | RESPIRATION RATE: 18 BRPM

## 2024-03-14 DIAGNOSIS — I50.22 CHRONIC HFREF (HEART FAILURE WITH REDUCED EJECTION FRACTION) (HCC): ICD-10-CM

## 2024-03-14 DIAGNOSIS — E87.6 HYPOKALEMIA: ICD-10-CM

## 2024-03-14 DIAGNOSIS — Z79.899 RECEIVING INOTROPIC MEDICATION: Primary | ICD-10-CM

## 2024-03-14 PROCEDURE — G8420 CALC BMI NORM PARAMETERS: HCPCS | Performed by: NURSE PRACTITIONER

## 2024-03-14 PROCEDURE — 1111F DSCHRG MED/CURRENT MED MERGE: CPT | Performed by: NURSE PRACTITIONER

## 2024-03-14 PROCEDURE — 1036F TOBACCO NON-USER: CPT | Performed by: NURSE PRACTITIONER

## 2024-03-14 PROCEDURE — 3078F DIAST BP <80 MM HG: CPT | Performed by: NURSE PRACTITIONER

## 2024-03-14 PROCEDURE — 3074F SYST BP LT 130 MM HG: CPT | Performed by: NURSE PRACTITIONER

## 2024-03-14 PROCEDURE — 1123F ACP DISCUSS/DSCN MKR DOCD: CPT | Performed by: NURSE PRACTITIONER

## 2024-03-14 PROCEDURE — 99215 OFFICE O/P EST HI 40 MIN: CPT | Performed by: NURSE PRACTITIONER

## 2024-03-14 PROCEDURE — 3017F COLORECTAL CA SCREEN DOC REV: CPT | Performed by: NURSE PRACTITIONER

## 2024-03-14 PROCEDURE — G8427 DOCREV CUR MEDS BY ELIG CLIN: HCPCS | Performed by: NURSE PRACTITIONER

## 2024-03-14 PROCEDURE — G8484 FLU IMMUNIZE NO ADMIN: HCPCS | Performed by: NURSE PRACTITIONER

## 2024-03-14 RX ORDER — METOLAZONE 2.5 MG/1
TABLET ORAL
Qty: 8 TABLET | Refills: 0 | Status: SHIPPED | OUTPATIENT
Start: 2024-03-14

## 2024-03-14 RX ORDER — ACETAZOLAMIDE 250 MG/1
125 TABLET ORAL 2 TIMES DAILY
Qty: 90 TABLET | Refills: 3 | Status: SHIPPED | OUTPATIENT
Start: 2024-03-14

## 2024-03-14 RX ORDER — BUMETANIDE 1 MG/1
1 TABLET ORAL 2 TIMES DAILY
Qty: 60 TABLET | Refills: 2 | Status: SHIPPED | OUTPATIENT
Start: 2024-03-14

## 2024-03-14 RX ORDER — POTASSIUM CHLORIDE 20 MEQ/1
TABLET, EXTENDED RELEASE ORAL
Qty: 150 TABLET | Refills: 1 | Status: SHIPPED | OUTPATIENT
Start: 2024-03-14

## 2024-03-14 ASSESSMENT — PATIENT HEALTH QUESTIONNAIRE - PHQ9
SUM OF ALL RESPONSES TO PHQ QUESTIONS 1-9: 0
SUM OF ALL RESPONSES TO PHQ QUESTIONS 1-9: 0
1. LITTLE INTEREST OR PLEASURE IN DOING THINGS: 0
SUM OF ALL RESPONSES TO PHQ QUESTIONS 1-9: 0
2. FEELING DOWN, DEPRESSED OR HOPELESS: 0
SUM OF ALL RESPONSES TO PHQ QUESTIONS 1-9: 0
SUM OF ALL RESPONSES TO PHQ9 QUESTIONS 1 & 2: 0

## 2024-03-14 ASSESSMENT — ENCOUNTER SYMPTOMS
CHEST TIGHTNESS: 0
ABDOMINAL DISTENTION: 0
SORE THROAT: 0
BLOOD IN STOOL: 0
SHORTNESS OF BREATH: 1
ABDOMINAL PAIN: 0
EYE PAIN: 0
COUGH: 0

## 2024-03-14 NOTE — PROGRESS NOTES
tamsulosin (FLOMAX) 0.4 MG capsule, Take 2 capsules by mouth daily, Disp: , Rfl:     PATIENT CARE TEAM:  Patient Care Team:  Elisa Loyd MD as PCP - General (Internal Medicine)  Elisa Loyd MD as PCP - Empaneled Provider  Lisbeth Moreland, RN as Care Transitions Nurse  Lisbeth Moreland, RN as Care Transitions Nurse  Annelise Galeas RN as Care Transitions Nurse     Thank you for allowing me to participate in this patient's care.    MARYCRUZ Jeffries - NP   [facility]  [facility]  [address] [place] [address], [address]  Phone: [phone]    On this date 3/14/2024, I have spent a total time of  40 minutes personally reviewing new vitals, test results, notes from recent visits, face to face encounter/physical exam of patient with counseling, writing orders, performing medical decision making, and documenting.

## 2024-03-15 ENCOUNTER — HOME CARE VISIT (OUTPATIENT)
Facility: HOME HEALTH | Age: 74
End: 2024-03-15

## 2024-03-15 PROCEDURE — G0299 HHS/HOSPICE OF RN EA 15 MIN: HCPCS

## 2024-03-16 VITALS
DIASTOLIC BLOOD PRESSURE: 67 MMHG | TEMPERATURE: 99.7 F | BODY MASS INDEX: 22.15 KG/M2 | WEIGHT: 154.4 LBS | RESPIRATION RATE: 20 BRPM | SYSTOLIC BLOOD PRESSURE: 116 MMHG | OXYGEN SATURATION: 97 % | HEART RATE: 72 BPM

## 2024-03-18 ENCOUNTER — HOME CARE VISIT (OUTPATIENT)
Facility: HOME HEALTH | Age: 74
End: 2024-03-18

## 2024-03-18 ENCOUNTER — HOSPITAL ENCOUNTER (OUTPATIENT)
Facility: HOSPITAL | Age: 74
Setting detail: SPECIMEN
Discharge: HOME | End: 2024-03-21

## 2024-03-18 VITALS
SYSTOLIC BLOOD PRESSURE: 110 MMHG | RESPIRATION RATE: 20 BRPM | BODY MASS INDEX: 22.3 KG/M2 | HEART RATE: 52 BPM | TEMPERATURE: 98.2 F | OXYGEN SATURATION: 95 % | DIASTOLIC BLOOD PRESSURE: 60 MMHG | WEIGHT: 155.4 LBS

## 2024-03-18 LAB
ALBUMIN SERPL-MCNC: 3.5 G/DL (ref 3.5–5)
ALBUMIN/GLOB SERPL: 1.1 (ref 1.1–2.2)
ALP SERPL-CCNC: 102 U/L (ref 45–117)
ALT SERPL-CCNC: 37 U/L (ref 12–78)
ANION GAP SERPL CALC-SCNC: 7 MMOL/L (ref 5–15)
AST SERPL-CCNC: 27 U/L (ref 15–37)
BASOPHILS # BLD: 0 K/UL (ref 0–0.1)
BASOPHILS NFR BLD: 1 % (ref 0–1)
BILIRUB SERPL-MCNC: 1.7 MG/DL (ref 0.2–1)
BUN SERPL-MCNC: 36 MG/DL (ref 6–20)
BUN/CREAT SERPL: 18 (ref 12–20)
CALCIUM SERPL-MCNC: 8.5 MG/DL (ref 8.5–10.1)
CHLORIDE SERPL-SCNC: 107 MMOL/L (ref 97–108)
CO2 SERPL-SCNC: 28 MMOL/L (ref 21–32)
CREAT SERPL-MCNC: 1.95 MG/DL (ref 0.7–1.3)
DIFFERENTIAL METHOD BLD: ABNORMAL
EOSINOPHIL # BLD: 0.1 K/UL (ref 0–0.4)
EOSINOPHIL NFR BLD: 2 % (ref 0–7)
ERYTHROCYTE [DISTWIDTH] IN BLOOD BY AUTOMATED COUNT: 18.1 % (ref 11.5–14.5)
GLOBULIN SER CALC-MCNC: 3.2 G/DL (ref 2–4)
GLUCOSE SERPL-MCNC: 125 MG/DL (ref 65–100)
HCT VFR BLD AUTO: 32.6 % (ref 36.6–50.3)
HGB BLD-MCNC: 11 G/DL (ref 12.1–17)
IMM GRANULOCYTES # BLD AUTO: 0 K/UL (ref 0–0.04)
IMM GRANULOCYTES NFR BLD AUTO: 0 % (ref 0–0.5)
LYMPHOCYTES # BLD: 1.7 K/UL (ref 0.8–3.5)
LYMPHOCYTES NFR BLD: 48 % (ref 12–49)
MAGNESIUM SERPL-MCNC: 1.7 MG/DL (ref 1.6–2.4)
MCH RBC QN AUTO: 28.1 PG (ref 26–34)
MCHC RBC AUTO-ENTMCNC: 33.7 G/DL (ref 30–36.5)
MCV RBC AUTO: 83.4 FL (ref 80–99)
MONOCYTES # BLD: 0.3 K/UL (ref 0–1)
MONOCYTES NFR BLD: 9 % (ref 5–13)
NEUTS SEG # BLD: 1.4 K/UL (ref 1.8–8)
NEUTS SEG NFR BLD: 40 % (ref 32–75)
NRBC # BLD: 0 K/UL (ref 0–0.01)
NRBC BLD-RTO: 0 PER 100 WBC
NT PRO BNP: 6727 PG/ML
PLATELET # BLD AUTO: 156 K/UL (ref 150–400)
PMV BLD AUTO: 11.1 FL (ref 8.9–12.9)
POTASSIUM SERPL-SCNC: 3.6 MMOL/L (ref 3.5–5.1)
PROT SERPL-MCNC: 6.7 G/DL (ref 6.4–8.2)
RBC # BLD AUTO: 3.91 M/UL (ref 4.1–5.7)
SODIUM SERPL-SCNC: 142 MMOL/L (ref 136–145)
WBC # BLD AUTO: 3.5 K/UL (ref 4.1–11.1)

## 2024-03-18 PROCEDURE — G0299 HHS/HOSPICE OF RN EA 15 MIN: HCPCS

## 2024-03-18 ASSESSMENT — ENCOUNTER SYMPTOMS: PAIN LOCATION - PAIN QUALITY: ACHY

## 2024-03-18 NOTE — HOME HEALTH
Subjective: My family came over this weekened   Falls since last visit No(if yes complete the Fall Tracking Form and include bsrifallreport):   Caregiver involvement changes: No  Home health supplies by type and quantity ordered/delivered this visit include: N/A :Pt's caregiver Khloe informed that pt was running low on IV endcaps and told to call supplier for more. She stated that she would call and order more.    Clinician asked if patient has had any physician contact since last home care visit and patient states: NO  Clinician asked if patient has any new or changed medications and patient states:  NO   If Yes, were medications reconciled? NO   Was the certifying physician notified of changes in medications? NO     Clinical assessment (what this visit means for the patient overall and need for ongoing skilled care) and progress or lack of progress towards SPECIFIC goals: pt with inotrope drip and right subclavian central line. SN needed for education, assessment, lab draes and weekly dressing changes. VS WNL, however weight is slightly trending up. Not enough to notify provider though. Pt states that he  has been compliant with his medication regimen and fluid restrictions. Assessment negective accept for bilateral lower extremity swelling of 2+ pitting. SN needed for labs and weekly dressing until goals are met.     Interdisciplinary communication with: N/A    Discharge planning as follows: Is no longer homebound, Per physician order and When goals are met    Specific plan for next visit: lab draw and dressing change

## 2024-03-19 NOTE — HOME HEALTH
Justification for continued intermittent care: R subclavian central line and continuous inotrope infusion. SN needed for weekly lab, central line dressing changes, cardiopulmoary assesssment and medication education    MD Nuria Major  approved of the following: the need for continued Skilled Nursing home health services and plan of care for Ingrid Kennedy. Skilled Nursing for: additional assessment and education and additional assessment and WOUND SIGALA IV ETC: IV/PICC/PORT services to R subclavian central line   Subjective: I feel ok.    Clinical assessment (what this visit means for the patient overall and need for ongoing skilled care) and progress or lack of progress towards SPECIFIC goals: Pt with CHF, continous inotrope drip and central line. SN needed until goals are met weight and BP management,  central line management and weekly lab draws to prevent infection and rehospitalization    Falls since last visit No(if yes complete the Fall Tracking Form and include bsrifallreport):     Written Teaching Material Utilized: N/A    Interdisciplinary communication with: N/A     Medications reconciled and all medications are available in the home this visit. A list of reconciled medications has been given to the patient/caregiver     Patient/caregiver instructed on plan of care and are agreeable to plan of care at this time.      Discharge planning as follows: Is no longer homebound, Per physician order and When goals are met    Specific plan for next visit: labs,assessment, dressing change

## 2024-03-20 RX ORDER — MAGNESIUM OXIDE 400 MG/1
400 TABLET ORAL DAILY
Qty: 30 TABLET | Refills: 2 | Status: SHIPPED | OUTPATIENT
Start: 2024-03-20

## 2024-03-20 NOTE — PROGRESS NOTES
Requested Prescriptions     Signed Prescriptions Disp Refills    magnesium oxide (MAG-OX) 400 MG tablet 30 tablet 2     Sig: Take 1 tablet by mouth daily     See my chart encounter for details

## 2024-03-22 ENCOUNTER — PATIENT MESSAGE (OUTPATIENT)
Age: 74
End: 2024-03-22

## 2024-03-25 ENCOUNTER — HOSPITAL ENCOUNTER (OUTPATIENT)
Facility: HOSPITAL | Age: 74
Setting detail: SPECIMEN
Discharge: HOME | End: 2024-03-28

## 2024-03-25 ENCOUNTER — HOME CARE VISIT (OUTPATIENT)
Facility: HOME HEALTH | Age: 74
End: 2024-03-25

## 2024-03-25 ENCOUNTER — TELEPHONE (OUTPATIENT)
Age: 74
End: 2024-03-25

## 2024-03-25 LAB
ALBUMIN SERPL-MCNC: 3.6 G/DL (ref 3.5–5)
ALBUMIN/GLOB SERPL: 1.1 (ref 1.1–2.2)
ALP SERPL-CCNC: 130 U/L (ref 45–117)
ALT SERPL-CCNC: 60 U/L (ref 12–78)
ANION GAP SERPL CALC-SCNC: 3 MMOL/L (ref 5–15)
AST SERPL-CCNC: 29 U/L (ref 15–37)
BASOPHILS # BLD: 0 K/UL (ref 0–0.1)
BASOPHILS NFR BLD: 1 % (ref 0–1)
BILIRUB SERPL-MCNC: 1.7 MG/DL (ref 0.2–1)
BUN SERPL-MCNC: 30 MG/DL (ref 6–20)
BUN/CREAT SERPL: 13 (ref 12–20)
CALCIUM SERPL-MCNC: 8.6 MG/DL (ref 8.5–10.1)
CHLORIDE SERPL-SCNC: 112 MMOL/L (ref 97–108)
CO2 SERPL-SCNC: 29 MMOL/L (ref 21–32)
CREAT SERPL-MCNC: 2.26 MG/DL (ref 0.7–1.3)
DIFFERENTIAL METHOD BLD: ABNORMAL
EOSINOPHIL # BLD: 0.1 K/UL (ref 0–0.4)
EOSINOPHIL NFR BLD: 2 % (ref 0–7)
ERYTHROCYTE [DISTWIDTH] IN BLOOD BY AUTOMATED COUNT: 18.6 % (ref 11.5–14.5)
GLOBULIN SER CALC-MCNC: 3.2 G/DL (ref 2–4)
GLUCOSE SERPL-MCNC: 114 MG/DL (ref 65–100)
HCT VFR BLD AUTO: 32.5 % (ref 36.6–50.3)
HGB BLD-MCNC: 10.6 G/DL (ref 12.1–17)
IMM GRANULOCYTES # BLD AUTO: 0 K/UL (ref 0–0.04)
IMM GRANULOCYTES NFR BLD AUTO: 0 % (ref 0–0.5)
LYMPHOCYTES # BLD: 1.4 K/UL (ref 0.8–3.5)
LYMPHOCYTES NFR BLD: 42 % (ref 12–49)
MAGNESIUM SERPL-MCNC: 2.2 MG/DL (ref 1.6–2.4)
MCH RBC QN AUTO: 27.5 PG (ref 26–34)
MCHC RBC AUTO-ENTMCNC: 32.6 G/DL (ref 30–36.5)
MCV RBC AUTO: 84.2 FL (ref 80–99)
MONOCYTES # BLD: 0.3 K/UL (ref 0–1)
MONOCYTES NFR BLD: 9 % (ref 5–13)
NEUTS SEG # BLD: 1.5 K/UL (ref 1.8–8)
NEUTS SEG NFR BLD: 46 % (ref 32–75)
NRBC # BLD: 0 K/UL (ref 0–0.01)
NRBC BLD-RTO: 0 PER 100 WBC
NT PRO BNP: 7393 PG/ML
PLATELET # BLD AUTO: 174 K/UL (ref 150–400)
PMV BLD AUTO: 10.2 FL (ref 8.9–12.9)
POTASSIUM SERPL-SCNC: 3.9 MMOL/L (ref 3.5–5.1)
PROT SERPL-MCNC: 6.8 G/DL (ref 6.4–8.2)
RBC # BLD AUTO: 3.86 M/UL (ref 4.1–5.7)
SODIUM SERPL-SCNC: 144 MMOL/L (ref 136–145)
WBC # BLD AUTO: 3.2 K/UL (ref 4.1–11.1)

## 2024-03-25 PROCEDURE — G0299 HHS/HOSPICE OF RN EA 15 MIN: HCPCS

## 2024-03-25 NOTE — TELEPHONE ENCOUNTER
----- Message from MARYCRUZ Ambrosio NP sent at 3/22/2024  4:09 PM EDT -----  Regarding: RE: Weights and BP for the week  Contact: [phone]  Ok.  Looks good.  No new changes.  Thank you.      ----- Message -----  From: Minna Coleman RN  Sent: 3/22/2024   4:08 PM EDT  To: MARYCRUZ Ambrosio NP  Subject: Weights and BP for the week                        ----- Message -----  From: Ingrid Kennedy  Sent: 3/22/2024   2:05 PM EDT  To: #  Subject: Leg                                              3/19  156.1. 102/87. 88    3/20.  156.8.  124/75.  98    3/21. 155.4.  106/77.  87    3/22.  157.6.  115/82.  89

## 2024-03-26 VITALS
SYSTOLIC BLOOD PRESSURE: 118 MMHG | OXYGEN SATURATION: 98 % | WEIGHT: 156 LBS | RESPIRATION RATE: 20 BRPM | DIASTOLIC BLOOD PRESSURE: 82 MMHG | HEART RATE: 80 BPM | TEMPERATURE: 98 F | BODY MASS INDEX: 22.38 KG/M2

## 2024-03-26 ASSESSMENT — ENCOUNTER SYMPTOMS: PAIN LOCATION - PAIN QUALITY: ACHY

## 2024-03-26 NOTE — HOME HEALTH
Subjective: My weight has been in the same ballpark   Falls since last visit No(if yes complete the Fall Tracking Form and include bsrifallreport):   Caregiver involvement changes: NO  Home health supplies by type and quantity ordered/delivered this visit include: NO    Clinician asked if patient has had any physician contact since last home care visit and patient states: NO  Clinician asked if patient has any new or changed medications and patient states:  NO   If Yes, were medications reconciled? NO   Was the certifying physician notified of changes in medications? NO   Clinical assessment (what this visit means for the patient overall and need for ongoing skilled care) and progress or lack of progress towards SPECIFIC goals: labs and centrine line care completed today. site remains infection free. vitals and weight WNL. Assessment negative. SN still needed for weekly lab and dressing changes     Written Teaching Material Utilized: N/A    Interdisciplinary communication with: N/A     Discharge planning as follows: Is no longer homebound, Per physician order and When goals are met    Specific plan for next visit: lab and dressing change

## 2024-03-28 ENCOUNTER — OFFICE VISIT (OUTPATIENT)
Age: 74
End: 2024-03-28
Payer: MEDICARE

## 2024-03-28 VITALS
SYSTOLIC BLOOD PRESSURE: 110 MMHG | WEIGHT: 160.8 LBS | BODY MASS INDEX: 23.07 KG/M2 | HEART RATE: 87 BPM | OXYGEN SATURATION: 96 % | RESPIRATION RATE: 18 BRPM | TEMPERATURE: 97.7 F | DIASTOLIC BLOOD PRESSURE: 64 MMHG

## 2024-03-28 DIAGNOSIS — I50.22 CHRONIC SYSTOLIC (CONGESTIVE) HEART FAILURE (HCC): ICD-10-CM

## 2024-03-28 DIAGNOSIS — Z79.899 RECEIVING INOTROPIC MEDICATION: Primary | ICD-10-CM

## 2024-03-28 DIAGNOSIS — I50.22 CHRONIC HFREF (HEART FAILURE WITH REDUCED EJECTION FRACTION) (HCC): ICD-10-CM

## 2024-03-28 PROCEDURE — 99214 OFFICE O/P EST MOD 30 MIN: CPT | Performed by: NURSE PRACTITIONER

## 2024-03-28 PROCEDURE — 3074F SYST BP LT 130 MM HG: CPT | Performed by: NURSE PRACTITIONER

## 2024-03-28 PROCEDURE — 1123F ACP DISCUSS/DSCN MKR DOCD: CPT | Performed by: NURSE PRACTITIONER

## 2024-03-28 PROCEDURE — G8427 DOCREV CUR MEDS BY ELIG CLIN: HCPCS | Performed by: NURSE PRACTITIONER

## 2024-03-28 PROCEDURE — 3078F DIAST BP <80 MM HG: CPT | Performed by: NURSE PRACTITIONER

## 2024-03-28 PROCEDURE — G8484 FLU IMMUNIZE NO ADMIN: HCPCS | Performed by: NURSE PRACTITIONER

## 2024-03-28 PROCEDURE — 1036F TOBACCO NON-USER: CPT | Performed by: NURSE PRACTITIONER

## 2024-03-28 PROCEDURE — 3017F COLORECTAL CA SCREEN DOC REV: CPT | Performed by: NURSE PRACTITIONER

## 2024-03-28 PROCEDURE — G8420 CALC BMI NORM PARAMETERS: HCPCS | Performed by: NURSE PRACTITIONER

## 2024-03-28 ASSESSMENT — ENCOUNTER SYMPTOMS
BLOOD IN STOOL: 0
CHEST TIGHTNESS: 0
SHORTNESS OF BREATH: 1
SORE THROAT: 0
ABDOMINAL PAIN: 0
COUGH: 0
ABDOMINAL DISTENTION: 0
EYE PAIN: 0

## 2024-03-28 NOTE — PROGRESS NOTES
----- Message from Mona Marrero MD sent at 7/6/2017  1:50 PM CDT -----  Anemia stable. lft stable but electrolytes are becoming worse due to his hx.   Needs to follow up with GI (yoli has been referred from the hospital)  Recheck bmp in 2 weeks to ensure sodium is not decreasing.   Dose: 0.0177 mg/min  Milrinone mixed in D5W), Disp: 999 mL, Rfl: 0    apixaban (ELIQUIS) 5 MG TABS tablet, Take 1 tablet by mouth 2 times daily, Disp: , Rfl:     tamsulosin (FLOMAX) 0.4 MG capsule, Take 2 capsules by mouth daily, Disp: , Rfl:     PATIENT CARE TEAM:  Patient Care Team:  Elisa Loyd MD as PCP - General (Internal Medicine)  Elisa Loyd MD as PCP - Empaneled Provider     Thank you for allowing me to participate in this patient's care.    MARYCRUZ Molina - NP   [facility]  [facility]  [address], [address]  Phone: [phone]    On this date 3/28/24 , I have spent a total time of  35 minutes personally reviewing new vitals, test results, notes from recent visits, face to face encounter/physical exam of patient with counseling, writing orders, performing medical decision making, and documenting.

## 2024-03-28 NOTE — PATIENT INSTRUCTIONS
Medication changes:    NONE    Please take this to your pharmacy to notify them of the change in medications.     Testing Ordered:    NONE    Other Recommendations:     Schedule an appointment with Neuropsychology, Dr. Bianca Roca at [phone]     Please record blood pressure and heart rate daily before medication and two hours after medication, please record weight daily upon waking/after using the bathroom. Keep a written records of your weights and blood pressure and bring to your next appointment. If you have a weight gain of 3 or more pounds overnight OR 5 or more pounds in one week, new/worsened shortness of breath or swelling, or if your blood pressure begins to consistently run below 90/60 and/or you begin to experience dizziness or lightheadedness please contact our office at [phone] option 2.    Ensure your drinking an adequate amount of water with a goal of 6-8 eight ounce glasses (1.5-2 liters) of fluid daily. Your urine should be clear and light yellow straw colored.     Follow up 1 month with [facility]    Thank you for allowing us the privilege of being a part of your healthcare team! Please do not hesitate to contact our office at [phone] option 2 with any questions or concerns.     We are restarting a monthly heart failure support group, this will be the last Wednesday of every month from 5-6pm at [facility]. If you would like to attend you will need to RSVP to [email or web address]

## 2024-03-29 ENCOUNTER — PATIENT MESSAGE (OUTPATIENT)
Age: 74
End: 2024-03-29

## 2024-03-29 NOTE — TELEPHONE ENCOUNTER
From: Ingrid Kennedy  To: Iwona Whittington  Sent: 3/29/2024 10:13 AM EDT  Subject: Ingrid Kennedy    Hi, I forgot to ask you yesterday for a medical paper stating my heart health situation so that I can update my disability form for my job. They just need an update that I'm still under heart health care and cannot work.    Letter signed by NP, sent to patient via [place]

## 2024-04-01 ENCOUNTER — HOME CARE VISIT (OUTPATIENT)
Facility: HOME HEALTH | Age: 74
End: 2024-04-01
Payer: MEDICARE

## 2024-04-01 ENCOUNTER — HOSPITAL ENCOUNTER (OUTPATIENT)
Facility: HOSPITAL | Age: 74
Setting detail: SPECIMEN
Discharge: HOME | End: 2024-04-04

## 2024-04-01 LAB
ALBUMIN SERPL-MCNC: 3.2 G/DL (ref 3.5–5)
ALBUMIN/GLOB SERPL: 1 (ref 1.1–2.2)
ALP SERPL-CCNC: 107 U/L (ref 45–117)
ALT SERPL-CCNC: 30 U/L (ref 12–78)
ANION GAP SERPL CALC-SCNC: 6 MMOL/L (ref 5–15)
AST SERPL-CCNC: 16 U/L (ref 15–37)
BASOPHILS # BLD: 0 K/UL (ref 0–0.1)
BASOPHILS NFR BLD: 1 % (ref 0–1)
BILIRUB SERPL-MCNC: 1.3 MG/DL (ref 0.2–1)
BUN SERPL-MCNC: 28 MG/DL (ref 6–20)
BUN/CREAT SERPL: 12 (ref 12–20)
CALCIUM SERPL-MCNC: 8.7 MG/DL (ref 8.5–10.1)
CHLORIDE SERPL-SCNC: 109 MMOL/L (ref 97–108)
CO2 SERPL-SCNC: 26 MMOL/L (ref 21–32)
CREAT SERPL-MCNC: 2.39 MG/DL (ref 0.7–1.3)
DIFFERENTIAL METHOD BLD: ABNORMAL
EOSINOPHIL # BLD: 0.1 K/UL (ref 0–0.4)
EOSINOPHIL NFR BLD: 2 % (ref 0–7)
ERYTHROCYTE [DISTWIDTH] IN BLOOD BY AUTOMATED COUNT: 18.6 % (ref 11.5–14.5)
GLOBULIN SER CALC-MCNC: 3.2 G/DL (ref 2–4)
GLUCOSE SERPL-MCNC: 153 MG/DL (ref 65–100)
HCT VFR BLD AUTO: 32.3 % (ref 36.6–50.3)
HGB BLD-MCNC: 10.6 G/DL (ref 12.1–17)
IMM GRANULOCYTES # BLD AUTO: 0 K/UL (ref 0–0.04)
IMM GRANULOCYTES NFR BLD AUTO: 0 % (ref 0–0.5)
LYMPHOCYTES # BLD: 1.3 K/UL (ref 0.8–3.5)
LYMPHOCYTES NFR BLD: 44 % (ref 12–49)
MAGNESIUM SERPL-MCNC: 2.4 MG/DL (ref 1.6–2.4)
MCH RBC QN AUTO: 27.4 PG (ref 26–34)
MCHC RBC AUTO-ENTMCNC: 32.8 G/DL (ref 30–36.5)
MCV RBC AUTO: 83.5 FL (ref 80–99)
MONOCYTES # BLD: 0.2 K/UL (ref 0–1)
MONOCYTES NFR BLD: 8 % (ref 5–13)
NEUTS SEG # BLD: 1.3 K/UL (ref 1.8–8)
NEUTS SEG NFR BLD: 45 % (ref 32–75)
NRBC # BLD: 0 K/UL (ref 0–0.01)
NRBC BLD-RTO: 0 PER 100 WBC
NT PRO BNP: 6412 PG/ML
PLATELET # BLD AUTO: 190 K/UL (ref 150–400)
PMV BLD AUTO: 10.4 FL (ref 8.9–12.9)
POTASSIUM SERPL-SCNC: 4 MMOL/L (ref 3.5–5.1)
PROT SERPL-MCNC: 6.4 G/DL (ref 6.4–8.2)
RBC # BLD AUTO: 3.87 M/UL (ref 4.1–5.7)
SODIUM SERPL-SCNC: 141 MMOL/L (ref 136–145)
WBC # BLD AUTO: 2.9 K/UL (ref 4.1–11.1)

## 2024-04-01 PROCEDURE — G0299 HHS/HOSPICE OF RN EA 15 MIN: HCPCS

## 2024-04-02 VITALS
RESPIRATION RATE: 20 BRPM | OXYGEN SATURATION: 95 % | TEMPERATURE: 98.1 F | BODY MASS INDEX: 23.39 KG/M2 | HEART RATE: 76 BPM | SYSTOLIC BLOOD PRESSURE: 122 MMHG | DIASTOLIC BLOOD PRESSURE: 80 MMHG | WEIGHT: 163 LBS

## 2024-04-02 NOTE — HOME HEALTH
Subjective: I have been complaint with my fluid restriction. I even think that I havent been reaching the max amount   Falls since last visit No(if yes complete the Fall Tracking Form and include bsrifallreport):   Caregiver involvement changes: No  Home health supplies by type and quantity ordered/delivered this visit include: N/A    Clinician asked if patient has had any physician contact since last home care visit and patient states: NO  Clinician asked if patient has any new or changed medications and patient states:  NO   If Yes, were medications reconciled? NO   Was the certifying physician notified of changes in medications? NO     Clinical assessment (what this visit means for the patient overall and need for ongoing skilled care) and progress or lack of progress towards SPECIFIC goals: Pt with CHF, on inotrope drip. Pt has a 7lb weight gain in 1 week. Pt states that he has been compliant with his medication regimen and fluid restrictions. Pt with 1 pitting edema to bilat ankles and below. Lungs are clear. Pt does endorse SOB with exertion. Vitals WNL. Labs completed today and CVC dressing changed today. Pt no longer with end caps. Caregiver states that supply company does not supply them, Caregiver instructed to order from [email or web address] Pt is currently not meeting fluid management goals. SN needed for continued assessment and education to prevent hospitalization. VM left for [facility] RN regarding above, currently awaiting call back    Written Teaching Material Utilized: N/A    Interdisciplinary communication with: See above     Discharge planning as follows: Is no longer homebound, Per physician order and When goals are met    Specific plan for next visit: cvc change, labs, assess weight

## 2024-04-04 RX ORDER — METOPROLOL SUCCINATE 25 MG/1
12.5 TABLET, EXTENDED RELEASE ORAL DAILY
Qty: 45 TABLET | Refills: 1 | Status: SHIPPED | OUTPATIENT
Start: 2024-04-04

## 2024-04-04 NOTE — TELEPHONE ENCOUNTER
Requested Prescriptions     Signed Prescriptions Disp Refills    metoprolol succinate (TOPROL XL) 25 MG extended release tablet 45 tablet 1     Sig: Take 0.5 tablets by mouth daily     Authorizing Provider: DANIEL JOHNSON     Ordering User: LINDA DELANEY

## 2024-04-08 ENCOUNTER — HOME CARE VISIT (OUTPATIENT)
Facility: HOME HEALTH | Age: 74
End: 2024-04-08
Payer: MEDICARE

## 2024-04-08 ENCOUNTER — HOSPITAL ENCOUNTER (OUTPATIENT)
Facility: HOSPITAL | Age: 74
Setting detail: SPECIMEN
Discharge: HOME | End: 2024-04-11

## 2024-04-08 LAB
ALBUMIN SERPL-MCNC: 3.4 G/DL (ref 3.5–5)
ALBUMIN/GLOB SERPL: 0.9 (ref 1.1–2.2)
ALP SERPL-CCNC: 104 U/L (ref 45–117)
ALT SERPL-CCNC: 22 U/L (ref 12–78)
ANION GAP SERPL CALC-SCNC: 9 MMOL/L (ref 5–15)
AST SERPL-CCNC: 13 U/L (ref 15–37)
BASOPHILS # BLD: 0 K/UL (ref 0–0.1)
BASOPHILS NFR BLD: 1 % (ref 0–1)
BILIRUB SERPL-MCNC: 1.3 MG/DL (ref 0.2–1)
BUN SERPL-MCNC: 35 MG/DL (ref 6–20)
BUN/CREAT SERPL: 16 (ref 12–20)
CALCIUM SERPL-MCNC: 8.9 MG/DL (ref 8.5–10.1)
CHLORIDE SERPL-SCNC: 106 MMOL/L (ref 97–108)
CO2 SERPL-SCNC: 26 MMOL/L (ref 21–32)
CREAT SERPL-MCNC: 2.24 MG/DL (ref 0.7–1.3)
DIFFERENTIAL METHOD BLD: ABNORMAL
EOSINOPHIL # BLD: 0.1 K/UL (ref 0–0.4)
EOSINOPHIL NFR BLD: 1 % (ref 0–7)
ERYTHROCYTE [DISTWIDTH] IN BLOOD BY AUTOMATED COUNT: 18.6 % (ref 11.5–14.5)
GLOBULIN SER CALC-MCNC: 3.6 G/DL (ref 2–4)
GLUCOSE SERPL-MCNC: 150 MG/DL (ref 65–100)
HCT VFR BLD AUTO: 36.2 % (ref 36.6–50.3)
HGB BLD-MCNC: 12 G/DL (ref 12.1–17)
IMM GRANULOCYTES # BLD AUTO: 0 K/UL (ref 0–0.04)
IMM GRANULOCYTES NFR BLD AUTO: 0 % (ref 0–0.5)
LYMPHOCYTES # BLD: 1.2 K/UL (ref 0.8–3.5)
LYMPHOCYTES NFR BLD: 26 % (ref 12–49)
MAGNESIUM SERPL-MCNC: 2.3 MG/DL (ref 1.6–2.4)
MCH RBC QN AUTO: 27.5 PG (ref 26–34)
MCHC RBC AUTO-ENTMCNC: 33.1 G/DL (ref 30–36.5)
MCV RBC AUTO: 82.8 FL (ref 80–99)
MONOCYTES # BLD: 0.5 K/UL (ref 0–1)
MONOCYTES NFR BLD: 11 % (ref 5–13)
NEUTS SEG # BLD: 2.8 K/UL (ref 1.8–8)
NEUTS SEG NFR BLD: 61 % (ref 32–75)
NRBC # BLD: 0 K/UL (ref 0–0.01)
NRBC BLD-RTO: 0 PER 100 WBC
NT PRO BNP: 7883 PG/ML
PLATELET # BLD AUTO: 182 K/UL (ref 150–400)
PMV BLD AUTO: 10.5 FL (ref 8.9–12.9)
POTASSIUM SERPL-SCNC: 3.6 MMOL/L (ref 3.5–5.1)
PROT SERPL-MCNC: 7 G/DL (ref 6.4–8.2)
RBC # BLD AUTO: 4.37 M/UL (ref 4.1–5.7)
SODIUM SERPL-SCNC: 141 MMOL/L (ref 136–145)
WBC # BLD AUTO: 4.6 K/UL (ref 4.1–11.1)

## 2024-04-08 PROCEDURE — 83735 ASSAY OF MAGNESIUM: CPT

## 2024-04-08 PROCEDURE — 85025 COMPLETE CBC W/AUTO DIFF WBC: CPT

## 2024-04-08 PROCEDURE — 83880 ASSAY OF NATRIURETIC PEPTIDE: CPT

## 2024-04-08 PROCEDURE — 80053 COMPREHEN METABOLIC PANEL: CPT

## 2024-04-08 PROCEDURE — G0299 HHS/HOSPICE OF RN EA 15 MIN: HCPCS

## 2024-04-08 PROCEDURE — 36415 COLL VENOUS BLD VENIPUNCTURE: CPT

## 2024-04-09 VITALS
WEIGHT: 155 LBS | HEART RATE: 66 BPM | BODY MASS INDEX: 22.24 KG/M2 | OXYGEN SATURATION: 94 % | DIASTOLIC BLOOD PRESSURE: 73 MMHG | TEMPERATURE: 98.8 F | SYSTOLIC BLOOD PRESSURE: 128 MMHG | RESPIRATION RATE: 20 BRPM

## 2024-04-09 ASSESSMENT — ENCOUNTER SYMPTOMS: PAIN LOCATION - PAIN QUALITY: ACHY

## 2024-04-09 NOTE — HOME HEALTH
aSubjective: I caught a little cold. I went outside last week with nothing on my head   Falls since last visit No(if yes complete the Fall Tracking Form and include bsrifallreport):   Caregiver involvement changes: No  Home health supplies by type and quantity ordered/delivered this visit include: N/A    Clinician asked if patient has had any physician contact since last home care visit and patient states: yes  Clinician asked if patient has any new or changed medications and patient states:  NO   If Yes, were medications reconciled? NO   Was the certifying physician notified of changes in medications? NO     Clinical assessment (what this visit means for the patient overall and need for ongoing skilled care) and progress or lack of progress towards SPECIFIC goals: Pt on inotrope drip tolerating well. Vitals stable. CVC with no signs of infection. dressing changed today. Pt currently meeting weight goals and lost approximately 8 lbs since last visit. Labs collected today and sent to [facility]. Pt continues to need  for weekly cardiompulmonary assessments, cvc dressing changes and lab draws to prevent infection and hospitalization.    Written Teaching Material Utilized: N/A    Interdisciplinary communication with: N/A    Discharge planning as follows: Is no longer homebound, Per physician order and When goals are met    Specific plan for next visit: cvc dressing change/ lab draws

## 2024-04-11 ENCOUNTER — TELEPHONE (OUTPATIENT)
Age: 74
End: 2024-04-11

## 2024-04-11 NOTE — TELEPHONE ENCOUNTER
Telephone Call RE:  Appointment reminder     Outcome:     [] Patient confirmed appointment   [] Patient rescheduled appointment for    [] Unable to reach  [x] Left message              [] Other:       on both voicemails.

## 2024-04-11 NOTE — TELEPHONE ENCOUNTER
Call received from Khloe Douglas to state she will send Ingrid Kennedy' BP and weight logs later today. She said his BP and weight has remained consistent over the past week and he has not had any shortness of breath.    2nd daughter got on the line to inquire about plans for patient's move to [facility] by July. I stated that is something that can be discussed at upcoming appointment on 4/16 as we need to have a physician to transfer Mr. Kennedy care to and who will manage his inotrope.

## 2024-04-15 ENCOUNTER — HOSPITAL ENCOUNTER (OUTPATIENT)
Facility: HOSPITAL | Age: 74
Setting detail: SPECIMEN
Discharge: HOME | End: 2024-04-18

## 2024-04-15 ENCOUNTER — HOME CARE VISIT (OUTPATIENT)
Facility: HOME HEALTH | Age: 74
End: 2024-04-15
Payer: MEDICARE

## 2024-04-15 PROCEDURE — G0299 HHS/HOSPICE OF RN EA 15 MIN: HCPCS

## 2024-04-15 ASSESSMENT — ENCOUNTER SYMPTOMS
SHORTNESS OF BREATH: 1
COUGH: 0
BLOOD IN STOOL: 0
CHEST TIGHTNESS: 0
SORE THROAT: 0
EYE PAIN: 0
ABDOMINAL DISTENTION: 0
ABDOMINAL PAIN: 0

## 2024-04-16 ENCOUNTER — OFFICE VISIT (OUTPATIENT)
Age: 74
End: 2024-04-16
Payer: MEDICARE

## 2024-04-16 VITALS
RESPIRATION RATE: 18 BRPM | HEART RATE: 84 BPM | SYSTOLIC BLOOD PRESSURE: 120 MMHG | WEIGHT: 165 LBS | OXYGEN SATURATION: 99 % | DIASTOLIC BLOOD PRESSURE: 100 MMHG | BODY MASS INDEX: 23.62 KG/M2 | HEIGHT: 70 IN | TEMPERATURE: 97.7 F

## 2024-04-16 VITALS
BODY MASS INDEX: 22.84 KG/M2 | WEIGHT: 159.2 LBS | TEMPERATURE: 98.3 F | RESPIRATION RATE: 20 BRPM | SYSTOLIC BLOOD PRESSURE: 100 MMHG | HEART RATE: 80 BPM | OXYGEN SATURATION: 97 % | DIASTOLIC BLOOD PRESSURE: 70 MMHG

## 2024-04-16 DIAGNOSIS — I50.22 CHRONIC HFREF (HEART FAILURE WITH REDUCED EJECTION FRACTION) (HCC): ICD-10-CM

## 2024-04-16 DIAGNOSIS — Z79.899 RECEIVING INOTROPIC MEDICATION: Primary | ICD-10-CM

## 2024-04-16 LAB
ALBUMIN SERPL-MCNC: 3.1 G/DL (ref 3.5–5)
ALBUMIN/GLOB SERPL: 0.9 (ref 1.1–2.2)
ALP SERPL-CCNC: 117 U/L (ref 45–117)
ALT SERPL-CCNC: 29 U/L (ref 12–78)
ANION GAP SERPL CALC-SCNC: 10 MMOL/L (ref 5–15)
AST SERPL-CCNC: 23 U/L (ref 15–37)
BASOPHILS # BLD: 0 K/UL (ref 0–0.1)
BASOPHILS NFR BLD: 1 % (ref 0–1)
BILIRUB SERPL-MCNC: 1 MG/DL (ref 0.2–1)
BUN SERPL-MCNC: 40 MG/DL (ref 6–20)
BUN/CREAT SERPL: 17 (ref 12–20)
CALCIUM SERPL-MCNC: 8.7 MG/DL (ref 8.5–10.1)
CHLORIDE SERPL-SCNC: 108 MMOL/L (ref 97–108)
CHOLEST SERPL-MCNC: 124 MG/DL
CO2 SERPL-SCNC: 24 MMOL/L (ref 21–32)
CREAT SERPL-MCNC: 2.41 MG/DL (ref 0.7–1.3)
DIFFERENTIAL METHOD BLD: ABNORMAL
EOSINOPHIL # BLD: 0.1 K/UL (ref 0–0.4)
EOSINOPHIL NFR BLD: 2 % (ref 0–7)
ERYTHROCYTE [DISTWIDTH] IN BLOOD BY AUTOMATED COUNT: 18.7 % (ref 11.5–14.5)
GLOBULIN SER CALC-MCNC: 3.4 G/DL (ref 2–4)
GLUCOSE SERPL-MCNC: 145 MG/DL (ref 65–100)
HCT VFR BLD AUTO: 31.9 % (ref 36.6–50.3)
HDLC SERPL-MCNC: 60 MG/DL
HDLC SERPL: 2.1 (ref 0–5)
HGB BLD-MCNC: 10.7 G/DL (ref 12.1–17)
IMM GRANULOCYTES # BLD AUTO: 0 K/UL (ref 0–0.04)
IMM GRANULOCYTES NFR BLD AUTO: 0 % (ref 0–0.5)
LDLC SERPL CALC-MCNC: 54 MG/DL (ref 0–100)
LYMPHOCYTES # BLD: 1.4 K/UL (ref 0.8–3.5)
LYMPHOCYTES NFR BLD: 43 % (ref 12–49)
MAGNESIUM SERPL-MCNC: 2.2 MG/DL (ref 1.6–2.4)
MCH RBC QN AUTO: 28 PG (ref 26–34)
MCHC RBC AUTO-ENTMCNC: 33.5 G/DL (ref 30–36.5)
MCV RBC AUTO: 83.5 FL (ref 80–99)
MONOCYTES # BLD: 0.3 K/UL (ref 0–1)
MONOCYTES NFR BLD: 9 % (ref 5–13)
NEUTS SEG # BLD: 1.5 K/UL (ref 1.8–8)
NEUTS SEG NFR BLD: 45 % (ref 32–75)
NRBC # BLD: 0 K/UL (ref 0–0.01)
NRBC BLD-RTO: 0 PER 100 WBC
NT PRO BNP: 8681 PG/ML
PLATELET # BLD AUTO: 184 K/UL (ref 150–400)
PMV BLD AUTO: 10.9 FL (ref 8.9–12.9)
POTASSIUM SERPL-SCNC: 3.7 MMOL/L (ref 3.5–5.1)
PROT SERPL-MCNC: 6.5 G/DL (ref 6.4–8.2)
RBC # BLD AUTO: 3.82 M/UL (ref 4.1–5.7)
SODIUM SERPL-SCNC: 142 MMOL/L (ref 136–145)
TRIGL SERPL-MCNC: 50 MG/DL
VLDLC SERPL CALC-MCNC: 10 MG/DL
WBC # BLD AUTO: 3.3 K/UL (ref 4.1–11.1)

## 2024-04-16 PROCEDURE — G8420 CALC BMI NORM PARAMETERS: HCPCS | Performed by: NURSE PRACTITIONER

## 2024-04-16 PROCEDURE — 99214 OFFICE O/P EST MOD 30 MIN: CPT | Performed by: NURSE PRACTITIONER

## 2024-04-16 PROCEDURE — 1036F TOBACCO NON-USER: CPT | Performed by: NURSE PRACTITIONER

## 2024-04-16 PROCEDURE — 3017F COLORECTAL CA SCREEN DOC REV: CPT | Performed by: NURSE PRACTITIONER

## 2024-04-16 PROCEDURE — 3074F SYST BP LT 130 MM HG: CPT | Performed by: NURSE PRACTITIONER

## 2024-04-16 PROCEDURE — G8427 DOCREV CUR MEDS BY ELIG CLIN: HCPCS | Performed by: NURSE PRACTITIONER

## 2024-04-16 PROCEDURE — 3079F DIAST BP 80-89 MM HG: CPT | Performed by: NURSE PRACTITIONER

## 2024-04-16 PROCEDURE — 96374 THER/PROPH/DIAG INJ IV PUSH: CPT | Performed by: NURSE PRACTITIONER

## 2024-04-16 PROCEDURE — 1123F ACP DISCUSS/DSCN MKR DOCD: CPT | Performed by: NURSE PRACTITIONER

## 2024-04-16 RX ORDER — BUMETANIDE 0.25 MG/ML
2 INJECTION INTRAMUSCULAR; INTRAVENOUS ONCE
Status: COMPLETED | OUTPATIENT
Start: 2024-04-16 | End: 2024-04-16

## 2024-04-16 RX ADMIN — BUMETANIDE 2 MG: 0.25 INJECTION INTRAMUSCULAR; INTRAVENOUS at 11:54

## 2024-04-16 NOTE — PATIENT INSTRUCTIONS
Medication changes:    Take only 2 mg bumex tonight and call tomorrow morning to report symptoms and weight    Please take this to your pharmacy to notify them of the change in medications.     Testing Ordered:    none    Other Recommendations:      Ensure your drinking an adequate amount of water with a goal of 6-8 eight ounce glasses (1.5-2 liters) of fluid daily. Your urine should be clear and light yellow straw colored.      If your blood pressure begins to consistently run below 90/60 and/or you begin to experience dizziness or lightheadedness, please contact the [facility] at [phone].       Follow up on 4/30/24 at 11:00am with Sunitha-hunter scheduled with [facility]      Please monitor your weights daily upon waking and after using the bathroom. Keep a written records of your weights and bring to your next appointment. If you have a weight gain of 3 or more pounds overnight OR 5 or more pounds in one week please contact our office.       Thank you for allowing us the privilege of being a part of your healthcare team! Please do not hesitate to contact our office at [phone] with any questions or concerns.

## 2024-04-16 NOTE — PROGRESS NOTES
Cholesterol Calculated 04/15/2024 10  MG/DL Final    Chol/HDL Ratio 04/15/2024 2.1  0.0 - 5.0   Final        Medications given per providers orders:      Administrations This Visit       bumetanide (BUMEX) injection 2 mg       Admin Date  04/16/2024  11:54 Action  Given Dose  2 mg Route  IntraVENous Site   Administered By  Lashaun Gould RN    Ordering Provider: Iwona Whittington APRN - NP    NDC: 0641-6007-10    Lot#: [id number]    : [employer]    Patient Supplied?: No                     Lines: left hand   Blood return noted and IV site assessed before, during, and after treatment.  Line flushed with 10-30 ml's 0.9% Normal Saline solution per protocol.     VS, UOP and patient symptoms reviewed with  Supervising provider prior to patient leaving clinic.  Plan is for follow up phone call in am to determine further treatment.    Access was removed from Mr. Kennedy after completion of infusion/injection, with STAT labs sent prior to line removal.   Mr. Kennedy tolerated the treatment without complaints and no medication reactions were seen while in presence of this RN.   Patient provided with AVS, which included future appointments and written educational material regarding    Orders Placed This Encounter   Medications    bumetanide (BUMEX) injection 2 mg    .  All of the patients questions were answered and then discharged  from [facility] in stable condition at 1:00pm.  Encounter routed to supervising provider for co-signing.     Future Appointments   Date Time Provider Department Center   4/22/2024 To Be Determined Xenia Tran RN [facility] HOME [facility]   4/29/2024 To Be Determined Xenia Tran RN [facility] HOME [facility]   4/30/2024 11:00 AM Sunitha Metcalf APRN - NP [facility]   5/6/2024 To Be Determined Xenia Tran RN [facility] HOME [facility]   5/13/2024 To Be Determined Xenia Tran RN [facility]   5/22/2024 10:30 AM Elisa Loyd MD [facility] 
Sexual activity: Not Currently     Partners: Female     Social Determinants of Health     Financial Resource Strain: Low Risk  (12/6/2023)    Overall Financial Resource Strain (CARDIA)     Difficulty of Paying Living Expenses: Not hard at all   Food Insecurity: No Food Insecurity (2/12/2024)    Hunger Vital Sign     Worried About Running Out of Food in the Last Year: Never true     Ran Out of Food in the Last Year: Never true   Transportation Needs: No Transportation Needs (2/21/2024)    OASIS : Transportation     Lack of Transportation (Medical): No     Lack of Transportation (Non-Medical): No     Patient Unable or Declines to Respond: No   Physical Activity: Insufficiently Active (1/24/2024)    Exercise Vital Sign     Days of Exercise per Week: 5 days     Minutes of Exercise per Session: 20 min   Social Connections: Feeling Socially Integrated (2/21/2024)    OASIS : Social Isolation     Frequency of experiencing loneliness or isolation: Never   Housing Stability: Low Risk  (2/12/2024)    Housing Stability Vital Sign     Unable to Pay for Housing in the Last Year: No     Number of Places Lived in the Last Year: 1     Unstable Housing in the Last Year: No       LABORATORY RESULTS:     No results found for: \"TSH\", \"TSH2\", \"TSH3\", \"TSHELE\"    ALLERGY:  No Known Allergies     CURRENT MEDICATIONS:    Current Outpatient Medications:     metoprolol succinate (TOPROL XL) 25 MG extended release tablet, Take 0.5 tablets by mouth daily, Disp: 45 tablet, Rfl: 1    magnesium oxide (MAG-OX) 400 MG tablet, Take 1 tablet by mouth daily, Disp: 30 tablet, Rfl: 2    bumetanide (BUMEX) 1 MG tablet, Take 1 tablet by mouth 2 times daily In addition to the 4mg, for a total of 5mg BID, Disp: 60 tablet, Rfl: 2    potassium chloride (KLOR-CON M) 20 MEQ extended release tablet, Take three tablets by mouth twice a day may take an additional tablet daily as needed as directed by [facility] when taking metolazone, Disp: 150 tablet, Rfl: 1

## 2024-04-17 ENCOUNTER — TELEPHONE (OUTPATIENT)
Age: 74
End: 2024-04-17

## 2024-04-17 RX ORDER — DAPAGLIFLOZIN 10 MG/1
10 TABLET, FILM COATED ORAL
Qty: 90 TABLET | Refills: 1 | Status: SHIPPED | OUTPATIENT
Start: 2024-04-17

## 2024-04-17 NOTE — TELEPHONE ENCOUNTER
Requested Prescriptions     Pending Prescriptions Disp Refills    dapagliflozin (FARXIGA) 10 MG tablet 90 tablet 1     Sig: Take 1 tablet by mouth Daily with supper      Last office visit 4/16/24; follow-up 4/30/24

## 2024-04-17 NOTE — TELEPHONE ENCOUNTER
My chart message sent requesting update after IV diuretic in clinic 4/16    No response by 1130am, called to get update, No answer, LM to send update through my chart or call back    Spoke with Khloe and patient:    Yesterday weight-161.0lb,   Todays weight-161.2lb  BP-119/79- 89  Urine output- 1000ml  Breathing is the same, swelling minor improvement. No dizziness    Will review with Iwona Moran NP, MARYCRUZ - NP  You2 hours ago (12:27 PM)     Continue the 5mg of bumex BID, can take metolazone if his weight is > 161 tomorrow.  Labs with  on Monday     Update sent through my chart message per patient/cg request

## 2024-04-18 NOTE — HOME HEALTH
Subjective: I'm just tired. I havent slept good  Falls since last visit No(if yes complete the Fall Tracking Form and include bsrifallreport):   Caregiver involvement changes: NO  Home health supplies by type and quantity ordered/delivered this visit include: N/A    Clinician asked if patient has had any physician contact since last home care visit and patient states: NO  Clinician asked if patient has any new or changed medications and patient states:  NO   If Yes, were medications reconciled? NO   Was the certifying physician notified of changes in medications? NO     Clinical assessment (what this visit means for the patient overall and need for ongoing skilled care) and progress or lack of progress towards SPECIFIC goals: Pt with CHF on Milrinone. Pt is currently meeting CHF goals, managing weight and CVC continues to remain free of overt signs of infection. SN still needed for weekly assessments, lab draws and dressing changes     Written Teaching Material Utilized: N/A    Interdisciplinary communication with: N/A    Discharge planning as follows: Is no longer homebound, Per physician order and When goals are met    Specific plan for next visit: asessement, dressing change, lab draw

## 2024-04-22 ENCOUNTER — HOSPITAL ENCOUNTER (OUTPATIENT)
Facility: HOSPITAL | Age: 74
Setting detail: SPECIMEN
Discharge: HOME | End: 2024-04-25

## 2024-04-22 ENCOUNTER — HOME CARE VISIT (OUTPATIENT)
Facility: HOME HEALTH | Age: 74
End: 2024-04-22
Payer: MEDICARE

## 2024-04-22 PROCEDURE — G0299 HHS/HOSPICE OF RN EA 15 MIN: HCPCS

## 2024-04-23 VITALS
SYSTOLIC BLOOD PRESSURE: 110 MMHG | OXYGEN SATURATION: 96 % | RESPIRATION RATE: 20 BRPM | TEMPERATURE: 97.7 F | DIASTOLIC BLOOD PRESSURE: 70 MMHG | BODY MASS INDEX: 22.21 KG/M2 | HEART RATE: 56 BPM | WEIGHT: 154.8 LBS

## 2024-04-23 LAB
ALBUMIN SERPL-MCNC: 3.3 G/DL (ref 3.5–5)
ALBUMIN/GLOB SERPL: 1 (ref 1.1–2.2)
ALP SERPL-CCNC: 104 U/L (ref 45–117)
ALT SERPL-CCNC: 26 U/L (ref 12–78)
ANION GAP SERPL CALC-SCNC: 4 MMOL/L (ref 5–15)
AST SERPL-CCNC: 15 U/L (ref 15–37)
BASOPHILS # BLD: 0 K/UL (ref 0–0.1)
BASOPHILS NFR BLD: 1 % (ref 0–1)
BILIRUB SERPL-MCNC: 0.9 MG/DL (ref 0.2–1)
BUN SERPL-MCNC: 31 MG/DL (ref 6–20)
BUN/CREAT SERPL: 15 (ref 12–20)
CALCIUM SERPL-MCNC: 7.9 MG/DL (ref 8.5–10.1)
CHLORIDE SERPL-SCNC: 108 MMOL/L (ref 97–108)
CO2 SERPL-SCNC: 30 MMOL/L (ref 21–32)
CREAT SERPL-MCNC: 2.06 MG/DL (ref 0.7–1.3)
DIFFERENTIAL METHOD BLD: ABNORMAL
EOSINOPHIL # BLD: 0 K/UL (ref 0–0.4)
EOSINOPHIL NFR BLD: 1 % (ref 0–7)
ERYTHROCYTE [DISTWIDTH] IN BLOOD BY AUTOMATED COUNT: 18.7 % (ref 11.5–14.5)
GLOBULIN SER CALC-MCNC: 3.3 G/DL (ref 2–4)
GLUCOSE SERPL-MCNC: 110 MG/DL (ref 65–100)
HCT VFR BLD AUTO: 34.3 % (ref 36.6–50.3)
HGB BLD-MCNC: 11.5 G/DL (ref 12.1–17)
IMM GRANULOCYTES # BLD AUTO: 0 K/UL (ref 0–0.04)
IMM GRANULOCYTES NFR BLD AUTO: 0 % (ref 0–0.5)
LYMPHOCYTES # BLD: 1.3 K/UL (ref 0.8–3.5)
LYMPHOCYTES NFR BLD: 35 % (ref 12–49)
MAGNESIUM SERPL-MCNC: 2.4 MG/DL (ref 1.6–2.4)
MCH RBC QN AUTO: 27.9 PG (ref 26–34)
MCHC RBC AUTO-ENTMCNC: 33.5 G/DL (ref 30–36.5)
MCV RBC AUTO: 83.3 FL (ref 80–99)
MONOCYTES # BLD: 0.4 K/UL (ref 0–1)
MONOCYTES NFR BLD: 10 % (ref 5–13)
NEUTS SEG # BLD: 2 K/UL (ref 1.8–8)
NEUTS SEG NFR BLD: 53 % (ref 32–75)
NRBC # BLD: 0 K/UL (ref 0–0.01)
NRBC BLD-RTO: 0 PER 100 WBC
NT PRO BNP: 7042 PG/ML
PLATELET # BLD AUTO: 242 K/UL (ref 150–400)
PMV BLD AUTO: 10.4 FL (ref 8.9–12.9)
POTASSIUM SERPL-SCNC: 3.8 MMOL/L (ref 3.5–5.1)
PROT SERPL-MCNC: 6.6 G/DL (ref 6.4–8.2)
RBC # BLD AUTO: 4.12 M/UL (ref 4.1–5.7)
SODIUM SERPL-SCNC: 142 MMOL/L (ref 136–145)
WBC # BLD AUTO: 3.8 K/UL (ref 4.1–11.1)

## 2024-04-23 NOTE — HOME HEALTH
Subjective: I seen the doctor last week tuesday   Falls since last visit No(if yes complete the Fall Tracking Form and include bsrifallreport):   Caregiver involvement changes: NO  Home health supplies by type and quantity ordered/delivered this visit include: N/A    Clinician asked if patient has had any physician contact since last home care visit and patient states: YES  Clinician asked if patient has any new or changed medications and patient states:  NO   If Yes, were medications reconciled? NO   Was the certifying physician notified of changes in medications? NO     Clinical assessment (what this visit means for the patient overall and need for ongoing skilled care) and progress or lack of progress towards SPECIFIC goals: Pt with CHF on  continuous milrinone drip. Infusion dosage matches orders. Pt states no complication with infusion administration. CVC dressing changed today/ labs collected and sent to [facility]. Pt is currently progressings towards goals as evidenced by weight and vitals wnls and cvc line remaining free from overt signs of infection. SN still need for weekly lab draws and dressing changes.    Written Teaching Material Utilized: N/A    Interdisciplinary communication with: N/A     Discharge planning as follows: Is no longer homebound, Per physician order and When goals are met    Specific plan for next visit: dressing change and labs

## 2024-04-26 ENCOUNTER — TELEPHONE (OUTPATIENT)
Age: 74
End: 2024-04-26

## 2024-04-26 NOTE — TELEPHONE ENCOUNTER
Telephone Call RE:  Appointment reminder     Outcome:     [] Patient confirmed appointment   [] Patient rescheduled appointment for    [] Unable to reach  [x] Left message              [] Other:

## 2024-04-29 ENCOUNTER — HOSPITAL ENCOUNTER (OUTPATIENT)
Facility: HOSPITAL | Age: 74
Setting detail: SPECIMEN
Discharge: HOME | End: 2024-05-02

## 2024-04-29 ENCOUNTER — HOME CARE VISIT (OUTPATIENT)
Facility: HOME HEALTH | Age: 74
End: 2024-04-29
Payer: MEDICARE

## 2024-04-29 LAB
ALBUMIN SERPL-MCNC: 3.3 G/DL (ref 3.5–5)
ALBUMIN/GLOB SERPL: 0.9 (ref 1.1–2.2)
ALP SERPL-CCNC: 105 U/L (ref 45–117)
ALT SERPL-CCNC: 28 U/L (ref 12–78)
ANION GAP SERPL CALC-SCNC: 6 MMOL/L (ref 5–15)
AST SERPL-CCNC: 21 U/L (ref 15–37)
BASOPHILS # BLD: 0 K/UL (ref 0–0.1)
BASOPHILS NFR BLD: 1 % (ref 0–1)
BILIRUB SERPL-MCNC: 1.3 MG/DL (ref 0.2–1)
BUN SERPL-MCNC: 31 MG/DL (ref 6–20)
BUN/CREAT SERPL: 13 (ref 12–20)
CALCIUM SERPL-MCNC: 8.7 MG/DL (ref 8.5–10.1)
CHLORIDE SERPL-SCNC: 110 MMOL/L (ref 97–108)
CO2 SERPL-SCNC: 28 MMOL/L (ref 21–32)
CREAT SERPL-MCNC: 2.33 MG/DL (ref 0.7–1.3)
DIFFERENTIAL METHOD BLD: ABNORMAL
EOSINOPHIL # BLD: 0.1 K/UL (ref 0–0.4)
EOSINOPHIL NFR BLD: 2 % (ref 0–7)
ERYTHROCYTE [DISTWIDTH] IN BLOOD BY AUTOMATED COUNT: 18.6 % (ref 11.5–14.5)
GLOBULIN SER CALC-MCNC: 3.5 G/DL (ref 2–4)
GLUCOSE SERPL-MCNC: 104 MG/DL (ref 65–100)
HCT VFR BLD AUTO: 33.9 % (ref 36.6–50.3)
HGB BLD-MCNC: 10.9 G/DL (ref 12.1–17)
IMM GRANULOCYTES # BLD AUTO: 0 K/UL (ref 0–0.04)
IMM GRANULOCYTES NFR BLD AUTO: 0 % (ref 0–0.5)
LYMPHOCYTES # BLD: 1.3 K/UL (ref 0.8–3.5)
LYMPHOCYTES NFR BLD: 37 % (ref 12–49)
MAGNESIUM SERPL-MCNC: 2.3 MG/DL (ref 1.6–2.4)
MCH RBC QN AUTO: 27 PG (ref 26–34)
MCHC RBC AUTO-ENTMCNC: 32.2 G/DL (ref 30–36.5)
MCV RBC AUTO: 83.9 FL (ref 80–99)
MONOCYTES # BLD: 0.4 K/UL (ref 0–1)
MONOCYTES NFR BLD: 11 % (ref 5–13)
NEUTS SEG # BLD: 1.7 K/UL (ref 1.8–8)
NEUTS SEG NFR BLD: 49 % (ref 32–75)
NRBC # BLD: 0 K/UL (ref 0–0.01)
NRBC BLD-RTO: 0 PER 100 WBC
NT PRO BNP: 7357 PG/ML
PLATELET # BLD AUTO: 173 K/UL (ref 150–400)
PMV BLD AUTO: 10 FL (ref 8.9–12.9)
POTASSIUM SERPL-SCNC: 4.2 MMOL/L (ref 3.5–5.1)
PROT SERPL-MCNC: 6.8 G/DL (ref 6.4–8.2)
RBC # BLD AUTO: 4.04 M/UL (ref 4.1–5.7)
SODIUM SERPL-SCNC: 144 MMOL/L (ref 136–145)
WBC # BLD AUTO: 3.5 K/UL (ref 4.1–11.1)

## 2024-04-29 PROCEDURE — 80053 COMPREHEN METABOLIC PANEL: CPT

## 2024-04-29 PROCEDURE — 85025 COMPLETE CBC W/AUTO DIFF WBC: CPT

## 2024-04-29 PROCEDURE — 36415 COLL VENOUS BLD VENIPUNCTURE: CPT

## 2024-04-29 PROCEDURE — 83735 ASSAY OF MAGNESIUM: CPT

## 2024-04-29 PROCEDURE — 83880 ASSAY OF NATRIURETIC PEPTIDE: CPT

## 2024-04-29 PROCEDURE — G0299 HHS/HOSPICE OF RN EA 15 MIN: HCPCS

## 2024-04-30 ENCOUNTER — OFFICE VISIT (OUTPATIENT)
Age: 74
End: 2024-04-30
Payer: MEDICARE

## 2024-04-30 VITALS
OXYGEN SATURATION: 98 % | HEIGHT: 70 IN | TEMPERATURE: 97.2 F | BODY MASS INDEX: 23.13 KG/M2 | HEART RATE: 59 BPM | DIASTOLIC BLOOD PRESSURE: 62 MMHG | RESPIRATION RATE: 14 BRPM | WEIGHT: 161.6 LBS | SYSTOLIC BLOOD PRESSURE: 96 MMHG

## 2024-04-30 VITALS
BODY MASS INDEX: 22.76 KG/M2 | WEIGHT: 158.6 LBS | SYSTOLIC BLOOD PRESSURE: 114 MMHG | TEMPERATURE: 98.2 F | DIASTOLIC BLOOD PRESSURE: 76 MMHG | OXYGEN SATURATION: 96 % | HEART RATE: 82 BPM | RESPIRATION RATE: 20 BRPM

## 2024-04-30 DIAGNOSIS — Z79.899 RECEIVING INOTROPIC MEDICATION: Primary | ICD-10-CM

## 2024-04-30 DIAGNOSIS — I50.22 CHRONIC HFREF (HEART FAILURE WITH REDUCED EJECTION FRACTION) (HCC): ICD-10-CM

## 2024-04-30 PROCEDURE — 3017F COLORECTAL CA SCREEN DOC REV: CPT | Performed by: NURSE PRACTITIONER

## 2024-04-30 PROCEDURE — 99215 OFFICE O/P EST HI 40 MIN: CPT | Performed by: NURSE PRACTITIONER

## 2024-04-30 PROCEDURE — 1123F ACP DISCUSS/DSCN MKR DOCD: CPT | Performed by: NURSE PRACTITIONER

## 2024-04-30 PROCEDURE — G8427 DOCREV CUR MEDS BY ELIG CLIN: HCPCS | Performed by: NURSE PRACTITIONER

## 2024-04-30 PROCEDURE — 3074F SYST BP LT 130 MM HG: CPT | Performed by: NURSE PRACTITIONER

## 2024-04-30 PROCEDURE — 1036F TOBACCO NON-USER: CPT | Performed by: NURSE PRACTITIONER

## 2024-04-30 PROCEDURE — 3078F DIAST BP <80 MM HG: CPT | Performed by: NURSE PRACTITIONER

## 2024-04-30 PROCEDURE — G8420 CALC BMI NORM PARAMETERS: HCPCS | Performed by: NURSE PRACTITIONER

## 2024-04-30 ASSESSMENT — ENCOUNTER SYMPTOMS
ABDOMINAL PAIN: 0
SHORTNESS OF BREATH: 1
BLOOD IN STOOL: 0
COUGH: 0
SORE THROAT: 0
EYE PAIN: 0
ABDOMINAL DISTENTION: 0
CHEST TIGHTNESS: 0

## 2024-04-30 ASSESSMENT — PATIENT HEALTH QUESTIONNAIRE - PHQ9
SUM OF ALL RESPONSES TO PHQ QUESTIONS 1-9: 0
SUM OF ALL RESPONSES TO PHQ QUESTIONS 1-9: 0
SUM OF ALL RESPONSES TO PHQ9 QUESTIONS 1 & 2: 0
SUM OF ALL RESPONSES TO PHQ QUESTIONS 1-9: 0
2. FEELING DOWN, DEPRESSED OR HOPELESS: NOT AT ALL
SUM OF ALL RESPONSES TO PHQ QUESTIONS 1-9: 0
1. LITTLE INTEREST OR PLEASURE IN DOING THINGS: NOT AT ALL

## 2024-04-30 NOTE — PROGRESS NOTES
hypokinesis present.    Mitral Valve: Mild regurgitation.    Pulmonary Arteries: Mild to moderate pulmonary hypertension present. Estimated PA pressure of 50 mmhg.    Pericardium:  No pericardial effusion. Left pleural effusion.      [facility] 2/15/24- RA 14, PCWP 29, CI 2.47 ( milrinone 0.3mcg at weight 155lb)  [facility] 12/27/2023 (milrinone 0.25 mcg/kg/min): RA 10, RV 60/7, RVEDP 11, PA 59/24 (39), PCW 25, PA saturation 56.9%, CHCF 2.42, TDCI 2.24  [facility] 12/18/2023: RA 11, RV 58/8, RVEDP 14, PA 62/34 (47), PCW 32, PA saturation 52.4%, CHCF 2.01, TDCI 1.37      Review of Systems   Constitutional:  Positive for appetite change and fatigue. Negative for activity change, chills, fever and unexpected weight change.        So-so   HENT:  Negative for congestion, nosebleeds and sore throat.    Eyes:  Negative for pain and visual disturbance.   Respiratory:  Positive for shortness of breath. Negative for cough and chest tightness.         MATHIS with minimal walking and ADLs;  or if talking for a period of time.      Sometimes SOB at rest or orthopnea   Cardiovascular:  Positive for leg swelling. Negative for chest pain and palpitations.        Present, but a little less.      Gastrointestinal:  Negative for abdominal distention, abdominal pain and blood in stool.   Endocrine: Positive for cold intolerance.   Genitourinary:  Negative for dysuria and hematuria.   Musculoskeletal:  Positive for arthralgias. Negative for myalgias.        Chronic shoulder pain    Skin:  Negative for rash and wound.   Neurological:  Negative for dizziness, weakness, light-headedness and headaches.   Psychiatric/Behavioral:  Positive for sleep disturbance. Negative for confusion and dysphoric mood. The patient is not nervous/anxious.         Terrible          PHYSICAL EXAM:  BP 92/76 (Site: Right Upper Arm, Position: Sitting, Cuff Size: Medium Adult)   Pulse 59   Temp 97.2 °F (36.2 °C) (Infrared)   Resp 14   Ht 1.778 m (5' 10\")   Wt 73.3 kg (161 lb 9.6 oz)

## 2024-04-30 NOTE — PATIENT INSTRUCTIONS
Medication changes:    none    Please take this to your pharmacy to notify them of the change in medications.     Testing Ordered:    none    Other Recommendations:     Please plan for hospital admission on May 10 in the morning-you will be called when your bed is ready.    Ensure your drinking an adequate amount of water with a goal of 6-8 eight ounce glasses (1.5-2 liters) of fluid daily. Your urine should be clear and light yellow straw colored.      If your blood pressure begins to consistently run below 90/60 and/or you begin to experience dizziness or lightheadedness, please contact the [facility] at [phone].       Follow up to be determined after your hospital admission with [facility]      Please monitor your weights daily upon waking and after using the bathroom. Keep a written records of your weights and bring to your next appointment. If you have a weight gain of 3 or more pounds overnight OR 5 or more pounds in one week please contact our office.       Thank you for allowing us the privilege of being a part of your healthcare team! Please do not hesitate to contact our office at [phone] with any questions or concerns.

## 2024-05-02 NOTE — HOME HEALTH
Subjective: I see the heart doctor tomorrow  Falls since last visit No(if yes complete the Fall Tracking Form and include bsrifallreport):   Caregiver involvement changes: No    Home health supplies by type and quantity ordered/delivered this visit include: N/A    Clinician asked if patient has had any physician contact since last home care visit and patient states: NO  Clinician asked if patient has any new or changed medications and patient states:  NO   If Yes, were medications reconciled? NO   Was the certifying physician notified of changes in medications? NO     Clinical assessment (what this visit means for the patient overall and need for ongoing skilled care) and progress or lack of progress towards SPECIFIC goals: Pt with HF on milrinone drip. Vitals and weights wnl. Pt with trace edema to ankles which is not new. Pt with no cardiac or respiratory complaints. Labs collected and cvc dressing changed. Pt is currently meeting goals for cvc, weights and vitals. SN still needed for weekly labs and dressing changes     Written Teaching Material Utilized: N/A    Interdisciplinary communication with: N/A   Discharge planning as follows: Is no longer homebound, Per physician order and When goals are met    Specific plan for next visit: assessment, dressing change and labs

## 2024-05-06 ENCOUNTER — HOME CARE VISIT (OUTPATIENT)
Dept: HOME HEALTH SERVICES | Facility: HOME HEALTH | Age: 74
End: 2024-05-06
Payer: MEDICARE

## 2024-05-06 ENCOUNTER — HOSPITAL ENCOUNTER (OUTPATIENT)
Facility: HOSPITAL | Age: 74
Setting detail: SPECIMEN
Discharge: HOME | End: 2024-05-09

## 2024-05-06 ENCOUNTER — HOME CARE VISIT (OUTPATIENT)
Facility: HOME HEALTH | Age: 74
End: 2024-05-06
Payer: MEDICARE

## 2024-05-06 VITALS
DIASTOLIC BLOOD PRESSURE: 82 MMHG | OXYGEN SATURATION: 92 % | BODY MASS INDEX: 22.99 KG/M2 | SYSTOLIC BLOOD PRESSURE: 120 MMHG | WEIGHT: 160.2 LBS | RESPIRATION RATE: 20 BRPM | TEMPERATURE: 97.8 F | HEART RATE: 84 BPM

## 2024-05-06 LAB
ALBUMIN SERPL-MCNC: 3.3 G/DL (ref 3.5–5)
ALBUMIN/GLOB SERPL: 1.1 (ref 1.1–2.2)
ALP SERPL-CCNC: 93 U/L (ref 45–117)
ALT SERPL-CCNC: 27 U/L (ref 12–78)
ANION GAP SERPL CALC-SCNC: 4 MMOL/L (ref 5–15)
AST SERPL-CCNC: 14 U/L (ref 15–37)
BASOPHILS # BLD: 0 K/UL (ref 0–0.1)
BASOPHILS NFR BLD: 1 % (ref 0–1)
BILIRUB SERPL-MCNC: 1.4 MG/DL (ref 0.2–1)
BUN SERPL-MCNC: 28 MG/DL (ref 6–20)
BUN/CREAT SERPL: 13 (ref 12–20)
CALCIUM SERPL-MCNC: 8.6 MG/DL (ref 8.5–10.1)
CHLORIDE SERPL-SCNC: 109 MMOL/L (ref 97–108)
CO2 SERPL-SCNC: 29 MMOL/L (ref 21–32)
CREAT SERPL-MCNC: 2.18 MG/DL (ref 0.7–1.3)
DIFFERENTIAL METHOD BLD: ABNORMAL
EOSINOPHIL # BLD: 0.1 K/UL (ref 0–0.4)
EOSINOPHIL NFR BLD: 3 % (ref 0–7)
ERYTHROCYTE [DISTWIDTH] IN BLOOD BY AUTOMATED COUNT: 19 % (ref 11.5–14.5)
GLOBULIN SER CALC-MCNC: 3.1 G/DL (ref 2–4)
GLUCOSE SERPL-MCNC: 107 MG/DL (ref 65–100)
HCT VFR BLD AUTO: 33.4 % (ref 36.6–50.3)
HGB BLD-MCNC: 10.8 G/DL (ref 12.1–17)
IMM GRANULOCYTES # BLD AUTO: 0 K/UL (ref 0–0.04)
IMM GRANULOCYTES NFR BLD AUTO: 0 % (ref 0–0.5)
LYMPHOCYTES # BLD: 1.2 K/UL (ref 0.8–3.5)
LYMPHOCYTES NFR BLD: 37 % (ref 12–49)
MAGNESIUM SERPL-MCNC: 2.3 MG/DL (ref 1.6–2.4)
MCH RBC QN AUTO: 27 PG (ref 26–34)
MCHC RBC AUTO-ENTMCNC: 32.3 G/DL (ref 30–36.5)
MCV RBC AUTO: 83.5 FL (ref 80–99)
MONOCYTES # BLD: 0.3 K/UL (ref 0–1)
MONOCYTES NFR BLD: 9 % (ref 5–13)
NEUTS SEG # BLD: 1.7 K/UL (ref 1.8–8)
NEUTS SEG NFR BLD: 50 % (ref 32–75)
NRBC # BLD: 0 K/UL (ref 0–0.01)
NRBC BLD-RTO: 0 PER 100 WBC
NT PRO BNP: 7164 PG/ML
PLATELET # BLD AUTO: 158 K/UL (ref 150–400)
PMV BLD AUTO: 10.7 FL (ref 8.9–12.9)
POTASSIUM SERPL-SCNC: 3.9 MMOL/L (ref 3.5–5.1)
PROT SERPL-MCNC: 6.4 G/DL (ref 6.4–8.2)
RBC # BLD AUTO: 4 M/UL (ref 4.1–5.7)
SODIUM SERPL-SCNC: 142 MMOL/L (ref 136–145)
WBC # BLD AUTO: 3.3 K/UL (ref 4.1–11.1)

## 2024-05-06 PROCEDURE — 83735 ASSAY OF MAGNESIUM: CPT

## 2024-05-06 PROCEDURE — G0299 HHS/HOSPICE OF RN EA 15 MIN: HCPCS

## 2024-05-06 PROCEDURE — 36415 COLL VENOUS BLD VENIPUNCTURE: CPT

## 2024-05-06 PROCEDURE — 80053 COMPREHEN METABOLIC PANEL: CPT

## 2024-05-06 PROCEDURE — 83880 ASSAY OF NATRIURETIC PEPTIDE: CPT

## 2024-05-06 PROCEDURE — 85025 COMPLETE CBC W/AUTO DIFF WBC: CPT

## 2024-05-07 ENCOUNTER — TELEPHONE (OUTPATIENT)
Age: 74
End: 2024-05-07

## 2024-05-07 NOTE — TELEPHONE ENCOUNTER
Sunitha Metcalf, APRN - NP  You6 minutes ago (4:32 PM)       Yes, he can take the metolazone 2.5mg 30 mins prior to a bumex dose, and take it with an EXTRA 60 meq of potassium.   Spoke with swathi using two patient identifiers. Advised her on above information per ARASH Metcalf NP. Reviewed with her that 60meq of potassium should be 3 additional tablets of potassium whenever patient takes metolazone whether that be this evening or tomorrow morning. Confirmed with swathi that patient is still scheduled for hospital admission on Friday and that patient should be ready anytime Friday morning to present for hospitalization and we will call once bed becomes available. Advised her per ARASH Metcalf that there is a potential that patients continuous IV medication may be change to another medication due to patients ongoing low bp. She states understanding of all instructions and had no further questions.

## 2024-05-09 ASSESSMENT — ENCOUNTER SYMPTOMS: DYSPNEA ACTIVITY LEVEL: AT REST

## 2024-05-09 NOTE — HOME HEALTH
Subjective: Sometimes I get short of breath, I really dont have to do anything   Falls since last visit No(if yes complete the Fall Tracking Form and include bsrifallreport):   Caregiver involvement changes: NO  Home health supplies by type and quantity ordered/delivered this visit include: N/A    Clinician asked if patient has had any physician contact since last home care visit and patient states: Yes  Clinician asked if patient has any new or changed medications and patient states:  NO   If Yes, were medications reconciled? N/A   Was the certifying physician notified of changes in medications? NO     Clinical assessment (what this visit means for the patient overall and need for ongoing skilled care) and progress or lack of progress towards SPECIFIC goals: Pt with some noticeable SOB today and slightly diminished lung songs. Pt states that his SOB is no worse than how it usually is. Pt's weight and VS are WNL. Pt instructed to call [facility] or go to ER  if symptoms worsen.  Pt states that he will be admitted on friday for inotrope medication change and then observation for two days. SN still needed for cardiopulm assessments, CVC dressing changes and lab draws to prevent infection and hospitalization      Written Teaching Material Utilized: N/A    Interdisciplinary communication with: N/A    Discharge planning as follows: Is no longer homebound, Per physician order and When goals are met    Specific plan for next visit: labs, dressing change

## 2024-05-10 ENCOUNTER — APPOINTMENT (OUTPATIENT)
Facility: HOSPITAL | Age: 74
End: 2024-05-10
Attending: INTERNAL MEDICINE
Payer: MEDICARE

## 2024-05-10 ENCOUNTER — TELEPHONE (OUTPATIENT)
Age: 74
End: 2024-05-10

## 2024-05-10 ENCOUNTER — HOSPITAL ENCOUNTER (INPATIENT)
Facility: HOSPITAL | Age: 74
LOS: 6 days | Discharge: HOME HEALTH | End: 2024-05-16
Attending: INTERNAL MEDICINE | Admitting: FAMILY MEDICINE
Payer: MEDICARE

## 2024-05-10 DIAGNOSIS — I50.21 HEART FAILURE, SYSTOLIC, ACUTE (HCC): ICD-10-CM

## 2024-05-10 PROBLEM — I50.9 DECOMPENSATED HEART FAILURE (HCC): Status: ACTIVE | Noted: 2024-05-10

## 2024-05-10 PROBLEM — I50.9 CONGESTIVE HEART FAILURE OF UNKNOWN ETIOLOGY (HCC): Status: ACTIVE | Noted: 2024-05-10

## 2024-05-10 LAB
ALBUMIN SERPL-MCNC: 3.2 G/DL (ref 3.5–5)
ALBUMIN/GLOB SERPL: 1 (ref 1.1–2.2)
ALP SERPL-CCNC: 95 U/L (ref 45–117)
ALT SERPL-CCNC: 17 U/L (ref 12–78)
ANION GAP SERPL CALC-SCNC: 3 MMOL/L (ref 5–15)
ANION GAP SERPL CALC-SCNC: 5 MMOL/L (ref 5–15)
AST SERPL-CCNC: 12 U/L (ref 15–37)
BASOPHILS # BLD: 0 K/UL (ref 0–0.1)
BASOPHILS NFR BLD: 0 % (ref 0–1)
BILIRUB DIRECT SERPL-MCNC: 0.4 MG/DL (ref 0–0.2)
BILIRUB SERPL-MCNC: 1.3 MG/DL (ref 0.2–1)
BUN SERPL-MCNC: 32 MG/DL (ref 6–20)
BUN SERPL-MCNC: 35 MG/DL (ref 6–20)
BUN/CREAT SERPL: 13 (ref 12–20)
BUN/CREAT SERPL: 15 (ref 12–20)
CALCIUM SERPL-MCNC: 8.7 MG/DL (ref 8.5–10.1)
CALCIUM SERPL-MCNC: 8.8 MG/DL (ref 8.5–10.1)
CHLORIDE SERPL-SCNC: 104 MMOL/L (ref 97–108)
CHLORIDE SERPL-SCNC: 104 MMOL/L (ref 97–108)
CO2 SERPL-SCNC: 32 MMOL/L (ref 21–32)
CO2 SERPL-SCNC: 34 MMOL/L (ref 21–32)
COMMENT:: NORMAL
COMMENT:: NORMAL
CREAT SERPL-MCNC: 2.29 MG/DL (ref 0.7–1.3)
CREAT SERPL-MCNC: 2.48 MG/DL (ref 0.7–1.3)
DIFFERENTIAL METHOD BLD: ABNORMAL
EOSINOPHIL # BLD: 0.1 K/UL (ref 0–0.4)
EOSINOPHIL NFR BLD: 3 % (ref 0–7)
ERYTHROCYTE [DISTWIDTH] IN BLOOD BY AUTOMATED COUNT: 19 % (ref 11.5–14.5)
GLOBULIN SER CALC-MCNC: 3.2 G/DL (ref 2–4)
GLUCOSE SERPL-MCNC: 105 MG/DL (ref 65–100)
GLUCOSE SERPL-MCNC: 123 MG/DL (ref 65–100)
HCT VFR BLD AUTO: 33.8 % (ref 36.6–50.3)
HGB BLD-MCNC: 11.2 G/DL (ref 12.1–17)
IMM GRANULOCYTES # BLD AUTO: 0 K/UL (ref 0–0.04)
IMM GRANULOCYTES NFR BLD AUTO: 0 % (ref 0–0.5)
LACTATE SERPL-SCNC: 1.2 MMOL/L (ref 0.4–2)
LYMPHOCYTES # BLD: 1.3 K/UL (ref 0.8–3.5)
LYMPHOCYTES NFR BLD: 41 % (ref 12–49)
MAGNESIUM SERPL-MCNC: 2.2 MG/DL (ref 1.6–2.4)
MCH RBC QN AUTO: 27.5 PG (ref 26–34)
MCHC RBC AUTO-ENTMCNC: 33.1 G/DL (ref 30–36.5)
MCV RBC AUTO: 82.8 FL (ref 80–99)
MONOCYTES # BLD: 0.4 K/UL (ref 0–1)
MONOCYTES NFR BLD: 11 % (ref 5–13)
NEUTS SEG # BLD: 1.4 K/UL (ref 1.8–8)
NEUTS SEG NFR BLD: 45 % (ref 32–75)
NRBC # BLD: 0 K/UL (ref 0–0.01)
NRBC BLD-RTO: 0 PER 100 WBC
NT PRO BNP: 5864 PG/ML
PLATELET # BLD AUTO: 167 K/UL (ref 150–400)
PMV BLD AUTO: 9.4 FL (ref 8.9–12.9)
POTASSIUM SERPL-SCNC: 3.4 MMOL/L (ref 3.5–5.1)
POTASSIUM SERPL-SCNC: 3.7 MMOL/L (ref 3.5–5.1)
PROT SERPL-MCNC: 6.4 G/DL (ref 6.4–8.2)
RBC # BLD AUTO: 4.08 M/UL (ref 4.1–5.7)
SODIUM SERPL-SCNC: 141 MMOL/L (ref 136–145)
SODIUM SERPL-SCNC: 141 MMOL/L (ref 136–145)
SPECIMEN HOLD: NORMAL
SPECIMEN HOLD: NORMAL
WBC # BLD AUTO: 3.1 K/UL (ref 4.1–11.1)

## 2024-05-10 PROCEDURE — 6370000000 HC RX 637 (ALT 250 FOR IP): Performed by: FAMILY MEDICINE

## 2024-05-10 PROCEDURE — 6360000002 HC RX W HCPCS: Performed by: FAMILY MEDICINE

## 2024-05-10 PROCEDURE — 36415 COLL VENOUS BLD VENIPUNCTURE: CPT

## 2024-05-10 PROCEDURE — 71045 X-RAY EXAM CHEST 1 VIEW: CPT

## 2024-05-10 PROCEDURE — 80076 HEPATIC FUNCTION PANEL: CPT

## 2024-05-10 PROCEDURE — 85025 COMPLETE CBC W/AUTO DIFF WBC: CPT

## 2024-05-10 PROCEDURE — 2060000000 HC ICU INTERMEDIATE R&B

## 2024-05-10 PROCEDURE — 83605 ASSAY OF LACTIC ACID: CPT

## 2024-05-10 PROCEDURE — 6360000002 HC RX W HCPCS: Performed by: NURSE PRACTITIONER

## 2024-05-10 PROCEDURE — 2580000003 HC RX 258: Performed by: FAMILY MEDICINE

## 2024-05-10 PROCEDURE — 36556 INSERT NON-TUNNEL CV CATH: CPT

## 2024-05-10 PROCEDURE — 99232 SBSQ HOSP IP/OBS MODERATE 35: CPT | Performed by: NURSE PRACTITIONER

## 2024-05-10 PROCEDURE — 80048 BASIC METABOLIC PNL TOTAL CA: CPT

## 2024-05-10 PROCEDURE — 2580000003 HC RX 258: Performed by: NURSE PRACTITIONER

## 2024-05-10 PROCEDURE — 83735 ASSAY OF MAGNESIUM: CPT

## 2024-05-10 PROCEDURE — 87040 BLOOD CULTURE FOR BACTERIA: CPT

## 2024-05-10 PROCEDURE — 83880 ASSAY OF NATRIURETIC PEPTIDE: CPT

## 2024-05-10 RX ORDER — AMIODARONE HYDROCHLORIDE 200 MG/1
100 TABLET ORAL DAILY
Status: DISCONTINUED | OUTPATIENT
Start: 2024-05-10 | End: 2024-05-16 | Stop reason: HOSPADM

## 2024-05-10 RX ORDER — BUMETANIDE 0.25 MG/ML
2 INJECTION INTRAMUSCULAR; INTRAVENOUS EVERY 8 HOURS
Status: DISCONTINUED | OUTPATIENT
Start: 2024-05-10 | End: 2024-05-10

## 2024-05-10 RX ORDER — ONDANSETRON 2 MG/ML
4 INJECTION INTRAMUSCULAR; INTRAVENOUS EVERY 6 HOURS PRN
Status: DISCONTINUED | OUTPATIENT
Start: 2024-05-10 | End: 2024-05-16 | Stop reason: HOSPADM

## 2024-05-10 RX ORDER — ALLOPURINOL 100 MG/1
50 TABLET ORAL DAILY
Status: DISCONTINUED | OUTPATIENT
Start: 2024-05-10 | End: 2024-05-16 | Stop reason: HOSPADM

## 2024-05-10 RX ORDER — ONDANSETRON 4 MG/1
4 TABLET, ORALLY DISINTEGRATING ORAL EVERY 8 HOURS PRN
Status: DISCONTINUED | OUTPATIENT
Start: 2024-05-10 | End: 2024-05-16 | Stop reason: HOSPADM

## 2024-05-10 RX ORDER — POTASSIUM CHLORIDE 750 MG/1
40 TABLET, FILM COATED, EXTENDED RELEASE ORAL PRN
Status: DISCONTINUED | OUTPATIENT
Start: 2024-05-10 | End: 2024-05-16 | Stop reason: HOSPADM

## 2024-05-10 RX ORDER — BUMETANIDE 1 MG/1
4 TABLET ORAL 2 TIMES DAILY
Status: DISCONTINUED | OUTPATIENT
Start: 2024-05-10 | End: 2024-05-10

## 2024-05-10 RX ORDER — SODIUM CHLORIDE 0.9 % (FLUSH) 0.9 %
5-40 SYRINGE (ML) INJECTION PRN
Status: DISCONTINUED | OUTPATIENT
Start: 2024-05-10 | End: 2024-05-16 | Stop reason: HOSPADM

## 2024-05-10 RX ORDER — MAGNESIUM SULFATE IN WATER 40 MG/ML
2000 INJECTION, SOLUTION INTRAVENOUS PRN
Status: DISCONTINUED | OUTPATIENT
Start: 2024-05-10 | End: 2024-05-16 | Stop reason: HOSPADM

## 2024-05-10 RX ORDER — SODIUM CHLORIDE 0.9 % (FLUSH) 0.9 %
5-40 SYRINGE (ML) INJECTION EVERY 12 HOURS SCHEDULED
Status: DISCONTINUED | OUTPATIENT
Start: 2024-05-10 | End: 2024-05-16 | Stop reason: HOSPADM

## 2024-05-10 RX ORDER — SODIUM CHLORIDE 0.9 % (FLUSH) 0.9 %
5-40 SYRINGE (ML) INJECTION EVERY 12 HOURS SCHEDULED
Status: DISCONTINUED | OUTPATIENT
Start: 2024-05-10 | End: 2024-05-11 | Stop reason: SDUPTHER

## 2024-05-10 RX ORDER — BUMETANIDE 0.25 MG/ML
3 INJECTION INTRAMUSCULAR; INTRAVENOUS EVERY 8 HOURS
Status: DISCONTINUED | OUTPATIENT
Start: 2024-05-10 | End: 2024-05-13

## 2024-05-10 RX ORDER — POTASSIUM CHLORIDE 7.45 MG/ML
10 INJECTION INTRAVENOUS ONCE
Status: COMPLETED | OUTPATIENT
Start: 2024-05-10 | End: 2024-05-10

## 2024-05-10 RX ORDER — VITAMIN B COMPLEX
1000 TABLET ORAL DAILY
Status: DISCONTINUED | OUTPATIENT
Start: 2024-05-10 | End: 2024-05-16 | Stop reason: HOSPADM

## 2024-05-10 RX ORDER — ACETAMINOPHEN 650 MG/1
650 SUPPOSITORY RECTAL EVERY 6 HOURS PRN
Status: DISCONTINUED | OUTPATIENT
Start: 2024-05-10 | End: 2024-05-11 | Stop reason: SDUPTHER

## 2024-05-10 RX ORDER — SODIUM CHLORIDE 9 MG/ML
INJECTION, SOLUTION INTRAVENOUS PRN
Status: DISCONTINUED | OUTPATIENT
Start: 2024-05-10 | End: 2024-05-16 | Stop reason: HOSPADM

## 2024-05-10 RX ORDER — ACETAMINOPHEN 325 MG/1
650 TABLET ORAL EVERY 6 HOURS PRN
Status: DISCONTINUED | OUTPATIENT
Start: 2024-05-10 | End: 2024-05-11 | Stop reason: SDUPTHER

## 2024-05-10 RX ORDER — DOBUTAMINE HYDROCHLORIDE 200 MG/100ML
2.5 INJECTION INTRAVENOUS CONTINUOUS
Status: DISCONTINUED | OUTPATIENT
Start: 2024-05-10 | End: 2024-05-13

## 2024-05-10 RX ORDER — POTASSIUM CHLORIDE 7.45 MG/ML
10 INJECTION INTRAVENOUS PRN
Status: DISCONTINUED | OUTPATIENT
Start: 2024-05-10 | End: 2024-05-16 | Stop reason: HOSPADM

## 2024-05-10 RX ORDER — POLYETHYLENE GLYCOL 3350 17 G/17G
17 POWDER, FOR SOLUTION ORAL DAILY PRN
Status: DISCONTINUED | OUTPATIENT
Start: 2024-05-10 | End: 2024-05-16 | Stop reason: HOSPADM

## 2024-05-10 RX ORDER — LEVOTHYROXINE SODIUM 88 UG/1
88 TABLET ORAL DAILY
Status: DISCONTINUED | OUTPATIENT
Start: 2024-05-10 | End: 2024-05-16 | Stop reason: HOSPADM

## 2024-05-10 RX ORDER — TAMSULOSIN HYDROCHLORIDE 0.4 MG/1
0.8 CAPSULE ORAL DAILY
Status: DISCONTINUED | OUTPATIENT
Start: 2024-05-10 | End: 2024-05-16 | Stop reason: HOSPADM

## 2024-05-10 RX ORDER — ACETAMINOPHEN 325 MG/1
650 TABLET ORAL EVERY 6 HOURS PRN
Status: DISCONTINUED | OUTPATIENT
Start: 2024-05-10 | End: 2024-05-16 | Stop reason: HOSPADM

## 2024-05-10 RX ORDER — ACETAMINOPHEN 650 MG/1
650 SUPPOSITORY RECTAL EVERY 6 HOURS PRN
Status: DISCONTINUED | OUTPATIENT
Start: 2024-05-10 | End: 2024-05-16 | Stop reason: HOSPADM

## 2024-05-10 RX ORDER — ENOXAPARIN SODIUM 100 MG/ML
40 INJECTION SUBCUTANEOUS DAILY
Status: DISCONTINUED | OUTPATIENT
Start: 2024-05-10 | End: 2024-05-10

## 2024-05-10 RX ORDER — ACETAZOLAMIDE 250 MG/1
125 TABLET ORAL 2 TIMES DAILY
Status: DISCONTINUED | OUTPATIENT
Start: 2024-05-10 | End: 2024-05-16 | Stop reason: HOSPADM

## 2024-05-10 RX ORDER — POLYETHYLENE GLYCOL 3350 17 G/17G
17 POWDER, FOR SOLUTION ORAL DAILY PRN
Status: DISCONTINUED | OUTPATIENT
Start: 2024-05-10 | End: 2024-05-10

## 2024-05-10 RX ORDER — METOPROLOL SUCCINATE 25 MG/1
12.5 TABLET, EXTENDED RELEASE ORAL DAILY
Status: DISCONTINUED | OUTPATIENT
Start: 2024-05-10 | End: 2024-05-10

## 2024-05-10 RX ADMIN — POTASSIUM CHLORIDE 10 MEQ: 7.46 INJECTION, SOLUTION INTRAVENOUS at 23:36

## 2024-05-10 RX ADMIN — DOBUTAMINE HYDROCHLORIDE 2.5 MCG/KG/MIN: 200 INJECTION INTRAVENOUS at 13:29

## 2024-05-10 RX ADMIN — SODIUM CHLORIDE, PRESERVATIVE FREE 10 ML: 5 INJECTION INTRAVENOUS at 21:33

## 2024-05-10 RX ADMIN — POTASSIUM CHLORIDE 10 MEQ: 7.46 INJECTION, SOLUTION INTRAVENOUS at 17:46

## 2024-05-10 RX ADMIN — ACETAZOLAMIDE 125 MG: 250 TABLET ORAL at 21:32

## 2024-05-10 RX ADMIN — SODIUM CHLORIDE, PRESERVATIVE FREE 10 ML: 5 INJECTION INTRAVENOUS at 22:20

## 2024-05-10 RX ADMIN — BUMETANIDE 3 MG: 0.25 INJECTION INTRAMUSCULAR; INTRAVENOUS at 18:25

## 2024-05-10 RX ADMIN — APIXABAN 5 MG: 5 TABLET, FILM COATED ORAL at 21:32

## 2024-05-10 NOTE — PROGRESS NOTES
4 Eyes Skin Assessment     NAME:  Ingrid Kennedy  YOB: 1950  MEDICAL RECORD NUMBER:  [id number]    The patient is being assessed for  Admission    I agree that at least one RN has performed a thorough Head to Toe Skin Assessment on the patient. ALL assessment sites listed below have been assessed.      Areas assessed by both nurses:    Head, Face, Ears, Shoulders, Back, Chest, Arms, Elbows, Hands, Sacrum. Buttock, Coccyx, Ischium, Legs. Feet and Heels, and Under Medical Devices         Does the Patient have a Wound? No noted wound(s)       Rikki Prevention initiated by RN: Yes  Wound Care Orders initiated by RN: No    Pressure Injury (Stage 3,4, Unstageable, DTI, NWPT, and Complex wounds) if present, place Wound referral order by RN under : No    New Ostomies, if present place, Ostomy referral order under : No     Nurse 1 eSignature: Electronically signed by Erma Lazaro RN on 5/10/24 at 7:15 PM EDT    **SHARE this note so that the co-signing nurse can place an eSignature**    Nurse 2 eSignature: {Esignature:[id number]}

## 2024-05-10 NOTE — PROGRESS NOTES
1930  Verbal bedside report given to Renee RN by CHARLIE Ritchie. Report included updated vitals and SBAR. Patient is in bed awake and respirations are even and unlabored. Dobutamine verified at handoff.     1701  Contacted HF NP.     1642  Informed hospitalist of V-tach. Referring to HF.    1637  Patient has 40 seconds of sustained V-tach. Patient was alert and oriented.     1215  Patient arrived. Patient rhythm A-paced. Patient is in bed awake and respirations are even and unlabored. Call bell is within reach.     Care Plan:    Problem: Discharge Planning  Goal: Discharge to home or other facility with appropriate resources  Outcome: Progressing     Problem: Chronic Conditions and Co-morbidities  Goal: Patient's chronic conditions and co-morbidity symptoms are monitored and maintained or improved  Outcome: Progressing

## 2024-05-10 NOTE — CONSULTS
Nutrition Note:   Acknowledge Consult for Education: heart failure diet education.  Pt has been educated by this service several times in the past and is familiar with diet.  Will defer RD intervention at this time.  Navigator/provider will reconsult RD if additional diet education intervention needed.    Thank you.    Marisa Hernandez RD  Available via [employer]

## 2024-05-10 NOTE — TELEPHONE ENCOUNTER
Spoke with swathi on phi. Used two patient identifiers. Advised her that patient bed has become available and to present to [facility]  inpatient admitting. Provided directions. Notified her that patients room number will be [id number]. She states understanding and will bring patient to hospital.

## 2024-05-10 NOTE — H&P
History and Physical    Date of Service:  5/10/2024  Primary Care Provider: Elisa Loyd MD  Source of information: The patient and Chart review    Chief Complaint: No chief complaint on file.      History of Presenting Illness:   Ingrid Kennedy is a 73 y.o. male who presents presents to [facility] as a direct admit from advanced heart failure team.  Patient with known history of cardiomyopathy with EF of 15 to 20% followed by advanced heart failure team.  Patient is currently on inotropic support with milrinone, deemed not a candidate for LVAD at this time.  Patient remains with poor kidney function and borderline low blood pressure.  Patient was direct admitted today by advanced heart failure team with the plan to switch from milrinone to dobutamine and right heart cath once euvolemic.  Patient has no acute complaint at this time.    The patient denies any headache, blurry vision, sore throat, trouble swallowing, trouble with speech, chest pain, SOB, cough, fever, chills, N/V/D, abd pain, urinary symptoms, constipation, recent travels, sick contacts, focal or generalized neurological symptoms, falls, injuries, rashes, contact with COVID-19 diagnosed patients, hematemesis, melena, hemoptysis, hematuria, rashes, denies starting any new medications and denies any other concerns or problems besides as mentioned above.         REVIEW OF SYSTEMS:  A comprehensive review of systems was negative except for that written in the History of Present Illness.     Past Medical History:   Diagnosis Date    AICD (automatic cardioverter/defibrillator) present     CHF (congestive heart failure) (HCC)     CKD (chronic kidney disease)     Dilated cardiomyopathy (HCC)     Hypertension     Low left ventricular ejection fraction     GERRI on CPAP     Thyroid disease       Past Surgical History:   Procedure Laterality Date    CARDIAC PROCEDURE N/A 12/18/2023    Right heart cath performed by Ananda Walton MD at [facility]

## 2024-05-10 NOTE — PROGRESS NOTES
20%. Left ventricle is moderately dilated. Normal wall thickness. Severe global hypokinesis present.    Right Ventricle: Moderately reduced systolic function.    Aortic Valve: Trileaflet valve. Mild regurgitation with a centrally directed jet.    Mitral Valve: Moderate regurgitation.    Tricuspid Valve: Severe regurgitation. Severely elevated RVSP. The estimated PASP is 75 mmHg.    Left Atrium: Left atrium is severely dilated.    Right Atrium: Right atrium is severely dilated.    Pulmonary Arteries: Severe pulmonary hypertension present.    Extracardiac: Large left pleural effusion.    Image quality is excellent.  Echo 12/11/2023    Left Ventricle: Severely reduced left ventricular systolic function with a visually estimated EF of 20 - 25%. Left ventricle is moderately dilated. Normal wall thickness. Severe global hypokinesis present.    Mitral Valve: Mild regurgitation.    Pulmonary Arteries: Mild to moderate pulmonary hypertension present. Estimated PA pressure of 50 mmhg.    Pericardium:  No pericardial effusion. Left pleural effusion.      RHC 2/15/24- RA 14, PCWP 29, CI 2.47 ( milrinone 0.3mcg at weight 155lb)  RHC 12/27/2023 (milrinone 0.25 mcg/kg/min): RA 10, RV 60/7, RVEDP 11, PA 59/24 (39), PCW 25, PA saturation 56.9%, half-way 2.42, TDCI 2.24  RHC 12/18/2023: RA 11, RV 58/8, RVEDP 14, PA 62/34 (47), PCW 32, PA saturation 52.4%, half-way 2.01, TDCI 1.37      Review of Systems   Constitutional:  Positive for fatigue. Negative for activity change, appetite change, chills, fever and unexpected weight change.        Unchanged fatigue   HENT:  Negative for congestion, nosebleeds and sore throat.    Eyes:  Negative for pain and visual disturbance.   Respiratory:  Positive for shortness of breath. Negative for cough and chest tightness.         At his baseline    Cardiovascular:  Positive for leg swelling. Negative for chest pain and palpitations.        Bilateral lower extremities   Gastrointestinal:  Negative for  Sign     Worried About Running Out of Food in the Last Year: Never true     Ran Out of Food in the Last Year: Never true   Transportation Needs: No Transportation Needs (2/21/2024)    OASIS : Transportation     Lack of Transportation (Medical): No     Lack of Transportation (Non-Medical): No     Patient Unable or Declines to Respond: No   Physical Activity: Insufficiently Active (1/24/2024)    Exercise Vital Sign     Days of Exercise per Week: 5 days     Minutes of Exercise per Session: 20 min   Social Connections: Feeling Socially Integrated (2/21/2024)    OASIS : Social Isolation     Frequency of experiencing loneliness or isolation: Never   Housing Stability: Low Risk  (2/12/2024)    Housing Stability Vital Sign     Unable to Pay for Housing in the Last Year: No     Number of Places Lived in the Last Year: 1     Unstable Housing in the Last Year: No       LABORATORY RESULTS:     No results found for: \"TSH\", \"TSH2\", \"TSH3\", \"TSHELE\"    ALLERGY:  No Known Allergies     CURRENT MEDICATIONS:    Current Outpatient Medications:     metoprolol succinate (TOPROL XL) 25 MG extended release tablet, Take 0.5 tablets by mouth daily, Disp: 45 tablet, Rfl: 1    magnesium oxide (MAG-OX) 400 MG tablet, Take 1 tablet by mouth daily, Disp: 30 tablet, Rfl: 2    bumetanide (BUMEX) 1 MG tablet, Take 1 tablet by mouth 2 times daily In addition to the 4mg, for a total of 5mg BID, Disp: 60 tablet, Rfl: 2    potassium chloride (KLOR-CON M) 20 MEQ extended release tablet, Take three tablets by mouth twice a day may take an additional tablet daily as needed as directed by [facility] when taking metolazone, Disp: 150 tablet, Rfl: 1    acetaZOLAMIDE (DIAMOX) 250 MG tablet, Take 0.5 tablets by mouth 2 times daily, Disp: 90 tablet, Rfl: 3    levothyroxine (SYNTHROID) 88 MCG tablet, Take 1 tablet by mouth daily, Disp: 30 tablet, Rfl: 5    dapagliflozin (FARXIGA) 10 MG tablet, Take 1 tablet by mouth Daily with supper, Disp: 90 tablet, Rfl:

## 2024-05-11 LAB
ALBUMIN SERPL-MCNC: 3.1 G/DL (ref 3.5–5)
ALBUMIN/GLOB SERPL: 0.9 (ref 1.1–2.2)
ALP SERPL-CCNC: 94 U/L (ref 45–117)
ALT SERPL-CCNC: 28 U/L (ref 12–78)
ANION GAP SERPL CALC-SCNC: 5 MMOL/L (ref 5–15)
ANION GAP SERPL CALC-SCNC: 7 MMOL/L (ref 5–15)
AST SERPL-CCNC: 15 U/L (ref 15–37)
BASOPHILS # BLD: 0 K/UL (ref 0–0.1)
BASOPHILS NFR BLD: 1 % (ref 0–1)
BILIRUB DIRECT SERPL-MCNC: 0.3 MG/DL (ref 0–0.2)
BILIRUB SERPL-MCNC: 1.1 MG/DL (ref 0.2–1)
BUN SERPL-MCNC: 30 MG/DL (ref 6–20)
BUN SERPL-MCNC: 32 MG/DL (ref 6–20)
BUN/CREAT SERPL: 15 (ref 12–20)
BUN/CREAT SERPL: 16 (ref 12–20)
CALCIUM SERPL-MCNC: 8.5 MG/DL (ref 8.5–10.1)
CALCIUM SERPL-MCNC: 8.8 MG/DL (ref 8.5–10.1)
CHLORIDE SERPL-SCNC: 104 MMOL/L (ref 97–108)
CHLORIDE SERPL-SCNC: 104 MMOL/L (ref 97–108)
CO2 SERPL-SCNC: 29 MMOL/L (ref 21–32)
CO2 SERPL-SCNC: 33 MMOL/L (ref 21–32)
COMMENT:: NORMAL
COMMENT:: NORMAL
CREAT SERPL-MCNC: 2.03 MG/DL (ref 0.7–1.3)
CREAT SERPL-MCNC: 2.05 MG/DL (ref 0.7–1.3)
DIFFERENTIAL METHOD BLD: ABNORMAL
EOSINOPHIL # BLD: 0.1 K/UL (ref 0–0.4)
EOSINOPHIL NFR BLD: 2 % (ref 0–7)
ERYTHROCYTE [DISTWIDTH] IN BLOOD BY AUTOMATED COUNT: 18.6 % (ref 11.5–14.5)
GLOBULIN SER CALC-MCNC: 3.5 G/DL (ref 2–4)
GLUCOSE SERPL-MCNC: 148 MG/DL (ref 65–100)
GLUCOSE SERPL-MCNC: 92 MG/DL (ref 65–100)
HCT VFR BLD AUTO: 34 % (ref 36.6–50.3)
HGB BLD-MCNC: 11.3 G/DL (ref 12.1–17)
IMM GRANULOCYTES # BLD AUTO: 0 K/UL (ref 0–0.04)
IMM GRANULOCYTES NFR BLD AUTO: 0 % (ref 0–0.5)
LACTATE SERPL-SCNC: 1 MMOL/L (ref 0.4–2)
LYMPHOCYTES # BLD: 1.8 K/UL (ref 0.8–3.5)
LYMPHOCYTES NFR BLD: 52 % (ref 12–49)
MAGNESIUM SERPL-MCNC: 2.3 MG/DL (ref 1.6–2.4)
MCH RBC QN AUTO: 27.2 PG (ref 26–34)
MCHC RBC AUTO-ENTMCNC: 33.2 G/DL (ref 30–36.5)
MCV RBC AUTO: 81.7 FL (ref 80–99)
MONOCYTES # BLD: 0.3 K/UL (ref 0–1)
MONOCYTES NFR BLD: 8 % (ref 5–13)
NEUTS SEG # BLD: 1.2 K/UL (ref 1.8–8)
NEUTS SEG NFR BLD: 37 % (ref 32–75)
NRBC # BLD: 0 K/UL (ref 0–0.01)
NRBC BLD-RTO: 0 PER 100 WBC
NT PRO BNP: 6031 PG/ML
PLATELET # BLD AUTO: 163 K/UL (ref 150–400)
PMV BLD AUTO: 9.7 FL (ref 8.9–12.9)
POTASSIUM SERPL-SCNC: 3.1 MMOL/L (ref 3.5–5.1)
POTASSIUM SERPL-SCNC: 3.1 MMOL/L (ref 3.5–5.1)
PROT SERPL-MCNC: 6.6 G/DL (ref 6.4–8.2)
RBC # BLD AUTO: 4.16 M/UL (ref 4.1–5.7)
SODIUM SERPL-SCNC: 140 MMOL/L (ref 136–145)
SODIUM SERPL-SCNC: 142 MMOL/L (ref 136–145)
SPECIMEN HOLD: NORMAL
SPECIMEN HOLD: NORMAL
WBC # BLD AUTO: 3.3 K/UL (ref 4.1–11.1)

## 2024-05-11 PROCEDURE — 6360000002 HC RX W HCPCS: Performed by: NURSE PRACTITIONER

## 2024-05-11 PROCEDURE — 85025 COMPLETE CBC W/AUTO DIFF WBC: CPT

## 2024-05-11 PROCEDURE — 99232 SBSQ HOSP IP/OBS MODERATE 35: CPT | Performed by: NURSE PRACTITIONER

## 2024-05-11 PROCEDURE — 36415 COLL VENOUS BLD VENIPUNCTURE: CPT

## 2024-05-11 PROCEDURE — 2060000000 HC ICU INTERMEDIATE R&B

## 2024-05-11 PROCEDURE — 2580000003 HC RX 258: Performed by: FAMILY MEDICINE

## 2024-05-11 PROCEDURE — 83880 ASSAY OF NATRIURETIC PEPTIDE: CPT

## 2024-05-11 PROCEDURE — 83605 ASSAY OF LACTIC ACID: CPT

## 2024-05-11 PROCEDURE — 6370000000 HC RX 637 (ALT 250 FOR IP): Performed by: NURSE PRACTITIONER

## 2024-05-11 PROCEDURE — 80076 HEPATIC FUNCTION PANEL: CPT

## 2024-05-11 PROCEDURE — 6370000000 HC RX 637 (ALT 250 FOR IP): Performed by: FAMILY MEDICINE

## 2024-05-11 PROCEDURE — 36556 INSERT NON-TUNNEL CV CATH: CPT

## 2024-05-11 PROCEDURE — 2580000003 HC RX 258: Performed by: NURSE PRACTITIONER

## 2024-05-11 PROCEDURE — 80048 BASIC METABOLIC PNL TOTAL CA: CPT

## 2024-05-11 PROCEDURE — 83735 ASSAY OF MAGNESIUM: CPT

## 2024-05-11 RX ORDER — POTASSIUM CHLORIDE 29.8 MG/ML
20 INJECTION INTRAVENOUS ONCE
Status: COMPLETED | OUTPATIENT
Start: 2024-05-11 | End: 2024-05-11

## 2024-05-11 RX ADMIN — POTASSIUM CHLORIDE 20 MEQ: 400 INJECTION, SOLUTION INTRAVENOUS at 13:10

## 2024-05-11 RX ADMIN — BUMETANIDE 3 MG: 0.25 INJECTION INTRAMUSCULAR; INTRAVENOUS at 18:30

## 2024-05-11 RX ADMIN — SODIUM CHLORIDE, PRESERVATIVE FREE 10 ML: 5 INJECTION INTRAVENOUS at 08:33

## 2024-05-11 RX ADMIN — LEVOTHYROXINE SODIUM 88 MCG: 88 TABLET ORAL at 08:32

## 2024-05-11 RX ADMIN — SODIUM CHLORIDE, PRESERVATIVE FREE 10 ML: 5 INJECTION INTRAVENOUS at 21:01

## 2024-05-11 RX ADMIN — ACETAZOLAMIDE 125 MG: 250 TABLET ORAL at 08:33

## 2024-05-11 RX ADMIN — POTASSIUM BICARBONATE 40 MEQ: 782 TABLET, EFFERVESCENT ORAL at 18:31

## 2024-05-11 RX ADMIN — Medication 1000 UNITS: at 08:33

## 2024-05-11 RX ADMIN — POTASSIUM CHLORIDE 20 MEQ: 400 INJECTION, SOLUTION INTRAVENOUS at 14:10

## 2024-05-11 RX ADMIN — AMIODARONE HYDROCHLORIDE 100 MG: 200 TABLET ORAL at 08:33

## 2024-05-11 RX ADMIN — APIXABAN 5 MG: 5 TABLET, FILM COATED ORAL at 21:00

## 2024-05-11 RX ADMIN — TAMSULOSIN HYDROCHLORIDE 0.8 MG: 0.4 CAPSULE ORAL at 08:33

## 2024-05-11 RX ADMIN — ALLOPURINOL 50 MG: 100 TABLET ORAL at 08:33

## 2024-05-11 RX ADMIN — POTASSIUM CHLORIDE 20 MEQ: 400 INJECTION, SOLUTION INTRAVENOUS at 08:55

## 2024-05-11 RX ADMIN — APIXABAN 5 MG: 5 TABLET, FILM COATED ORAL at 08:33

## 2024-05-11 RX ADMIN — EMPAGLIFLOZIN 10 MG: 10 TABLET, FILM COATED ORAL at 08:32

## 2024-05-11 RX ADMIN — ACETAZOLAMIDE 125 MG: 250 TABLET ORAL at 21:00

## 2024-05-11 RX ADMIN — DOBUTAMINE HYDROCHLORIDE 2.5 MCG/KG/MIN: 200 INJECTION INTRAVENOUS at 22:14

## 2024-05-11 RX ADMIN — BUMETANIDE 3 MG: 0.25 INJECTION INTRAMUSCULAR; INTRAVENOUS at 02:38

## 2024-05-11 RX ADMIN — BUMETANIDE 3 MG: 0.25 INJECTION INTRAMUSCULAR; INTRAVENOUS at 10:22

## 2024-05-11 RX ADMIN — POTASSIUM BICARBONATE 40 MEQ: 782 TABLET, EFFERVESCENT ORAL at 04:14

## 2024-05-11 NOTE — PROGRESS NOTES
Hospitalist Progress Note  Klever Dejesus MD  Answering service: [phone]        Date of Service:  2024  NAME:  Ingrid Kennedy  :  1950  MRN:  [id number]      Admission Summary:     Patient admitted with CHF.    Interval history / Subjective:     No acute complaint.     Assessment & Plan:     Congestive heart failure  -Acute on chronic systolic CHF, NYHA class IV on admission, patient with known history of cardiomyopathy with EF of 15 to 20%  -Milrinone drip was switched to dobutamine per AHF team  -Continue diuresis with Bumex, GDMT's to include Jardiance, Toprol, others limited due to low blood pressure as well as renal insufficiency  -Deemed not a candidate for LVAD at current  -Monitor intake and output, monitor daily weight, HF team following     Paroxysmal atrial fibrillation  -Continue amiodarone, on Eliquis for anticoagulation  -Continue monitor on telemetry     Hypertension  -Limited GDMT's due to borderline low blood pressure     CKD stage III  -Renal function appears stable at baseline  -Follow daily renal function     Dyslipidemia  -LDL is controlled, not on statin     Hypothyroidism  -Continue Synthroid     BPH  -Continue Flomax     Obstructive sleep apnea  -CPAP nightly     Code status: Full  Prophylaxis: Eliquis  Care Plan discussed with: Patient  Anticipated Disposition: 24 to 48 hours  Central Line: CVC Double Lumen 24 Right Subclavian-Central Line Being Utilized: Yes     CRITICAL CARE ATTESTATION:  I had a face to face encounter with the patient, reviewed and interpreted patient data including clinical events, labs, images, vital signs, I/O's, and examined patient.  I have discussed the case and the plan and management of the patient's care with the consulting services, the bedside nurses and necessary ancillary providers.       NOTE OF PERSONAL INVOLVEMENT IN CARE   This patient has a

## 2024-05-11 NOTE — PROGRESS NOTES
ADVANCED HEART FAILURE CENTER  [facility] in [place], [place]  Heart Failure Outpatient Clinic Note      Patient name: Ingrid Kennedy  Patient : 1950  Patient MRN: [id number]  Date of service: 24      ASSESSMENT:  Ingrid Kennedy is a 73 y.o. male with acute on chronic systolic heart failure, stage D, NYHA class IV. GDMT limited by renal dysfunction.  EF 15-20%  Discussed at LVAD multidisciplinary meeting 23. Not currently a candidate for LVAD due to renal dysfunction, Creatinine >1.8, RV dysfunction, and neurocognitive dysfunction   Pt continues to have issues with volume overload, kidney dysfunction and borderline BP at home despite milrinone infusion. Pt directly admitted to hospital on 5/10 to switch from milrinone to dobutamine infusion. Once on stable dose of dobutamine and euvolemic will get RHC.   Patient wants to move back to [facility].  There is a cardiologist there that can prescribe and monitor inotropes.  Once stable on dobutamine will see if care can be transferred.      INTERVAL HISTORY:  Episode of NSVT yesterday afternoon (not symptomatic and event terminated on it's own with out being shocked by ICD), potassium low  No other acute events overnight  HDS, BP improved on dobutamine  Lab reviewed: potassium 3.1, Cr down trending to 2.03, BNP 6K (stable)      RECOMMENDATIONS:  Medical Therapy for Heart Failure  Continue dobutamine at 2.5 mcg/kg/min  Beta-blocker: no BB while on dobutamine   ACEi/ARB/ARNI: Unable to tolerate due to renal dysfunction  Hydralazine/Nitrate: Unable to tolerate due to hypotension  MRA: Unable to tolerate due to renal dysfunction  SGLT2i: Continue Jardiance 10 mg daily  Continue Bumex to 3mg IV TID , will likely start to decrease dose tomorrow since edema much improved  Cotntinue diamox 125mg BID.   Strict I/Os  Daily standing weights  Allopurinol 50 mg daily  Fluid restriction of 48-64 oz fluid  2 g sodium limit daily  Once euvolemic and on  Normal range of motion and neck supple.      Right lower leg: Edema present.      Left lower leg: Edema present.      Comments: Trace BLE edema   Skin:     General: Skin is warm and dry.   Neurological:      General: No focal deficit present.      Mental Status: He is alert and oriented to person, place, and time.   Psychiatric:         Mood and Affect: Mood normal.         Behavior: Behavior normal.         Thought Content: Thought content normal.         Judgment: Judgment normal.            PAST MEDICAL HISTORY:  Past Medical History:   Diagnosis Date    AICD (automatic cardioverter/defibrillator) present     CHF (congestive heart failure) (HCC)     CKD (chronic kidney disease)     Dilated cardiomyopathy (HCC)     Hypertension     Low left ventricular ejection fraction     GERRI on CPAP     Thyroid disease        PAST SURGICAL HISTORY:  Past Surgical History:   Procedure Laterality Date    CARDIAC PROCEDURE N/A 12/18/2023    Right heart cath performed by Ananda Walton MD at [facility] CARDIAC CATH LAB    CARDIAC PROCEDURE N/A 12/27/2023    Right heart cath performed by Ananda Walton MD at [facility] CARDIAC CATH LAB    CARDIAC PROCEDURE N/A 2/15/2024    Right heart cath performed by Emil Covarrubias MD at [facility] CARDIAC CATH LAB    IR INSERT TUNNELED CV CATH WO PORT < 5YRS  12/26/2023    IR INSERT TUNNELED CV CATH WO PORT < 5YRS 12/26/2023 [facility] RAD ANGIO IR    IR TUNNELED CATHETER PLACEMENT GREATER THAN 5 YEARS  1/17/2024    IR TUNNELED CATHETER PLACEMENT GREATER THAN 5 YEARS 1/17/2024 [facility] RAD ANGIO IR    TOTAL KNEE ARTHROPLASTY         FAMILY HISTORY:  No family history on file.    SOCIAL HISTORY:  Social History     Socioeconomic History    Marital status:      Spouse name: None    Number of children: None    Years of education: None    Highest education level: None   Tobacco Use    Smoking status: Never    Smokeless tobacco: Never   Vaping Use    Vaping Use: Never used   Substance and Sexual Activity    Alcohol use:

## 2024-05-12 LAB
ALBUMIN SERPL-MCNC: 3 G/DL (ref 3.5–5)
ALBUMIN/GLOB SERPL: 0.9 (ref 1.1–2.2)
ALP SERPL-CCNC: 88 U/L (ref 45–117)
ALT SERPL-CCNC: 19 U/L (ref 12–78)
ANION GAP SERPL CALC-SCNC: 8 MMOL/L (ref 5–15)
AST SERPL-CCNC: 14 U/L (ref 15–37)
BASOPHILS # BLD: 0 K/UL (ref 0–0.1)
BASOPHILS NFR BLD: 1 % (ref 0–1)
BILIRUB DIRECT SERPL-MCNC: 0.3 MG/DL (ref 0–0.2)
BILIRUB SERPL-MCNC: 1.1 MG/DL (ref 0.2–1)
BUN SERPL-MCNC: 37 MG/DL (ref 6–20)
BUN/CREAT SERPL: 17 (ref 12–20)
CALCIUM SERPL-MCNC: 8.6 MG/DL (ref 8.5–10.1)
CHLORIDE SERPL-SCNC: 105 MMOL/L (ref 97–108)
CO2 SERPL-SCNC: 28 MMOL/L (ref 21–32)
CREAT SERPL-MCNC: 2.23 MG/DL (ref 0.7–1.3)
DIFFERENTIAL METHOD BLD: ABNORMAL
EOSINOPHIL # BLD: 0.1 K/UL (ref 0–0.4)
EOSINOPHIL NFR BLD: 1 % (ref 0–7)
ERYTHROCYTE [DISTWIDTH] IN BLOOD BY AUTOMATED COUNT: 18.7 % (ref 11.5–14.5)
GLOBULIN SER CALC-MCNC: 3.2 G/DL (ref 2–4)
GLUCOSE SERPL-MCNC: 97 MG/DL (ref 65–100)
HCT VFR BLD AUTO: 33.5 % (ref 36.6–50.3)
HGB BLD-MCNC: 11.5 G/DL (ref 12.1–17)
IMM GRANULOCYTES # BLD AUTO: 0 K/UL (ref 0–0.04)
IMM GRANULOCYTES NFR BLD AUTO: 0 % (ref 0–0.5)
LYMPHOCYTES # BLD: 1.7 K/UL (ref 0.8–3.5)
LYMPHOCYTES NFR BLD: 47 % (ref 12–49)
MAGNESIUM SERPL-MCNC: 2.2 MG/DL (ref 1.6–2.4)
MCH RBC QN AUTO: 27.6 PG (ref 26–34)
MCHC RBC AUTO-ENTMCNC: 34.3 G/DL (ref 30–36.5)
MCV RBC AUTO: 80.5 FL (ref 80–99)
MONOCYTES # BLD: 0.3 K/UL (ref 0–1)
MONOCYTES NFR BLD: 10 % (ref 5–13)
NEUTS SEG # BLD: 1.4 K/UL (ref 1.8–8)
NEUTS SEG NFR BLD: 41 % (ref 32–75)
NRBC # BLD: 0 K/UL (ref 0–0.01)
NRBC BLD-RTO: 0 PER 100 WBC
NT PRO BNP: 8286 PG/ML
PLATELET # BLD AUTO: 178 K/UL (ref 150–400)
PMV BLD AUTO: 9.8 FL (ref 8.9–12.9)
POTASSIUM SERPL-SCNC: 3.5 MMOL/L (ref 3.5–5.1)
PROT SERPL-MCNC: 6.2 G/DL (ref 6.4–8.2)
RBC # BLD AUTO: 4.16 M/UL (ref 4.1–5.7)
SODIUM SERPL-SCNC: 141 MMOL/L (ref 136–145)
WBC # BLD AUTO: 3.5 K/UL (ref 4.1–11.1)

## 2024-05-12 PROCEDURE — 6360000002 HC RX W HCPCS: Performed by: NURSE PRACTITIONER

## 2024-05-12 PROCEDURE — 80048 BASIC METABOLIC PNL TOTAL CA: CPT

## 2024-05-12 PROCEDURE — 36415 COLL VENOUS BLD VENIPUNCTURE: CPT

## 2024-05-12 PROCEDURE — 2580000003 HC RX 258: Performed by: FAMILY MEDICINE

## 2024-05-12 PROCEDURE — 83880 ASSAY OF NATRIURETIC PEPTIDE: CPT

## 2024-05-12 PROCEDURE — 6370000000 HC RX 637 (ALT 250 FOR IP): Performed by: NURSE PRACTITIONER

## 2024-05-12 PROCEDURE — 80076 HEPATIC FUNCTION PANEL: CPT

## 2024-05-12 PROCEDURE — 85025 COMPLETE CBC W/AUTO DIFF WBC: CPT

## 2024-05-12 PROCEDURE — 2060000000 HC ICU INTERMEDIATE R&B

## 2024-05-12 PROCEDURE — 83735 ASSAY OF MAGNESIUM: CPT

## 2024-05-12 PROCEDURE — 99232 SBSQ HOSP IP/OBS MODERATE 35: CPT | Performed by: NURSE PRACTITIONER

## 2024-05-12 PROCEDURE — 6370000000 HC RX 637 (ALT 250 FOR IP): Performed by: FAMILY MEDICINE

## 2024-05-12 PROCEDURE — 36556 INSERT NON-TUNNEL CV CATH: CPT

## 2024-05-12 RX ORDER — POTASSIUM CHLORIDE 29.8 MG/ML
20 INJECTION INTRAVENOUS
Status: COMPLETED | OUTPATIENT
Start: 2024-05-12 | End: 2024-05-12

## 2024-05-12 RX ORDER — METOLAZONE 2.5 MG/1
2.5 TABLET ORAL ONCE
Status: COMPLETED | OUTPATIENT
Start: 2024-05-12 | End: 2024-05-12

## 2024-05-12 RX ADMIN — APIXABAN 5 MG: 5 TABLET, FILM COATED ORAL at 20:31

## 2024-05-12 RX ADMIN — TAMSULOSIN HYDROCHLORIDE 0.8 MG: 0.4 CAPSULE ORAL at 08:25

## 2024-05-12 RX ADMIN — Medication 1000 UNITS: at 08:25

## 2024-05-12 RX ADMIN — AMIODARONE HYDROCHLORIDE 100 MG: 200 TABLET ORAL at 08:25

## 2024-05-12 RX ADMIN — POTASSIUM CHLORIDE 20 MEQ: 400 INJECTION, SOLUTION INTRAVENOUS at 09:50

## 2024-05-12 RX ADMIN — POTASSIUM BICARBONATE 40 MEQ: 782 TABLET, EFFERVESCENT ORAL at 20:31

## 2024-05-12 RX ADMIN — SODIUM CHLORIDE, PRESERVATIVE FREE 10 ML: 5 INJECTION INTRAVENOUS at 08:25

## 2024-05-12 RX ADMIN — EMPAGLIFLOZIN 10 MG: 10 TABLET, FILM COATED ORAL at 08:25

## 2024-05-12 RX ADMIN — POTASSIUM BICARBONATE 40 MEQ: 782 TABLET, EFFERVESCENT ORAL at 08:25

## 2024-05-12 RX ADMIN — ACETAZOLAMIDE 125 MG: 250 TABLET ORAL at 20:31

## 2024-05-12 RX ADMIN — METOLAZONE 2.5 MG: 2.5 TABLET ORAL at 08:48

## 2024-05-12 RX ADMIN — ALLOPURINOL 50 MG: 100 TABLET ORAL at 08:25

## 2024-05-12 RX ADMIN — LEVOTHYROXINE SODIUM 88 MCG: 88 TABLET ORAL at 08:25

## 2024-05-12 RX ADMIN — POTASSIUM CHLORIDE 20 MEQ: 400 INJECTION, SOLUTION INTRAVENOUS at 08:47

## 2024-05-12 RX ADMIN — SODIUM CHLORIDE, PRESERVATIVE FREE 10 ML: 5 INJECTION INTRAVENOUS at 20:31

## 2024-05-12 RX ADMIN — APIXABAN 5 MG: 5 TABLET, FILM COATED ORAL at 08:25

## 2024-05-12 RX ADMIN — BUMETANIDE 3 MG: 0.25 INJECTION INTRAMUSCULAR; INTRAVENOUS at 18:42

## 2024-05-12 RX ADMIN — ACETAZOLAMIDE 125 MG: 250 TABLET ORAL at 08:25

## 2024-05-12 RX ADMIN — BUMETANIDE 3 MG: 0.25 INJECTION INTRAMUSCULAR; INTRAVENOUS at 02:32

## 2024-05-12 RX ADMIN — BUMETANIDE 3 MG: 0.25 INJECTION INTRAMUSCULAR; INTRAVENOUS at 08:48

## 2024-05-12 NOTE — PROGRESS NOTES
has a high probability of imminent, clinically significant deterioration, which requires the highest level of preparedness to intervene urgently. I participated in the decision-making and personally managed or directed the management of the following life and organ supporting interventions that required my frequent assessment to treat or prevent imminent deterioration.     I personally spent 45 minutes of critical care time.  This is time spent at this critically ill patient's bedside actively involved in patient care as well as the coordination of care and discussions with the patient's family.  This does not include any procedural time which has been billed separately.             Review of Systems:   Pertinent items are noted in HPI.         Vital Signs:    Last 24hrs VS reviewed since prior progress note. Most recent are:  Vitals:    05/12/24 1200   BP:    Pulse: 75   Resp:    Temp:    SpO2:          Intake/Output Summary (Last 24 hours) at 5/12/2024 1352  Last data filed at 5/12/2024 1109  Gross per 24 hour   Intake 1336.99 ml   Output 2275 ml   Net -938.01 ml        Physical Examination:     I had a face to face encounter with this patient and independently examined them on 5/12/2024 as outlined below:          General : alert x 3, awake, no acute distress,   HEENT: PEERL, EOMI, moist mucus membrane, TM clear  Neck: supple, no JVD, no meningeal signs  Chest: Clear to auscultation bilaterally   CVS: S1 S2 heard, Capillary refill less than 2 seconds  Abd: soft/ non tender, non distended, BS physiological,   Ext: no clubbing, no cyanosis, no edema, brisk 2+ DP pulses  Neuro/Psych: pleasant mood and affect, CN 2-12 grossly intact, sensory grossly within normal limit, Strength 5/5 in all extremities, DTR 1+ x 4  Skin: warm            Data Review:    Review and/or order of clinical lab test    I have independently reviewed and interpreted patient's lab and all other diagnostic data    Notes reviewed from all  clinical/nonclinical/nursing services involved in patient's clinical care. Care coordination discussions were held with appropriate clinical/nonclinical/ nursing providers based on care coordination needs.     Labs:     Recent Labs     05/11/24 0300 05/12/24  0245   WBC 3.3* 3.5*   HGB 11.3* 11.5*   HCT 34.0* 33.5*    178     Recent Labs     05/10/24  1315 05/10/24  2029 05/11/24  0300 05/11/24  1130 05/12/24  0245      < > 140 142 141   K 3.7   < > 3.1* 3.1* 3.5      < > 104 104 105   CO2 34*   < > 29 33* 28   BUN 32*   < > 32* 30* 37*   MG 2.2  --  2.3  --  2.2    < > = values in this interval not displayed.     Recent Labs     05/10/24  1315 05/11/24  0300 05/12/24  0245   ALT 17 28 19   GLOB 3.2 3.5 3.2     No results for input(s): \"INR\", \"APTT\" in the last 72 hours.    Invalid input(s): \"PTP\"   No results for input(s): \"TIBC\", \"FERR\" in the last 72 hours.    Invalid input(s): \"FE\", \"PSAT\"   No results found for: \"RBCF\"   No results for input(s): \"PH\", \"PCO2\", \"PO2\" in the last 72 hours.  No results for input(s): \"CPK\" in the last 72 hours.    Invalid input(s): \"CPKMB\", \"CKNDX\", \"TROIQ\"  Lab Results   Component Value Date/Time    CHOL 124 04/15/2024 11:46 AM    HDL 60 04/15/2024 11:46 AM    LDL 54 04/15/2024 11:46 AM     No results found for: \"GLUCPOC\"  [unfilled]      Medications Reviewed:     Current Facility-Administered Medications   Medication Dose Route Frequency    potassium bicarb-citric acid (EFFER-K) effervescent tablet 40 mEq  40 mEq Oral BID    DOBUTamine (DOBUTREX) 500 mg in dextrose 5 % 250 mL infusion  2.5 mcg/kg/min IntraVENous Continuous    sodium chloride flush 0.9 % injection 5-40 mL  5-40 mL IntraVENous PRN    0.9 % sodium chloride infusion   IntraVENous PRN    potassium chloride (KLOR-CON) extended release tablet 40 mEq  40 mEq Oral PRN    Or    potassium bicarb-citric acid (EFFER-K) effervescent tablet 40 mEq  40 mEq Oral PRN    Or    potassium chloride 10 mEq/100 mL IVPB

## 2024-05-12 NOTE — PROGRESS NOTES
1930  Verbal bedside report given to CHARLIE Villalba by CHARLIE Ritchie. Report included updated vitals and SBAR. Patient is in bed awake and respirations are even and unlabored. Dobutamine gtt verified at handoff.     1830  Patient up to bathroom.     1200  Called [facility] office for RHC consult per HF order. Dr. Danielson following.    0830  Patient completed oral care. Bed pad changed.     0730  Verbal bedside report received from CHARLIE Villalba given to CHARLIE Ritchie. Report included vital signs, SBAR, and plan for the day. Patient rhythm A-paced. Patient is in bed awake and respirations are even and unlabored. Call bell is within reach. Dobutamine gtt verified at handoff.     Care Plan:    Problem: Discharge Planning  Goal: Discharge to home or other facility with appropriate resources  5/12/2024 0839 by Erma Lazaro RN  Outcome: Progressing  5/11/2024 2113 by Orly Turner RN  Outcome: Progressing     Problem: Chronic Conditions and Co-morbidities  Goal: Patient's chronic conditions and co-morbidity symptoms are monitored and maintained or improved  5/12/2024 0839 by Erma Lazaro RN  Outcome: Progressing  5/11/2024 2113 by Orly Turner RN  Outcome: Progressing     Problem: Safety - Adult  Goal: Free from fall injury  5/12/2024 0839 by Erma Lazaro RN  Outcome: Progressing  5/11/2024 2113 by Orly Turner RN  Outcome: Progressing

## 2024-05-12 NOTE — PROGRESS NOTES
ADVANCED HEART FAILURE CENTER  [facility] in [place], [place]  Heart Failure Outpatient Clinic Note      Patient name: Ingrid Kennedy  Patient : 1950  Patient MRN: [id number]  Date of service: 24      ASSESSMENT:  Ingrid Kennedy is a 73 y.o. male with acute on chronic systolic heart failure, stage D, NYHA class IV. GDMT limited by renal dysfunction.  EF 15-20%  Discussed at LVAD multidisciplinary meeting 23. Not currently a candidate for LVAD due to renal dysfunction, Creatinine >1.8, RV dysfunction, and neurocognitive dysfunction   Pt continues to have issues with volume overload, kidney dysfunction and borderline BP at home despite milrinone infusion. Pt directly admitted to hospital on 5/10 to switch from milrinone to dobutamine infusion. Once on stable dose of dobutamine and euvolemic will get RHC.   Patient wants to move back to the [facility].  There is a cardiologist there that can prescribe and monitor inotropes.  Once stable on dobutamine will see if care can be transferred.      INTERVAL HISTORY:  No other acute events overnight  HDS, BP improved on dobutamine  Net neg 727, weigth 151-->152  Lab reviewed: potassium 3.5, Cr 2.05-->2.23, BNP 6K-->8K    RECOMMENDATIONS:  Medical Therapy for Heart Failure  Continue dobutamine at 2.5 mcg/kg/min  Beta-blocker: no BB while on dobutamine   ACEi/ARB/ARNI: Unable to tolerate due to renal dysfunction  Hydralazine/Nitrate: Unable to tolerate due to hypotension  MRA: Unable to tolerate due to renal dysfunction  SGLT2i: Continue Jardiance 10 mg daily  Continue Bumex to 3mg IV TID   Continue diamox 125mg BID.   Given metolazone 2.5 mg x 1 dose with additional 40 mEq of K today  Strict I/Os  Daily standing weights  Allopurinol 50 mg daily  Fluid restriction of 48-64 oz fluid  2 g sodium limit daily  Plan for RHC on Monday    Anticoagulation and Antiplatelet Therapy  Anticoagulation with Apixaban for PAF    Antiarrhythmic and Device

## 2024-05-13 LAB
ALBUMIN SERPL-MCNC: 3.1 G/DL (ref 3.5–5)
ALBUMIN/GLOB SERPL: 1 (ref 1.1–2.2)
ALP SERPL-CCNC: 89 U/L (ref 45–117)
ALT SERPL-CCNC: 18 U/L (ref 12–78)
ANION GAP SERPL CALC-SCNC: 8 MMOL/L (ref 5–15)
AST SERPL-CCNC: 15 U/L (ref 15–37)
BASOPHILS # BLD: 0 K/UL (ref 0–0.1)
BASOPHILS NFR BLD: 1 % (ref 0–1)
BILIRUB DIRECT SERPL-MCNC: 0.3 MG/DL (ref 0–0.2)
BILIRUB SERPL-MCNC: 1.4 MG/DL (ref 0.2–1)
BUN SERPL-MCNC: 38 MG/DL (ref 6–20)
BUN/CREAT SERPL: 17 (ref 12–20)
CALCIUM SERPL-MCNC: 8.8 MG/DL (ref 8.5–10.1)
CHLORIDE SERPL-SCNC: 97 MMOL/L (ref 97–108)
CO2 SERPL-SCNC: 35 MMOL/L (ref 21–32)
CREAT SERPL-MCNC: 2.2 MG/DL (ref 0.7–1.3)
DIFFERENTIAL METHOD BLD: ABNORMAL
EOSINOPHIL # BLD: 0.1 K/UL (ref 0–0.4)
EOSINOPHIL NFR BLD: 3 % (ref 0–7)
ERYTHROCYTE [DISTWIDTH] IN BLOOD BY AUTOMATED COUNT: 18.4 % (ref 11.5–14.5)
GLOBULIN SER CALC-MCNC: 3.2 G/DL (ref 2–4)
GLUCOSE SERPL-MCNC: 87 MG/DL (ref 65–100)
HCT VFR BLD AUTO: 35.2 % (ref 36.6–50.3)
HGB BLD-MCNC: 12.3 G/DL (ref 12.1–17)
IMM GRANULOCYTES # BLD AUTO: 0 K/UL (ref 0–0.04)
IMM GRANULOCYTES NFR BLD AUTO: 0 % (ref 0–0.5)
LYMPHOCYTES # BLD: 1.7 K/UL (ref 0.8–3.5)
LYMPHOCYTES NFR BLD: 49 % (ref 12–49)
MAGNESIUM SERPL-MCNC: 2.2 MG/DL (ref 1.6–2.4)
MCH RBC QN AUTO: 28 PG (ref 26–34)
MCHC RBC AUTO-ENTMCNC: 34.9 G/DL (ref 30–36.5)
MCV RBC AUTO: 80 FL (ref 80–99)
MONOCYTES # BLD: 0.3 K/UL (ref 0–1)
MONOCYTES NFR BLD: 9 % (ref 5–13)
NEUTS SEG # BLD: 1.4 K/UL (ref 1.8–8)
NEUTS SEG NFR BLD: 38 % (ref 32–75)
NRBC # BLD: 0 K/UL (ref 0–0.01)
NRBC BLD-RTO: 0 PER 100 WBC
NT PRO BNP: 5839 PG/ML
PLATELET # BLD AUTO: 173 K/UL (ref 150–400)
PMV BLD AUTO: 9.6 FL (ref 8.9–12.9)
POTASSIUM SERPL-SCNC: 2.8 MMOL/L (ref 3.5–5.1)
PROT SERPL-MCNC: 6.3 G/DL (ref 6.4–8.2)
RBC # BLD AUTO: 4.4 M/UL (ref 4.1–5.7)
SODIUM SERPL-SCNC: 140 MMOL/L (ref 136–145)
WBC # BLD AUTO: 3.5 K/UL (ref 4.1–11.1)

## 2024-05-13 PROCEDURE — 6360000002 HC RX W HCPCS: Performed by: NURSE PRACTITIONER

## 2024-05-13 PROCEDURE — 6370000000 HC RX 637 (ALT 250 FOR IP): Performed by: NURSE PRACTITIONER

## 2024-05-13 PROCEDURE — 36415 COLL VENOUS BLD VENIPUNCTURE: CPT

## 2024-05-13 PROCEDURE — 85025 COMPLETE CBC W/AUTO DIFF WBC: CPT

## 2024-05-13 PROCEDURE — 2580000003 HC RX 258: Performed by: FAMILY MEDICINE

## 2024-05-13 PROCEDURE — 83735 ASSAY OF MAGNESIUM: CPT

## 2024-05-13 PROCEDURE — 2580000003 HC RX 258: Performed by: NURSE PRACTITIONER

## 2024-05-13 PROCEDURE — 80076 HEPATIC FUNCTION PANEL: CPT

## 2024-05-13 PROCEDURE — P9047 ALBUMIN (HUMAN), 25%, 50ML: HCPCS | Performed by: NURSE PRACTITIONER

## 2024-05-13 PROCEDURE — P9045 ALBUMIN (HUMAN), 5%, 250 ML: HCPCS | Performed by: NURSE PRACTITIONER

## 2024-05-13 PROCEDURE — 2060000000 HC ICU INTERMEDIATE R&B

## 2024-05-13 PROCEDURE — APPNB30 APP NON BILLABLE TIME 0-30 MINS: Performed by: NURSE PRACTITIONER

## 2024-05-13 PROCEDURE — 80048 BASIC METABOLIC PNL TOTAL CA: CPT

## 2024-05-13 PROCEDURE — 83880 ASSAY OF NATRIURETIC PEPTIDE: CPT

## 2024-05-13 PROCEDURE — 6370000000 HC RX 637 (ALT 250 FOR IP): Performed by: FAMILY MEDICINE

## 2024-05-13 PROCEDURE — 99233 SBSQ HOSP IP/OBS HIGH 50: CPT | Performed by: INTERNAL MEDICINE

## 2024-05-13 RX ORDER — POTASSIUM CHLORIDE 29.8 MG/ML
20 INJECTION INTRAVENOUS
Status: DISCONTINUED | OUTPATIENT
Start: 2024-05-13 | End: 2024-05-13

## 2024-05-13 RX ORDER — ALBUMIN, HUMAN INJ 5% 5 %
12.5 SOLUTION INTRAVENOUS ONCE
Status: COMPLETED | OUTPATIENT
Start: 2024-05-13 | End: 2024-05-13

## 2024-05-13 RX ORDER — ALBUMIN (HUMAN) 12.5 G/50ML
25 SOLUTION INTRAVENOUS ONCE
Status: COMPLETED | OUTPATIENT
Start: 2024-05-13 | End: 2024-05-13

## 2024-05-13 RX ORDER — POTASSIUM CHLORIDE 29.8 MG/ML
20 INJECTION INTRAVENOUS ONCE
Status: COMPLETED | OUTPATIENT
Start: 2024-05-13 | End: 2024-05-13

## 2024-05-13 RX ORDER — BUMETANIDE 0.25 MG/ML
3 INJECTION INTRAMUSCULAR; INTRAVENOUS 2 TIMES DAILY
Status: DISCONTINUED | OUTPATIENT
Start: 2024-05-13 | End: 2024-05-15

## 2024-05-13 RX ORDER — DOBUTAMINE HYDROCHLORIDE 200 MG/100ML
4 INJECTION INTRAVENOUS CONTINUOUS
Status: DISCONTINUED | OUTPATIENT
Start: 2024-05-13 | End: 2024-05-16 | Stop reason: HOSPADM

## 2024-05-13 RX ADMIN — POTASSIUM CHLORIDE 10 MEQ: 10 INJECTION, SOLUTION INTRAVENOUS at 07:08

## 2024-05-13 RX ADMIN — POTASSIUM CHLORIDE 20 MEQ: 29.8 INJECTION, SOLUTION INTRAVENOUS at 10:04

## 2024-05-13 RX ADMIN — APIXABAN 5 MG: 5 TABLET, FILM COATED ORAL at 20:50

## 2024-05-13 RX ADMIN — LEVOTHYROXINE SODIUM 88 MCG: 88 TABLET ORAL at 10:07

## 2024-05-13 RX ADMIN — BUMETANIDE 3 MG: 0.25 INJECTION INTRAMUSCULAR; INTRAVENOUS at 04:08

## 2024-05-13 RX ADMIN — APIXABAN 5 MG: 5 TABLET, FILM COATED ORAL at 10:08

## 2024-05-13 RX ADMIN — POTASSIUM CHLORIDE 10 MEQ: 10 INJECTION, SOLUTION INTRAVENOUS at 07:56

## 2024-05-13 RX ADMIN — TAMSULOSIN HYDROCHLORIDE 0.8 MG: 0.4 CAPSULE ORAL at 10:09

## 2024-05-13 RX ADMIN — Medication 1000 UNITS: at 10:07

## 2024-05-13 RX ADMIN — POTASSIUM BICARBONATE 40 MEQ: 782 TABLET, EFFERVESCENT ORAL at 20:49

## 2024-05-13 RX ADMIN — AMIODARONE HYDROCHLORIDE 100 MG: 200 TABLET ORAL at 10:09

## 2024-05-13 RX ADMIN — POTASSIUM CHLORIDE 10 MEQ: 10 INJECTION, SOLUTION INTRAVENOUS at 05:49

## 2024-05-13 RX ADMIN — ALBUMIN (HUMAN) 12.5 G: 12.5 INJECTION, SOLUTION INTRAVENOUS at 12:20

## 2024-05-13 RX ADMIN — ALBUMIN (HUMAN) 25 G: 0.25 INJECTION, SOLUTION INTRAVENOUS at 19:52

## 2024-05-13 RX ADMIN — POTASSIUM BICARBONATE 40 MEQ: 782 TABLET, EFFERVESCENT ORAL at 10:02

## 2024-05-13 RX ADMIN — DOBUTAMINE HYDROCHLORIDE 3 MCG/KG/MIN: 200 INJECTION INTRAVENOUS at 20:52

## 2024-05-13 RX ADMIN — BUMETANIDE 3 MG: 0.25 INJECTION INTRAMUSCULAR; INTRAVENOUS at 17:15

## 2024-05-13 RX ADMIN — SODIUM CHLORIDE 20 ML/HR: 9 INJECTION, SOLUTION INTRAVENOUS at 05:50

## 2024-05-13 RX ADMIN — EMPAGLIFLOZIN 10 MG: 10 TABLET, FILM COATED ORAL at 10:08

## 2024-05-13 RX ADMIN — SODIUM CHLORIDE, PRESERVATIVE FREE 10 ML: 5 INJECTION INTRAVENOUS at 20:51

## 2024-05-13 RX ADMIN — ALLOPURINOL 50 MG: 100 TABLET ORAL at 10:08

## 2024-05-13 RX ADMIN — SODIUM CHLORIDE, PRESERVATIVE FREE 10 ML: 5 INJECTION INTRAVENOUS at 10:08

## 2024-05-13 ASSESSMENT — ENCOUNTER SYMPTOMS
EYE PAIN: 0
SHORTNESS OF BREATH: 0
ABDOMINAL PAIN: 0
ABDOMINAL DISTENTION: 0
COUGH: 0
BLOOD IN STOOL: 0
CHEST TIGHTNESS: 0
SORE THROAT: 0

## 2024-05-13 NOTE — PROGRESS NOTES
ADVANCED HEART FAILURE CENTER  [facility] in [place], [place]  Heart Failure Outpatient Clinic Note      Patient name: Ingrid Kennedy  Patient : 1950  Patient MRN: [id number]  Date of service: 24      ASSESSMENT:  Ingrid Kennedy is a 73 y.o. male with acute on chronic systolic heart failure, stage D, NYHA class IV. GDMT limited by renal dysfunction.  EF 15-20%  Discussed at LVAD multidisciplinary meeting 23. Not currently a candidate for LVAD due to renal dysfunction, Creatinine >1.8, RV dysfunction, and neurocognitive dysfunction   Pt continues to have issues with volume overload, kidney dysfunction and borderline BP at home despite milrinone infusion. Pt directly admitted to hospital on 5/10 to switch from milrinone to dobutamine infusion. Once on stable dose of dobutamine and euvolemic will get RHC.   Patient wants to move back to the [facility].  There is a cardiologist there that can prescribe and monitor inotropes.  Once stable on dobutamine will see if care can be transferred.      INTERVAL HISTORY:  No other acute events overnight  Net neg 2.8 L, weight stable  K 2.8, PBNP 8K-->5K, Cr 2.2 (stable)  HDS overnight but this am SBP in the 80s. Given 12.5 g of 5% albumin and BP improved to 105/45    RECOMMENDATIONS:  Medical Therapy for Heart Failure  Continue dobutamine at 2.5 mcg/kg/min  Beta-blocker: no BB while on dobutamine   ACEi/ARB/ARNI: Unable to tolerate due to renal dysfunction  Hydralazine/Nitrate: Unable to tolerate due to hypotension  MRA: Unable to tolerate due to renal dysfunction  SGLT2i: Continue Jardiance 10 mg daily  Decrease Bumex to 3mg IV BID  Continue diamox 125mg BID.   Continue Potassium 40 BID, additional 50 mEq given today for K of 2.8  Strict I/Os  Daily standing weights  Allopurinol 50 mg daily  Fluid restriction of 48-64 oz fluid  2 g sodium limit daily  Plan for RHC tomorrow    Anticoagulation and Antiplatelet Therapy  Anticoagulation with Apixaban for  of breath.    Cardiovascular:  Negative for chest pain, palpitations and leg swelling.   Gastrointestinal:  Negative for abdominal distention, abdominal pain and blood in stool.   Endocrine: Negative for cold intolerance.   Genitourinary:  Negative for dysuria and hematuria.   Musculoskeletal:  Negative for arthralgias and myalgias.   Skin:  Negative for rash and wound.   Neurological:  Negative for dizziness, weakness, light-headedness and headaches.   Psychiatric/Behavioral:  Negative for confusion, dysphoric mood and sleep disturbance. The patient is not nervous/anxious.           PHYSICAL EXAM:  BP (!) 105/45   Pulse 92   Temp 97.9 °F (36.6 °C) (Oral)   Resp 19   Ht 1.778 m (5' 10\")   Wt 69.2 kg (152 lb 9.6 oz)   SpO2 94%   BMI 21.90 kg/m²     Physical Exam  Vitals reviewed.   Constitutional:       General: He is not in acute distress.     Appearance: Normal appearance. He is normal weight. He is not ill-appearing.   HENT:      Head: Normocephalic and atraumatic.      Mouth/Throat:      Mouth: Mucous membranes are moist.      Pharynx: Oropharynx is clear.   Eyes:      General: No scleral icterus.     Conjunctiva/sclera: Conjunctivae normal.   Neck:      Vascular: No JVD.   Cardiovascular:      Rate and Rhythm: Normal rate and regular rhythm.      Pulses: Normal pulses.      Heart sounds: Normal heart sounds. No murmur heard.     No friction rub. No gallop.   Pulmonary:      Effort: Pulmonary effort is normal. No respiratory distress.      Breath sounds: Normal breath sounds.   Abdominal:      General: Bowel sounds are normal. There is no distension.      Palpations: Abdomen is soft.   Musculoskeletal:         General: No swelling or deformity.      Cervical back: Normal range of motion and neck supple.      Right lower leg: No edema.      Left lower leg: No edema.   Skin:     General: Skin is warm and dry.   Neurological:      General: No focal deficit present.      Mental Status: He is alert and oriented

## 2024-05-13 NOTE — PROGRESS NOTES
Hospitalist Progress Note  Klever Dejesus MD  Answering service: [phone]        Date of Service:  2024  NAME:  Ingrid Kennedy  :  1950  MRN:  [id number]      Admission Summary:     Patient admitted with CHF.    Interval history / Subjective:     No acute complaint.     Assessment & Plan:     Congestive heart failure  -Acute on chronic systolic CHF, NYHA class IV on admission, patient with known history of cardiomyopathy with EF of 15 to 20%  -Milrinone drip was switched to dobutamine per AHF team on admission  -Continue diuresis with Bumex, GDMT's to include Jardiance, Toprol, others limited due to low blood pressure as well as renal insufficiency  -Deemed not a candidate for LVAD at current  -Monitor intake and output, monitor daily weight, HF team following  -Plan for right heart cath in a.m.     Paroxysmal atrial fibrillation  -Continue amiodarone, on Eliquis for anticoagulation  -Continue monitor on telemetry     Hypertension  -Limited GDMT's due to borderline low blood pressure     CKD stage III  -Renal function appears stable at baseline  -Follow daily renal function     Dyslipidemia  -LDL is controlled, not on statin     Hypothyroidism  -Continue Synthroid     BPH  -Continue Flomax     Obstructive sleep apnea  -CPAP nightly     Code status: Full  Prophylaxis: Eliquis  Care Plan discussed with: Patient  Anticipated Disposition: 24 to 48 hours  Central Line: CVC Double Lumen 24 Right Subclavian-Central Line Being Utilized: Yes     CRITICAL CARE ATTESTATION:  I had a face to face encounter with the patient, reviewed and interpreted patient data including clinical events, labs, images, vital signs, I/O's, and examined patient.  I have discussed the case and the plan and management of the patient's care with the consulting services, the bedside nurses and necessary ancillary providers.       NOTE OF

## 2024-05-13 NOTE — NURSE NAVIGATOR
Heart Failure Nurse Navigator rounds completed.    Patient had been seen by HFNN on 2/12/24.  Provided AHA Discharge Packet for Patients Diagnosed with Heart Failure booklet.      Reviewed daily weights, patient states he has been doing daily weights and following a low salt diet.  Patient is familiar with the signs and symptoms of  heart failure.  Patient verbalizes he is here to change from home Milrinone to Dobutamine.      Advised patient that follow up appointment will be scheduled and placed on Discharge Instructions prior to discharge. Will continue to follow until discharge.        Time spent with patient discussing HF education:  10 minutes

## 2024-05-13 NOTE — CARE COORDINATION
Care Management Initial Assessment       RUR: 19% - moderate  Readmission? No  1st IM letter given? Yes - 5/13/24  1st  letter given: No    Chart reviewed. Patient is open with [facility] for HH services and [facility] for home infusion services. Patient was previously on home milrinone. Now on dobutamine. CM called [facility] and sent notification referral of admission. Advised patient will likely discharge on dobutamine once medically stable. Plans for RHC tomorrow. Patient would like to return to the [facility] and states there is a Cardiologist at [place] that will prescribe home inotrope.    CM will continue to follow.       05/13/24 3144   Service Assessment   Patient Orientation Alert and Oriented   Cognition Alert   History Provided By Patient   Primary Caregiver Self   Support Systems Children   Patient's Healthcare Decision Maker is: Legal Next of Kin   PCP Verified by CM Yes   Last Visit to PCP Within last 3 months   Prior Functional Level Independent in ADLs/IADLs   Current Functional Level Independent in ADLs/IADLs   Can patient return to prior living arrangement Yes   Ability to make needs known: Good   Family able to assist with home care needs: Yes   Would you like for me to discuss the discharge plan with any other family members/significant others, and if so, who? Yes  (daughter)   Financial Resources Medicare   Community Resources None   Social/Functional History   Lives With Daughter   Type of Home House   Receives Help From Family   ADL Assistance Independent   Homemaking Assistance Independent   Homemaking Responsibilities No   Ambulation Assistance Independent   Transfer Assistance Independent   Active  No   Patient's  Info family   Occupation Retired   Discharge Planning   Type of Residence House   Living Arrangements Children   Current Services Prior To Admission Home Infusion   Potential Assistance Needed Outpatient IV   DME Ordered? No   Potential  Assistance Purchasing Medications No   Type of Home Care Services IV Therapy   Patient expects to be discharged to: House   Services At/After Discharge   Transition of Care Consult (CM Consult) Home Health   Internal Home Health Yes   Services At/After Discharge IV Therapy;Home Health    Resource Information Provided? No   Mode of Transport at Discharge Other (see comment)  (family)   Confirm Follow Up Transport Family   Condition of Participation: Discharge Planning   The Plan for Transition of Care is related to the following treatment goals: home health   The Patient and/or Patient Representative was provided with a Choice of Provider? Patient   The Patient and/Or Patient Representative agree with the Discharge Plan? Yes   Freedom of Choice list was provided with basic dialogue that supports the patient's individualized plan of care/goals, treatment preferences, and shares the quality data associated with the providers?  Yes     Renny Jesus, MPH  Care Manager l [facility]  Available via retsCloud or

## 2024-05-13 NOTE — NURSE NAVIGATOR
HEART FAILURE NURSE NAVIGATOR NOTE  [facility]    Patient chart was reviewed by Heart Failure (HF) Nurse Navigators for compliance of prescribed treatment with guidelines directed medical therapy (GDMT) and HF database completed.     Please, review beneath recommendations for symptomatic patients with HF with Reduced Ejection Fraction ? 40% (HFrEF)* and patients whose LVEF improved > 40% (HFimpEF)* for your consideration when taking care of this patient.    Current Medical Therapy:    Name Ingrid Kennedy    1950   LVEF  15/20   NYHA Functional Class  IV   ARNi/ACEi/ARB  Unable to tolerate due to renal dysfunction   Beta-blocker  No BB while on dobutamine   Aldosterone Antagonist  Unable to tolerate due to renal dysfunction   SGLT2 inhibitor  Jardiance   Hydralazine/Isosorbide Dinitrate Unable to tolerate due to hypotension   Consulting Cardiologist:  [facility]     Recommendations:    Please, add the following GDMT for HFrEF ? 40% [Class 1] or document in discharge summary/progress note why patient cannot take the medication:  ARNi/ACEi or ARB  Beta-blockers (carvedilol, sustained-release metoprolol succinate or bisoprolol)  Aldosterone antagonists GFR > 30 and K< 5  SGLT2 inhibitor  Hydralazine/Isosorbide dinitrate for  Americans with Class III/IV symptoms  Adjust diuretic dose at discharge if hospitalized for volume overload    Consider adding the following GDMT for HFrEF ? 40%, if appropriate [Class 2b]:  Ivabradine for patients on maximally tolerated beta-blocker dose in order to achieve HR 70-80bpm  Digoxin, goal level 0.5-0.9  Polyunsaturated fatty acids  Vericuguat  For patient with hyperkalemia while on RAASi > 5.5, consider adding potassium binders (patiromer, sodium zirconium cycosilicate)    Note: the following medications may be potentially harmful in heart failure [Class 3]:  Calcium channel blockers (doxazosin, diltiazem, verapamil, nifedipine)  Antiarrhythmics

## 2024-05-13 NOTE — PROGRESS NOTES
0730: Bedside and Verbal shift change report given to CHARLIE Centeno (oncoming nurse) by CHARLIE Villalba  (offgoing nurse). Report included the following information Nurse Handoff Report, Index, Adult Overview, MAR, Recent Results, and Cardiac Rhythm A. Paced  . Rate verified Dobutamine gtt.     AM Potassium 2.8, PRN IVPB Potassium started and pt recevied total of 30 mEq. NP Kings placed order for additional 20 mEq IVPB Potassium.   Per NP Kings plan is for pt to receive a total of 50 mEq of IVPB Potassium in addition to scheduled PO Potassium.     1000: BP 88/63 MAP 69, per NP Kings hold AM IV bumex and PO Diamox, okay to administer PO Amio and PO Flomax.     Per GEORGETTE Ku, MAP goal > 65.     1222: BP 86/56 MAP 62, NP Kings notified, order received for one time dose of a big bottle of albumin.     1330: Repeat BP post albumin administration, 105/45 MAP 65.     1415: Per GEORGETTE Ku, IVP bumex order modified to twice a day, okay to administer IVP Bumex if SBP > 90.     1515: BP 80/57 MAP 60 and pt lethargy but responsive to voice, NP Kings notified. NP Kings at bedside to test pt fluid responsiveness - BP post lying flat and elevating legs was 86/48 MAP 61 - pt fluid unresponsive. Per NP Kings, verbal order received to increase Dobutamine gtt to 3 mcg/kg/min and reassess BP an hour after changing.     1615: Repeat BP's 92/71 MAP 77 and 91/68 MAP 75, NP Kings notified and aware and verified okay to administer evening bumex.     1930: Bedside and Verbal shift change report given to CHARLIE Marsh (oncoming nurse) by CHARLIE Centeno (offgoing nurse). Report included the following information Nurse Handoff Report, Index, Adult Overview, MAR, Recent Results, and Cardiac Rhythm A. Paced  .         Care Plan:   Problem: Discharge Planning  Goal: Discharge to home or other facility with appropriate resources  5/13/2024 0935 by Dari Baldwin RN  Outcome: Progressing  5/12/2024 2214 by Orly Turner

## 2024-05-13 NOTE — DISCHARGE INSTRUCTIONS
To access the American Heart Association's Interactive Workbook \"Healthier Living with Heart Failure - Managing Symptoms and Reducing Risk\"  Scan the QR code below.                            Discharge Instructions       PATIENT ID: Ingrid Kennedy  MRN: [id number]   YOB: 1950    DATE OF ADMISSION: [unfilled]    DATE OF DISCHARGE: 5/16/2024    PRIMARY CARE PROVIDER: @PCP@     ATTENDING PHYSICIAN: [unfilled]  DISCHARGING PROVIDER: Ernie Choudhury MD    To contact this individual call [phone] and ask the  to page.   If unavailable ask to be transferred the Adult Hospitalist Department.    DISCHARGE DIAGNOSES Acute on chronic CHF    CONSULTATIONS: Cardiology    PROCEDURES/SURGERIES: Procedure(s):  Right heart cath    PENDING TEST RESULTS:   At the time of discharge the following test results are still pending: none    FOLLOW UP APPOINTMENTS:   PCP  Cardiology    ADDITIONAL CARE RECOMMENDATIONS:   Daily weight, ifweight gain >2lbs in 3-4days please call your cardiologist    DIET: cardiac diet    ACTIVITY: activity as tolerated      DISCHARGE MEDICATIONS:   See Medication Reconciliation Form    It is important that you take the medication exactly as they are prescribed.   Keep your medication in the bottles provided by the pharmacist and keep a list of the medication names, dosages, and times to be taken in your wallet.   Do not take other medications without consulting your doctor.       NOTIFY YOUR PHYSICIAN FOR ANY OF THE FOLLOWING:   Fever over 101 degrees for 24 hours.   Chest pain, shortness of breath, fever, chills, nausea, vomiting, diarrhea, change in mentation, falling, weakness, bleeding. Severe pain or pain not relieved by medications.  Or, any other signs or symptoms that you may have questions about.      DISPOSITION:  x  Home With:   OT  PT  HH  RN       SNF/Inpatient Rehab/LTAC    Independent/assisted living    Hospice    Other:     CDMP Checked:   Yes x     PROBLEM LIST Updated:  Yes

## 2024-05-13 NOTE — CONSULTS
Noted consult for RHC per AHF team.    Scheduled for Tues 5/14.  Please keep NPO after midnight.

## 2024-05-14 ENCOUNTER — HOME CARE VISIT (OUTPATIENT)
Dept: HOME HEALTH SERVICES | Facility: HOME HEALTH | Age: 74
End: 2024-05-14
Payer: MEDICARE

## 2024-05-14 LAB
ALBUMIN SERPL-MCNC: 3.1 G/DL (ref 3.5–5)
ALBUMIN/GLOB SERPL: 0.8 (ref 1.1–2.2)
ALP SERPL-CCNC: 85 U/L (ref 45–117)
ALT SERPL-CCNC: ABNORMAL U/L (ref 12–78)
ANION GAP SERPL CALC-SCNC: 5 MMOL/L (ref 5–15)
AST SERPL-CCNC: ABNORMAL U/L (ref 15–37)
BASOPHILS # BLD: 0 K/UL (ref 0–0.1)
BASOPHILS NFR BLD: 1 % (ref 0–1)
BILIRUB DIRECT SERPL-MCNC: 0.1 MG/DL (ref 0–0.2)
BILIRUB SERPL-MCNC: 1.2 MG/DL (ref 0.2–1)
BUN SERPL-MCNC: 38 MG/DL (ref 6–20)
BUN/CREAT SERPL: 16 (ref 12–20)
CALCIUM SERPL-MCNC: 8.3 MG/DL (ref 8.5–10.1)
CHLORIDE SERPL-SCNC: 95 MMOL/L (ref 97–108)
CO2 SERPL-SCNC: 35 MMOL/L (ref 21–32)
CREAT SERPL-MCNC: 2.44 MG/DL (ref 0.7–1.3)
DIFFERENTIAL METHOD BLD: ABNORMAL
EOSINOPHIL # BLD: 0.1 K/UL (ref 0–0.4)
EOSINOPHIL NFR BLD: 2 % (ref 0–7)
ERYTHROCYTE [DISTWIDTH] IN BLOOD BY AUTOMATED COUNT: 19 % (ref 11.5–14.5)
GLOBULIN SER CALC-MCNC: 3.7 G/DL (ref 2–4)
GLUCOSE SERPL-MCNC: 77 MG/DL (ref 65–100)
HCT VFR BLD AUTO: 33.7 % (ref 36.6–50.3)
HGB BLD-MCNC: 11.6 G/DL (ref 12.1–17)
IMM GRANULOCYTES # BLD AUTO: 0 K/UL (ref 0–0.04)
IMM GRANULOCYTES NFR BLD AUTO: 0 % (ref 0–0.5)
LYMPHOCYTES # BLD: 1.8 K/UL (ref 0.8–3.5)
LYMPHOCYTES NFR BLD: 50 % (ref 12–49)
MAGNESIUM SERPL-MCNC: 2.4 MG/DL (ref 1.6–2.4)
MCH RBC QN AUTO: 27.5 PG (ref 26–34)
MCHC RBC AUTO-ENTMCNC: 34.4 G/DL (ref 30–36.5)
MCV RBC AUTO: 79.9 FL (ref 80–99)
MONOCYTES # BLD: 0.4 K/UL (ref 0–1)
MONOCYTES NFR BLD: 10 % (ref 5–13)
NEUTS SEG # BLD: 1.4 K/UL (ref 1.8–8)
NEUTS SEG NFR BLD: 37 % (ref 32–75)
NRBC # BLD: 0 K/UL (ref 0–0.01)
NRBC BLD-RTO: 0 PER 100 WBC
NT PRO BNP: 3853 PG/ML
PLATELET # BLD AUTO: 208 K/UL (ref 150–400)
PMV BLD AUTO: 10.3 FL (ref 8.9–12.9)
POTASSIUM SERPL-SCNC: 3 MMOL/L (ref 3.5–5.1)
POTASSIUM SERPL-SCNC: ABNORMAL MMOL/L (ref 3.5–5.1)
PROT SERPL-MCNC: 6.8 G/DL (ref 6.4–8.2)
RBC # BLD AUTO: 4.22 M/UL (ref 4.1–5.7)
SODIUM SERPL-SCNC: 135 MMOL/L (ref 136–145)
WBC # BLD AUTO: 3.7 K/UL (ref 4.1–11.1)

## 2024-05-14 PROCEDURE — 2500000003 HC RX 250 WO HCPCS: Performed by: INTERNAL MEDICINE

## 2024-05-14 PROCEDURE — 80076 HEPATIC FUNCTION PANEL: CPT

## 2024-05-14 PROCEDURE — 99223 1ST HOSP IP/OBS HIGH 75: CPT | Performed by: INTERNAL MEDICINE

## 2024-05-14 PROCEDURE — 36415 COLL VENOUS BLD VENIPUNCTURE: CPT

## 2024-05-14 PROCEDURE — 4A023N6 MEASUREMENT OF CARDIAC SAMPLING AND PRESSURE, RIGHT HEART, PERCUTANEOUS APPROACH: ICD-10-PCS | Performed by: INTERNAL MEDICINE

## 2024-05-14 PROCEDURE — 93451 RIGHT HEART CATH: CPT | Performed by: INTERNAL MEDICINE

## 2024-05-14 PROCEDURE — 2580000003 HC RX 258: Performed by: FAMILY MEDICINE

## 2024-05-14 PROCEDURE — C1725 CATH, TRANSLUMIN NON-LASER: HCPCS | Performed by: INTERNAL MEDICINE

## 2024-05-14 PROCEDURE — 83735 ASSAY OF MAGNESIUM: CPT

## 2024-05-14 PROCEDURE — B2111ZZ FLUOROSCOPY OF MULTIPLE CORONARY ARTERIES USING LOW OSMOLAR CONTRAST: ICD-10-PCS | Performed by: INTERNAL MEDICINE

## 2024-05-14 PROCEDURE — 6370000000 HC RX 637 (ALT 250 FOR IP): Performed by: FAMILY MEDICINE

## 2024-05-14 PROCEDURE — 80048 BASIC METABOLIC PNL TOTAL CA: CPT

## 2024-05-14 PROCEDURE — 2060000000 HC ICU INTERMEDIATE R&B

## 2024-05-14 PROCEDURE — P9047 ALBUMIN (HUMAN), 25%, 50ML: HCPCS | Performed by: NURSE PRACTITIONER

## 2024-05-14 PROCEDURE — 84132 ASSAY OF SERUM POTASSIUM: CPT

## 2024-05-14 PROCEDURE — 83880 ASSAY OF NATRIURETIC PEPTIDE: CPT

## 2024-05-14 PROCEDURE — C1894 INTRO/SHEATH, NON-LASER: HCPCS | Performed by: INTERNAL MEDICINE

## 2024-05-14 PROCEDURE — 85025 COMPLETE CBC W/AUTO DIFF WBC: CPT

## 2024-05-14 PROCEDURE — 6360000002 HC RX W HCPCS: Performed by: NURSE PRACTITIONER

## 2024-05-14 PROCEDURE — 6360000002 HC RX W HCPCS: Performed by: FAMILY MEDICINE

## 2024-05-14 PROCEDURE — 6370000000 HC RX 637 (ALT 250 FOR IP): Performed by: NURSE PRACTITIONER

## 2024-05-14 PROCEDURE — 99232 SBSQ HOSP IP/OBS MODERATE 35: CPT | Performed by: NURSE PRACTITIONER

## 2024-05-14 PROCEDURE — C1713 ANCHOR/SCREW BN/BN,TIS/BN: HCPCS | Performed by: INTERNAL MEDICINE

## 2024-05-14 PROCEDURE — 76937 US GUIDE VASCULAR ACCESS: CPT | Performed by: INTERNAL MEDICINE

## 2024-05-14 PROCEDURE — 2709999900 HC NON-CHARGEABLE SUPPLY: Performed by: INTERNAL MEDICINE

## 2024-05-14 RX ORDER — ALBUMIN (HUMAN) 12.5 G/50ML
25 SOLUTION INTRAVENOUS ONCE
Status: COMPLETED | OUTPATIENT
Start: 2024-05-14 | End: 2024-05-14

## 2024-05-14 RX ORDER — POTASSIUM CHLORIDE 7.45 MG/ML
10 INJECTION INTRAVENOUS
Status: COMPLETED | OUTPATIENT
Start: 2024-05-14 | End: 2024-05-14

## 2024-05-14 RX ORDER — LIDOCAINE HYDROCHLORIDE 10 MG/ML
INJECTION, SOLUTION INFILTRATION; PERINEURAL PRN
Status: DISCONTINUED | OUTPATIENT
Start: 2024-05-14 | End: 2024-05-14 | Stop reason: HOSPADM

## 2024-05-14 RX ORDER — POTASSIUM CHLORIDE 7.45 MG/ML
10 INJECTION INTRAVENOUS
Status: DISCONTINUED | OUTPATIENT
Start: 2024-05-14 | End: 2024-05-14

## 2024-05-14 RX ADMIN — SODIUM CHLORIDE, PRESERVATIVE FREE 10 ML: 5 INJECTION INTRAVENOUS at 08:30

## 2024-05-14 RX ADMIN — APIXABAN 5 MG: 5 TABLET, FILM COATED ORAL at 08:34

## 2024-05-14 RX ADMIN — POTASSIUM CHLORIDE 10 MEQ: 10 INJECTION, SOLUTION INTRAVENOUS at 08:29

## 2024-05-14 RX ADMIN — POTASSIUM BICARBONATE 40 MEQ: 782 TABLET, EFFERVESCENT ORAL at 08:36

## 2024-05-14 RX ADMIN — Medication 1000 UNITS: at 08:35

## 2024-05-14 RX ADMIN — POTASSIUM CHLORIDE 10 MEQ: 7.46 INJECTION, SOLUTION INTRAVENOUS at 10:35

## 2024-05-14 RX ADMIN — POTASSIUM CHLORIDE 10 MEQ: 7.46 INJECTION, SOLUTION INTRAVENOUS at 11:37

## 2024-05-14 RX ADMIN — BUMETANIDE 3 MG: 0.25 INJECTION INTRAMUSCULAR; INTRAVENOUS at 08:34

## 2024-05-14 RX ADMIN — APIXABAN 5 MG: 5 TABLET, FILM COATED ORAL at 21:05

## 2024-05-14 RX ADMIN — ALLOPURINOL 50 MG: 100 TABLET ORAL at 08:35

## 2024-05-14 RX ADMIN — ACETAZOLAMIDE 125 MG: 250 TABLET ORAL at 08:34

## 2024-05-14 RX ADMIN — BUMETANIDE 3 MG: 0.25 INJECTION INTRAMUSCULAR; INTRAVENOUS at 17:08

## 2024-05-14 RX ADMIN — DOBUTAMINE HYDROCHLORIDE 3 MCG/KG/MIN: 200 INJECTION INTRAVENOUS at 03:10

## 2024-05-14 RX ADMIN — ALBUMIN (HUMAN) 25 G: 0.25 INJECTION, SOLUTION INTRAVENOUS at 19:19

## 2024-05-14 RX ADMIN — SODIUM CHLORIDE, PRESERVATIVE FREE 10 ML: 5 INJECTION INTRAVENOUS at 21:08

## 2024-05-14 RX ADMIN — AMIODARONE HYDROCHLORIDE 100 MG: 200 TABLET ORAL at 08:35

## 2024-05-14 RX ADMIN — POTASSIUM CHLORIDE 10 MEQ: 7.46 INJECTION, SOLUTION INTRAVENOUS at 09:32

## 2024-05-14 RX ADMIN — EMPAGLIFLOZIN 10 MG: 10 TABLET, FILM COATED ORAL at 08:35

## 2024-05-14 RX ADMIN — POTASSIUM BICARBONATE 40 MEQ: 782 TABLET, EFFERVESCENT ORAL at 21:05

## 2024-05-14 RX ADMIN — LEVOTHYROXINE SODIUM 88 MCG: 88 TABLET ORAL at 08:35

## 2024-05-14 RX ADMIN — TAMSULOSIN HYDROCHLORIDE 0.8 MG: 0.4 CAPSULE ORAL at 08:35

## 2024-05-14 ASSESSMENT — ENCOUNTER SYMPTOMS
BLOOD IN STOOL: 0
CHEST TIGHTNESS: 0
COUGH: 0
ABDOMINAL PAIN: 0
ABDOMINAL DISTENTION: 0
SHORTNESS OF BREATH: 0
EYE PAIN: 0
SORE THROAT: 0

## 2024-05-14 NOTE — PROGRESS NOTES
Cardiac Cath Lab Recovery Arrival Note:      Ingrid Kennedy arrived to Cardiac Cath Lab, Recovery Area. Staff introduced to patient. Patient identifiers verified with NAME and DATE OF BIRTH. Procedure verified with patient. Consent forms reviewed and signed by patient or authorized representative and verified. Allergies verified.     Patient and family oriented to department. Patient and family informed of procedure and plan of care.     Questions answered with review. Patient prepped for procedure, per orders from physician, prior to arrival.    Patient on cardiac monitor, non-invasive blood pressure, SPO2 monitor. On room air. Patient is A&Ox 4. Patient reports no complaints.     Patient in stretcher, in low position, with side rails up, call bell within reach, patient instructed to call if assistance as needed.    Patient prep in: [facility] Recovery Area, [place] [facility].

## 2024-05-14 NOTE — PROGRESS NOTES
Physician Progress Note      PATIENT:               CORTEZ MENDIETA  CSN #:                  [id number]  :                       1950  ADMIT DATE:       5/10/2024 11:40 AM  DISCH DATE:  RESPONDING  PROVIDER #:        Klever Dejesus MD          QUERY TEXT:    Patient admitted with Acute on chronic systolic CHF, noted to have Paroxysmal   atrial fibrillation and is maintained on Eliquis. If possible, please document   in progress notes and discharge summary if you are evaluating and/or treating   any of the following:    The medical record reflects the following:  Risk Factors: 73 y.o. Male with CHF, HTN  Clinical Indicators:   PN  \"Paroxysmal atrial fibrillation  -Continue amiodarone, on Eliquis for anticoagulation\"  Treatment: Eliquis 5mg tablet PO BID  Thank you,  Gloria Modi, WILFREDN, RN, CCRN, CRCR  Jabber: [phone]  I am also available via PS.  Options provided:  -- Secondary hypercoagulable state in a patient with atrial fibrillation  -- Other - I will add my own diagnosis  -- Disagree - Not applicable / Not valid  -- Disagree - Clinically unable to determine / Unknown  -- Refer to Clinical Documentation Reviewer    PROVIDER RESPONSE TEXT:    This patient has secondary hypercoagulable state in a patient with atrial   fibrillation.    Query created by: Gloria Modi on 2024 4:23 PM      Electronically signed by:  Klever Dejesus MD 2024 4:59 PM

## 2024-05-14 NOTE — PROGRESS NOTES
Never true     Ran Out of Food in the Last Year: Never true   Transportation Needs: No Transportation Needs (5/11/2024)    PRAPARE - Transportation     Lack of Transportation (Medical): No     Lack of Transportation (Non-Medical): No   Physical Activity: Insufficiently Active (1/24/2024)    Exercise Vital Sign     Days of Exercise per Week: 5 days     Minutes of Exercise per Session: 20 min   Social Connections: Feeling Socially Integrated (2/21/2024)    OASIS : Social Isolation     Frequency of experiencing loneliness or isolation: Never   Housing Stability: Low Risk  (5/11/2024)    Housing Stability Vital Sign     Unable to Pay for Housing in the Last Year: No     Number of Places Lived in the Last Year: 1     Unstable Housing in the Last Year: No       LABORATORY RESULTS:     No results found for: \"TSH\", \"TSH2\", \"TSH3\", \"TSHELE\"    ALLERGY:  No Known Allergies     CURRENT MEDICATIONS:    Current Facility-Administered Medications:     bumetanide (BUMEX) injection 3 mg, 3 mg, IntraVENous, BID, Marly Ku APRN - NP, 3 mg at 05/14/24 0834    DOBUTamine (DOBUTREX) 500 mg in dextrose 5 % 250 mL infusion, 3 mcg/kg/min, IntraVENous, Continuous, Marly Ku APRN - NP, Last Rate: 6.5 mL/hr at 05/14/24 1210, 3 mcg/kg/min at 05/14/24 1210    potassium bicarb-citric acid (EFFER-K) effervescent tablet 40 mEq, 40 mEq, Oral, BID, Marly Ku APRN - NP, 40 mEq at 05/14/24 0836    sodium chloride flush 0.9 % injection 5-40 mL, 5-40 mL, IntraVENous, PRN, Marly Ku APRN - NP    0.9 % sodium chloride infusion, , IntraVENous, PRN, Marly Ku APRN - NP, Last Rate: 20 mL/hr at 05/13/24 0550, 20 mL/hr at 05/13/24 0550    potassium chloride (KLOR-CON) extended release tablet 40 mEq, 40 mEq, Oral, PRN **OR** potassium bicarb-citric acid (EFFER-K) effervescent tablet 40 mEq, 40 mEq, Oral, PRN **OR** potassium chloride 10 mEq/100 mL IVPB (Peripheral Line), 10 mEq, IntraVENous,

## 2024-05-14 NOTE — CONSULTS
Dr. Anton Mar [facility].  [phone]            Interventional cardiology Consult/Progress Note      Requesting/referring provider: Elisa Loyd MD Rosanna L Hastings, APR*     Reason for Consult: Evaluation for right heart catheterization    Assessment/Plan:  1.  Cardiomyopathy with acute on chronic systolic heart failure  2.  Dyspnea of uncertain etiology  3.  Biventricular dysfunction  4.  AMITA on CKD improving    Ingrid Alicia evaluated at the request of advanced heart failure services.  Patient history of cardiomyopathy.  Due to persistent symptoms of dyspnea and fatigue, in the setting of unclear volume status in light of abnormal renal function, it would be reasonable to proceed with right heart catheterization.I discussed the risks/benefits/alternatives of the procedure with the patient. Risks include (but are not limited to) bleeding, infection,stroke,heart attack, need for emergency surgery/pericardiocentesis, need for dialysis or death. The patient understands and agrees to proceed.      Investigations ordered    []    High complexity decision making was performed  []    Patient is at high-risk of decompensation with multiple organ involvement  []    Complex/difficult social determinants of health outcomes  Total of ** minutes were spent on reviewing the records, analyzing investigations and documentation in the chart, on the day of visit including time for examination and time spent with the patient  Investigations personally reviewed and interpreted    HPI: Ingrid Kennedy, a 73 y.o. year-old who is seen for evaluation and management of heart failure.  He has history of longstanding cardiomyopathy with biventricular dysfunction.  He was admitted to the hospital with increasing symptoms of tiredness fatigue and shortness of breath and worsening renal function.    Review of system:Patient reports no PND/Orthpnea/CP. He reports no cough/fever/focal  Results   Component Value Date    CHOL 124 04/15/2024    CHOL 126 01/09/2024    CHOL 176 12/06/2023     Lab Results   Component Value Date    TRIG 50 04/15/2024    TRIG 76 01/09/2024    TRIG 59 12/06/2023     Lab Results   Component Value Date    HDL 60 04/15/2024    HDL 48 01/09/2024    HDL 87 12/06/2023     No components found for: \"LDLCHOLESTEROL\", \"LDLCALC\"  Lab Results   Component Value Date    VLDL 10 04/15/2024    VLDL 15.2 01/09/2024    VLDL 11.8 12/06/2023     Lab Results   Component Value Date    CHOLHDLRATIO 2.1 04/15/2024    CHOLHDLRATIO 2.6 01/09/2024    CHOLHDLRATIO 2.0 12/06/2023     Lab Results   Component Value Date/Time     05/14/2024 03:21 AM    K 3.0 05/14/2024 04:44 AM    CL 95 05/14/2024 03:21 AM    CO2 35 05/14/2024 03:21 AM    BUN 38 05/14/2024 03:21 AM    CREATININE 2.44 05/14/2024 03:21 AM    GLUCOSE 77 05/14/2024 03:21 AM    GLUCOSE 110 03/07/2024 03:05 PM    CALCIUM 8.3 05/14/2024 03:21 AM    LABGLOM 27 05/14/2024 03:21 AM    LABGLOM 29 04/29/2024 11:08 AM    LABGLOM 32 03/07/2024 03:05 PM      Lab Results   Component Value Date    WBC 3.7 (L) 05/14/2024    HGB 11.6 (L) 05/14/2024    HCT 33.7 (L) 05/14/2024    MCV 79.9 (L) 05/14/2024     05/14/2024               Investigations reviewed  02/09/24    ECHO (TTE) LIMITED (PRN CONTRAST/BUBBLE/STRAIN/3D) 02/10/2024  2:33 PM (Final)    Interpretation Summary    Left Ventricle: Severely reduced left ventricular systolic function with a visually estimated EF of 15 - 20%. Left ventricle is severely dilated. Normal wall thickness. Severe global hypokinesis present.    Right Ventricle: Right ventricle is moderately dilated. Lead present in the right ventricle. Reduced systolic function.    Aortic Valve: Mild to moderate regurgitation.    Mitral Valve: Mildly thickened leaflet. Moderate regurgitation with an eccentrically directed jet and may underestimate severity.    Tricuspid Valve: Mildly thickened leaflets. Severe regurgitation.

## 2024-05-14 NOTE — PROGRESS NOTES
Cardiac Cath Lab Procedure Area Arrival Note:    Ingrid Kennedy arrived to Cardiac Cath Lab, Procedure Area. Patient identifiers verified with NAME and DATE OF BIRTH. Procedure verified with patient. Consent forms verified. Allergies verified. Patient informed of procedure and plan of care. Questions answered with review. Patient voiced understanding of procedure and plan of care.    Patient on cardiac monitor, non-invasive blood pressure, SPO2 monitor. IV of ns on pump at 5 ml/hr. Patient status doing well without problems. Patient is A&Ox 4. Patient reports no complaints.     Patient medicated during procedure with orders obtained and verified by Dr. Walton.    Refer to patients Cardiac Cath Lab PROCEDURE REPORT for vital signs, assessment, status, and response during procedure, printed at end of case. Printed report on chart or scanned into chart.

## 2024-05-14 NOTE — PROGRESS NOTES
CATH LAB to RECOVERY ROOM REPORT    Procedure: [facility]    MD: JOVANNA Walton MD    The procedure was diagnostic only.    Verbal Report given to Recovery Nurse on patient being transferred to Cardiac Cath Lab  for routine post-op. Patient stable upon transfer to .  Vitals, mental status, MAR, procedural summary discussed with recovery RN.        Sheaths:  Right internal jugular vein sheath pulled at 1416 pm, secured with a band-aid.

## 2024-05-14 NOTE — PROGRESS NOTES
INVOLVEMENT IN CARE   This patient has a high probability of imminent, clinically significant deterioration, which requires the highest level of preparedness to intervene urgently. I participated in the decision-making and personally managed or directed the management of the following life and organ supporting interventions that required my frequent assessment to treat or prevent imminent deterioration.     I personally spent 45 minutes of critical care time.  This is time spent at this critically ill patient's bedside actively involved in patient care as well as the coordination of care and discussions with the patient's family.  This does not include any procedural time which has been billed separately.             Review of Systems:   Pertinent items are noted in HPI.         Vital Signs:    Last 24hrs VS reviewed since prior progress note. Most recent are:  Vitals:    05/14/24 1402   BP: 115/73   Pulse: 94   Resp: 20   Temp:    SpO2: 93%         Intake/Output Summary (Last 24 hours) at 5/14/2024 1422  Last data filed at 5/14/2024 1419  Gross per 24 hour   Intake 1196.12 ml   Output 2710 ml   Net -1513.88 ml        Physical Examination:     I had a face to face encounter with this patient and independently examined them on 5/14/2024 as outlined below:          General : alert x 3, awake, no acute distress,   HEENT: PEERL, EOMI, moist mucus membrane, TM clear  Neck: supple, no JVD, no meningeal signs  Chest: Clear to auscultation bilaterally   CVS: S1 S2 heard, Capillary refill less than 2 seconds  Abd: soft/ non tender, non distended, BS physiological,   Ext: no clubbing, no cyanosis, no edema, brisk 2+ DP pulses  Neuro/Psych: pleasant mood and affect, CN 2-12 grossly intact, sensory grossly within normal limit, Strength 5/5 in all extremities, DTR 1+ x 4  Skin: warm            Data Review:    Review and/or order of clinical lab test    I have independently reviewed and interpreted patient's lab and all other  diagnostic data    Notes reviewed from all clinical/nonclinical/nursing services involved in patient's clinical care. Care coordination discussions were held with appropriate clinical/nonclinical/ nursing providers based on care coordination needs.     Labs:     Recent Labs     05/13/24 0418 05/14/24 0321   WBC 3.5* 3.7*   HGB 12.3 11.6*   HCT 35.2* 33.7*    208     Recent Labs     05/12/24 0245 05/13/24 0418 05/14/24 0321 05/14/24  0444    140 135*  --    K 3.5 2.8* HEMOLYZED SPECIMEN 3.0*    97 95*  --    CO2 28 35* 35*  --    BUN 37* 38* 38*  --    MG 2.2 2.2 2.4  --      Recent Labs     05/12/24 0245 05/13/24 0418 05/14/24 0321   ALT 19 18 HEMOLYZED SPECIMEN   GLOB 3.2 3.2 3.7     No results for input(s): \"INR\", \"APTT\" in the last 72 hours.    Invalid input(s): \"PTP\"   No results for input(s): \"TIBC\", \"FERR\" in the last 72 hours.    Invalid input(s): \"FE\", \"PSAT\"   No results found for: \"RBCF\"   No results for input(s): \"PH\", \"PCO2\", \"PO2\" in the last 72 hours.  No results for input(s): \"CPK\" in the last 72 hours.    Invalid input(s): \"CPKMB\", \"CKNDX\", \"TROIQ\"  Lab Results   Component Value Date/Time    CHOL 124 04/15/2024 11:46 AM    HDL 60 04/15/2024 11:46 AM    LDL 54 04/15/2024 11:46 AM     No results found for: \"GLUCPOC\"  [unfilled]      Medications Reviewed:     Current Facility-Administered Medications   Medication Dose Route Frequency    lidocaine 1 % injection    PRN    bumetanide (BUMEX) injection 3 mg  3 mg IntraVENous BID    DOBUTamine (DOBUTREX) 500 mg in dextrose 5 % 250 mL infusion  3 mcg/kg/min IntraVENous Continuous    potassium bicarb-citric acid (EFFER-K) effervescent tablet 40 mEq  40 mEq Oral BID    sodium chloride flush 0.9 % injection 5-40 mL  5-40 mL IntraVENous PRN    0.9 % sodium chloride infusion   IntraVENous PRN    potassium chloride (KLOR-CON) extended release tablet 40 mEq  40 mEq Oral PRN    Or    potassium bicarb-citric acid (EFFER-K) effervescent tablet

## 2024-05-14 NOTE — PROGRESS NOTES
1433-TRANSFER - IN Cath Lab RR REPORT:    Verbal report received from Radha on Ingrid Kennedy  being received from the Cardiac Cath Lab for routine progression of care. Report consisted of patient’s Situation, Background, Assessment and Recommendations(SBAR). Procedural summary and MAR reviewed with receiving nurse. Opportunity for questions and clarification was provided. Assessment completed upon patient’s arrival to Cardiac Cath Lab RECOVERY AREA and care assumed.       Cardiac Cath Lab Recovery Arrival Note:    Ingrid Kennedy arrived to [facility] recovery area.  Patient procedure=RHC. Patient on cardiac monitor, non-invasive blood pressure, SPO2 monitor. On RA.  IV  of Dobutamine on pump at 6.5 ml/hr. Patient is A&Ox 3. Patient reports No CP SOB.    PROCEDURE SITE CHECK:    Procedure site: No bleeding and no hematoma, no pain/discomfort reported at procedure site.     No change in patient status. Continue to monitor patient and status.     1450- TRANSFER - OUT REPORT:    Verbal report given to Jorge Luis at bedside on Ingrid Kennedy  being transferred to [facility] for routine progression of patient care       Report consisted of patient's Situation, Background, Assessment and   Recommendations(SBAR).     Information from the following report(s) Nurse Handoff Report, Intake/Output, and Cardiac Rhythm Paced  was reviewed with the receiving nurse.           Lines:   CVC Double Lumen 01/17/24 Right Subclavian (Active)   $Central Line Insertion $ Yes 05/12/24 0820   Central Line Being Utilized Yes 05/14/24 0815   Criteria for Appropriate Use Long term IV/antibiotic administration 05/14/24 0815   Site Assessment Clean, dry & intact 05/14/24 0815   Phlebitis Assessment No symptoms 05/14/24 0815   Infiltration Assessment 0 05/14/24 0815   Proximal Lumen Color/Status Red;Brisk blood return;Flushed;Infusing;Alcohol cap applied;Alcohol cap present 05/14/24 0815   Distal Lumen Color/Status Purple;Brisk blood return;Flushed;Alcohol cap

## 2024-05-14 NOTE — PLAN OF CARE
Problem: Discharge Planning  Goal: Discharge to home or other facility with appropriate resources  5/13/2024 2209 by Salma Paulino RN  Outcome: Progressing  5/13/2024 0935 by Dari Baldwin RN  Outcome: Progressing  Flowsheets (Taken 5/13/2024 0800)  Discharge to home or other facility with appropriate resources: Identify barriers to discharge with patient and caregiver     Problem: Chronic Conditions and Co-morbidities  Goal: Patient's chronic conditions and co-morbidity symptoms are monitored and maintained or improved  5/13/2024 2209 by Salma Paulino RN  Outcome: Progressing  5/13/2024 0935 by Dari Baldwin RN  Outcome: Progressing  Flowsheets (Taken 5/13/2024 0800)  Care Plan - Patient's Chronic Conditions and Co-Morbidity Symptoms are Monitored and Maintained or Improved: Monitor and assess patient's chronic conditions and comorbid symptoms for stability, deterioration, or improvement     Problem: Safety - Adult  Goal: Free from fall injury  5/13/2024 2209 by Salma Paulino RN  Outcome: Progressing  5/13/2024 0935 by Dari Baldwin RN  Outcome: Progressing

## 2024-05-15 ENCOUNTER — TELEPHONE (OUTPATIENT)
Age: 74
End: 2024-05-15

## 2024-05-15 LAB
ALBUMIN SERPL-MCNC: 3.6 G/DL (ref 3.5–5)
ALBUMIN/GLOB SERPL: 1.1 (ref 1.1–2.2)
ALP SERPL-CCNC: 87 U/L (ref 45–117)
ALT SERPL-CCNC: 12 U/L (ref 12–78)
ANION GAP SERPL CALC-SCNC: 6 MMOL/L (ref 5–15)
AST SERPL-CCNC: 11 U/L (ref 15–37)
BACTERIA SPEC CULT: NORMAL
BACTERIA SPEC CULT: NORMAL
BASOPHILS # BLD: 0 K/UL (ref 0–0.1)
BASOPHILS NFR BLD: 1 % (ref 0–1)
BILIRUB DIRECT SERPL-MCNC: 0.4 MG/DL (ref 0–0.2)
BILIRUB SERPL-MCNC: 1.8 MG/DL (ref 0.2–1)
BUN SERPL-MCNC: 37 MG/DL (ref 6–20)
BUN/CREAT SERPL: 16 (ref 12–20)
CALCIUM SERPL-MCNC: 8.9 MG/DL (ref 8.5–10.1)
CHLORIDE SERPL-SCNC: 96 MMOL/L (ref 97–108)
CO2 SERPL-SCNC: 36 MMOL/L (ref 21–32)
CREAT SERPL-MCNC: 2.37 MG/DL (ref 0.7–1.3)
DIFFERENTIAL METHOD BLD: ABNORMAL
EOSINOPHIL # BLD: 0.1 K/UL (ref 0–0.4)
EOSINOPHIL NFR BLD: 3 % (ref 0–7)
ERYTHROCYTE [DISTWIDTH] IN BLOOD BY AUTOMATED COUNT: 18.2 % (ref 11.5–14.5)
GLOBULIN SER CALC-MCNC: 3.3 G/DL (ref 2–4)
GLUCOSE SERPL-MCNC: 85 MG/DL (ref 65–100)
HCT VFR BLD AUTO: 35.1 % (ref 36.6–50.3)
HGB BLD-MCNC: 12 G/DL (ref 12.1–17)
IMM GRANULOCYTES # BLD AUTO: 0 K/UL (ref 0–0.04)
IMM GRANULOCYTES NFR BLD AUTO: 0 % (ref 0–0.5)
LYMPHOCYTES # BLD: 1.5 K/UL (ref 0.8–3.5)
LYMPHOCYTES NFR BLD: 42 % (ref 12–49)
MAGNESIUM SERPL-MCNC: 2.4 MG/DL (ref 1.6–2.4)
MCH RBC QN AUTO: 27.4 PG (ref 26–34)
MCHC RBC AUTO-ENTMCNC: 34.2 G/DL (ref 30–36.5)
MCV RBC AUTO: 80.1 FL (ref 80–99)
MONOCYTES # BLD: 0.3 K/UL (ref 0–1)
MONOCYTES NFR BLD: 9 % (ref 5–13)
NEUTS SEG # BLD: 1.6 K/UL (ref 1.8–8)
NEUTS SEG NFR BLD: 45 % (ref 32–75)
NRBC # BLD: 0 K/UL (ref 0–0.01)
NRBC BLD-RTO: 0 PER 100 WBC
NT PRO BNP: 3160 PG/ML
PLATELET # BLD AUTO: 183 K/UL (ref 150–400)
PMV BLD AUTO: 9.8 FL (ref 8.9–12.9)
POTASSIUM SERPL-SCNC: 3.3 MMOL/L (ref 3.5–5.1)
PROT SERPL-MCNC: 6.9 G/DL (ref 6.4–8.2)
RBC # BLD AUTO: 4.38 M/UL (ref 4.1–5.7)
SERVICE CMNT-IMP: NORMAL
SERVICE CMNT-IMP: NORMAL
SODIUM SERPL-SCNC: 138 MMOL/L (ref 136–145)
WBC # BLD AUTO: 3.5 K/UL (ref 4.1–11.1)

## 2024-05-15 PROCEDURE — 6370000000 HC RX 637 (ALT 250 FOR IP): Performed by: FAMILY MEDICINE

## 2024-05-15 PROCEDURE — 83735 ASSAY OF MAGNESIUM: CPT

## 2024-05-15 PROCEDURE — 80076 HEPATIC FUNCTION PANEL: CPT

## 2024-05-15 PROCEDURE — 6370000000 HC RX 637 (ALT 250 FOR IP): Performed by: NURSE PRACTITIONER

## 2024-05-15 PROCEDURE — 6360000002 HC RX W HCPCS

## 2024-05-15 PROCEDURE — 36415 COLL VENOUS BLD VENIPUNCTURE: CPT

## 2024-05-15 PROCEDURE — 85025 COMPLETE CBC W/AUTO DIFF WBC: CPT

## 2024-05-15 PROCEDURE — 6360000002 HC RX W HCPCS: Performed by: NURSE PRACTITIONER

## 2024-05-15 PROCEDURE — 80048 BASIC METABOLIC PNL TOTAL CA: CPT

## 2024-05-15 PROCEDURE — 2580000003 HC RX 258: Performed by: FAMILY MEDICINE

## 2024-05-15 PROCEDURE — P9047 ALBUMIN (HUMAN), 25%, 50ML: HCPCS

## 2024-05-15 PROCEDURE — 2060000000 HC ICU INTERMEDIATE R&B

## 2024-05-15 PROCEDURE — 83880 ASSAY OF NATRIURETIC PEPTIDE: CPT

## 2024-05-15 PROCEDURE — 99232 SBSQ HOSP IP/OBS MODERATE 35: CPT | Performed by: NURSE PRACTITIONER

## 2024-05-15 RX ORDER — ALBUMIN (HUMAN) 12.5 G/50ML
25 SOLUTION INTRAVENOUS ONCE
Status: COMPLETED | OUTPATIENT
Start: 2024-05-15 | End: 2024-05-15

## 2024-05-15 RX ORDER — METOLAZONE 2.5 MG/1
2.5 TABLET ORAL ONCE
Status: COMPLETED | OUTPATIENT
Start: 2024-05-15 | End: 2024-05-15

## 2024-05-15 RX ORDER — BUMETANIDE 1 MG/1
4 TABLET ORAL 2 TIMES DAILY
Status: DISCONTINUED | OUTPATIENT
Start: 2024-05-15 | End: 2024-05-16 | Stop reason: HOSPADM

## 2024-05-15 RX ADMIN — AMIODARONE HYDROCHLORIDE 100 MG: 200 TABLET ORAL at 08:35

## 2024-05-15 RX ADMIN — ALLOPURINOL 50 MG: 100 TABLET ORAL at 08:35

## 2024-05-15 RX ADMIN — POTASSIUM BICARBONATE 40 MEQ: 782 TABLET, EFFERVESCENT ORAL at 20:30

## 2024-05-15 RX ADMIN — ACETAZOLAMIDE 125 MG: 250 TABLET ORAL at 20:30

## 2024-05-15 RX ADMIN — APIXABAN 5 MG: 5 TABLET, FILM COATED ORAL at 08:36

## 2024-05-15 RX ADMIN — ACETAZOLAMIDE 125 MG: 250 TABLET ORAL at 08:35

## 2024-05-15 RX ADMIN — LEVOTHYROXINE SODIUM 88 MCG: 88 TABLET ORAL at 08:36

## 2024-05-15 RX ADMIN — DOBUTAMINE HYDROCHLORIDE 4 MCG/KG/MIN: 200 INJECTION INTRAVENOUS at 12:57

## 2024-05-15 RX ADMIN — SODIUM CHLORIDE, PRESERVATIVE FREE 10 ML: 5 INJECTION INTRAVENOUS at 20:31

## 2024-05-15 RX ADMIN — SODIUM CHLORIDE, PRESERVATIVE FREE 10 ML: 5 INJECTION INTRAVENOUS at 08:37

## 2024-05-15 RX ADMIN — METOLAZONE 2.5 MG: 2.5 TABLET ORAL at 11:02

## 2024-05-15 RX ADMIN — ALBUMIN (HUMAN) 25 G: 0.25 INJECTION, SOLUTION INTRAVENOUS at 19:35

## 2024-05-15 RX ADMIN — BUMETANIDE 3 MG: 0.25 INJECTION INTRAMUSCULAR; INTRAVENOUS at 08:37

## 2024-05-15 RX ADMIN — POTASSIUM BICARBONATE 40 MEQ: 782 TABLET, EFFERVESCENT ORAL at 08:35

## 2024-05-15 RX ADMIN — EMPAGLIFLOZIN 10 MG: 10 TABLET, FILM COATED ORAL at 08:35

## 2024-05-15 RX ADMIN — BUMETANIDE 4 MG: 1 TABLET ORAL at 18:44

## 2024-05-15 RX ADMIN — Medication 1000 UNITS: at 08:36

## 2024-05-15 RX ADMIN — TAMSULOSIN HYDROCHLORIDE 0.8 MG: 0.4 CAPSULE ORAL at 08:36

## 2024-05-15 RX ADMIN — APIXABAN 5 MG: 5 TABLET, FILM COATED ORAL at 20:29

## 2024-05-15 ASSESSMENT — ENCOUNTER SYMPTOMS
BLOOD IN STOOL: 0
SHORTNESS OF BREATH: 0
ABDOMINAL PAIN: 0
EYE PAIN: 0
SORE THROAT: 0
CHEST TIGHTNESS: 0
ABDOMINAL DISTENTION: 0

## 2024-05-15 NOTE — PROGRESS NOTES
ADVANCED [facility] FAILURE CENTER  [facility] in [place], [place]  Heart Failure Outpatient Clinic Note      Patient name: Ingrid Kennedy  Patient : 1950  Patient MRN: [id number]  Date of service: 05/15/24      ASSESSMENT:  Ingrid Kennedy is a 73 y.o. male with acute on chronic systolic heart failure, stage D, NYHA class IV. GDMT limited by renal dysfunction.  EF 15-20%  Discussed at LVAD multidisciplinary meeting 23. Not currently a candidate for LVAD due to renal dysfunction, Creatinine >1.8, RV dysfunction, and neurocognitive dysfunction   Pt continues to have issues with volume overload, kidney dysfunction and borderline BP at home despite milrinone infusion. Pt directly admitted to hospital on 5/10 to switch from milrinone to dobutamine infusion. Once on stable dose of dobutamine and euvolemic will get RHC.   Patient wants to move back to the [facility].  There is a cardiologist there that can prescribe and monitor inotropes.  Once stable on dobutamine will see if care can be transferred.      INTERVAL HISTORY:  No acute events overnight  HDS  RHC yesterday with elevated filling pressures, low TDCI and normal Shelly CI    RECOMMENDATIONS:  Medical Therapy for Heart Failure  RHC done : mPAP 23, PCWP 22, RA 17, TDCI 1.74, Shelly CI 2.26, Pa sat 57.9%  Increase dobutamine to 4 mcg/kg/min  Beta-blocker: no BB while on dobutamine   ACEi/ARB/ARNI: Unable to tolerate due to renal dysfunction  Hydralazine/Nitrate: Unable to tolerate due to hypotension  MRA: Unable to tolerate due to renal dysfunction  SGLT2i: Continue Jardiance 10 mg daily  Bumex 4 mg PO BID  Continue diamox 125mg BID.   Give 2.5 mg metolazone x 1 dose today  Continue Potassium 40 BID and PRN, Goal K >4, Mg >2  Strict I/Os  Daily standing weights  Allopurinol 50 mg daily  Fluid restriction of 48-64 oz fluid  2 g sodium limit daily    Anticoagulation and Antiplatelet Therapy  Anticoagulation with Apixaban for PAF    Antiarrhythmic and  Oral, Q8H PRN **OR** ondansetron (ZOFRAN) injection 4 mg, 4 mg, IntraVENous, Q6H PRN, Klever Dejesus MD    polyethylene glycol (GLYCOLAX) packet 17 g, 17 g, Oral, Daily PRN, Klever Dejesus MD    acetaminophen (TYLENOL) tablet 650 mg, 650 mg, Oral, Q6H PRN **OR** acetaminophen (TYLENOL) suppository 650 mg, 650 mg, Rectal, Q6H PRN, Klever Dejesus MD    PATIENT CARE TEAM:  Patient Care Team:  Elisa Loyd MD as PCP - General (Internal Medicine)  Elisa Loyd MD as PCP - Empaneled Provider     Thank you for allowing me to participate in this patient's care.    Marly JOEL-NP  [facility] Failure [facility]  [facility]  [address], [address]  Phone: [phone]      I have spent a total time of 35 minutes personally reviewing new vitals, test results, notes, telemetry/EKG, face to face encounter/physical exam of patient, writing orders, performing medical decision making, and patient education.

## 2024-05-15 NOTE — PROGRESS NOTES
1841: Notified GEORGETTE Luke about patient's BP being 93/57 with MAP of 69. Per Marly NP still give bumex dose.     1919: Notified GEORGETTE Kauffman about patient's BP being 92/45 with MAP 60. Per GEORGETTE Kauffman to put in albumin order. Per GEORGETTE Luke don't give more than one bottle and okay as long as systolic stays above 90. GEORGETTE Kauffman updated.

## 2024-05-15 NOTE — TELEPHONE ENCOUNTER
Left voicemail for Khloe to call office to let us know which time she would like for us to reschedule patient's appointment for. 9AM or 10 AM?

## 2024-05-15 NOTE — PROGRESS NOTES
Hospitalist Progress Note  Klever Dejesus MD  Answering service: [phone]        Date of Service:  5/15/2024  NAME:  Ingrid Kennedy  :  1950  MRN:  [id number]      Admission Summary:     Patient admitted with CHF.    Interval history / Subjective:     No acute complaint.     Assessment & Plan:     Congestive heart failure  -Acute on chronic systolic CHF, NYHA class IV on admission, patient with known history of cardiomyopathy with EF of 15 to 20%  -Milrinone drip was switched to dobutamine per AHF team on admission  -Continue diuresis with Bumex, GDMT's to include Jardiance, Toprol, others limited due to low blood pressure as well as renal insufficiency  -Deemed not a candidate for LVAD at current  -S/p right heart cath  shows elevated right and left-sided filling pressures  -Monitor intake and output, monitor daily weight, HF team following     Paroxysmal atrial fibrillation  -Continue amiodarone, on Eliquis for anticoagulation  -Continue monitor on telemetry     Hypertension  -Limited GDMT's due to borderline low blood pressure     CKD stage III  -Renal function appears stable at baseline  -Follow daily renal function     Dyslipidemia  -LDL is controlled, not on statin     Hypothyroidism  -Continue Synthroid     BPH  -Continue Flomax     Obstructive sleep apnea  -CPAP nightly     Code status: Full  Prophylaxis: Eliquis  Care Plan discussed with: Patient  Anticipated Disposition: 24 to 48 hours  Central Line: CVC Double Lumen 24 Right Subclavian-Central Line Being Utilized: Yes     CRITICAL CARE ATTESTATION:  I had a face to face encounter with the patient, reviewed and interpreted patient data including clinical events, labs, images, vital signs, I/O's, and examined patient.  I have discussed the case and the plan and management of the patient's care with the consulting services, the bedside nurses and

## 2024-05-15 NOTE — TELEPHONE ENCOUNTER
Khloe called wanting to know if patient's appointment could be moved to 09:30 AM - 09:45 AM Tue 05/21/2024 so that she could bring patient the same day and time as her and her 's appointments? I explained to her that Dr. Loyd had no availability but she wanted a message sent and the situation explained. She stated that they drive quite a distance, and did not want to have to drive here two days back to back. Patient is scheduled for Wed 05/22/2024 10:30 AM.     [phone]

## 2024-05-15 NOTE — PLAN OF CARE
Problem: Discharge Planning  Goal: Discharge to home or other facility with appropriate resources  Outcome: Progressing     Problem: Chronic Conditions and Co-morbidities  Goal: Patient's chronic conditions and co-morbidity symptoms are monitored and maintained or improved  5/14/2024 2224 by Salma Paulino RN  Outcome: Progressing  5/14/2024 1428 by Jorge Luis Tirado RN  Outcome: Progressing     Problem: Safety - Adult  Goal: Free from fall injury  5/14/2024 2224 by Salma Paulino RN  Outcome: Progressing  5/14/2024 1428 by Jorge Luis Tirado RN  Outcome: Progressing

## 2024-05-15 NOTE — CARE COORDINATION
Transition of Care Plan:    RUR: 19% - moderate  Prior Level of Functioning: independent; chronic inotrope   Disposition: home health and home infusion  Follow up appointments: [facility]  DME needed: none  Transportation at discharge: family  IM/IMM Medicare/[employer]ai letter given: 5/15/24  Caregiver Contact: levy Amin - [phone]   Discharge Caregiver contacted prior to discharge? no  Care Conference needed? no  Barriers to discharge: home infusion set up    CM updated by [facility] NP that patient is clinically stable for discharge. Home dobutamine order updated to reflect correct dose for 4mcg/kg/min. Spoke with [facility] RN and provided update that patient is scheduled for discharge. He is now on dobutamine (switch from previous order of milrinone). Home infusion RN advised that patient will need new programmed pumps and bags will be delivered this afternoon.     Resumption of care for home health with [facility].    1115 - Updated by [facility] NP that patient will discharge tomorrow due to elevated BP. Notified [facility] of same.     CM will continue to follow.    Disposition Plan:  Home Health: [facility]  Home Infusion: [facility]  Transportation: Family    Renny Jesus, MPH  Care Manager l [facility]  Available via [employer]

## 2024-05-16 VITALS
RESPIRATION RATE: 18 BRPM | BODY MASS INDEX: 20.99 KG/M2 | HEART RATE: 84 BPM | HEIGHT: 70 IN | SYSTOLIC BLOOD PRESSURE: 91 MMHG | TEMPERATURE: 97.9 F | WEIGHT: 146.61 LBS | DIASTOLIC BLOOD PRESSURE: 60 MMHG | OXYGEN SATURATION: 98 %

## 2024-05-16 LAB
ALBUMIN SERPL-MCNC: 3.8 G/DL (ref 3.5–5)
ALBUMIN/GLOB SERPL: 1.1 (ref 1.1–2.2)
ALP SERPL-CCNC: 79 U/L (ref 45–117)
ALT SERPL-CCNC: 14 U/L (ref 12–78)
ANION GAP SERPL CALC-SCNC: 5 MMOL/L (ref 5–15)
ANION GAP SERPL CALC-SCNC: 8 MMOL/L (ref 5–15)
AST SERPL-CCNC: 9 U/L (ref 15–37)
BASOPHILS # BLD: 0 K/UL (ref 0–0.1)
BASOPHILS NFR BLD: 1 % (ref 0–1)
BILIRUB DIRECT SERPL-MCNC: 0.3 MG/DL (ref 0–0.2)
BILIRUB SERPL-MCNC: 1.8 MG/DL (ref 0.2–1)
BUN SERPL-MCNC: 42 MG/DL (ref 6–20)
BUN SERPL-MCNC: 45 MG/DL (ref 6–20)
BUN/CREAT SERPL: 16 (ref 12–20)
BUN/CREAT SERPL: 17 (ref 12–20)
CALCIUM SERPL-MCNC: 9.2 MG/DL (ref 8.5–10.1)
CALCIUM SERPL-MCNC: 9.4 MG/DL (ref 8.5–10.1)
CHLORIDE SERPL-SCNC: 92 MMOL/L (ref 97–108)
CHLORIDE SERPL-SCNC: 92 MMOL/L (ref 97–108)
CO2 SERPL-SCNC: 37 MMOL/L (ref 21–32)
CO2 SERPL-SCNC: 38 MMOL/L (ref 21–32)
CREAT SERPL-MCNC: 2.48 MG/DL (ref 0.7–1.3)
CREAT SERPL-MCNC: 2.85 MG/DL (ref 0.7–1.3)
DIFFERENTIAL METHOD BLD: ABNORMAL
EOSINOPHIL # BLD: 0.1 K/UL (ref 0–0.4)
EOSINOPHIL NFR BLD: 3 % (ref 0–7)
ERYTHROCYTE [DISTWIDTH] IN BLOOD BY AUTOMATED COUNT: 17.8 % (ref 11.5–14.5)
GLOBULIN SER CALC-MCNC: 3.4 G/DL (ref 2–4)
GLUCOSE SERPL-MCNC: 112 MG/DL (ref 65–100)
GLUCOSE SERPL-MCNC: 87 MG/DL (ref 65–100)
HCT VFR BLD AUTO: 34.9 % (ref 36.6–50.3)
HGB BLD-MCNC: 11.7 G/DL (ref 12.1–17)
IMM GRANULOCYTES # BLD AUTO: 0 K/UL (ref 0–0.04)
IMM GRANULOCYTES NFR BLD AUTO: 0 % (ref 0–0.5)
LYMPHOCYTES # BLD: 1.9 K/UL (ref 0.8–3.5)
LYMPHOCYTES NFR BLD: 50 % (ref 12–49)
MAGNESIUM SERPL-MCNC: 2.6 MG/DL (ref 1.6–2.4)
MCH RBC QN AUTO: 27 PG (ref 26–34)
MCHC RBC AUTO-ENTMCNC: 33.5 G/DL (ref 30–36.5)
MCV RBC AUTO: 80.4 FL (ref 80–99)
MONOCYTES # BLD: 0.4 K/UL (ref 0–1)
MONOCYTES NFR BLD: 10 % (ref 5–13)
NEUTS SEG # BLD: 1.4 K/UL (ref 1.8–8)
NEUTS SEG NFR BLD: 36 % (ref 32–75)
NRBC # BLD: 0 K/UL (ref 0–0.01)
NRBC BLD-RTO: 0 PER 100 WBC
NT PRO BNP: 2711 PG/ML
PLATELET # BLD AUTO: 182 K/UL (ref 150–400)
PMV BLD AUTO: 10.3 FL (ref 8.9–12.9)
POTASSIUM SERPL-SCNC: 2.6 MMOL/L (ref 3.5–5.1)
POTASSIUM SERPL-SCNC: 3.7 MMOL/L (ref 3.5–5.1)
PROT SERPL-MCNC: 7.2 G/DL (ref 6.4–8.2)
RBC # BLD AUTO: 4.34 M/UL (ref 4.1–5.7)
SODIUM SERPL-SCNC: 135 MMOL/L (ref 136–145)
SODIUM SERPL-SCNC: 137 MMOL/L (ref 136–145)
WBC # BLD AUTO: 3.8 K/UL (ref 4.1–11.1)

## 2024-05-16 PROCEDURE — 80076 HEPATIC FUNCTION PANEL: CPT

## 2024-05-16 PROCEDURE — 6360000002 HC RX W HCPCS: Performed by: NURSE PRACTITIONER

## 2024-05-16 PROCEDURE — 83735 ASSAY OF MAGNESIUM: CPT

## 2024-05-16 PROCEDURE — 99232 SBSQ HOSP IP/OBS MODERATE 35: CPT | Performed by: NURSE PRACTITIONER

## 2024-05-16 PROCEDURE — 83880 ASSAY OF NATRIURETIC PEPTIDE: CPT

## 2024-05-16 PROCEDURE — 6370000000 HC RX 637 (ALT 250 FOR IP): Performed by: FAMILY MEDICINE

## 2024-05-16 PROCEDURE — 36415 COLL VENOUS BLD VENIPUNCTURE: CPT

## 2024-05-16 PROCEDURE — 2580000003 HC RX 258: Performed by: FAMILY MEDICINE

## 2024-05-16 PROCEDURE — 6370000000 HC RX 637 (ALT 250 FOR IP): Performed by: NURSE PRACTITIONER

## 2024-05-16 PROCEDURE — 80048 BASIC METABOLIC PNL TOTAL CA: CPT

## 2024-05-16 PROCEDURE — 85025 COMPLETE CBC W/AUTO DIFF WBC: CPT

## 2024-05-16 RX ORDER — DOBUTAMINE HYDROCHLORIDE 200 MG/100ML
4 INJECTION INTRAVENOUS CONTINUOUS
Qty: 1 ML | Refills: 2 | Status: SHIPPED | OUTPATIENT
Start: 2024-05-16 | End: 2024-05-21

## 2024-05-16 RX ADMIN — POTASSIUM CHLORIDE 10 MEQ: 10 INJECTION, SOLUTION INTRAVENOUS at 14:07

## 2024-05-16 RX ADMIN — APIXABAN 5 MG: 5 TABLET, FILM COATED ORAL at 09:17

## 2024-05-16 RX ADMIN — POTASSIUM CHLORIDE 10 MEQ: 10 INJECTION, SOLUTION INTRAVENOUS at 09:16

## 2024-05-16 RX ADMIN — POTASSIUM CHLORIDE 10 MEQ: 10 INJECTION, SOLUTION INTRAVENOUS at 12:38

## 2024-05-16 RX ADMIN — LEVOTHYROXINE SODIUM 88 MCG: 88 TABLET ORAL at 09:17

## 2024-05-16 RX ADMIN — ALLOPURINOL 50 MG: 100 TABLET ORAL at 09:18

## 2024-05-16 RX ADMIN — POTASSIUM BICARBONATE 40 MEQ: 782 TABLET, EFFERVESCENT ORAL at 09:25

## 2024-05-16 RX ADMIN — EMPAGLIFLOZIN 10 MG: 10 TABLET, FILM COATED ORAL at 09:17

## 2024-05-16 RX ADMIN — POTASSIUM CHLORIDE 10 MEQ: 10 INJECTION, SOLUTION INTRAVENOUS at 08:00

## 2024-05-16 RX ADMIN — AMIODARONE HYDROCHLORIDE 100 MG: 200 TABLET ORAL at 09:17

## 2024-05-16 RX ADMIN — SODIUM CHLORIDE, PRESERVATIVE FREE 10 ML: 5 INJECTION INTRAVENOUS at 09:18

## 2024-05-16 RX ADMIN — POTASSIUM CHLORIDE 10 MEQ: 10 INJECTION, SOLUTION INTRAVENOUS at 15:09

## 2024-05-16 RX ADMIN — POTASSIUM CHLORIDE 10 MEQ: 10 INJECTION, SOLUTION INTRAVENOUS at 11:38

## 2024-05-16 RX ADMIN — Medication 1000 UNITS: at 09:17

## 2024-05-16 RX ADMIN — SODIUM CHLORIDE 25 ML: 9 INJECTION, SOLUTION INTRAVENOUS at 08:00

## 2024-05-16 ASSESSMENT — ENCOUNTER SYMPTOMS
CHEST TIGHTNESS: 0
ABDOMINAL PAIN: 0
EYE PAIN: 0
BLOOD IN STOOL: 0
SHORTNESS OF BREATH: 0
ABDOMINAL DISTENTION: 0
COUGH: 0

## 2024-05-16 NOTE — PROGRESS NOTES
ADVANCED HEART FAILURE CENTER  [facility] in [place], [place]  Heart Failure Outpatient Clinic Note      Patient name: Ingrid Kennedy  Patient : 1950  Patient MRN: [id number]  Date of service: 24      ASSESSMENT:  Ingrid Kennedy is a 73 y.o. male with acute on chronic systolic heart failure, stage D, NYHA class IV. GDMT limited by renal dysfunction.  EF 15-20%  Discussed at LVAD multidisciplinary meeting 23. Not currently a candidate for LVAD due to renal dysfunction, Creatinine >1.8, RV dysfunction, and neurocognitive dysfunction   Pt continues to have issues with volume overload, kidney dysfunction and borderline BP at home despite milrinone infusion. Pt directly admitted to hospital on 5/10 to switch from milrinone to dobutamine infusion. Once on stable dose of dobutamine and euvolemic will get RHC.   Patient wants to move back to the [facility].  There is a cardiologist there that can prescribe and monitor inotropes.  Once stable on dobutamine will see if care can be transferred.      INTERVAL HISTORY:  No acute events overnight  HDS  Labs reviewed, K low, all other labs stable    RECOMMENDATIONS:  Medical Therapy for Heart Failure  RHC done  on dobutamine of 3 mcg/kg/min: mPAP 23, PCWP 22, RA 17, TDCI 1.74, Shelly CI 2.26, Pa sat 57.9%  Continue dobutamine to 4 mcg/kg/min  Beta-blocker: no BB while on dobutamine   ACEi/ARB/ARNI: Unable to tolerate due to renal dysfunction  Hydralazine/Nitrate: Unable to tolerate due to hypotension  MRA: Unable to tolerate due to renal dysfunction  SGLT2i: Continue Jardiance 10 mg daily  Bumex 4 mg PO BID  Continue diamox 125mg BID.   Continue Potassium 40 BID and PRN, Goal K >4, Mg >2  Strict I/Os  Daily standing weights  Allopurinol 50 mg daily  Fluid restriction of 48-64 oz fluid  2 g sodium limit daily    Anticoagulation and Antiplatelet Therapy  Anticoagulation with Apixaban for PAF    Antiarrhythmic and Device Therapy  Continue amiodarone 100   Left lower leg: No edema.   Skin:     General: Skin is warm and dry.   Neurological:      General: No focal deficit present.      Mental Status: He is alert and oriented to person, place, and time.   Psychiatric:         Mood and Affect: Mood normal.         Behavior: Behavior normal.         Thought Content: Thought content normal.         Judgment: Judgment normal.            PAST MEDICAL HISTORY:  Past Medical History:   Diagnosis Date    AICD (automatic cardioverter/defibrillator) present     CHF (congestive heart failure) (HCC)     CKD (chronic kidney disease)     Dilated cardiomyopathy (HCC)     Hypertension     Low left ventricular ejection fraction     GERRI on CPAP     Thyroid disease        PAST SURGICAL HISTORY:  Past Surgical History:   Procedure Laterality Date    CARDIAC PROCEDURE N/A 12/18/2023    Right heart cath performed by Ananda Walton MD at [facility] CARDIAC CATH LAB    CARDIAC PROCEDURE N/A 12/27/2023    Right heart cath performed by Ananda Walton MD at [facility] CARDIAC CATH LAB    CARDIAC PROCEDURE N/A 2/15/2024    Right heart cath performed by Emil Covarrubias MD at [facility] CARDIAC CATH LAB    CARDIAC PROCEDURE N/A 5/14/2024    Right heart cath performed by Ananda Walton MD at [facility] CARDIAC CATH LAB    IR INSERT TUNNELED CV CATH WO PORT < 5YRS  12/26/2023    IR INSERT TUNNELED CV CATH WO PORT < 5YRS 12/26/2023 [facility] RAD ANGIO IR    IR TUNNELED CATHETER PLACEMENT GREATER THAN 5 YEARS  1/17/2024    IR TUNNELED CATHETER PLACEMENT GREATER THAN 5 YEARS 1/17/2024 [facility] RAD ANGIO IR    TOTAL KNEE ARTHROPLASTY         FAMILY HISTORY:  No family history on file.    SOCIAL HISTORY:  Social History     Socioeconomic History    Marital status:    Tobacco Use    Smoking status: Never    Smokeless tobacco: Never   Vaping Use    Vaping Use: Never used   Substance and Sexual Activity    Alcohol use: Never    Drug use: Never    Sexual activity: Not Currently     Partners: Female     Social Determinants of Health

## 2024-05-16 NOTE — PROGRESS NOTES
Hospitalist Progress Note  Ernie Choudhury MD  Answering service: [phone]        Date of Service:  2024  NAME:  Ingrid Kennedy  :  1950  MRN:  [id number]      Admission Summary:     Patient admitted with CHF.    Interval history / Subjective:   Follow up acute on chronic CHF  Feels fine except occasional tremors in arm  No chest pain, SOB     Assessment & Plan:     Congestive heart failure  -Acute on chronic systolic CHF, NYHA class IV on admission, patient with known history of cardiomyopathy with EF of 15 to 20%  -Milrinone drip was switched to dobutamine per AHF team on admission  -Continue diuresis with Bumex, GDMT's to include Jardiance, Toprol, others limited due to low blood pressure as well as renal insufficiency  -Deemed not a candidate for LVAD at current  -S/p right heart cath  shows elevated right and left-sided filling pressures  -Appreciate discussion with Adv heart failure team, ok to discharge with home dobutamine gtt     Paroxysmal atrial fibrillation  -Continue amiodarone, on Eliquis for anticoagulation  -Continue monitor on telemetry     Hypertension  -Limited GDMT's due to borderline low blood pressure     CKD stage III  -Renal function appears stable at baseline  -Follow daily renal function     Dyslipidemia  -LDL is controlled, not on statin     Hypothyroidism  -Continue Synthroid     BPH  -Continue Flomax     Obstructive sleep apnea  -CPAP nightly     Code status: Full  Prophylaxis: Eliquis    Plan: Discharge to home today  Care Plan discussed with: Patient  Anticipated Disposition: today  Central Line: CVC Double Lumen 24 Right Subclavian-Central Line Being Utilized: Yes     CRITICAL CARE ATTESTATION:  I had a face to face encounter with the patient, reviewed and interpreted patient data including clinical events, labs, images, vital signs, I/O's, and examined patient.  I have

## 2024-05-16 NOTE — CARE COORDINATION
Transition of Care Plan:    RUR: 19% - moderate  Prior Level of Functioning: independent; chronic inotrope   Disposition: home health and home infusion  Follow up appointments: [facility]  DME needed: none  Transportation at discharge: family  IM/IMM Medicare/Mai letter given: 5/15/24  Caregiver Contact: daughter Rose Amin - [phone]   Discharge Caregiver contacted prior to discharge? no  Care Conference needed? no  Barriers to discharge: none    Received update from [facility] NP that patient is medically stable for discharge this afternoon on dobutamine.    CM notified [facility] that patient is stable for discharge on dobutamine and home infusion order remains the same as yesterday. [facility] RN confirmed receipt of same and delivery of medication and pumps for this afternoon.    PM Update:  [facility] for 4pm delivery of pumps and medication. Patient's daughter will be arriving around 4:30pm and able to hook up patient to home inotrope.    Disposition Plan:  Home Health: [facility]  Home Infusion: [facility]  Transportation: Family    Renny Jesus, MPH  Care Manager [facility]  Available via [employer] or

## 2024-05-16 NOTE — DISCHARGE SUMMARY
Discharge Summary       PATIENT ID: Ingrid Kennedy  MRN: [id number]   YOB: 1950    DATE OF ADMISSION: 5/10/2024 11:40 AM    DATE OF DISCHARGE: 5/16/2024   PRIMARY CARE PROVIDER: Elisa Loyd MD     ATTENDING PHYSICIAN: Dr Ernie Choudhury  DISCHARGING PROVIDER: Ernie Choudhury MD    To contact this individual call [phone] and ask the  to page.  If unavailable ask to be transferred the Adult Hospitalist Department.    CONSULTATIONS: IP CONSULT TO HEART FAILURE NURSE/COORDINATOR  IP CONSULT TO DIETITIAN  IP CONSULT TO HOSPITALIST  IP CONSULT TO CARDIOLOGY  IP CONSULT HOME HEALTH    PROCEDURES/SURGERIES: Procedure(s):  Right heart cath    ADMITTING DIAGNOSES & HOSPITAL COURSE:   Congestive heart failure  -Acute on chronic systolic CHF, NYHA class IV on admission, patient with known history of cardiomyopathy with EF of 15 to 20%  -Milrinone drip was switched to dobutamine per AHF team on admission  -Continue diuresis with Bumex, GDMT's to include Jardiance, Toprol, others limited due to low blood pressure as well as renal insufficiency  -Deemed not a candidate for LVAD at current  -S/p right heart cath 5/14 shows elevated right and left-sided filling pressures  -Appreciate discussion with Adv heart failure team, ok to discharge with home dobutamine gtt     Paroxysmal atrial fibrillation  -Continue amiodarone, on Eliquis for anticoagulation  -Continue monitor on telemetry     Hypertension  -Limited GDMT's due to borderline low blood pressure     CKD stage III  -Renal function appears stable at baseline  -Follow daily renal function     Dyslipidemia  -LDL is controlled, not on statin     Hypothyroidism  -Continue Synthroid     BPH  -Continue Flomax     Obstructive sleep apnea  -CPAP nightly    PENDING TEST RESULTS:   At the time of discharge the following test results are still pending: none    FOLLOW UP APPOINTMENTS:    PCP  Cardiology    ADDITIONAL CARE RECOMMENDATIONS:   Daily weight, if weight

## 2024-05-16 NOTE — PLAN OF CARE
Problem: Discharge Planning  Goal: Discharge to home or other facility with appropriate resources  5/15/2024 2132 by Michael Kaminski RN  Outcome: Progressing  Flowsheets (Taken 5/15/2024 2000)  Discharge to home or other facility with appropriate resources: Identify barriers to discharge with patient and caregiver  5/15/2024 1012 by Merly Godwin RN  Outcome: Progressing     Problem: Chronic Conditions and Co-morbidities  Goal: Patient's chronic conditions and co-morbidity symptoms are monitored and maintained or improved  5/15/2024 2132 by Michael Kaminski RN  Outcome: Progressing  Flowsheets (Taken 5/15/2024 2000)  Care Plan - Patient's Chronic Conditions and Co-Morbidity Symptoms are Monitored and Maintained or Improved: Monitor and assess patient's chronic conditions and comorbid symptoms for stability, deterioration, or improvement  5/15/2024 1012 by Merly Godwin RN  Outcome: Progressing     Problem: Safety - Adult  Goal: Free from fall injury  5/15/2024 2132 by Michael Kaminski RN  Outcome: Progressing  Flowsheets (Taken 5/15/2024 2000)  Free From Fall Injury: Instruct family/caregiver on patient safety  5/15/2024 1012 by Merly Godwin RN  Outcome: Progressing

## 2024-05-16 NOTE — PROGRESS NOTES
0745  Took critical lab from [facility].  K of 2.6, notified RN and attending provider.  PRN orders for 6 runs IV K already in place.

## 2024-05-17 ENCOUNTER — HOME HEALTH ADMISSION (OUTPATIENT)
Dept: HOME HEALTH SERVICES | Facility: HOME HEALTH | Age: 74
End: 2024-05-17
Payer: COMMERCIAL

## 2024-05-17 ENCOUNTER — HOME CARE VISIT (OUTPATIENT)
Facility: HOME HEALTH | Age: 74
End: 2024-05-17

## 2024-05-17 ENCOUNTER — TELEPHONE (OUTPATIENT)
Facility: HOSPITAL | Age: 74
End: 2024-05-17

## 2024-05-17 ENCOUNTER — TELEPHONE (OUTPATIENT)
Age: 74
End: 2024-05-17

## 2024-05-17 VITALS
TEMPERATURE: 97.3 F | BODY MASS INDEX: 21.11 KG/M2 | HEART RATE: 58 BPM | RESPIRATION RATE: 18 BRPM | OXYGEN SATURATION: 93 % | DIASTOLIC BLOOD PRESSURE: 65 MMHG | WEIGHT: 147.13 LBS | SYSTOLIC BLOOD PRESSURE: 92 MMHG

## 2024-05-17 PROCEDURE — G0299 HHS/HOSPICE OF RN EA 15 MIN: HCPCS

## 2024-05-17 PROCEDURE — 0221000100 HH NO PAY CLAIM PROCEDURE

## 2024-05-17 ASSESSMENT — ENCOUNTER SYMPTOMS: STOOL DESCRIPTION: FORMED

## 2024-05-17 NOTE — TELEPHONE ENCOUNTER
Care Transitions Initial Follow Up Call    Outreach made within 2 business days of discharge: Yes    Patient: Ingrid Kennedy Patient : 1950   MRN: [id number]  Reason for Admission: decompensated heart failure   Discharge Date: 24       Spoke with: Khloe     Discharge department/facility: [facility]    TCM Interactive Patient Contact:  Was patient able to fill all prescriptions: Yes  Was patient instructed to bring all medications to the follow-up visit: Yes  Is patient taking all medications as directed in the discharge summary? Yes  Does patient understand their discharge instructions: Yes  Does patient have questions or concerns that need addressed prior to 7-14 day follow up office visit: no    Scheduled appointment with PCP within 7-14 days    Follow Up  Future Appointments   Date Time Provider Department Center   2024 12:00 PM Nikole Yoon RN [facility] HOME HEAL   2024  9:45 AM Elisa Loyd MD [facility] BS AMB   2024 10:00 AM Marly Ku, APRN - NP [facility] BS [facility]       LUIS MIGUEL CORTES MA

## 2024-05-17 NOTE — HOME HEALTH
Reason for referral, [facility] SUMMARY of clinical condition: CHF admitted to home care for SN. Nursing needed for inotrope managmenet and cardiac teaching, iv dressing changes and labs.     Clinical Assessment/Skilled reason for admission to home health (What this means for the patient overall and need for ongoing skilled care):patient will continue to benefit from skilled nursing services for inotrope managmenet and cardiac teaching, iv dressing changes and labs.     Functional Mobility:  Bed Mobility (rolling/scooting): Supervision or Touching Assistance  Transfers (supine to chair and return to supine): Supervision or Touching Assistance.  Patient uses device: yes  Gait:  Ambulates with supervision using cane  Safety concerns with mobility include: unsteady gait, impaired balance , recent falls, fatigues easily  and cluttered living area, environmental modifications needed for safety   DME: cane    Diagnosis: CHF    Subjective (statement from pt/cg that is relative to why you are there): patient reports low bp this am    Caregiver: relative.  Caregiver assists with: Medications, Meals, Bathing, ADL, IADL, Transportation, Housekeeping and iv. Caregiver unable to assist with: Wound care. Caregiver is available 24 hours/day Caregiver is  present at this visit and did participate with clinician.    Medications reconciled and all medications are available in the home this visit. The following education was provided regarding medications: medication interactions and look alike medications: na.    A list of reconciled medications has been given to the patient/caregiver  and uploaded to media.    High risk med teaching was performed on farxiga, Hypoglycemic medication education side effects such as upset stomach, diarrhea, metallic taste in mouth, headaches, feeling tired or weak, potential complications such as loss or change in vision, lightheadedness/dizziness, confusion, jittery, low blood sugar, nausea, vomiting,

## 2024-05-17 NOTE — TELEPHONE ENCOUNTER
HEART FAILURE NURSE NAVIGATOR POST DISCHARGE FOLLOW UP PHONE CALL     HF NN contacted patient by telephone to perform post hospital discharge follow up call.  Verified patient name and date of birth as identifiers.  Provided introduction to self and role.   HF NN spoke with patient's caregiver.  She states home health nurse is at the home now.    Reviewed discharge instructions; confirmed patient is in receipt of all prescribed HF medications.    Reinforced importance of daily weights and dietary restrictions, following low sodium diet.      Reinforced signs/symptoms of HF and when to notify the physician.    Confirmed knowledge of scheduled follow up appointment: Marly Ku NP ([facility]) 5/23/24 at 9:45 AM.  Originally scheduled for 5/21/24 at 9:00 AM, however caregiver called office to change appointment as it conflicted with a PCP appointment on 5/21/24.     Confirmed patient has transportation to above appointment.    Patient given opportunity to ask questions/express concerns.  All questions answered with good understanding.

## 2024-05-17 NOTE — TELEPHONE ENCOUNTER
Care Transitions Initial Follow Up Call    Outreach made within 2 business days of discharge: Yes    Patient: Ingrid Kennedy Patient : 1950   MRN: [id number]  Reason for Admission: There are no discharge diagnoses documented for the most recent discharge.  Discharge Date: 24       Spoke with: Khloe     Discharge department/facility: [facility]    TCM Interactive Patient Contact:  Was patient able to fill all prescriptions: Yes  Was patient instructed to bring all medications to the follow-up visit: Yes  Is patient taking all medications as directed in the discharge summary? Yes  Does patient understand their discharge instructions: Yes  Does patient have questions or concerns that need addressed prior to 7-14 day follow up office visit: no    Scheduled appointment with PCP within 7-14 days    Follow Up  Future Appointments   Date Time Provider Department Center   2024  9:45 AM Elisa Loyd MD [facility] BS AMB   2024 10:00 AM Marly Ku, APRN - NP [facility] BS AMB       LUIS MIGUEL CORTES MA

## 2024-05-18 ENCOUNTER — PATIENT MESSAGE (OUTPATIENT)
Age: 74
End: 2024-05-18

## 2024-05-20 NOTE — TELEPHONE ENCOUNTER
Called and spoke to Dr Kadeem Trevizo (cardiologist in [place]) transferred call to Marly PALOMINO    Fax-- [phone]     He states IV inotropes would have to be flown in from [place] with [facility] it would take 24-48 hours to receive shipment.    He discussed with daughter Shannan, they determined this was not feasible. Heart failure team will discuss weaning/goals of care at appt 5/23

## 2024-05-21 ENCOUNTER — HOME CARE VISIT (OUTPATIENT)
Dept: HOME HEALTH SERVICES | Facility: HOME HEALTH | Age: 74
End: 2024-05-21

## 2024-05-21 ENCOUNTER — OFFICE VISIT (OUTPATIENT)
Age: 74
End: 2024-05-21

## 2024-05-21 ENCOUNTER — NURSE ONLY (OUTPATIENT)
Age: 74
End: 2024-05-21

## 2024-05-21 VITALS
HEART RATE: 84 BPM | TEMPERATURE: 96.9 F | RESPIRATION RATE: 16 BRPM | HEIGHT: 70 IN | SYSTOLIC BLOOD PRESSURE: 110 MMHG | OXYGEN SATURATION: 97 % | BODY MASS INDEX: 21.53 KG/M2 | WEIGHT: 150.4 LBS | DIASTOLIC BLOOD PRESSURE: 70 MMHG

## 2024-05-21 DIAGNOSIS — Z09 HOSPITAL DISCHARGE FOLLOW-UP: ICD-10-CM

## 2024-05-21 DIAGNOSIS — E03.2 HYPOTHYROIDISM DUE TO MEDICATION: ICD-10-CM

## 2024-05-21 DIAGNOSIS — E83.40 MAGNESIUM DISORDER: ICD-10-CM

## 2024-05-21 DIAGNOSIS — E11.65 TYPE 2 DIABETES MELLITUS WITH HYPERGLYCEMIA, WITHOUT LONG-TERM CURRENT USE OF INSULIN (HCC): ICD-10-CM

## 2024-05-21 DIAGNOSIS — I11.0 HYPERTENSIVE HEART DISEASE WITH HEART FAILURE (HCC): ICD-10-CM

## 2024-05-21 DIAGNOSIS — I50.814 RIGHT HEART FAILURE SECONDARY TO LEFT HEART FAILURE (HCC): ICD-10-CM

## 2024-05-21 DIAGNOSIS — I50.814 RIGHT HEART FAILURE SECONDARY TO LEFT HEART FAILURE (HCC): Primary | ICD-10-CM

## 2024-05-21 ASSESSMENT — PATIENT HEALTH QUESTIONNAIRE - PHQ9
SUM OF ALL RESPONSES TO PHQ9 QUESTIONS 1 & 2: 0
SUM OF ALL RESPONSES TO PHQ QUESTIONS 1-9: 0
2. FEELING DOWN, DEPRESSED OR HOPELESS: NOT AT ALL
SUM OF ALL RESPONSES TO PHQ QUESTIONS 1-9: 0
1. LITTLE INTEREST OR PLEASURE IN DOING THINGS: NOT AT ALL
SUM OF ALL RESPONSES TO PHQ QUESTIONS 1-9: 0
SUM OF ALL RESPONSES TO PHQ QUESTIONS 1-9: 0

## 2024-05-21 NOTE — PROGRESS NOTES
Post-Discharge Transitional Care Follow Up    CC:  Chief Complaint   Patient presents with    Follow-Up from Hospital     Patient states he has been feeling pretty well since being released from hospital   No questions or concerns at this time      Ingrid Kennedy   YOB: 1950    Date of Office Visit:  5/21/2024  Date of Hospital Admission: 5/10/24  Date of Hospital Discharge: 5/16/24  Readmission Risk Score (high >=14%. 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)  -     Comprehensive Metabolic Panel; Future  -     CBC with Auto Differential; Future  -     Brain Natriuretic Peptide; Future  Hospital discharge follow-up  -     MT DISCHARGE MEDS RECONCILED W/ CURRENT OUTPATIENT MED LIST  Hypothyroidism due to medication  -     TSH + Free T4 Panel; Future  Type 2 diabetes mellitus with hyperglycemia, without long-term current use of insulin (HCC)  -     Comprehensive Metabolic Panel; Future  -     CBC with Auto Differential; Future  -     Hemoglobin A1C; Future  Magnesium disorder  -     Magnesium; Future  Hypertensive heart disease with heart failure (HCC)  -     Brain Natriuretic Peptide; Future      Medical Decision Making: high complexity  Return if symptoms worsen or fail to improve.    On this date 5/21/2024 I have spent 30 minutes reviewing previous notes, test results and face to face with the patient discussing the diagnosis and importance of compliance with the treatment plan as well as documenting on the day of the visit.       Subjective:   HPI  73F with chronic congestive heart failure and is monitored by the [facility].   Recent hospitalization done after recent

## 2024-05-21 NOTE — PROGRESS NOTES
Identified pt with two pt identifiers(name and ).    Chief Complaint   Patient presents with    Follow-Up from Hospital     Patient states he has been feeling pretty well since being released from hospital   No questions or concerns at this time         Health Maintenance Due   Topic    COVID-19 Vaccine (1)    Pneumococcal 65+ years Vaccine (1 of 2 - PCV)    Diabetic retinal exam     DTaP/Tdap/Td vaccine (1 - Tdap)    Shingles vaccine (1 of 2)    Respiratory Syncytial Virus (RSV) Pregnant or age 60 yrs+ (1 - 1-dose 60+ series)       Wt Readings from Last 3 Encounters:   24 68.2 kg (150 lb 6.4 oz)   24 66.7 kg (147 lb 2 oz)   24 66.5 kg (146 lb 9.7 oz)     Temp Readings from Last 3 Encounters:   24 96.9 °F (36.1 °C) (Temporal)   24 97.3 °F (36.3 °C)   24 97.9 °F (36.6 °C) (Oral)     BP Readings from Last 3 Encounters:   24 110/70   24 92/65   24 91/60     Pulse Readings from Last 3 Encounters:   24 84   24 58   24 84           Depression Screening:  :         2024     9:59 AM 2024    11:05 AM 3/14/2024     2:19 PM 2024     9:27 AM 2024     9:30 AM 2024     1:18 PM 2024    10:22 AM   PHQ-9 Questionaire   Little interest or pleasure in doing things 0 0 0 0 0 0 0   Feeling down, depressed, or hopeless 0 0 0 0 0 0 0   PHQ-9 Total Score 0 0 0 0 0 0 0        Fall Risk Assessment:  :         2024     9:30 AM 2024    10:18 AM 2024    10:07 AM 2023     2:56 PM 2023     1:25 PM   Fall Risk   2 or more falls in past year? no no no no no   Fall with injury in past year? no yes no yes yes        Abuse Screening:  :          No data to display                 Coordination of Care Questionnaire:  :     \"Have you been to the ER, urgent care clinic since your last visit?  Hospitalized since your last visit?\"    YES - When: approximately 2  weeks ago.  Where and Why: Heart failure .    “Have you seen or

## 2024-05-22 ENCOUNTER — HOME CARE VISIT (OUTPATIENT)
Facility: HOME HEALTH | Age: 74
End: 2024-05-22
Payer: MEDICARE

## 2024-05-22 ENCOUNTER — TELEPHONE (OUTPATIENT)
Age: 74
End: 2024-05-22

## 2024-05-22 LAB
ALBUMIN SERPL-MCNC: 4 G/DL (ref 3.5–5)
ALBUMIN/GLOB SERPL: 1.3 (ref 1.1–2.2)
ALP SERPL-CCNC: 84 U/L (ref 45–117)
ALT SERPL-CCNC: 12 U/L (ref 12–78)
ANION GAP SERPL CALC-SCNC: 5 MMOL/L (ref 5–15)
AST SERPL-CCNC: 16 U/L (ref 15–37)
BASOPHILS # BLD: 0 K/UL (ref 0–0.1)
BASOPHILS NFR BLD: 1 % (ref 0–1)
BILIRUB SERPL-MCNC: 1.2 MG/DL (ref 0.2–1)
BUN SERPL-MCNC: 45 MG/DL (ref 6–20)
BUN/CREAT SERPL: 18 (ref 12–20)
CALCIUM SERPL-MCNC: 9.1 MG/DL (ref 8.5–10.1)
CHLORIDE SERPL-SCNC: 97 MMOL/L (ref 97–108)
CO2 SERPL-SCNC: 39 MMOL/L (ref 21–32)
CREAT SERPL-MCNC: 2.54 MG/DL (ref 0.7–1.3)
DIFFERENTIAL METHOD BLD: ABNORMAL
EOSINOPHIL # BLD: 0.1 K/UL (ref 0–0.4)
EOSINOPHIL NFR BLD: 3 % (ref 0–7)
ERYTHROCYTE [DISTWIDTH] IN BLOOD BY AUTOMATED COUNT: 19 % (ref 11.5–14.5)
EST. AVERAGE GLUCOSE BLD GHB EST-MCNC: 137 MG/DL
GLOBULIN SER CALC-MCNC: 3.2 G/DL (ref 2–4)
GLUCOSE SERPL-MCNC: 88 MG/DL (ref 65–100)
HBA1C MFR BLD: 6.4 % (ref 4–5.6)
HCT VFR BLD AUTO: 37.2 % (ref 36.6–50.3)
HGB BLD-MCNC: 12 G/DL (ref 12.1–17)
IMM GRANULOCYTES # BLD AUTO: 0 K/UL (ref 0–0.04)
IMM GRANULOCYTES NFR BLD AUTO: 0 % (ref 0–0.5)
LYMPHOCYTES # BLD: 1.7 K/UL (ref 0.8–3.5)
LYMPHOCYTES NFR BLD: 46 % (ref 12–49)
MAGNESIUM SERPL-MCNC: 2.7 MG/DL (ref 1.6–2.4)
MCH RBC QN AUTO: 27.4 PG (ref 26–34)
MCHC RBC AUTO-ENTMCNC: 32.3 G/DL (ref 30–36.5)
MCV RBC AUTO: 84.9 FL (ref 80–99)
MONOCYTES # BLD: 0.4 K/UL (ref 0–1)
MONOCYTES NFR BLD: 11 % (ref 5–13)
NEUTS SEG # BLD: 1.4 K/UL (ref 1.8–8)
NEUTS SEG NFR BLD: 39 % (ref 32–75)
NRBC # BLD: 0 K/UL (ref 0–0.01)
NRBC BLD-RTO: 0 PER 100 WBC
NT PRO BNP: 5239 PG/ML
PLATELET # BLD AUTO: 199 K/UL (ref 150–400)
PMV BLD AUTO: 11.3 FL (ref 8.9–12.9)
POTASSIUM SERPL-SCNC: 3.6 MMOL/L (ref 3.5–5.1)
PROT SERPL-MCNC: 7.2 G/DL (ref 6.4–8.2)
RBC # BLD AUTO: 4.38 M/UL (ref 4.1–5.7)
SODIUM SERPL-SCNC: 141 MMOL/L (ref 136–145)
T4 FREE SERPL-MCNC: 1.4 NG/DL (ref 0.8–1.5)
TSH SERPL DL<=0.05 MIU/L-ACNC: 2.39 UIU/ML (ref 0.36–3.74)
WBC # BLD AUTO: 3.7 K/UL (ref 4.1–11.1)

## 2024-05-22 PROCEDURE — G0299 HHS/HOSPICE OF RN EA 15 MIN: HCPCS

## 2024-05-22 NOTE — TELEPHONE ENCOUNTER
----- Message from Elisa Loyd MD sent at 5/22/2024  6:38 AM EDT -----  Please let patient know that his thyroid levels are now in the normal range. First time since he started being seen. Continue with current dose of medication. Follow up as we discussed.   Thanks!

## 2024-05-23 ENCOUNTER — TELEPHONE (OUTPATIENT)
Age: 74
End: 2024-05-23

## 2024-05-23 ENCOUNTER — OFFICE VISIT (OUTPATIENT)
Age: 74
End: 2024-05-23
Payer: MEDICARE

## 2024-05-23 VITALS
RESPIRATION RATE: 18 BRPM | WEIGHT: 153 LBS | BODY MASS INDEX: 21.9 KG/M2 | HEART RATE: 77 BPM | DIASTOLIC BLOOD PRESSURE: 70 MMHG | HEIGHT: 70 IN | OXYGEN SATURATION: 97 % | SYSTOLIC BLOOD PRESSURE: 94 MMHG | TEMPERATURE: 97.2 F

## 2024-05-23 DIAGNOSIS — I50.22 CHRONIC SYSTOLIC HEART FAILURE (HCC): Primary | ICD-10-CM

## 2024-05-23 DIAGNOSIS — Z79.899 RECEIVING INOTROPIC MEDICATION: ICD-10-CM

## 2024-05-23 DIAGNOSIS — I49.9 IRREGULAR HEART BEAT: ICD-10-CM

## 2024-05-23 PROCEDURE — G8420 CALC BMI NORM PARAMETERS: HCPCS | Performed by: NURSE PRACTITIONER

## 2024-05-23 PROCEDURE — 1036F TOBACCO NON-USER: CPT | Performed by: NURSE PRACTITIONER

## 2024-05-23 PROCEDURE — 99215 OFFICE O/P EST HI 40 MIN: CPT | Performed by: NURSE PRACTITIONER

## 2024-05-23 PROCEDURE — G8427 DOCREV CUR MEDS BY ELIG CLIN: HCPCS | Performed by: NURSE PRACTITIONER

## 2024-05-23 PROCEDURE — 1123F ACP DISCUSS/DSCN MKR DOCD: CPT | Performed by: NURSE PRACTITIONER

## 2024-05-23 PROCEDURE — 3017F COLORECTAL CA SCREEN DOC REV: CPT | Performed by: NURSE PRACTITIONER

## 2024-05-23 PROCEDURE — 3074F SYST BP LT 130 MM HG: CPT | Performed by: NURSE PRACTITIONER

## 2024-05-23 PROCEDURE — 1111F DSCHRG MED/CURRENT MED MERGE: CPT | Performed by: NURSE PRACTITIONER

## 2024-05-23 PROCEDURE — 3078F DIAST BP <80 MM HG: CPT | Performed by: NURSE PRACTITIONER

## 2024-05-23 RX ORDER — METOLAZONE 2.5 MG/1
2.5 TABLET ORAL PRN
Qty: 10 TABLET | Refills: 0 | Status: SHIPPED | OUTPATIENT
Start: 2024-05-23

## 2024-05-23 RX ORDER — BUMETANIDE 2 MG/1
4 TABLET ORAL 2 TIMES DAILY
Qty: 120 TABLET | Refills: 2 | Status: SHIPPED | OUTPATIENT
Start: 2024-05-23

## 2024-05-23 RX ORDER — ALLOPURINOL 100 MG/1
50 TABLET ORAL DAILY
Qty: 15 TABLET | Refills: 2 | Status: SHIPPED | OUTPATIENT
Start: 2024-05-23

## 2024-05-23 RX ORDER — MAGNESIUM OXIDE 400 MG/1
400 TABLET ORAL DAILY
Qty: 30 TABLET | Refills: 2 | Status: SHIPPED | OUTPATIENT
Start: 2024-05-23

## 2024-05-23 RX ORDER — ACETAZOLAMIDE 250 MG/1
125 TABLET ORAL 2 TIMES DAILY
Qty: 30 TABLET | Refills: 2 | Status: SHIPPED | OUTPATIENT
Start: 2024-05-23

## 2024-05-23 RX ORDER — AMIODARONE HYDROCHLORIDE 200 MG/1
100 TABLET ORAL DAILY
Qty: 15 TABLET | Refills: 2 | Status: SHIPPED | OUTPATIENT
Start: 2024-05-23

## 2024-05-23 RX ORDER — DAPAGLIFLOZIN 10 MG/1
10 TABLET, FILM COATED ORAL
Qty: 30 TABLET | Refills: 2 | Status: SHIPPED | OUTPATIENT
Start: 2024-05-23

## 2024-05-23 ASSESSMENT — PATIENT HEALTH QUESTIONNAIRE - PHQ9
2. FEELING DOWN, DEPRESSED OR HOPELESS: NOT AT ALL
SUM OF ALL RESPONSES TO PHQ QUESTIONS 1-9: 0
SUM OF ALL RESPONSES TO PHQ9 QUESTIONS 1 & 2: 0
SUM OF ALL RESPONSES TO PHQ QUESTIONS 1-9: 0
1. LITTLE INTEREST OR PLEASURE IN DOING THINGS: NOT AT ALL
SUM OF ALL RESPONSES TO PHQ QUESTIONS 1-9: 0
SUM OF ALL RESPONSES TO PHQ QUESTIONS 1-9: 0

## 2024-05-23 ASSESSMENT — ENCOUNTER SYMPTOMS
CHEST TIGHTNESS: 0
EYE PAIN: 0
BLOOD IN STOOL: 0
ABDOMINAL DISTENTION: 0
SHORTNESS OF BREATH: 0
ABDOMINAL PAIN: 0
COUGH: 0
SORE THROAT: 0

## 2024-05-23 NOTE — PROGRESS NOTES
ventricular ejection fraction     GERRI on CPAP     Thyroid disease        PAST SURGICAL HISTORY:  Past Surgical History:   Procedure Laterality Date    CARDIAC PROCEDURE N/A 12/18/2023    Right heart cath performed by Ananda Walton MD at [facility] CARDIAC CATH LAB    CARDIAC PROCEDURE N/A 12/27/2023    Right heart cath performed by Ananda Walton MD at [facility] CARDIAC CATH LAB    CARDIAC PROCEDURE N/A 02/15/2024    Right heart cath performed by Emil Covarrubias MD at [facility] CARDIAC CATH LAB    CARDIAC PROCEDURE N/A 05/14/2024    Right heart cath performed by Ananda Walton MD at [facility] CARDIAC CATH LAB    CARDIAC SURGERY  Year 2023    IR INSERT TUNNELED CV CATH WO PORT < 5YRS  12/26/2023    IR INSERT TUNNELED CV CATH WO PORT < 5YRS 12/26/2023 [facility] RAD ANGIO IR    IR TUNNELED CATHETER PLACEMENT GREATER THAN 5 YEARS  01/17/2024    IR TUNNELED CATHETER PLACEMENT GREATER THAN 5 YEARS 1/17/2024 [facility] RAD ANGIO IR    KNEE ARTHROPLASTY  Year 2023    TOTAL KNEE ARTHROPLASTY         FAMILY HISTORY:  Family History   Problem Relation Age of Onset    High Blood Pressure Mother     Breast Cancer Sister     Cancer Sister        SOCIAL HISTORY:  Social History     Socioeconomic History    Marital status:      Spouse name: None    Number of children: None    Years of education: None    Highest education level: None   Tobacco Use    Smoking status: Never    Smokeless tobacco: Never   Vaping Use    Vaping Use: Never used   Substance and Sexual Activity    Alcohol use: Never    Drug use: Never    Sexual activity: Not Currently     Partners: Female     Social Determinants of Health     Financial Resource Strain: Low Risk  (12/6/2023)    Overall Financial Resource Strain (CARDIA)     Difficulty of Paying Living Expenses: Not hard at all   Food Insecurity: No Food Insecurity (5/11/2024)    Hunger Vital Sign     Worried About Running Out of Food in the Last Year: Never true     Ran Out of Food in the Last Year: Never true   Transportation Needs:

## 2024-05-23 NOTE — PATIENT INSTRUCTIONS
Medication changes:    Take metolazone tomorrow with extra 20meq potassium    Please take this to your pharmacy to notify them of the change in medications.     Testing Ordered:    Labs to continue with Home health    Other Recommendations:      Please record blood pressure and heart rate daily before medication and two hours after medication, please record weight daily upon waking/after using the bathroom. Keep a written records of your weights and blood pressure and bring to your next appointment. If you have a weight gain of 3 or more pounds overnight OR 5 or more pounds in one week, new/worsened shortness of breath or swelling, or if your blood pressure begins to consistently run below 90/60 and/or you begin to experience dizziness or lightheadedness please contact our office at [phone] option 2.    Ensure your drinking an adequate amount of water with a goal of 6-8 eight ounce glasses (1.5-2 liters) of fluid daily. Your urine should be clear and light yellow straw colored.     Follow up with Dr Major June 17th with [facility]    Thank you for allowing us the privilege of being a part of your healthcare team! Please do not hesitate to contact our office at [phone] option 2 with any questions or concerns.     We are restarting a monthly heart failure support group, this will be the last Wednesday of every month from 5-6pm at [facility]. If you would like to attend you will need to RSVP to [email or web address]

## 2024-05-23 NOTE — TELEPHONE ENCOUNTER
----- Message from Elisa Loyd MD sent at 5/23/2024  1:08 AM EDT -----  Please let patient know that his labs are stable. His thyroid is better. HBA1C is in acceptable range. His BNP is slightly elevated. He is seeing NP tomorrow and I have cc'd a copy of these labs to her. They may adjust his medications further. Otherwise, no other major worrisome changes. Thanks!

## 2024-05-28 ENCOUNTER — HOME CARE VISIT (OUTPATIENT)
Facility: HOME HEALTH | Age: 74
End: 2024-05-28
Payer: MEDICARE

## 2024-05-28 ENCOUNTER — TELEPHONE (OUTPATIENT)
Age: 74
End: 2024-05-28

## 2024-05-28 ENCOUNTER — HOSPITAL ENCOUNTER (OUTPATIENT)
Facility: HOSPITAL | Age: 74
Setting detail: SPECIMEN
Discharge: HOME | End: 2024-05-31

## 2024-05-28 VITALS
RESPIRATION RATE: 20 BRPM | TEMPERATURE: 98.2 F | OXYGEN SATURATION: 97 % | HEART RATE: 86 BPM | WEIGHT: 148.4 LBS | DIASTOLIC BLOOD PRESSURE: 62 MMHG | BODY MASS INDEX: 21.29 KG/M2 | SYSTOLIC BLOOD PRESSURE: 84 MMHG

## 2024-05-28 VITALS
TEMPERATURE: 100 F | HEART RATE: 68 BPM | SYSTOLIC BLOOD PRESSURE: 110 MMHG | RESPIRATION RATE: 20 BRPM | OXYGEN SATURATION: 95 % | DIASTOLIC BLOOD PRESSURE: 60 MMHG

## 2024-05-28 LAB
ALBUMIN SERPL-MCNC: 3.7 G/DL (ref 3.5–5)
ALBUMIN/GLOB SERPL: 1.1 (ref 1.1–2.2)
ALP SERPL-CCNC: 90 U/L (ref 45–117)
ALT SERPL-CCNC: 13 U/L (ref 12–78)
ANION GAP SERPL CALC-SCNC: 5 MMOL/L (ref 5–15)
AST SERPL-CCNC: 15 U/L (ref 15–37)
BASOPHILS # BLD: 0 K/UL (ref 0–0.1)
BASOPHILS NFR BLD: 1 % (ref 0–1)
BILIRUB SERPL-MCNC: 1 MG/DL (ref 0.2–1)
BUN SERPL-MCNC: 40 MG/DL (ref 6–20)
BUN/CREAT SERPL: 14 (ref 12–20)
CALCIUM SERPL-MCNC: 8.9 MG/DL (ref 8.5–10.1)
CHLORIDE SERPL-SCNC: 98 MMOL/L (ref 97–108)
CO2 SERPL-SCNC: 36 MMOL/L (ref 21–32)
CREAT SERPL-MCNC: 2.76 MG/DL (ref 0.7–1.3)
DIFFERENTIAL METHOD BLD: ABNORMAL
EOSINOPHIL # BLD: 0.1 K/UL (ref 0–0.4)
EOSINOPHIL NFR BLD: 3 % (ref 0–7)
ERYTHROCYTE [DISTWIDTH] IN BLOOD BY AUTOMATED COUNT: 18.5 % (ref 11.5–14.5)
GLOBULIN SER CALC-MCNC: 3.4 G/DL (ref 2–4)
GLUCOSE SERPL-MCNC: 153 MG/DL (ref 65–100)
HCT VFR BLD AUTO: 35.4 % (ref 36.6–50.3)
HGB BLD-MCNC: 11.5 G/DL (ref 12.1–17)
IMM GRANULOCYTES # BLD AUTO: 0 K/UL (ref 0–0.04)
IMM GRANULOCYTES NFR BLD AUTO: 0 % (ref 0–0.5)
LYMPHOCYTES # BLD: 1.6 K/UL (ref 0.8–3.5)
LYMPHOCYTES NFR BLD: 47 % (ref 12–49)
MAGNESIUM SERPL-MCNC: 2.6 MG/DL (ref 1.6–2.4)
MCH RBC QN AUTO: 27.2 PG (ref 26–34)
MCHC RBC AUTO-ENTMCNC: 32.5 G/DL (ref 30–36.5)
MCV RBC AUTO: 83.7 FL (ref 80–99)
MONOCYTES # BLD: 0.3 K/UL (ref 0–1)
MONOCYTES NFR BLD: 10 % (ref 5–13)
NEUTS SEG # BLD: 1.4 K/UL (ref 1.8–8)
NEUTS SEG NFR BLD: 39 % (ref 32–75)
NRBC # BLD: 0 K/UL (ref 0–0.01)
NRBC BLD-RTO: 0 PER 100 WBC
NT PRO BNP: 5014 PG/ML
PLATELET # BLD AUTO: 208 K/UL (ref 150–400)
PMV BLD AUTO: 10.2 FL (ref 8.9–12.9)
POTASSIUM SERPL-SCNC: 3.5 MMOL/L (ref 3.5–5.1)
PROT SERPL-MCNC: 7.1 G/DL (ref 6.4–8.2)
RBC # BLD AUTO: 4.23 M/UL (ref 4.1–5.7)
SODIUM SERPL-SCNC: 139 MMOL/L (ref 136–145)
WBC # BLD AUTO: 3.5 K/UL (ref 4.1–11.1)

## 2024-05-28 PROCEDURE — G0299 HHS/HOSPICE OF RN EA 15 MIN: HCPCS

## 2024-05-28 NOTE — TELEPHONE ENCOUNTER
0933: Call received from Xenia with [facility] who reported patient BP low today. She reported BP on the Left arm 76/64 with HR or 66 and right arm 96/59 with HR on 86. She stated patient did confirm taking bumex 4mg and diamox 125mg this morning. She stated caregiver prepares patient medications, but is not available. Patient is unsure if he took flomax, but stated he takes farxiga in the evening. She confirmed patient dobutamine infusing. Xenia reported weight 148.4lbs this morning which is baseline for patient. Repeat BP while on the call was 82/60 on the left and 84/62 on the right. Patient denied any dizziness, swelling, shortness of breath and stated he feels okay. Per home health patient does not wish to present to ER for further evaluation. Reviewed with ANN MARIE Whittington NP who recommended VORB patient hold second doses of bumex and diamox today, increase oral fluid intake and present to ER with any new onset symptoms of dizziness, lightheadedness, headache, or any other symptoms. Xenia notified. She informed patient. They verbalized understanding and had no further questions.    1030: Return call received from Xenia who reported second lumen of PICC will flush, but no blood return. She reported family has been doing heparin flushes without issue, and did notice a small amount of resistance, but was still able to flush. She stated dobutamine infusing through other lumen without issue, and labs drawn via peripheral stick today. Informed her will notify provider and contact patient with further recommendations. She verbalized understanding and had no further questions.     ROD JAIN RN

## 2024-05-28 NOTE — HOME HEALTH
Subjective: I didn't know my blood pressure was so low  Falls since last visit No(if yes complete the Fall Tracking Form and include bsrifallreport):   Caregiver involvement changes: NO  Home health supplies by type and quantity ordered/delivered this visit include: N/A    Clinician asked if patient has had any physician contact since last home care visit and patient states: YES  Clinician asked if patient has any new or changed medications and patient states:  NO   If Yes, were medications reconciled? NO   Was the certifying physician notified of changes in medications? NO     Clinical assessment (what this visit means for the patient overall and need for ongoing skilled care) and progress or lack of progress towards SPECIFIC goals: Pt with asymptomatic hypotension during visit. SBP ranging between 70 to 80's. Pt denies any SOB, lightheadedness or faint feeling. Pt endorses taking all of his morning medications. SN was going to call 911 but pt states that he didnt want to go to the hospital. SN called [facility] and spoke with nurse Nevarez and voiced concerns regarding above.  SN recieved advisement that was shared with pt during phone call. Pt is not to take afternoon dose of bumex and diamox. Pt is to drink plently of fluids today.  If pt develops any dizziness or lightheadedness to call 911 immediately. Pt to resume with scheduled dose of bumex and diamox tomorrow.  Caregiver was called and inform as well.      Pt with CVC complications. Pt has double lumen cath to right chest. One is infusing dobutamine and the other is is for labs, etc.  Red lumen is not positive for blood return. line flushes well but SN was able to pull back any blood. Line flushed again and heparin locked. Today's labs were taking from pt's lower extremity.  called [facility] and spoke with Geri. Geri states that she will set up for the pt to have alteplase and give a pt a call regarding the above. CG called and informed of this as well.     Written

## 2024-05-28 NOTE — HOME HEALTH
Subjective: I'm feeling good  Falls since last visit No(if yes complete the Fall Tracking Form and include bsrifallreport):   Caregiver involvement changes: NO  Home health supplies by type and quantity ordered/delivered this visit include: No    Clinician asked if patient has had any physician contact since last home care visit and patient states: NO  Clinician asked if patient has any new or changed medications and patient states:  NO   If Yes, were medications reconciled? NO   Was the certifying physician notified of changes in medications? NO     Clinical assessment (what this visit means for the patient overall and need for ongoing skilled care) and progress or lack of progress towards SPECIFIC goals: Pt with CHF, inotrope recently changed to dobutamine. Pt is tolerating medication well. Vitals and weight was wnl. Pt continues to need SN for assessment, weekly labs and CVC dressing changes.     Written Teaching Material Utilized: N/A    Interdisciplinary communication with: N/A     Discharge planning as follows: Is no longer homebound, Per physician order and When goals are met    Specific plan for next visit: assessment and education

## 2024-05-29 ENCOUNTER — PATIENT MESSAGE (OUTPATIENT)
Age: 74
End: 2024-05-29

## 2024-05-29 RX ORDER — MIDODRINE HYDROCHLORIDE 2.5 MG/1
TABLET ORAL
Qty: 90 TABLET | Refills: 3 | Status: SHIPPED | OUTPATIENT
Start: 2024-05-29

## 2024-05-30 NOTE — TELEPHONE ENCOUNTER
Order transcribed for alteplase at [facility] pending provider signature, then to be faxed to [phone]

## 2024-05-30 NOTE — TELEPHONE ENCOUNTER
Report received from caregiver of Ingrid Kennedy that a lumen of his PICC line does not give blood return and is sluggish on flush.    Contacted [facility] and spoke with pharmacist who confirmed that Mr. Kennedy' insurance will cover Cathflo infusion, however, availability for a [facility] nurse to administer the med is uncertain. Recommendation that Mr. Kennedy have the med administered and an infusion center.

## 2024-05-31 PROBLEM — T82.868A THROMBUS OF VENOUS DIALYSIS CATHETER (HCC): Status: ACTIVE | Noted: 2024-05-31

## 2024-06-03 ENCOUNTER — TELEPHONE (OUTPATIENT)
Age: 74
End: 2024-06-03

## 2024-06-03 ENCOUNTER — HOSPITAL ENCOUNTER (OUTPATIENT)
Facility: HOSPITAL | Age: 74
Setting detail: SPECIMEN
Discharge: HOME | End: 2024-06-06

## 2024-06-03 ENCOUNTER — HOME CARE VISIT (OUTPATIENT)
Facility: HOME HEALTH | Age: 74
End: 2024-06-03
Payer: MEDICARE

## 2024-06-03 LAB
ALBUMIN SERPL-MCNC: 3.4 G/DL (ref 3.5–5)
ALBUMIN/GLOB SERPL: 1 (ref 1.1–2.2)
ALP SERPL-CCNC: 105 U/L (ref 45–117)
ALT SERPL-CCNC: 24 U/L (ref 12–78)
ANION GAP SERPL CALC-SCNC: 8 MMOL/L (ref 5–15)
AST SERPL-CCNC: 23 U/L (ref 15–37)
BASOPHILS # BLD: 0 K/UL (ref 0–0.1)
BASOPHILS NFR BLD: 0 % (ref 0–1)
BILIRUB SERPL-MCNC: 1.7 MG/DL (ref 0.2–1)
BUN SERPL-MCNC: 51 MG/DL (ref 6–20)
BUN/CREAT SERPL: 22 (ref 12–20)
CALCIUM SERPL-MCNC: 8.7 MG/DL (ref 8.5–10.1)
CHLORIDE SERPL-SCNC: 98 MMOL/L (ref 97–108)
CO2 SERPL-SCNC: 29 MMOL/L (ref 21–32)
CREAT SERPL-MCNC: 2.37 MG/DL (ref 0.7–1.3)
DIFFERENTIAL METHOD BLD: ABNORMAL
EOSINOPHIL # BLD: 0 K/UL (ref 0–0.4)
EOSINOPHIL NFR BLD: 1 % (ref 0–7)
ERYTHROCYTE [DISTWIDTH] IN BLOOD BY AUTOMATED COUNT: 18.3 % (ref 11.5–14.5)
GLOBULIN SER CALC-MCNC: 3.5 G/DL (ref 2–4)
GLUCOSE SERPL-MCNC: 127 MG/DL (ref 65–100)
HCT VFR BLD AUTO: 32.3 % (ref 36.6–50.3)
HGB BLD-MCNC: 10.8 G/DL (ref 12.1–17)
IMM GRANULOCYTES # BLD AUTO: 0 K/UL (ref 0–0.04)
IMM GRANULOCYTES NFR BLD AUTO: 0 % (ref 0–0.5)
LYMPHOCYTES # BLD: 1.2 K/UL (ref 0.8–3.5)
LYMPHOCYTES NFR BLD: 22 % (ref 12–49)
MAGNESIUM SERPL-MCNC: 2.2 MG/DL (ref 1.6–2.4)
MCH RBC QN AUTO: 27.1 PG (ref 26–34)
MCHC RBC AUTO-ENTMCNC: 33.4 G/DL (ref 30–36.5)
MCV RBC AUTO: 81.2 FL (ref 80–99)
MONOCYTES # BLD: 0.7 K/UL (ref 0–1)
MONOCYTES NFR BLD: 12 % (ref 5–13)
NEUTS SEG # BLD: 3.7 K/UL (ref 1.8–8)
NEUTS SEG NFR BLD: 65 % (ref 32–75)
NRBC # BLD: 0 K/UL (ref 0–0.01)
NRBC BLD-RTO: 0 PER 100 WBC
NT PRO BNP: ABNORMAL PG/ML
PLATELET # BLD AUTO: 174 K/UL (ref 150–400)
PMV BLD AUTO: 10.7 FL (ref 8.9–12.9)
POTASSIUM SERPL-SCNC: 4.3 MMOL/L (ref 3.5–5.1)
PROT SERPL-MCNC: 6.9 G/DL (ref 6.4–8.2)
RBC # BLD AUTO: 3.98 M/UL (ref 4.1–5.7)
SODIUM SERPL-SCNC: 135 MMOL/L (ref 136–145)
WBC # BLD AUTO: 5.6 K/UL (ref 4.1–11.1)

## 2024-06-03 PROCEDURE — G0299 HHS/HOSPICE OF RN EA 15 MIN: HCPCS

## 2024-06-03 NOTE — TELEPHONE ENCOUNTER
Pts home health nurse Xenia is calling in regards to multiple concerns she's having with patient. Pt has recently had changes in medications and has been experiencing a reaction that she believes is due to that change. Xenia can be reached at [phone].

## 2024-06-03 NOTE — TELEPHONE ENCOUNTER
Spoke with Xenia Tran nurse with [facility] who reported that Mr. Kennedy had a 15 lb weight gain since she last saw him Tues. 5/28. Weight increased from 148-163 lbs. She noted increased BLE edema, lung sounds were diminished. The patient reports  increased SOB and fatigue.     We reviewed the medication changes. She said as far as she knows, Mr. Kennedy has not held diamox and bumex  and is taking the midodrine based on BP parameters.     Call placed to caregiver Khloe Amin to confirm medicaiton administration; there was no answer.    Iwona Whittington APRN - NP   to Me       6/3/24 12:59 PM  Have him take metolazone 2.5mg today with 20meq K. See if there is space in titration clinic    @1403 -VM message left for Khloe Amin to call [facility] regarding the 15 lb weight gain Mr. Kennedy has had in the past week. Message included info regarding clinic appointment tomorrow at 11:30. [employer] message also sent.    2933 - Call received by Khloe Douglas and Ingrid Kennedy. Mr. Kennedy verified his weight as 163 lbs this morning and 159 lbs yesterday. Ms. Douglas said that they have held the bumex and diamox for SBP <100 \"since we talked\" (which was on 5/30). However, Mr. Kennedy said he took all of his meds today.     -I told them they need to be seen in clinic on 6/4 @ 1130 or go to the ED to be evaluated. They agreed to the clinic appointment at 11:30.    - Instructions provided to administer metalozone 2.5 mg this afternoon 30 minutes before afternoon bumex and diamox along with an extra Potassium 20 meq.

## 2024-06-04 ENCOUNTER — OFFICE VISIT (OUTPATIENT)
Age: 74
End: 2024-06-04
Payer: MEDICARE

## 2024-06-04 ENCOUNTER — HOSPITAL ENCOUNTER (OUTPATIENT)
Facility: HOSPITAL | Age: 74
Setting detail: INFUSION SERIES
Discharge: HOME | End: 2024-06-04
Payer: MEDICARE

## 2024-06-04 VITALS
RESPIRATION RATE: 18 BRPM | SYSTOLIC BLOOD PRESSURE: 102 MMHG | HEART RATE: 83 BPM | DIASTOLIC BLOOD PRESSURE: 69 MMHG | TEMPERATURE: 97.8 F | OXYGEN SATURATION: 96 %

## 2024-06-04 VITALS
HEART RATE: 61 BPM | RESPIRATION RATE: 18 BRPM | TEMPERATURE: 97.9 F | OXYGEN SATURATION: 98 % | WEIGHT: 162 LBS | SYSTOLIC BLOOD PRESSURE: 106 MMHG | DIASTOLIC BLOOD PRESSURE: 78 MMHG | HEIGHT: 70 IN | BODY MASS INDEX: 23.19 KG/M2

## 2024-06-04 VITALS
SYSTOLIC BLOOD PRESSURE: 102 MMHG | DIASTOLIC BLOOD PRESSURE: 68 MMHG | BODY MASS INDEX: 23.39 KG/M2 | OXYGEN SATURATION: 98 % | TEMPERATURE: 99.1 F | HEART RATE: 90 BPM | WEIGHT: 163 LBS

## 2024-06-04 DIAGNOSIS — Z79.899 RECEIVING INOTROPIC MEDICATION: ICD-10-CM

## 2024-06-04 DIAGNOSIS — T82.868A: Primary | ICD-10-CM

## 2024-06-04 DIAGNOSIS — I50.22 CHRONIC SYSTOLIC HEART FAILURE (HCC): Primary | ICD-10-CM

## 2024-06-04 PROCEDURE — 3074F SYST BP LT 130 MM HG: CPT | Performed by: NURSE PRACTITIONER

## 2024-06-04 PROCEDURE — 36593 DECLOT VASCULAR DEVICE: CPT

## 2024-06-04 PROCEDURE — 96376 TX/PRO/DX INJ SAME DRUG ADON: CPT

## 2024-06-04 PROCEDURE — 3017F COLORECTAL CA SCREEN DOC REV: CPT | Performed by: NURSE PRACTITIONER

## 2024-06-04 PROCEDURE — 1123F ACP DISCUSS/DSCN MKR DOCD: CPT | Performed by: NURSE PRACTITIONER

## 2024-06-04 PROCEDURE — G8420 CALC BMI NORM PARAMETERS: HCPCS | Performed by: NURSE PRACTITIONER

## 2024-06-04 PROCEDURE — 2580000003 HC RX 258: Performed by: NURSE PRACTITIONER

## 2024-06-04 PROCEDURE — 1036F TOBACCO NON-USER: CPT | Performed by: NURSE PRACTITIONER

## 2024-06-04 PROCEDURE — 3078F DIAST BP <80 MM HG: CPT | Performed by: NURSE PRACTITIONER

## 2024-06-04 PROCEDURE — 99214 OFFICE O/P EST MOD 30 MIN: CPT | Performed by: NURSE PRACTITIONER

## 2024-06-04 PROCEDURE — 6360000002 HC RX W HCPCS: Performed by: NURSE PRACTITIONER

## 2024-06-04 PROCEDURE — 96374 THER/PROPH/DIAG INJ IV PUSH: CPT

## 2024-06-04 PROCEDURE — 1111F DSCHRG MED/CURRENT MED MERGE: CPT | Performed by: NURSE PRACTITIONER

## 2024-06-04 PROCEDURE — G8427 DOCREV CUR MEDS BY ELIG CLIN: HCPCS | Performed by: NURSE PRACTITIONER

## 2024-06-04 RX ORDER — POTASSIUM BICARBONATE 782 MG/1
TABLET, EFFERVESCENT ORAL
COMMUNITY
Start: 2024-05-24 | End: 2024-06-04 | Stop reason: ALTCHOICE

## 2024-06-04 RX ORDER — SODIUM CHLORIDE 0.9 % (FLUSH) 0.9 %
10 SYRINGE (ML) INJECTION PRN
Status: DISCONTINUED | OUTPATIENT
Start: 2024-06-04 | End: 2024-06-05 | Stop reason: HOSPADM

## 2024-06-04 RX ORDER — POTASSIUM CHLORIDE 20 MEQ/1
20 TABLET, EXTENDED RELEASE ORAL 2 TIMES DAILY
Qty: 30 TABLET | Refills: 5 | Status: SHIPPED | OUTPATIENT
Start: 2024-06-04

## 2024-06-04 RX ORDER — LIDOCAINE 4 G/G
1 PATCH TOPICAL DAILY
Qty: 30 PATCH | Refills: 1 | Status: SHIPPED | OUTPATIENT
Start: 2024-06-04

## 2024-06-04 RX ADMIN — WATER 2 MG: 1 INJECTION INTRAMUSCULAR; INTRAVENOUS; SUBCUTANEOUS at 10:52

## 2024-06-04 RX ADMIN — SODIUM CHLORIDE, PRESERVATIVE FREE 10 ML: 5 INJECTION INTRAVENOUS at 10:39

## 2024-06-04 RX ADMIN — SODIUM CHLORIDE, PRESERVATIVE FREE 10 ML: 5 INJECTION INTRAVENOUS at 10:40

## 2024-06-04 RX ADMIN — WATER 2 MG: 1 INJECTION INTRAMUSCULAR; INTRAVENOUS; SUBCUTANEOUS at 08:37

## 2024-06-04 RX ADMIN — SODIUM CHLORIDE, PRESERVATIVE FREE 10 ML: 5 INJECTION INTRAVENOUS at 12:40

## 2024-06-04 RX ADMIN — SODIUM CHLORIDE, PRESERVATIVE FREE 10 ML: 5 INJECTION INTRAVENOUS at 08:35

## 2024-06-04 ASSESSMENT — ENCOUNTER SYMPTOMS
ABDOMINAL PAIN: 0
COUGH: 0
EYE PAIN: 0
ABDOMINAL DISTENTION: 0
SORE THROAT: 0
BLOOD IN STOOL: 0
SHORTNESS OF BREATH: 0
CHEST TIGHTNESS: 0

## 2024-06-04 ASSESSMENT — PAIN DESCRIPTION - DESCRIPTORS: DESCRIPTORS: ACHING

## 2024-06-04 ASSESSMENT — PATIENT HEALTH QUESTIONNAIRE - PHQ9
SUM OF ALL RESPONSES TO PHQ QUESTIONS 1-9: 0
1. LITTLE INTEREST OR PLEASURE IN DOING THINGS: NOT AT ALL
SUM OF ALL RESPONSES TO PHQ9 QUESTIONS 1 & 2: 0
SUM OF ALL RESPONSES TO PHQ QUESTIONS 1-9: 0
2. FEELING DOWN, DEPRESSED OR HOPELESS: NOT AT ALL

## 2024-06-04 ASSESSMENT — PAIN DESCRIPTION - PAIN TYPE: TYPE: CHRONIC PAIN

## 2024-06-04 ASSESSMENT — PAIN DESCRIPTION - LOCATION: LOCATION: SHOULDER

## 2024-06-04 ASSESSMENT — PAIN SCALES - GENERAL: PAINLEVEL_OUTOF10: 2

## 2024-06-04 ASSESSMENT — PAIN DESCRIPTION - ORIENTATION: ORIENTATION: RIGHT;LEFT

## 2024-06-04 NOTE — PATIENT INSTRUCTIONS
Medication changes:    Take diamox regardless of BP  Hold bumex if Systolic (top number) BP <94    New prescription for potassium tabs  Prescription for lidocaine patch, if not covered by insurance, you may get this over the counter    Contact [facility] by Monday/Tuesday by [place] with your daily weights, blood pressures and any symptoms.     Please take this to your pharmacy to notify them of the change in medications.     Testing Ordered:    Weekly Labs with HH    Other Recommendations:      Please record blood pressure and heart rate daily before medication and two hours after medication, please record weight daily upon waking/after using the bathroom. Keep a written records of your weights and blood pressure and bring to your next appointment. If you have a weight gain of 3 or more pounds overnight OR 5 or more pounds in one week, new/worsened shortness of breath or swelling, or if your blood pressure begins to consistently run below 90/60 and/or you begin to experience dizziness or lightheadedness please contact our office at [phone] option 2.    Ensure your drinking an adequate amount of water with a goal of 6-8 eight ounce glasses (1.5-2 liters) of fluid daily. Your urine should be clear and light yellow straw colored.     Follow up as scheduled on June 17th with [facility]    Thank you for allowing us the privilege of being a part of your healthcare team! Please do not hesitate to contact our office at [phone] option 2 with any questions or concerns.     We are restarting a monthly heart failure support group, this will be the last Wednesday of every month from 5-6pm at [facility]. If you would like to attend you will need to RSVP to [email or web address]

## 2024-06-04 NOTE — PROGRESS NOTES
[facility] Peds/Adult Note                       Date: 2024    Name: Ingrid Kennedy    MRN: [id number]         : 1950    0825 Patient arrives for Alteplase without acute problems. Please see Epic for complete assessment and education provided.    Vital signs stable throughout and prior to discharge. Patient tolerated procedure well and was discharged without incident.  Patient/Daughter is aware of no further [facility] appointments @ this time & to follow up with healthcare provider for next appointment.     Alteplase inserted to Red lumen x 2 due to no blood return post first attempt; Blood return noted @ 1240 with 2nd attempt, after patient returned from MD appointment.       Mr. Kennedy's vitals were reviewed prior to and after treatment.   Patient Vitals for the past 12 hrs:   Temp Pulse Resp BP SpO2   24 1123 -- 83 18 102/69 --   24 0825 97.8 °F (36.6 °C) 60 20 (!) 96/57 96 %       Medications given:   Medications Administered         ALTEplase (CATHFLO) 2 mg in sterile water 2 mL injection Admin Date  2024 Action  Given Dose  2 mg Route  IntraCATHeter Administered By  Ann Guzman RN        ALTEplase (CATHFLO) 2 mg in sterile water 2 mL injection Admin Date  2024 Action  Given Dose  2 mg Route  IntraCATHeter Administered By  Ann Guzman RN        sodium chloride flush 0.9 % injection 10 mL Admin Date  2024 Action  Given Dose  10 mL Route  IntraVENous Administered By  Ann Guzman RN        sodium chloride flush 0.9 % injection 10 mL Admin Date  2024 Action  Given Dose  10 mL Route  IntraVENous Administered By  Ann Guzman RN        sodium chloride flush 0.9 % injection 10 mL Admin Date  2024 Action  Given Dose  10 mL Route  IntraVENous Administered By  Ann Guzman RN        sodium chloride flush 0.9 % injection 10 mL Admin Date  2024 Action  Given Dose  10 mL Route  IntraVENous Administered By  Ann Guzman RN

## 2024-06-04 NOTE — PROGRESS NOTES
Housing Stability Vital Sign     Unable to Pay for Housing in the Last Year: No     Number of Places Lived in the Last Year: 1     Unstable Housing in the Last Year: No       LABORATORY RESULTS:     No results found for: \"TSH\", \"TSH2\", \"TSH3\", \"TSHELE\"    ALLERGY:  No Known Allergies     CURRENT MEDICATIONS:    Current Outpatient Medications:     EFFER-K 20 MEQ TBEF effervescent tablet, TAKE 2 TABLET BY MOUTH TWICE DAILY, Disp: , Rfl:     midodrine (PROAMATINE) 2.5 MG tablet, Take 3 times a day. Hold if systolic blood pressure is >120, Disp: 90 tablet, Rfl: 3    FARXIGA 10 MG tablet, Take 1 tablet by mouth Daily with supper, Disp: 30 tablet, Rfl: 2    metOLazone (ZAROXOLYN) 2.5 MG tablet, Take 1 tablet by mouth as needed (as needed for weight gain, sweeling, SOB, or edema when instructed by heart failure team), Disp: 10 tablet, Rfl: 0    acetaZOLAMIDE (DIAMOX) 250 MG tablet, Take 0.5 tablets by mouth 2 times daily, Disp: 30 tablet, Rfl: 2    magnesium oxide (MAG-OX) 400 MG tablet, Take 1 tablet by mouth daily, Disp: 30 tablet, Rfl: 2    allopurinol (ZYLOPRIM) 100 MG tablet, Take 0.5 tablets by mouth daily, Disp: 15 tablet, Rfl: 2    amiodarone (CORDARONE) 200 MG tablet, Take 0.5 tablets by mouth daily, Disp: 15 tablet, Rfl: 2    bumetanide (BUMEX) 2 MG tablet, Take 2 tablets by mouth 2 times daily, Disp: 120 tablet, Rfl: 2    DOBUTAMINE HCL IV, Infuse 0.29 mg/min intravenously continuous. 500mg in dextrose 5% 250mL infusion. Flush using Sash method., Disp: , Rfl:     levothyroxine (SYNTHROID) 88 MCG tablet, Take 1 tablet by mouth daily, Disp: 30 tablet, Rfl: 5    Cholecalciferol (VITAMIN D3) 25 MCG TABS, Take 1 tablet by mouth daily, Disp: 90 tablet, Rfl: 1    sodium chloride 0.9 % SOLN, Infuse 10 mLs intravenously every 24 hours. FLUSH LUMEN NOT IN USE FOR MILRINONE WITH 10ML EVERY 24 HOURS. FLUSH PRE/POST LAB DRAW WITH 10-20ML, Disp: , Rfl:     Heparin Sod, Pork, Lock Flush (HEPARIN, PF,) 10 UNIT/ML injection,

## 2024-06-05 RX ORDER — METOLAZONE 2.5 MG/1
2.5 TABLET ORAL PRN
Qty: 10 TABLET | Refills: 0 | Status: SHIPPED | OUTPATIENT
Start: 2024-06-05

## 2024-06-05 ASSESSMENT — ENCOUNTER SYMPTOMS: DYSPNEA ACTIVITY LEVEL: AT REST

## 2024-06-05 NOTE — HOME HEALTH
Subjective: I had some shortness of breath lastnight  Falls since last visit No(if yes complete the Fall Tracking Form and include bsrifallreport):   Caregiver involvement changes: NO  Home health supplies by type and quantity ordered/delivered this visit include: N/A    Clinician asked if patient has had any physician contact since last home care visit and patient states: NO  Clinician asked if patient has any new or changed medications and patient states:  NO   If Yes, were medications reconciled? NO   Was the certifying physician notified of changes in medications? NO     Clinical assessment (what this visit means for the patient overall and need for ongoing skilled care) and progress or lack of progress towards SPECIFIC goals: Pt with CHF. PT and CG states that he had his diuretic parameters changed due to hypotension. Pt still with hypotension during appt. Pt states that he has increased SOB x 1 day that he noticed lastnight and increased weight gain. Weight has increased by 15lbs since last visit. SN called called HF clinic, spoke with Minna and informed her of above. Pt is currently not meeting goals. SN still needed for assessment and education until goals are met to prevent hospitalization     Written Teaching Material Utilized: N/A    Interdisciplinary communication with: See above    Discharge planning as follows: Is no longer homebound, Per physician order and When goals are met    Specific plan for next visit: labs, dressing change, education

## 2024-06-05 NOTE — TELEPHONE ENCOUNTER
Requested Prescriptions     Signed Prescriptions Disp Refills    metOLazone (ZAROXOLYN) 2.5 MG tablet 10 tablet 0     Sig: Take 1 tablet by mouth as needed (as needed for weight gain, sweeling, SOB, or edema when instructed by heart failure team)     Authorizing Provider: DANIEL JOHNSON     Ordering User: LINDA DELANEY appt 6/4/24  Next appt 6/17/24

## 2024-06-10 ENCOUNTER — TELEPHONE (OUTPATIENT)
Age: 74
End: 2024-06-10

## 2024-06-10 DIAGNOSIS — R30.0 DYSURIA: Primary | ICD-10-CM

## 2024-06-11 ENCOUNTER — HOME CARE VISIT (OUTPATIENT)
Dept: HOME HEALTH SERVICES | Facility: HOME HEALTH | Age: 74
End: 2024-06-11
Payer: MEDICARE

## 2024-06-11 NOTE — TELEPHONE ENCOUNTER
UA with reflex ordered per Sunitha PALOMINO.  Called [facility]  to alert of order, spoke with Aldo, she will place order   [General Appearance - Alert] : alert [General Appearance - In No Acute Distress] : in no acute distress [Oriented To Time, Place, And Person] : oriented to person, place, and time [Impaired Insight] : insight and judgment were intact [Affect] : the affect was normal [Person] : oriented to person [Place] : oriented to place [Time] : oriented to time [Cranial Nerves Optic (II)] : visual acuity intact bilaterally,  visual fields full to confrontation, pupils equal round and reactive to light [Cranial Nerves Oculomotor (III)] : extraocular motion intact [Cranial Nerves Trigeminal (V)] : facial sensation intact symmetrically [Cranial Nerves Facial (VII)] : face symmetrical [Cranial Nerves Vestibulocochlear (VIII)] : hearing was intact bilaterally [Cranial Nerves Glossopharyngeal (IX)] : tongue and palate midline [Cranial Nerves Accessory (XI - Cranial And Spinal)] : head turning and shoulder shrug symmetric [Cranial Nerves Hypoglossal (XII)] : there was no tongue deviation with protrusion [Motor Tone] : muscle tone was normal in all four extremities [Motor Strength] : muscle strength was normal in all four extremities [Involuntary Movements] : no involuntary movements were seen [Sensation Tactile Decrease] : light touch was intact [Sensation Pain / Temperature Decrease] : pain and temperature was intact [Sensation Vibration Decrease] : vibration was intact [Abnormal Walk] : normal gait [Limited Balance] : the patient's balance was impaired [2+] : Patella left 2+ [Sclera] : the sclera and conjunctiva were normal [PERRL With Normal Accommodation] : pupils were equal in size, round, reactive to light, with normal accommodation [Extraocular Movements] : extraocular movements were intact [Outer Ear] : the ears and nose were normal in appearance [Neck Appearance] : the appearance of the neck was normal [] : the neck was supple [Neck Cervical Mass (___cm)] : no neck mass was observed [FreeTextEntry1] : no vertigo when lay flat w/ head turn to left but mild vertigo for 5 secs when returned to upright seated position. Mild vertigo for 7 secs when laying flat w/ head turned to the right. Mild vertigo for 15secs when seated upright from laying flat w/ head turned to the right.  [Motor Strength Upper Extremities Bilaterally] : strength was normal in both upper extremities [Motor Strength Lower Extremities Bilaterally] : strength was normal in both lower extremities [Romberg's Sign] : Romberg's sign was negtive [Past-pointing] : there was no past-pointing [Tremor] : no tremor present [Coordination - Dysmetria Impaired Finger-to-Nose Bilateral] : not present

## 2024-06-11 NOTE — TELEPHONE ENCOUNTER
Returned a page to Issac  around 2100 on 6/10.  Mr. Kennedy had apparently slid out of the bed while he was dreaming.  Denied hitting his head or any injury.  He had been incontinent, but Issac said he had cleaned himself up.   No pulls on his PICC line.  He's not dizzy or in any pain.  He is a little SOB, but they have plans to take his metolazone in the morning (didn't want to take it in the evening so he wouldn't be urinating all night).      We did also briefly discuss Mr. Kennedy occasional dysuria that he's been having.   I will check with the HF nurses whether home health is able to get urine specimens.    will visit him again on Wednesday.                 Sunitha Metcalf NP DNP, RN, AGACNP-BC  [facility]  [facility]  [address], [address]  Phone: [phone]

## 2024-06-12 ENCOUNTER — TELEPHONE (OUTPATIENT)
Age: 74
End: 2024-06-12

## 2024-06-12 ENCOUNTER — HOSPITAL ENCOUNTER (OUTPATIENT)
Facility: HOSPITAL | Age: 74
Setting detail: SPECIMEN
Discharge: HOME | End: 2024-06-15

## 2024-06-12 ENCOUNTER — APPOINTMENT (OUTPATIENT)
Facility: HOSPITAL | Age: 74
DRG: 871 | End: 2024-06-12
Payer: MEDICARE

## 2024-06-12 ENCOUNTER — HOME CARE VISIT (OUTPATIENT)
Facility: HOME HEALTH | Age: 74
End: 2024-06-12
Payer: MEDICARE

## 2024-06-12 ENCOUNTER — HOSPITAL ENCOUNTER (INPATIENT)
Facility: HOSPITAL | Age: 74
LOS: 8 days | Discharge: HOME HEALTH | DRG: 871 | End: 2024-06-20
Attending: EMERGENCY MEDICINE | Admitting: STUDENT IN AN ORGANIZED HEALTH CARE EDUCATION/TRAINING PROGRAM
Payer: MEDICARE

## 2024-06-12 DIAGNOSIS — E87.70 HYPERVOLEMIA, UNSPECIFIED HYPERVOLEMIA TYPE: ICD-10-CM

## 2024-06-12 DIAGNOSIS — R78.81 BACTEREMIA: ICD-10-CM

## 2024-06-12 DIAGNOSIS — R30.0 DYSURIA: ICD-10-CM

## 2024-06-12 DIAGNOSIS — R57.0 CARDIOGENIC SHOCK (HCC): ICD-10-CM

## 2024-06-12 DIAGNOSIS — J96.01 ACUTE RESPIRATORY FAILURE WITH HYPOXIA (HCC): ICD-10-CM

## 2024-06-12 DIAGNOSIS — I50.9 ACUTE ON CHRONIC CONGESTIVE HEART FAILURE, UNSPECIFIED HEART FAILURE TYPE (HCC): Primary | ICD-10-CM

## 2024-06-12 DIAGNOSIS — I42.9 CARDIOMYOPATHY, UNSPECIFIED TYPE (HCC): ICD-10-CM

## 2024-06-12 PROBLEM — R57.9 SHOCK CIRCULATORY (HCC): Status: ACTIVE | Noted: 2024-06-12

## 2024-06-12 LAB
ALBUMIN SERPL-MCNC: 2.9 G/DL (ref 3.5–5)
ALBUMIN SERPL-MCNC: 3 G/DL (ref 3.5–5)
ALBUMIN/GLOB SERPL: 0.7 (ref 1.1–2.2)
ALBUMIN/GLOB SERPL: 0.8 (ref 1.1–2.2)
ALP SERPL-CCNC: 151 U/L (ref 45–117)
ALP SERPL-CCNC: 161 U/L (ref 45–117)
ALT SERPL-CCNC: 22 U/L (ref 12–78)
ALT SERPL-CCNC: 25 U/L (ref 12–78)
ANION GAP SERPL CALC-SCNC: 8 MMOL/L (ref 5–15)
ANION GAP SERPL CALC-SCNC: 9 MMOL/L (ref 5–15)
APPEARANCE UR: ABNORMAL
AST SERPL-CCNC: 21 U/L (ref 15–37)
AST SERPL-CCNC: 22 U/L (ref 15–37)
BACTERIA URNS QL MICRO: NEGATIVE /HPF
BASOPHILS # BLD: 0 K/UL (ref 0–0.1)
BASOPHILS # BLD: 0 K/UL (ref 0–0.1)
BASOPHILS NFR BLD: 0 % (ref 0–1)
BASOPHILS NFR BLD: 0 % (ref 0–1)
BILIRUB SERPL-MCNC: 2.1 MG/DL (ref 0.2–1)
BILIRUB SERPL-MCNC: 2.1 MG/DL (ref 0.2–1)
BILIRUB UR QL: NEGATIVE
BUN SERPL-MCNC: 46 MG/DL (ref 6–20)
BUN SERPL-MCNC: 48 MG/DL (ref 6–20)
BUN/CREAT SERPL: 17 (ref 12–20)
BUN/CREAT SERPL: 18 (ref 12–20)
CALCIUM SERPL-MCNC: 8.4 MG/DL (ref 8.5–10.1)
CALCIUM SERPL-MCNC: 8.7 MG/DL (ref 8.5–10.1)
CHLORIDE SERPL-SCNC: 89 MMOL/L (ref 97–108)
CHLORIDE SERPL-SCNC: 91 MMOL/L (ref 97–108)
CO2 SERPL-SCNC: 32 MMOL/L (ref 21–32)
CO2 SERPL-SCNC: 32 MMOL/L (ref 21–32)
COLOR UR: ABNORMAL
COMMENT:: NORMAL
CREAT SERPL-MCNC: 2.67 MG/DL (ref 0.7–1.3)
CREAT SERPL-MCNC: 2.67 MG/DL (ref 0.7–1.3)
DIFFERENTIAL METHOD BLD: ABNORMAL
DIFFERENTIAL METHOD BLD: ABNORMAL
EKG DIAGNOSIS: NORMAL
EKG Q-T INTERVAL: 396 MS
EKG QRS DURATION: 138 MS
EKG QTC CALCULATION (BAZETT): 520 MS
EKG R AXIS: 165 DEGREES
EKG T AXIS: 76 DEGREES
EKG VENTRICULAR RATE: 104 BPM
EOSINOPHIL # BLD: 0 K/UL (ref 0–0.4)
EOSINOPHIL # BLD: 0 K/UL (ref 0–0.4)
EOSINOPHIL NFR BLD: 0 % (ref 0–7)
EOSINOPHIL NFR BLD: 0 % (ref 0–7)
EPITH CASTS URNS QL MICRO: ABNORMAL /LPF
ERYTHROCYTE [DISTWIDTH] IN BLOOD BY AUTOMATED COUNT: 17.1 % (ref 11.5–14.5)
ERYTHROCYTE [DISTWIDTH] IN BLOOD BY AUTOMATED COUNT: 17.2 % (ref 11.5–14.5)
GLOBULIN SER CALC-MCNC: 3.6 G/DL (ref 2–4)
GLOBULIN SER CALC-MCNC: 4.3 G/DL (ref 2–4)
GLUCOSE SERPL-MCNC: 133 MG/DL (ref 65–100)
GLUCOSE SERPL-MCNC: 143 MG/DL (ref 65–100)
GLUCOSE UR STRIP.AUTO-MCNC: 100 MG/DL
HCT VFR BLD AUTO: 29.7 % (ref 36.6–50.3)
HCT VFR BLD AUTO: 31.5 % (ref 36.6–50.3)
HGB BLD-MCNC: 10.5 G/DL (ref 12.1–17)
HGB BLD-MCNC: 11.2 G/DL (ref 12.1–17)
HGB UR QL STRIP: ABNORMAL
IMM GRANULOCYTES # BLD AUTO: 0.1 K/UL (ref 0–0.04)
IMM GRANULOCYTES # BLD AUTO: 0.1 K/UL (ref 0–0.04)
IMM GRANULOCYTES NFR BLD AUTO: 0 % (ref 0–0.5)
IMM GRANULOCYTES NFR BLD AUTO: 1 % (ref 0–0.5)
KETONES UR QL STRIP.AUTO: NEGATIVE MG/DL
LACTATE SERPL-SCNC: 2.2 MMOL/L (ref 0.4–2)
LACTATE SERPL-SCNC: 2.9 MMOL/L (ref 0.4–2)
LEUKOCYTE ESTERASE UR QL STRIP.AUTO: ABNORMAL
LYMPHOCYTES # BLD: 1 K/UL (ref 0.8–3.5)
LYMPHOCYTES # BLD: 1.2 K/UL (ref 0.8–3.5)
LYMPHOCYTES NFR BLD: 10 % (ref 12–49)
LYMPHOCYTES NFR BLD: 7 % (ref 12–49)
MAGNESIUM SERPL-MCNC: 2.1 MG/DL (ref 1.6–2.4)
MCH RBC QN AUTO: 27.5 PG (ref 26–34)
MCH RBC QN AUTO: 27.6 PG (ref 26–34)
MCHC RBC AUTO-ENTMCNC: 35.4 G/DL (ref 30–36.5)
MCHC RBC AUTO-ENTMCNC: 35.6 G/DL (ref 30–36.5)
MCV RBC AUTO: 77.6 FL (ref 80–99)
MCV RBC AUTO: 77.7 FL (ref 80–99)
MONOCYTES # BLD: 0.5 K/UL (ref 0–1)
MONOCYTES # BLD: 0.7 K/UL (ref 0–1)
MONOCYTES NFR BLD: 4 % (ref 5–13)
MONOCYTES NFR BLD: 5 % (ref 5–13)
NEUTS SEG # BLD: 11.2 K/UL (ref 1.8–8)
NEUTS SEG # BLD: 12.6 K/UL (ref 1.8–8)
NEUTS SEG NFR BLD: 86 % (ref 32–75)
NEUTS SEG NFR BLD: 87 % (ref 32–75)
NITRITE UR QL STRIP.AUTO: NEGATIVE
NRBC # BLD: 0 K/UL (ref 0–0.01)
NRBC # BLD: 0.02 K/UL (ref 0–0.01)
NRBC BLD-RTO: 0 PER 100 WBC
NRBC BLD-RTO: 0.2 PER 100 WBC
NT PRO BNP: ABNORMAL PG/ML
NT PRO BNP: ABNORMAL PG/ML
PH UR STRIP: 7.5 (ref 5–8)
PLATELET # BLD AUTO: 230 K/UL (ref 150–400)
PLATELET # BLD AUTO: 238 K/UL (ref 150–400)
PMV BLD AUTO: 10.3 FL (ref 8.9–12.9)
PMV BLD AUTO: 10.7 FL (ref 8.9–12.9)
POTASSIUM SERPL-SCNC: 3.5 MMOL/L (ref 3.5–5.1)
POTASSIUM SERPL-SCNC: 3.6 MMOL/L (ref 3.5–5.1)
PROCALCITONIN SERPL-MCNC: 18.11 NG/ML
PROT SERPL-MCNC: 6.5 G/DL (ref 6.4–8.2)
PROT SERPL-MCNC: 7.3 G/DL (ref 6.4–8.2)
PROT UR STRIP-MCNC: 30 MG/DL
RBC # BLD AUTO: 3.82 M/UL (ref 4.1–5.7)
RBC # BLD AUTO: 4.06 M/UL (ref 4.1–5.7)
RBC #/AREA URNS HPF: ABNORMAL /HPF (ref 0–5)
SODIUM SERPL-SCNC: 129 MMOL/L (ref 136–145)
SODIUM SERPL-SCNC: 132 MMOL/L (ref 136–145)
SP GR UR REFRACTOMETRY: 1.02 (ref 1–1.03)
SPECIMEN HOLD: NORMAL
TROPONIN I SERPL HS-MCNC: 93 NG/L (ref 0–76)
UROBILINOGEN UR QL STRIP.AUTO: 1 EU/DL (ref 0.2–1)
WBC # BLD AUTO: 13.1 K/UL (ref 4.1–11.1)
WBC # BLD AUTO: 14.5 K/UL (ref 4.1–11.1)
WBC URNS QL MICRO: ABNORMAL /HPF (ref 0–4)

## 2024-06-12 PROCEDURE — 83605 ASSAY OF LACTIC ACID: CPT

## 2024-06-12 PROCEDURE — G0299 HHS/HOSPICE OF RN EA 15 MIN: HCPCS

## 2024-06-12 PROCEDURE — 87077 CULTURE AEROBIC IDENTIFY: CPT

## 2024-06-12 PROCEDURE — 80053 COMPREHEN METABOLIC PANEL: CPT

## 2024-06-12 PROCEDURE — 93010 ELECTROCARDIOGRAM REPORT: CPT | Performed by: INTERNAL MEDICINE

## 2024-06-12 PROCEDURE — 84484 ASSAY OF TROPONIN QUANT: CPT

## 2024-06-12 PROCEDURE — 6370000000 HC RX 637 (ALT 250 FOR IP): Performed by: STUDENT IN AN ORGANIZED HEALTH CARE EDUCATION/TRAINING PROGRAM

## 2024-06-12 PROCEDURE — 87040 BLOOD CULTURE FOR BACTERIA: CPT

## 2024-06-12 PROCEDURE — 6360000002 HC RX W HCPCS: Performed by: EMERGENCY MEDICINE

## 2024-06-12 PROCEDURE — 83880 ASSAY OF NATRIURETIC PEPTIDE: CPT

## 2024-06-12 PROCEDURE — 93005 ELECTROCARDIOGRAM TRACING: CPT | Performed by: STUDENT IN AN ORGANIZED HEALTH CARE EDUCATION/TRAINING PROGRAM

## 2024-06-12 PROCEDURE — 87154 CUL TYP ID BLD PTHGN 6+ TRGT: CPT

## 2024-06-12 PROCEDURE — 84145 PROCALCITONIN (PCT): CPT

## 2024-06-12 PROCEDURE — 96375 TX/PRO/DX INJ NEW DRUG ADDON: CPT

## 2024-06-12 PROCEDURE — 6360000002 HC RX W HCPCS: Performed by: STUDENT IN AN ORGANIZED HEALTH CARE EDUCATION/TRAINING PROGRAM

## 2024-06-12 PROCEDURE — 85025 COMPLETE CBC W/AUTO DIFF WBC: CPT

## 2024-06-12 PROCEDURE — 96374 THER/PROPH/DIAG INJ IV PUSH: CPT

## 2024-06-12 PROCEDURE — 99285 EMERGENCY DEPT VISIT HI MDM: CPT

## 2024-06-12 PROCEDURE — 2000000000 HC ICU R&B

## 2024-06-12 PROCEDURE — 2500000003 HC RX 250 WO HCPCS: Performed by: EMERGENCY MEDICINE

## 2024-06-12 PROCEDURE — 36415 COLL VENOUS BLD VENIPUNCTURE: CPT

## 2024-06-12 PROCEDURE — 71045 X-RAY EXAM CHEST 1 VIEW: CPT

## 2024-06-12 PROCEDURE — 99233 SBSQ HOSP IP/OBS HIGH 50: CPT | Performed by: INTERNAL MEDICINE

## 2024-06-12 PROCEDURE — 74176 CT ABD & PELVIS W/O CONTRAST: CPT

## 2024-06-12 PROCEDURE — 2580000003 HC RX 258: Performed by: EMERGENCY MEDICINE

## 2024-06-12 PROCEDURE — 93005 ELECTROCARDIOGRAM TRACING: CPT | Performed by: EMERGENCY MEDICINE

## 2024-06-12 PROCEDURE — 87186 SC STD MICRODIL/AGAR DIL: CPT

## 2024-06-12 RX ORDER — POLYETHYLENE GLYCOL 3350 17 G/17G
17 POWDER, FOR SOLUTION ORAL DAILY PRN
Status: DISCONTINUED | OUTPATIENT
Start: 2024-06-12 | End: 2024-06-21 | Stop reason: HOSPADM

## 2024-06-12 RX ORDER — SODIUM CHLORIDE 9 MG/ML
INJECTION, SOLUTION INTRAVENOUS PRN
Status: DISCONTINUED | OUTPATIENT
Start: 2024-06-12 | End: 2024-06-21 | Stop reason: HOSPADM

## 2024-06-12 RX ORDER — ACETAMINOPHEN 650 MG/1
650 SUPPOSITORY RECTAL EVERY 6 HOURS PRN
Status: DISCONTINUED | OUTPATIENT
Start: 2024-06-12 | End: 2024-06-21 | Stop reason: HOSPADM

## 2024-06-12 RX ORDER — SODIUM CHLORIDE 0.9 % (FLUSH) 0.9 %
5-40 SYRINGE (ML) INJECTION PRN
Status: DISCONTINUED | OUTPATIENT
Start: 2024-06-12 | End: 2024-06-21 | Stop reason: HOSPADM

## 2024-06-12 RX ORDER — BUMETANIDE 0.25 MG/ML
4 INJECTION INTRAMUSCULAR; INTRAVENOUS ONCE
Status: COMPLETED | OUTPATIENT
Start: 2024-06-12 | End: 2024-06-12

## 2024-06-12 RX ORDER — NOREPINEPHRINE BITARTRATE 0.06 MG/ML
1-100 INJECTION, SOLUTION INTRAVENOUS CONTINUOUS
Status: DISCONTINUED | OUTPATIENT
Start: 2024-06-12 | End: 2024-06-15

## 2024-06-12 RX ORDER — ONDANSETRON 2 MG/ML
4 INJECTION INTRAMUSCULAR; INTRAVENOUS EVERY 6 HOURS PRN
Status: DISCONTINUED | OUTPATIENT
Start: 2024-06-12 | End: 2024-06-21 | Stop reason: HOSPADM

## 2024-06-12 RX ORDER — ONDANSETRON 4 MG/1
4 TABLET, ORALLY DISINTEGRATING ORAL EVERY 8 HOURS PRN
Status: DISCONTINUED | OUTPATIENT
Start: 2024-06-12 | End: 2024-06-21 | Stop reason: HOSPADM

## 2024-06-12 RX ORDER — DOBUTAMINE HYDROCHLORIDE 200 MG/100ML
4 INJECTION INTRAVENOUS CONTINUOUS
Status: DISCONTINUED | OUTPATIENT
Start: 2024-06-12 | End: 2024-06-21 | Stop reason: HOSPADM

## 2024-06-12 RX ORDER — SODIUM CHLORIDE 0.9 % (FLUSH) 0.9 %
5-40 SYRINGE (ML) INJECTION EVERY 12 HOURS SCHEDULED
Status: DISCONTINUED | OUTPATIENT
Start: 2024-06-12 | End: 2024-06-21 | Stop reason: HOSPADM

## 2024-06-12 RX ORDER — ACETAMINOPHEN 325 MG/1
650 TABLET ORAL EVERY 6 HOURS PRN
Status: DISCONTINUED | OUTPATIENT
Start: 2024-06-12 | End: 2024-06-21 | Stop reason: HOSPADM

## 2024-06-12 RX ADMIN — ACETAMINOPHEN 650 MG: 325 TABLET ORAL at 22:15

## 2024-06-12 RX ADMIN — BUMETANIDE 4 MG: 0.25 INJECTION INTRAMUSCULAR; INTRAVENOUS at 19:05

## 2024-06-12 RX ADMIN — VANCOMYCIN HYDROCHLORIDE 2000 MG: 10 INJECTION, POWDER, LYOPHILIZED, FOR SOLUTION INTRAVENOUS at 17:55

## 2024-06-12 RX ADMIN — SODIUM CHLORIDE 5 MCG/MIN: 9 INJECTION, SOLUTION INTRAVENOUS at 18:05

## 2024-06-12 RX ADMIN — WATER 2000 MG: 1 INJECTION INTRAMUSCULAR; INTRAVENOUS; SUBCUTANEOUS at 17:50

## 2024-06-12 RX ADMIN — DOBUTAMINE HYDROCHLORIDE 4 MCG/KG/MIN: 200 INJECTION INTRAVENOUS at 15:59

## 2024-06-12 ASSESSMENT — PAIN - FUNCTIONAL ASSESSMENT
PAIN_FUNCTIONAL_ASSESSMENT: NONE - DENIES PAIN
PAIN_FUNCTIONAL_ASSESSMENT: NONE - DENIES PAIN

## 2024-06-12 NOTE — ED TRIAGE NOTES
Pt c/o swelling all over, denies cp or sob, pt on Milrinone drip , MATHIS, +tachypneic in triage, +leg swelling, oxygen placed on pt in triage

## 2024-06-12 NOTE — TELEPHONE ENCOUNTER
Telephone Call RE:  Appointment reminder     Outcome:     [] Patient confirmed appointment   [] Patient rescheduled appointment for    [] Unable to reach  [x] Left message              [] Other:       Informed pt's friend, through voicemail, about designated parking spaces and permit process.

## 2024-06-12 NOTE — TELEPHONE ENCOUNTER
Xenia with [facility] called in update:    Took metolazone yesterday  No change in weight  Still 165lb  SOB with min exertion x 3 days  BP- 100/60  2+ pitting edema, distended abdomen  Lack of appetite, poor fluid intake  Diarrhea x 2 weeks  Urinary frequency/retention  Only able to get enough for UA (pt minimal output)  Noted slight confusion by caregiver  Confirmed with cg Khloe patient is taking bumex twice a day (SBP has been >94)    Central line infusing in 1 port, 2nd port can flush but not blood return (just had cathflo last week)    Will review with Sunitha Jeffries NP, APRN - NP  You5 minutes ago (12:15 PM)     It sounds like he probably needs to come in to the ER to be evaluated.    I'm not sure why he's having diarrhea, and that makes his fluid balance more difficult as well.   If he didn't respond to the metolazone, then he probably needs IV dosing in the hospital.       Spoke with Khloe regarding above, she is in agreement to bring patient to ER

## 2024-06-12 NOTE — TELEPHONE ENCOUNTER
Home health nurse Xenia has multiple concerns. Patient is currently experiencing shortness of breathe at least 3 times a day while sitting and with limited motion, swelling in bilateral legs extending to the stomach. Has had complaints of no appetite, has to be forced to eat and drink. Diarrhea has been on going for 2 weeks. Family has noticed that he has been more forgetful than usual. Central port is not working and patient is currently on inotrope. Xenia can be reached at [phone].

## 2024-06-13 ENCOUNTER — APPOINTMENT (OUTPATIENT)
Facility: HOSPITAL | Age: 74
DRG: 871 | End: 2024-06-13
Payer: MEDICARE

## 2024-06-13 ENCOUNTER — HOME CARE VISIT (OUTPATIENT)
Dept: HOME HEALTH SERVICES | Facility: HOME HEALTH | Age: 74
End: 2024-06-13
Payer: MEDICARE

## 2024-06-13 VITALS
SYSTOLIC BLOOD PRESSURE: 100 MMHG | BODY MASS INDEX: 23.76 KG/M2 | TEMPERATURE: 98.7 F | HEART RATE: 54 BPM | OXYGEN SATURATION: 95 % | DIASTOLIC BLOOD PRESSURE: 70 MMHG | WEIGHT: 165.6 LBS

## 2024-06-13 PROBLEM — N12 PYELONEPHRITIS: Status: ACTIVE | Noted: 2024-06-13

## 2024-06-13 PROBLEM — R57.0 CARDIOGENIC SHOCK (HCC): Status: ACTIVE | Noted: 2024-06-12

## 2024-06-13 PROBLEM — N30.00 ACUTE CYSTITIS WITHOUT HEMATURIA: Status: ACTIVE | Noted: 2024-06-13

## 2024-06-13 PROBLEM — B96.4 BACTEREMIA DUE TO PROTEUS SPECIES: Status: ACTIVE | Noted: 2024-01-09

## 2024-06-13 LAB
ACCESSION NUMBER, LLC1M: ABNORMAL
ACINETOBACTER CALCOAC BAUMANNII COMPLEX BY PCR: NOT DETECTED
ALBUMIN SERPL-MCNC: 2.6 G/DL (ref 3.5–5)
ALBUMIN/GLOB SERPL: 0.7 (ref 1.1–2.2)
ALP SERPL-CCNC: 128 U/L (ref 45–117)
ALT SERPL-CCNC: 22 U/L (ref 12–78)
ANION GAP SERPL CALC-SCNC: 7 MMOL/L (ref 5–15)
ANION GAP SERPL CALC-SCNC: 7 MMOL/L (ref 5–15)
ANION GAP SERPL CALC-SCNC: 9 MMOL/L (ref 5–15)
AST SERPL-CCNC: 17 U/L (ref 15–37)
B FRAGILIS DNA BLD POS QL NAA+NON-PROBE: NOT DETECTED
BASE EXCESS BLD CALC-SCNC: 10.7 MMOL/L
BASOPHILS # BLD: 0 K/UL (ref 0–0.1)
BASOPHILS NFR BLD: 0 % (ref 0–1)
BILIRUB SERPL-MCNC: 2 MG/DL (ref 0.2–1)
BIOFIRE TEST COMMENT: ABNORMAL
BLACTX-M ISLT/SPM QL: NOT DETECTED
BLAIMP ISLT/SPM QL: NOT DETECTED
BLAKPC ISLT/SPM QL: NOT DETECTED
BLAOXA-48-LIKE ISLT/SPM QL: NOT DETECTED
BLAVIM ISLT/SPM QL: NOT DETECTED
BUN SERPL-MCNC: 44 MG/DL (ref 6–20)
BUN SERPL-MCNC: 46 MG/DL (ref 6–20)
BUN SERPL-MCNC: 52 MG/DL (ref 6–20)
BUN/CREAT SERPL: 19 (ref 12–20)
BUN/CREAT SERPL: 20 (ref 12–20)
BUN/CREAT SERPL: 22 (ref 12–20)
C ALBICANS DNA BLD POS QL NAA+NON-PROBE: NOT DETECTED
C AURIS DNA BLD POS QL NAA+NON-PROBE: NOT DETECTED
C GATTII+NEOFOR DNA BLD POS QL NAA+N-PRB: NOT DETECTED
C GLABRATA DNA BLD POS QL NAA+NON-PROBE: NOT DETECTED
C KRUSEI DNA BLD POS QL NAA+NON-PROBE: NOT DETECTED
C PARAP DNA BLD POS QL NAA+NON-PROBE: NOT DETECTED
C TROPICLS DNA BLD POS QL NAA+NON-PROBE: NOT DETECTED
CA-I BLD-SCNC: 1.06 MMOL/L (ref 1.12–1.32)
CALCIUM SERPL-MCNC: 8 MG/DL (ref 8.5–10.1)
CALCIUM SERPL-MCNC: 8.2 MG/DL (ref 8.5–10.1)
CALCIUM SERPL-MCNC: 8.3 MG/DL (ref 8.5–10.1)
CHLORIDE SERPL-SCNC: 92 MMOL/L (ref 97–108)
CHLORIDE SERPL-SCNC: 93 MMOL/L (ref 97–108)
CHLORIDE SERPL-SCNC: 94 MMOL/L (ref 97–108)
CO2 SERPL-SCNC: 32 MMOL/L (ref 21–32)
CO2 SERPL-SCNC: 33 MMOL/L (ref 21–32)
CO2 SERPL-SCNC: 33 MMOL/L (ref 21–32)
CREAT SERPL-MCNC: 2.3 MG/DL (ref 0.7–1.3)
CREAT SERPL-MCNC: 2.34 MG/DL (ref 0.7–1.3)
CREAT SERPL-MCNC: 2.34 MG/DL (ref 0.7–1.3)
DIFFERENTIAL METHOD BLD: ABNORMAL
E CLOAC COMP DNA BLD POS NAA+NON-PROBE: NOT DETECTED
E COLI DNA BLD POS QL NAA+NON-PROBE: NOT DETECTED
E FAECALIS DNA BLD POS QL NAA+NON-PROBE: NOT DETECTED
E FAECIUM DNA BLD POS QL NAA+NON-PROBE: NOT DETECTED
ECHO AO ROOT DIAM: 3.8 CM
ECHO AO ROOT INDEX: 2 CM/M2
ECHO AR MAX VEL PISA: 4.1 M/S
ECHO AV AREA PEAK VELOCITY: 2.2 CM2
ECHO AV AREA VTI: 2.1 CM2
ECHO AV AREA/BSA PEAK VELOCITY: 1.2 CM2/M2
ECHO AV AREA/BSA VTI: 1.1 CM2/M2
ECHO AV MEAN GRADIENT: 8 MMHG
ECHO AV MEAN VELOCITY: 1.3 M/S
ECHO AV PEAK GRADIENT: 16 MMHG
ECHO AV PEAK VELOCITY: 2 M/S
ECHO AV REGURGITANT PHT: 401.2 MILLISECOND
ECHO AV VELOCITY RATIO: 0.7
ECHO AV VTI: 36.4 CM
ECHO BSA: 1.89 M2
ECHO EST RA PRESSURE: 15 MMHG
ECHO LA DIAMETER INDEX: 2.37 CM/M2
ECHO LA DIAMETER: 4.5 CM
ECHO LA TO AORTIC ROOT RATIO: 1.18
ECHO LA VOL A-L A2C: 104 ML (ref 18–58)
ECHO LA VOL A-L A4C: 105 ML (ref 18–58)
ECHO LA VOL MOD A2C: 101 ML (ref 18–58)
ECHO LA VOL MOD A4C: 98 ML (ref 18–58)
ECHO LA VOLUME AREA LENGTH: 115 ML
ECHO LA VOLUME INDEX A-L A2C: 55 ML/M2 (ref 16–34)
ECHO LA VOLUME INDEX A-L A4C: 55 ML/M2 (ref 16–34)
ECHO LA VOLUME INDEX AREA LENGTH: 61 ML/M2 (ref 16–34)
ECHO LA VOLUME INDEX MOD A2C: 53 ML/M2 (ref 16–34)
ECHO LA VOLUME INDEX MOD A4C: 52 ML/M2 (ref 16–34)
ECHO LV FRACTIONAL SHORTENING: 7 % (ref 28–44)
ECHO LV INTERNAL DIMENSION DIASTOLE INDEX: 3.16 CM/M2
ECHO LV INTERNAL DIMENSION DIASTOLIC: 6 CM (ref 4.2–5.9)
ECHO LV INTERNAL DIMENSION SYSTOLIC INDEX: 2.95 CM/M2
ECHO LV INTERNAL DIMENSION SYSTOLIC: 5.6 CM
ECHO LV IVSD: 1.2 CM (ref 0.6–1)
ECHO LV MASS 2D: 331.8 G (ref 88–224)
ECHO LV MASS INDEX 2D: 174.7 G/M2 (ref 49–115)
ECHO LV POSTERIOR WALL DIASTOLIC: 1.3 CM (ref 0.6–1)
ECHO LV RELATIVE WALL THICKNESS RATIO: 0.43
ECHO LVOT AREA: 3.1 CM2
ECHO LVOT AV VTI INDEX: 0.66
ECHO LVOT DIAM: 2 CM
ECHO LVOT MEAN GRADIENT: 4 MMHG
ECHO LVOT PEAK GRADIENT: 7 MMHG
ECHO LVOT PEAK VELOCITY: 1.4 M/S
ECHO LVOT STROKE VOLUME INDEX: 39.5 ML/M2
ECHO LVOT SV: 75 ML
ECHO LVOT VTI: 23.9 CM
ECHO MV A VELOCITY: 0.54 M/S
ECHO MV AREA VTI: 0.6 CM2
ECHO MV E VELOCITY: 1.17 M/S
ECHO MV E/A RATIO: 2.17
ECHO MV LVOT VTI INDEX: 4.92
ECHO MV MAX VELOCITY: 4.4 M/S
ECHO MV MEAN GRADIENT: 43 MMHG
ECHO MV MEAN VELOCITY: 3 M/S
ECHO MV PEAK GRADIENT: 78 MMHG
ECHO MV REGURGITANT PEAK GRADIENT: 81 MMHG
ECHO MV REGURGITANT PEAK VELOCITY: 4.5 M/S
ECHO MV REGURGITANT VTIA: 131.8 CM
ECHO MV VTI: 117.6 CM
ECHO RIGHT VENTRICULAR SYSTOLIC PRESSURE (RVSP): 68 MMHG
ECHO RV TAPSE: 1.4 CM (ref 1.7–?)
ECHO TV REGURGITANT MAX VELOCITY: 3.64 M/S
ECHO TV REGURGITANT PEAK GRADIENT: 53 MMHG
EKG ATRIAL RATE: 60 BPM
EKG DIAGNOSIS: NORMAL
EKG Q-T INTERVAL: 448 MS
EKG QRS DURATION: 152 MS
EKG QTC CALCULATION (BAZETT): 536 MS
EKG R AXIS: -45 DEGREES
EKG T AXIS: 105 DEGREES
EKG VENTRICULAR RATE: 86 BPM
ENTEROBACTERALES DNA BLD POS NAA+N-PRB: DETECTED
EOSINOPHIL # BLD: 0 K/UL (ref 0–0.4)
EOSINOPHIL NFR BLD: 0 % (ref 0–7)
ERYTHROCYTE [DISTWIDTH] IN BLOOD BY AUTOMATED COUNT: 17.2 % (ref 11.5–14.5)
GLOBULIN SER CALC-MCNC: 3.9 G/DL (ref 2–4)
GLUCOSE SERPL-MCNC: 123 MG/DL (ref 65–100)
GLUCOSE SERPL-MCNC: 142 MG/DL (ref 65–100)
GLUCOSE SERPL-MCNC: 153 MG/DL (ref 65–100)
GP B STREP DNA BLD POS QL NAA+NON-PROBE: NOT DETECTED
HAEM INFLU DNA BLD POS QL NAA+NON-PROBE: NOT DETECTED
HCO3 BLD-SCNC: 36.6 MMOL/L (ref 22–26)
HCT VFR BLD AUTO: 29.2 % (ref 36.6–50.3)
HGB BLD-MCNC: 10.3 G/DL (ref 12.1–17)
IMM GRANULOCYTES # BLD AUTO: 0.1 K/UL (ref 0–0.04)
IMM GRANULOCYTES NFR BLD AUTO: 1 % (ref 0–0.5)
K OXYTOCA DNA BLD POS QL NAA+NON-PROBE: NOT DETECTED
KLEBSIELLA SP DNA BLD POS QL NAA+NON-PRB: NOT DETECTED
KLEBSIELLA SP DNA BLD POS QL NAA+NON-PRB: NOT DETECTED
L MONOCYTOG DNA BLD POS QL NAA+NON-PROBE: NOT DETECTED
LACTATE SERPL-SCNC: 1.4 MMOL/L (ref 0.4–2)
LYMPHOCYTES # BLD: 1.3 K/UL (ref 0.8–3.5)
LYMPHOCYTES NFR BLD: 9 % (ref 12–49)
MAGNESIUM SERPL-MCNC: 2.2 MG/DL (ref 1.6–2.4)
MCH RBC QN AUTO: 27 PG (ref 26–34)
MCHC RBC AUTO-ENTMCNC: 35.3 G/DL (ref 30–36.5)
MCV RBC AUTO: 76.4 FL (ref 80–99)
MONOCYTES # BLD: 0.8 K/UL (ref 0–1)
MONOCYTES NFR BLD: 6 % (ref 5–13)
N MEN DNA BLD POS QL NAA+NON-PROBE: NOT DETECTED
NEUTS SEG # BLD: 11.5 K/UL (ref 1.8–8)
NEUTS SEG NFR BLD: 84 % (ref 32–75)
NRBC # BLD: 0 K/UL (ref 0–0.01)
NRBC BLD-RTO: 0 PER 100 WBC
NT PRO BNP: ABNORMAL PG/ML
P AERUGINOSA DNA BLD POS NAA+NON-PROBE: NOT DETECTED
PCO2 BLD: 53.5 MMHG (ref 35–45)
PH BLD: 7.44 (ref 7.35–7.45)
PHOSPHATE SERPL-MCNC: 4.1 MG/DL (ref 2.6–4.7)
PLATELET # BLD AUTO: 232 K/UL (ref 150–400)
PMV BLD AUTO: 9.9 FL (ref 8.9–12.9)
PO2 BLD: <27 MMHG (ref 80–100)
POTASSIUM SERPL-SCNC: 2.8 MMOL/L (ref 3.5–5.1)
POTASSIUM SERPL-SCNC: 3 MMOL/L (ref 3.5–5.1)
POTASSIUM SERPL-SCNC: 3.2 MMOL/L (ref 3.5–5.1)
PROT SERPL-MCNC: 6.5 G/DL (ref 6.4–8.2)
PROTEUS SP DNA BLD POS QL NAA+NON-PROBE: DETECTED
RBC # BLD AUTO: 3.82 M/UL (ref 4.1–5.7)
RESISTANT GENE NDM BY PCR: NOT DETECTED
RESISTANT GENE TARGETS: ABNORMAL
S AUREUS DNA BLD POS QL NAA+NON-PROBE: NOT DETECTED
S AUREUS+CONS DNA BLD POS NAA+NON-PROBE: NOT DETECTED
S EPIDERMIDIS DNA BLD POS QL NAA+NON-PRB: NOT DETECTED
S LUGDUNENSIS DNA BLD POS QL NAA+NON-PRB: NOT DETECTED
S MALTOPHILIA DNA BLD POS QL NAA+NON-PRB: NOT DETECTED
S MARCESCENS DNA BLD POS NAA+NON-PROBE: NOT DETECTED
S PNEUM DNA BLD POS QL NAA+NON-PROBE: NOT DETECTED
S PYO DNA BLD POS QL NAA+NON-PROBE: NOT DETECTED
SALMONELLA DNA BLD POS QL NAA+NON-PROBE: NOT DETECTED
SODIUM SERPL-SCNC: 132 MMOL/L (ref 136–145)
SODIUM SERPL-SCNC: 134 MMOL/L (ref 136–145)
SODIUM SERPL-SCNC: 134 MMOL/L (ref 136–145)
SPECIMEN TYPE: ABNORMAL
STREPTOCOCCUS DNA BLD POS NAA+NON-PROBE: NOT DETECTED
WBC # BLD AUTO: 13.6 K/UL (ref 4.1–11.1)

## 2024-06-13 PROCEDURE — 6370000000 HC RX 637 (ALT 250 FOR IP): Performed by: EMERGENCY MEDICINE

## 2024-06-13 PROCEDURE — 2580000003 HC RX 258: Performed by: STUDENT IN AN ORGANIZED HEALTH CARE EDUCATION/TRAINING PROGRAM

## 2024-06-13 PROCEDURE — 99233 SBSQ HOSP IP/OBS HIGH 50: CPT | Performed by: INTERNAL MEDICINE

## 2024-06-13 PROCEDURE — 80048 BASIC METABOLIC PNL TOTAL CA: CPT

## 2024-06-13 PROCEDURE — 2700000000 HC OXYGEN THERAPY PER DAY

## 2024-06-13 PROCEDURE — APPNB60 APP NON BILLABLE TIME 46-60 MINS: Performed by: NURSE PRACTITIONER

## 2024-06-13 PROCEDURE — 0JPTXXZ REMOVAL OF TUNNELED VASCULAR ACCESS DEVICE FROM TRUNK SUBCUTANEOUS TISSUE AND FASCIA, EXTERNAL APPROACH: ICD-10-PCS | Performed by: PHYSICIAN ASSISTANT

## 2024-06-13 PROCEDURE — 84100 ASSAY OF PHOSPHORUS: CPT

## 2024-06-13 PROCEDURE — 6370000000 HC RX 637 (ALT 250 FOR IP): Performed by: NURSE PRACTITIONER

## 2024-06-13 PROCEDURE — 6370000000 HC RX 637 (ALT 250 FOR IP): Performed by: STUDENT IN AN ORGANIZED HEALTH CARE EDUCATION/TRAINING PROGRAM

## 2024-06-13 PROCEDURE — 36415 COLL VENOUS BLD VENIPUNCTURE: CPT

## 2024-06-13 PROCEDURE — 93308 TTE F-UP OR LMTD: CPT

## 2024-06-13 PROCEDURE — 6360000002 HC RX W HCPCS: Performed by: INTERNAL MEDICINE

## 2024-06-13 PROCEDURE — 80053 COMPREHEN METABOLIC PANEL: CPT

## 2024-06-13 PROCEDURE — 2500000003 HC RX 250 WO HCPCS: Performed by: EMERGENCY MEDICINE

## 2024-06-13 PROCEDURE — 6360000002 HC RX W HCPCS: Performed by: NURSE PRACTITIONER

## 2024-06-13 PROCEDURE — 83605 ASSAY OF LACTIC ACID: CPT

## 2024-06-13 PROCEDURE — 87040 BLOOD CULTURE FOR BACTERIA: CPT

## 2024-06-13 PROCEDURE — 36589 REMOVAL TUNNELED CV CATH: CPT

## 2024-06-13 PROCEDURE — 93308 TTE F-UP OR LMTD: CPT | Performed by: INTERNAL MEDICINE

## 2024-06-13 PROCEDURE — 6370000000 HC RX 637 (ALT 250 FOR IP): Performed by: INTERNAL MEDICINE

## 2024-06-13 PROCEDURE — 6360000002 HC RX W HCPCS: Performed by: STUDENT IN AN ORGANIZED HEALTH CARE EDUCATION/TRAINING PROGRAM

## 2024-06-13 PROCEDURE — 6360000002 HC RX W HCPCS: Performed by: EMERGENCY MEDICINE

## 2024-06-13 PROCEDURE — 93321 DOPPLER ECHO F-UP/LMTD STD: CPT | Performed by: INTERNAL MEDICINE

## 2024-06-13 PROCEDURE — 93010 ELECTROCARDIOGRAM REPORT: CPT | Performed by: INTERNAL MEDICINE

## 2024-06-13 PROCEDURE — 85025 COMPLETE CBC W/AUTO DIFF WBC: CPT

## 2024-06-13 PROCEDURE — 83880 ASSAY OF NATRIURETIC PEPTIDE: CPT

## 2024-06-13 PROCEDURE — 82803 BLOOD GASES ANY COMBINATION: CPT

## 2024-06-13 PROCEDURE — 2000000000 HC ICU R&B

## 2024-06-13 PROCEDURE — 93325 DOPPLER ECHO COLOR FLOW MAPG: CPT | Performed by: INTERNAL MEDICINE

## 2024-06-13 PROCEDURE — 99222 1ST HOSP IP/OBS MODERATE 55: CPT | Performed by: INTERNAL MEDICINE

## 2024-06-13 PROCEDURE — 83735 ASSAY OF MAGNESIUM: CPT

## 2024-06-13 PROCEDURE — 94760 N-INVAS EAR/PLS OXIMETRY 1: CPT

## 2024-06-13 RX ORDER — POTASSIUM CHLORIDE 7.45 MG/ML
10 INJECTION INTRAVENOUS
Status: COMPLETED | OUTPATIENT
Start: 2024-06-14 | End: 2024-06-14

## 2024-06-13 RX ORDER — BUMETANIDE 0.25 MG/ML
2 INJECTION INTRAMUSCULAR; INTRAVENOUS 2 TIMES DAILY
Status: DISCONTINUED | OUTPATIENT
Start: 2024-06-13 | End: 2024-06-18

## 2024-06-13 RX ORDER — LEVOTHYROXINE SODIUM 88 UG/1
88 TABLET ORAL DAILY
Status: DISCONTINUED | OUTPATIENT
Start: 2024-06-13 | End: 2024-06-21 | Stop reason: HOSPADM

## 2024-06-13 RX ORDER — TAMSULOSIN HYDROCHLORIDE 0.4 MG/1
0.4 CAPSULE ORAL DAILY
Status: DISCONTINUED | OUTPATIENT
Start: 2024-06-13 | End: 2024-06-21 | Stop reason: HOSPADM

## 2024-06-13 RX ORDER — AMIODARONE HYDROCHLORIDE 200 MG/1
100 TABLET ORAL DAILY
Status: DISCONTINUED | OUTPATIENT
Start: 2024-06-13 | End: 2024-06-21 | Stop reason: HOSPADM

## 2024-06-13 RX ORDER — DOCUSATE SODIUM 100 MG/1
100 CAPSULE, LIQUID FILLED ORAL 2 TIMES DAILY PRN
Status: DISCONTINUED | OUTPATIENT
Start: 2024-06-13 | End: 2024-06-21 | Stop reason: HOSPADM

## 2024-06-13 RX ORDER — ALLOPURINOL 100 MG/1
50 TABLET ORAL DAILY
Status: DISCONTINUED | OUTPATIENT
Start: 2024-06-13 | End: 2024-06-21 | Stop reason: HOSPADM

## 2024-06-13 RX ORDER — METOLAZONE 2.5 MG/1
TABLET ORAL
Qty: 10 TABLET | Refills: 0 | OUTPATIENT
Start: 2024-06-13

## 2024-06-13 RX ORDER — ACETAZOLAMIDE 250 MG/1
125 TABLET ORAL 2 TIMES DAILY
Status: DISCONTINUED | OUTPATIENT
Start: 2024-06-13 | End: 2024-06-21 | Stop reason: HOSPADM

## 2024-06-13 RX ORDER — MIDODRINE HYDROCHLORIDE 5 MG/1
2.5 TABLET ORAL
Status: DISCONTINUED | OUTPATIENT
Start: 2024-06-13 | End: 2024-06-13

## 2024-06-13 RX ORDER — POTASSIUM CHLORIDE 29.8 MG/ML
20 INJECTION INTRAVENOUS
Status: COMPLETED | OUTPATIENT
Start: 2024-06-13 | End: 2024-06-13

## 2024-06-13 RX ORDER — CALCIUM GLUCONATE 20 MG/ML
2000 INJECTION, SOLUTION INTRAVENOUS ONCE
Status: COMPLETED | OUTPATIENT
Start: 2024-06-13 | End: 2024-06-13

## 2024-06-13 RX ORDER — MIDODRINE HYDROCHLORIDE 5 MG/1
5 TABLET ORAL
Status: DISCONTINUED | OUTPATIENT
Start: 2024-06-13 | End: 2024-06-14

## 2024-06-13 RX ORDER — POTASSIUM CHLORIDE 7.45 MG/ML
10 INJECTION INTRAVENOUS
Status: DISPENSED | OUTPATIENT
Start: 2024-06-13 | End: 2024-06-13

## 2024-06-13 RX ADMIN — DOCUSATE SODIUM 100 MG: 100 CAPSULE, LIQUID FILLED ORAL at 11:37

## 2024-06-13 RX ADMIN — APIXABAN 5 MG: 5 TABLET, FILM COATED ORAL at 20:50

## 2024-06-13 RX ADMIN — SODIUM CHLORIDE, PRESERVATIVE FREE 10 ML: 5 INJECTION INTRAVENOUS at 01:47

## 2024-06-13 RX ADMIN — SODIUM CHLORIDE: 9 INJECTION, SOLUTION INTRAVENOUS at 14:36

## 2024-06-13 RX ADMIN — LEVOTHYROXINE SODIUM 88 MCG: 0.09 TABLET ORAL at 08:43

## 2024-06-13 RX ADMIN — BUMETANIDE 2 MG: 0.25 INJECTION INTRAMUSCULAR; INTRAVENOUS at 09:48

## 2024-06-13 RX ADMIN — POTASSIUM CHLORIDE 20 MEQ: 29.8 INJECTION, SOLUTION INTRAVENOUS at 02:55

## 2024-06-13 RX ADMIN — ACETAZOLAMIDE 125 MG: 250 TABLET ORAL at 09:48

## 2024-06-13 RX ADMIN — POTASSIUM CHLORIDE 20 MEQ: 29.8 INJECTION, SOLUTION INTRAVENOUS at 04:19

## 2024-06-13 RX ADMIN — POTASSIUM CHLORIDE 10 MEQ: 7.46 INJECTION, SOLUTION INTRAVENOUS at 11:59

## 2024-06-13 RX ADMIN — CEFEPIME 1000 MG: 1 INJECTION, POWDER, FOR SOLUTION INTRAMUSCULAR; INTRAVENOUS at 17:46

## 2024-06-13 RX ADMIN — CALCIUM GLUCONATE 2000 MG: 20 INJECTION, SOLUTION INTRAVENOUS at 14:31

## 2024-06-13 RX ADMIN — DOBUTAMINE HYDROCHLORIDE 4 MCG/KG/MIN: 200 INJECTION INTRAVENOUS at 22:20

## 2024-06-13 RX ADMIN — POTASSIUM CHLORIDE 10 MEQ: 7.46 INJECTION, SOLUTION INTRAVENOUS at 13:27

## 2024-06-13 RX ADMIN — AMIODARONE HYDROCHLORIDE 100 MG: 200 TABLET ORAL at 08:43

## 2024-06-13 RX ADMIN — ALLOPURINOL 50 MG: 100 TABLET ORAL at 08:43

## 2024-06-13 RX ADMIN — SODIUM CHLORIDE: 9 INJECTION, SOLUTION INTRAVENOUS at 02:54

## 2024-06-13 RX ADMIN — MIDODRINE HYDROCHLORIDE 2.5 MG: 5 TABLET ORAL at 08:43

## 2024-06-13 RX ADMIN — BUMETANIDE 2 MG: 0.25 INJECTION INTRAMUSCULAR; INTRAVENOUS at 17:47

## 2024-06-13 RX ADMIN — ACETAZOLAMIDE 125 MG: 250 TABLET ORAL at 22:14

## 2024-06-13 RX ADMIN — APIXABAN 5 MG: 5 TABLET, FILM COATED ORAL at 08:43

## 2024-06-13 RX ADMIN — MIDODRINE HYDROCHLORIDE 2.5 MG: 5 TABLET ORAL at 11:37

## 2024-06-13 RX ADMIN — ACETAZOLAMIDE 125 MG: 250 TABLET ORAL at 02:13

## 2024-06-13 RX ADMIN — POTASSIUM BICARBONATE 20 MEQ: 782 TABLET, EFFERVESCENT ORAL at 23:22

## 2024-06-13 RX ADMIN — MIDODRINE HYDROCHLORIDE 5 MG: 5 TABLET ORAL at 17:48

## 2024-06-13 RX ADMIN — POTASSIUM CHLORIDE 10 MEQ: 7.46 INJECTION, SOLUTION INTRAVENOUS at 14:37

## 2024-06-13 RX ADMIN — POTASSIUM CHLORIDE 10 MEQ: 7.46 INJECTION, SOLUTION INTRAVENOUS at 23:24

## 2024-06-13 RX ADMIN — CEFEPIME 1000 MG: 1 INJECTION, POWDER, FOR SOLUTION INTRAMUSCULAR; INTRAVENOUS at 06:08

## 2024-06-13 ASSESSMENT — PAIN SCALES - GENERAL
PAINLEVEL_OUTOF10: 0

## 2024-06-13 ASSESSMENT — ENCOUNTER SYMPTOMS
DIARRHEA: 1
DYSPNEA ACTIVITY LEVEL: AFTER AMBULATING LESS THAN 10 FT
PAIN LOCATION - PAIN QUALITY: ACHY

## 2024-06-13 NOTE — ED NOTES
TRANSFER - OUT REPORT:    Verbal report given to dena Youngblood  on Ingrid Kennedy  being transferred to [place] [id number] for routine progression of patient care       Report consisted of patient's Situation, Background, Assessment and   Recommendations(SBAR).     Information from the following report(s) Nurse Handoff Report, ED Encounter Summary, Adult Overview, Intake/Output, MAR, Recent Results, Cardiac Rhythm A-fib, and Neuro Assessment was reviewed with the receiving nurse.    [place] Fall Assessment:    Presents to emergency department  because of falls (Syncope, seizure, or loss of consciousness): No  Age > 70: Yes  Altered Mental Status, Intoxication with alcohol or substance confusion (Disorientation, impaired judgment, poor safety awaremess, or inability to follow instructions): No  Impaired Mobility: Ambulates or transfers with assistive devices or assistance; Unable to ambulate or transer.: No  Nursing Judgement: No          Lines:   CVC Double Lumen 01/17/24 Right Subclavian (Active)       Peripheral IV 06/12/24 Posterior;Right Hand (Active)        Opportunity for questions and clarification was provided.      Patient transported with:  Monitor and Registered Nurse

## 2024-06-13 NOTE — DISCHARGE INSTRUCTIONS
To access the American Heart Association's Interactive Workbook \"Healthier Living with Heart Failure - Managing Symptoms and Reducing Risk\"  Scan the QR code below.                              Discharge Instructions       PATIENT ID: Ingrid Kennedy  MRN: [id number]   YOB: 1950    DATE OF ADMISSION: 6/12/2024   DATE OF DISCHARGE: 6/20/2024    PRIMARY CARE PROVIDER: Elisa Loyd     ATTENDING PHYSICIAN: Aissatou Matthews MD   DISCHARGING PROVIDER: Aissatou Matthews MD    To contact this individual call [phone] and ask the  to page.   If unavailable ask to be transferred the Adult Hospitalist Department.    DISCHARGE DIAGNOSES   Bacteremia   Septic shock - resolved   Acute on chronic systolic congestive heart failure  S/p ICD   Paroxysmal atrial fibrillation  Hypertension  CKD stage III  Hypothyroid  Hypokalemia     CONSULTATIONS:    IP CONSULT TO PHARMACY  IP CONSULT TO INFECTIOUS DISEASES  IP CONSULT TO PALLIATIVE CARE  IP CONSULT HOME HEALTH    PROCEDURES/SURGERIES: * No surgery found *    PENDING TEST RESULTS:   At the time of discharge the following test results are still pending: None    FOLLOW UP APPOINTMENTS:   Elisa Loyd MD in 1-2 weeks  Lesly Major MD, [facility], Advanced Heart Failure  Follow up with  Pulmonary Service    ADDITIONAL CARE RECOMMENDATIONS:      DIET: regular diet    ACTIVITY: activity as tolerated    WOUND CARE: None    EQUIPMENT needed: Home iv abx      DISCHARGE MEDICATIONS:   See Medication Reconciliation Form    It is important that you take the medication exactly as they are prescribed.   Keep your medication in the bottles provided by the pharmacist and keep a list of the medication names, dosages, and times to be taken in your wallet.   Do not take other medications without consulting your doctor.       NOTIFY YOUR PHYSICIAN FOR ANY OF THE FOLLOWING:   Fever over 101 degrees for 24 hours.   Chest pain, shortness of breath, fever, chills, nausea,

## 2024-06-13 NOTE — ED NOTES
Pt was unable to void in the urinal. Rn changed bedding and placed an external urinary catheter on pt.

## 2024-06-13 NOTE — ED PROVIDER NOTES
[facility] 4 CORONARY CARE  EMERGENCY DEPARTMENT ENCOUNTER      Pt Name: Ingrid Kennedy  MRN: [id number]  Birthdate 1950  Date of evaluation: 6/12/2024  Provider: Siddharth Wilson MD      HISTORY OF PRESENT ILLNESS      [facility]  73-year-old man with a past medical history of heart failure on home dobutamine presenting to the emergency department due to increasing shortness of breath as well as leg and abdominal swelling.  Symptoms have progressed fairly rapidly over the last few days.  He says he gets very short of breath with only minimal ambulation.  He says his abdomen has become swollen and distended as well.  He denies fever.  Denies cough or chest pain.  He says he has been compliant with his medications.  Patient has been in contact with his heart failure team and was told to go to the emergency department.  He says he has had decreased urine output as well despite taking his diuretics.      Nursing Notes were reviewed.    REVIEW OF SYSTEMS         Review of Systems  A complete review of systems was performed and all systems reviewed were negative unless otherwise document in the HPI      PAST MEDICAL HISTORY     Past Medical History:   Diagnosis Date    AICD (automatic cardioverter/defibrillator) present     CHF (congestive heart failure) (HCC)     CKD (chronic kidney disease)     Dilated cardiomyopathy (HCC)     Hypertension     Low left ventricular ejection fraction     GERRI on CPAP     Thyroid disease          SURGICAL HISTORY       Past Surgical History:   Procedure Laterality Date    CARDIAC PROCEDURE N/A 12/18/2023    Right heart cath performed by Ananda Walton MD at [facility] CARDIAC CATH LAB    CARDIAC PROCEDURE N/A 12/27/2023    Right heart cath performed by Ananda Walton MD at [facility] CARDIAC CATH LAB    CARDIAC PROCEDURE N/A 02/15/2024    Right heart cath performed by Emil Covarrubias MD at [facility] CARDIAC CATH LAB    CARDIAC PROCEDURE N/A 05/14/2024    Right heart cath performed by Ananda Walton MD at [facility]

## 2024-06-13 NOTE — CARDIO/PULMONARY
Chart reviewed: Patient is 73 y.o. male admitted with Shock circulatory (HCC) [R57.9]  Hypervolemia, unspecified hypervolemia type [E87.70]  Acute on chronic congestive heart failure, unspecified heart failure type (HCC) [I50.9]. Most recent EF is EF: 15%.  on (date).    Patient is on palliative dobutamine and is not a candidate for LVAD, thus, patient is not a candidate for cardiac rehab.   SEE FOFANA RN

## 2024-06-13 NOTE — ED NOTES
Pt has 4 runs of Vtach. Rn informed ED md and ICU md. Rn got an EKG of the pt and scanned the results into the pt's chart.

## 2024-06-13 NOTE — ED NOTES
Rn was informed by previous nurse that pt urinate in brief. Brief was said to be very full and heavy. Rn was unable to know specify amount of urine.

## 2024-06-13 NOTE — HOME HEALTH
ER.    Caregiver states that [facility] sent over a new IV pump yesterday, but new pump is not working. She states a messing comes up regarding priming. SN reached out to [employer] and left a message for the nurse to give me a call back.     Pt is currently not meeting goals at ths time. SN is still needed for cardiopulm assessments and education until goals are met.           Please complete form for all falls whether observed or not observed.    Fall observed by HH Staff?No    Describe Event (please include location of fall and may copy and paste from visit note):pt states on 6/10/24 he was sleeping in the bed and woke up to find himself on the floor. Pt states that he didnt hit his head or sustain any injuries but landed on his left side. Pt denies hitting/ pulling at his central venous line. pt called for help and his caregiver was able to get him off the floor.    Document any re-training or treatment plan modifications indicated and the patient/caregiver response (may copy and paste from visit note): Pt normally sleeps at the edge of the bed. Pt instructed to sleep in the middle to prevent against falls when rolling over.    Assistive Devices used by patient prior to fall:No      Was this equipment in use at time of fall? No    Injury? No If yes, describe:    Emergent Care Received (EMS, DH)?NO, If yes, describe:      Was patient identified as High Risk prior to fall? YES      Written Teaching Material Utilized: N/A    Interdisciplinary communication with: see above    Discharge planning as follows: Is no longer homebound, Per physician order and When goals are met    Specific plan for next visit: assesssment, labs, education

## 2024-06-14 PROBLEM — J96.01 ACUTE RESPIRATORY FAILURE WITH HYPOXIA (HCC): Status: ACTIVE | Noted: 2024-06-14

## 2024-06-14 PROBLEM — Z51.5 PALLIATIVE CARE ENCOUNTER: Status: ACTIVE | Noted: 2024-06-14

## 2024-06-14 PROBLEM — Z71.89 GOALS OF CARE, COUNSELING/DISCUSSION: Status: ACTIVE | Noted: 2024-06-14

## 2024-06-14 LAB
ALBUMIN SERPL-MCNC: 2.3 G/DL (ref 3.5–5)
ALBUMIN/GLOB SERPL: 0.6 (ref 1.1–2.2)
ALP SERPL-CCNC: 134 U/L (ref 45–117)
ALT SERPL-CCNC: 19 U/L (ref 12–78)
ANION GAP SERPL CALC-SCNC: 5 MMOL/L (ref 5–15)
ANION GAP SERPL CALC-SCNC: 6 MMOL/L (ref 5–15)
AST SERPL-CCNC: 23 U/L (ref 15–37)
BACTERIA SPEC CULT: ABNORMAL
BACTERIA SPEC CULT: ABNORMAL
BACTERIA SPEC CULT: NORMAL
BACTERIA SPEC CULT: NORMAL
BASOPHILS # BLD: 0 K/UL (ref 0–0.1)
BASOPHILS NFR BLD: 0 % (ref 0–1)
BILIRUB DIRECT SERPL-MCNC: 0.5 MG/DL (ref 0–0.2)
BILIRUB SERPL-MCNC: 1.5 MG/DL (ref 0.2–1)
BUN SERPL-MCNC: 52 MG/DL (ref 6–20)
BUN SERPL-MCNC: 52 MG/DL (ref 6–20)
BUN/CREAT SERPL: 21 (ref 12–20)
BUN/CREAT SERPL: 24 (ref 12–20)
CALCIUM SERPL-MCNC: 7.9 MG/DL (ref 8.5–10.1)
CALCIUM SERPL-MCNC: 8.1 MG/DL (ref 8.5–10.1)
CC UR VC: ABNORMAL
CHLORIDE SERPL-SCNC: 94 MMOL/L (ref 97–108)
CHLORIDE SERPL-SCNC: 97 MMOL/L (ref 97–108)
CO2 SERPL-SCNC: 33 MMOL/L (ref 21–32)
CO2 SERPL-SCNC: 34 MMOL/L (ref 21–32)
CREAT SERPL-MCNC: 2.19 MG/DL (ref 0.7–1.3)
CREAT SERPL-MCNC: 2.43 MG/DL (ref 0.7–1.3)
DIFFERENTIAL METHOD BLD: ABNORMAL
EOSINOPHIL # BLD: 0 K/UL (ref 0–0.4)
EOSINOPHIL NFR BLD: 0 % (ref 0–7)
ERYTHROCYTE [DISTWIDTH] IN BLOOD BY AUTOMATED COUNT: 17.2 % (ref 11.5–14.5)
GLOBULIN SER CALC-MCNC: 3.7 G/DL (ref 2–4)
GLUCOSE SERPL-MCNC: 142 MG/DL (ref 65–100)
GLUCOSE SERPL-MCNC: 164 MG/DL (ref 65–100)
HCT VFR BLD AUTO: 28.3 % (ref 36.6–50.3)
HGB BLD-MCNC: 9.7 G/DL (ref 12.1–17)
IMM GRANULOCYTES # BLD AUTO: 0.1 K/UL (ref 0–0.04)
IMM GRANULOCYTES NFR BLD AUTO: 1 % (ref 0–0.5)
LYMPHOCYTES # BLD: 1.2 K/UL (ref 0.8–3.5)
LYMPHOCYTES NFR BLD: 10 % (ref 12–49)
MCH RBC QN AUTO: 26.8 PG (ref 26–34)
MCHC RBC AUTO-ENTMCNC: 34.3 G/DL (ref 30–36.5)
MCV RBC AUTO: 78.2 FL (ref 80–99)
MONOCYTES # BLD: 1.1 K/UL (ref 0–1)
MONOCYTES NFR BLD: 10 % (ref 5–13)
NEUTS SEG # BLD: 8.8 K/UL (ref 1.8–8)
NEUTS SEG NFR BLD: 79 % (ref 32–75)
NRBC # BLD: 0 K/UL (ref 0–0.01)
NRBC BLD-RTO: 0 PER 100 WBC
NT PRO BNP: ABNORMAL PG/ML
PLATELET # BLD AUTO: 247 K/UL (ref 150–400)
PMV BLD AUTO: 10 FL (ref 8.9–12.9)
POTASSIUM SERPL-SCNC: 2.9 MMOL/L (ref 3.5–5.1)
POTASSIUM SERPL-SCNC: 3.6 MMOL/L (ref 3.5–5.1)
PROT SERPL-MCNC: 6 G/DL (ref 6.4–8.2)
RBC # BLD AUTO: 3.62 M/UL (ref 4.1–5.7)
SERVICE CMNT-IMP: ABNORMAL
SERVICE CMNT-IMP: NORMAL
SODIUM SERPL-SCNC: 133 MMOL/L (ref 136–145)
SODIUM SERPL-SCNC: 136 MMOL/L (ref 136–145)
WBC # BLD AUTO: 11.2 K/UL (ref 4.1–11.1)

## 2024-06-14 PROCEDURE — 80076 HEPATIC FUNCTION PANEL: CPT

## 2024-06-14 PROCEDURE — 2580000003 HC RX 258: Performed by: STUDENT IN AN ORGANIZED HEALTH CARE EDUCATION/TRAINING PROGRAM

## 2024-06-14 PROCEDURE — 97162 PT EVAL MOD COMPLEX 30 MIN: CPT

## 2024-06-14 PROCEDURE — 99233 SBSQ HOSP IP/OBS HIGH 50: CPT | Performed by: INTERNAL MEDICINE

## 2024-06-14 PROCEDURE — 2580000003 HC RX 258: Performed by: NURSE PRACTITIONER

## 2024-06-14 PROCEDURE — 2580000003 HC RX 258: Performed by: EMERGENCY MEDICINE

## 2024-06-14 PROCEDURE — 36415 COLL VENOUS BLD VENIPUNCTURE: CPT

## 2024-06-14 PROCEDURE — 6360000002 HC RX W HCPCS: Performed by: EMERGENCY MEDICINE

## 2024-06-14 PROCEDURE — 2000000000 HC ICU R&B

## 2024-06-14 PROCEDURE — 97530 THERAPEUTIC ACTIVITIES: CPT

## 2024-06-14 PROCEDURE — 80048 BASIC METABOLIC PNL TOTAL CA: CPT

## 2024-06-14 PROCEDURE — 97116 GAIT TRAINING THERAPY: CPT

## 2024-06-14 PROCEDURE — 6360000002 HC RX W HCPCS: Performed by: NURSE PRACTITIONER

## 2024-06-14 PROCEDURE — 6360000002 HC RX W HCPCS: Performed by: INTERNAL MEDICINE

## 2024-06-14 PROCEDURE — 6370000000 HC RX 637 (ALT 250 FOR IP): Performed by: STUDENT IN AN ORGANIZED HEALTH CARE EDUCATION/TRAINING PROGRAM

## 2024-06-14 PROCEDURE — 87040 BLOOD CULTURE FOR BACTERIA: CPT

## 2024-06-14 PROCEDURE — 6370000000 HC RX 637 (ALT 250 FOR IP): Performed by: EMERGENCY MEDICINE

## 2024-06-14 PROCEDURE — 97165 OT EVAL LOW COMPLEX 30 MIN: CPT

## 2024-06-14 PROCEDURE — 6370000000 HC RX 637 (ALT 250 FOR IP): Performed by: INTERNAL MEDICINE

## 2024-06-14 PROCEDURE — 97535 SELF CARE MNGMENT TRAINING: CPT

## 2024-06-14 PROCEDURE — 6360000002 HC RX W HCPCS: Performed by: STUDENT IN AN ORGANIZED HEALTH CARE EDUCATION/TRAINING PROGRAM

## 2024-06-14 PROCEDURE — 85025 COMPLETE CBC W/AUTO DIFF WBC: CPT

## 2024-06-14 PROCEDURE — 83880 ASSAY OF NATRIURETIC PEPTIDE: CPT

## 2024-06-14 PROCEDURE — 6370000000 HC RX 637 (ALT 250 FOR IP): Performed by: NURSE PRACTITIONER

## 2024-06-14 RX ORDER — MIDODRINE HYDROCHLORIDE 5 MG/1
10 TABLET ORAL
Status: DISCONTINUED | OUTPATIENT
Start: 2024-06-14 | End: 2024-06-19

## 2024-06-14 RX ORDER — FLUDROCORTISONE ACETATE 0.1 MG/1
0.1 TABLET ORAL ONCE
Status: COMPLETED | OUTPATIENT
Start: 2024-06-14 | End: 2024-06-14

## 2024-06-14 RX ADMIN — POTASSIUM BICARBONATE 40 MEQ: 782 TABLET, EFFERVESCENT ORAL at 20:41

## 2024-06-14 RX ADMIN — BUMETANIDE 2 MG: 0.25 INJECTION INTRAMUSCULAR; INTRAVENOUS at 08:58

## 2024-06-14 RX ADMIN — MIDODRINE HYDROCHLORIDE 10 MG: 5 TABLET ORAL at 13:10

## 2024-06-14 RX ADMIN — ACETAZOLAMIDE 125 MG: 250 TABLET ORAL at 20:41

## 2024-06-14 RX ADMIN — TAMSULOSIN HYDROCHLORIDE 0.4 MG: 0.4 CAPSULE ORAL at 08:57

## 2024-06-14 RX ADMIN — WATER 100 MG: 1 INJECTION INTRAMUSCULAR; INTRAVENOUS; SUBCUTANEOUS at 08:56

## 2024-06-14 RX ADMIN — ACETAZOLAMIDE 125 MG: 250 TABLET ORAL at 11:10

## 2024-06-14 RX ADMIN — ALLOPURINOL 50 MG: 100 TABLET ORAL at 08:57

## 2024-06-14 RX ADMIN — POTASSIUM CHLORIDE 10 MEQ: 7.46 INJECTION, SOLUTION INTRAVENOUS at 00:59

## 2024-06-14 RX ADMIN — FLUDROCORTISONE ACETATE 0.1 MG: 0.1 TABLET ORAL at 08:57

## 2024-06-14 RX ADMIN — AMIODARONE HYDROCHLORIDE 100 MG: 200 TABLET ORAL at 08:56

## 2024-06-14 RX ADMIN — MIDODRINE HYDROCHLORIDE 10 MG: 5 TABLET ORAL at 17:12

## 2024-06-14 RX ADMIN — WATER 2000 MG: 1 INJECTION INTRAMUSCULAR; INTRAVENOUS; SUBCUTANEOUS at 18:30

## 2024-06-14 RX ADMIN — BUMETANIDE 2 MG: 0.25 INJECTION INTRAMUSCULAR; INTRAVENOUS at 17:12

## 2024-06-14 RX ADMIN — LEVOTHYROXINE SODIUM 88 MCG: 0.09 TABLET ORAL at 11:10

## 2024-06-14 RX ADMIN — APIXABAN 5 MG: 5 TABLET, FILM COATED ORAL at 08:58

## 2024-06-14 RX ADMIN — APIXABAN 5 MG: 5 TABLET, FILM COATED ORAL at 20:41

## 2024-06-14 RX ADMIN — MIDODRINE HYDROCHLORIDE 10 MG: 5 TABLET ORAL at 08:56

## 2024-06-14 RX ADMIN — CEFEPIME 1000 MG: 1 INJECTION, POWDER, FOR SOLUTION INTRAMUSCULAR; INTRAVENOUS at 06:16

## 2024-06-14 ASSESSMENT — PAIN SCALES - GENERAL
PAINLEVEL_OUTOF10: 0

## 2024-06-14 ASSESSMENT — PAIN DESCRIPTION - DESCRIPTORS: DESCRIPTORS: ACHING

## 2024-06-14 ASSESSMENT — PAIN DESCRIPTION - LOCATION: LOCATION: SHOULDER

## 2024-06-15 LAB
ALBUMIN SERPL-MCNC: 2.3 G/DL (ref 3.5–5)
ALBUMIN/GLOB SERPL: 0.5 (ref 1.1–2.2)
ALP SERPL-CCNC: 126 U/L (ref 45–117)
ALT SERPL-CCNC: 17 U/L (ref 12–78)
ANION GAP SERPL CALC-SCNC: 7 MMOL/L (ref 5–15)
ANION GAP SERPL CALC-SCNC: 7 MMOL/L (ref 5–15)
ANION GAP SERPL CALC-SCNC: 8 MMOL/L (ref 5–15)
AST SERPL-CCNC: 12 U/L (ref 15–37)
BACTERIA SPEC CULT: ABNORMAL
BASOPHILS # BLD: 0 K/UL (ref 0–0.1)
BASOPHILS NFR BLD: 0 % (ref 0–1)
BILIRUB DIRECT SERPL-MCNC: 0.3 MG/DL (ref 0–0.2)
BILIRUB SERPL-MCNC: 0.9 MG/DL (ref 0.2–1)
BUN SERPL-MCNC: 56 MG/DL (ref 6–20)
BUN SERPL-MCNC: 58 MG/DL (ref 6–20)
BUN SERPL-MCNC: 62 MG/DL (ref 6–20)
BUN/CREAT SERPL: 23 (ref 12–20)
BUN/CREAT SERPL: 27 (ref 12–20)
BUN/CREAT SERPL: 27 (ref 12–20)
CALCIUM SERPL-MCNC: 8.3 MG/DL (ref 8.5–10.1)
CALCIUM SERPL-MCNC: 8.4 MG/DL (ref 8.5–10.1)
CALCIUM SERPL-MCNC: 8.4 MG/DL (ref 8.5–10.1)
CHLORIDE SERPL-SCNC: 97 MMOL/L (ref 97–108)
CHLORIDE SERPL-SCNC: 97 MMOL/L (ref 97–108)
CO2 SERPL-SCNC: 30 MMOL/L (ref 21–32)
CO2 SERPL-SCNC: 33 MMOL/L (ref 21–32)
CO2 SERPL-SCNC: 34 MMOL/L (ref 21–32)
CREAT SERPL-MCNC: 2.04 MG/DL (ref 0.7–1.3)
CREAT SERPL-MCNC: 2.69 MG/DL (ref 0.7–1.3)
DIFFERENTIAL METHOD BLD: ABNORMAL
EOSINOPHIL # BLD: 0 K/UL (ref 0–0.4)
EOSINOPHIL NFR BLD: 0 % (ref 0–7)
ERYTHROCYTE [DISTWIDTH] IN BLOOD BY AUTOMATED COUNT: 17.2 % (ref 11.5–14.5)
GLOBULIN SER CALC-MCNC: 4.2 G/DL (ref 2–4)
GLUCOSE SERPL-MCNC: 125 MG/DL (ref 65–100)
GLUCOSE SERPL-MCNC: 157 MG/DL (ref 65–100)
HCT VFR BLD AUTO: 28.9 % (ref 36.6–50.3)
HGB BLD-MCNC: 10.2 G/DL (ref 12.1–17)
IMM GRANULOCYTES # BLD AUTO: 0.1 K/UL (ref 0–0.04)
IMM GRANULOCYTES NFR BLD AUTO: 1 % (ref 0–0.5)
LYMPHOCYTES # BLD: 1.2 K/UL (ref 0.8–3.5)
LYMPHOCYTES NFR BLD: 9 % (ref 12–49)
MAGNESIUM SERPL-MCNC: 2.5 MG/DL (ref 1.6–2.4)
MCH RBC QN AUTO: 27.3 PG (ref 26–34)
MCHC RBC AUTO-ENTMCNC: 35.3 G/DL (ref 30–36.5)
MCV RBC AUTO: 77.3 FL (ref 80–99)
MONOCYTES # BLD: 1 K/UL (ref 0–1)
MONOCYTES NFR BLD: 8 % (ref 5–13)
NEUTS SEG # BLD: 10.2 K/UL (ref 1.8–8)
NEUTS SEG NFR BLD: 82 % (ref 32–75)
NRBC # BLD: 0 K/UL (ref 0–0.01)
NRBC BLD-RTO: 0 PER 100 WBC
NT PRO BNP: ABNORMAL PG/ML
PLATELET # BLD AUTO: 274 K/UL (ref 150–400)
PMV BLD AUTO: 9.5 FL (ref 8.9–12.9)
POTASSIUM SERPL-SCNC: 2.8 MMOL/L (ref 3.5–5.1)
POTASSIUM SERPL-SCNC: 2.9 MMOL/L (ref 3.5–5.1)
POTASSIUM SERPL-SCNC: 3.3 MMOL/L (ref 3.5–5.1)
PROT SERPL-MCNC: 6.5 G/DL (ref 6.4–8.2)
RBC # BLD AUTO: 3.74 M/UL (ref 4.1–5.7)
SERVICE CMNT-IMP: ABNORMAL
SERVICE CMNT-IMP: ABNORMAL
SODIUM SERPL-SCNC: 134 MMOL/L (ref 136–145)
SODIUM SERPL-SCNC: 138 MMOL/L (ref 136–145)
SODIUM SERPL-SCNC: 138 MMOL/L (ref 136–145)
WBC # BLD AUTO: 12.5 K/UL (ref 4.1–11.1)

## 2024-06-15 PROCEDURE — 6370000000 HC RX 637 (ALT 250 FOR IP): Performed by: STUDENT IN AN ORGANIZED HEALTH CARE EDUCATION/TRAINING PROGRAM

## 2024-06-15 PROCEDURE — 2060000000 HC ICU INTERMEDIATE R&B

## 2024-06-15 PROCEDURE — 6370000000 HC RX 637 (ALT 250 FOR IP): Performed by: FAMILY MEDICINE

## 2024-06-15 PROCEDURE — 6370000000 HC RX 637 (ALT 250 FOR IP): Performed by: NURSE PRACTITIONER

## 2024-06-15 PROCEDURE — 2580000003 HC RX 258: Performed by: NURSE PRACTITIONER

## 2024-06-15 PROCEDURE — 83735 ASSAY OF MAGNESIUM: CPT

## 2024-06-15 PROCEDURE — 6370000000 HC RX 637 (ALT 250 FOR IP): Performed by: INTERNAL MEDICINE

## 2024-06-15 PROCEDURE — 6360000002 HC RX W HCPCS: Performed by: NURSE PRACTITIONER

## 2024-06-15 PROCEDURE — P9047 ALBUMIN (HUMAN), 25%, 50ML: HCPCS | Performed by: NURSE PRACTITIONER

## 2024-06-15 PROCEDURE — 2700000000 HC OXYGEN THERAPY PER DAY

## 2024-06-15 PROCEDURE — 85025 COMPLETE CBC W/AUTO DIFF WBC: CPT

## 2024-06-15 PROCEDURE — 80076 HEPATIC FUNCTION PANEL: CPT

## 2024-06-15 PROCEDURE — 99231 SBSQ HOSP IP/OBS SF/LOW 25: CPT | Performed by: NURSE PRACTITIONER

## 2024-06-15 PROCEDURE — 2580000003 HC RX 258: Performed by: STUDENT IN AN ORGANIZED HEALTH CARE EDUCATION/TRAINING PROGRAM

## 2024-06-15 PROCEDURE — 80048 BASIC METABOLIC PNL TOTAL CA: CPT

## 2024-06-15 PROCEDURE — 36415 COLL VENOUS BLD VENIPUNCTURE: CPT

## 2024-06-15 PROCEDURE — 6370000000 HC RX 637 (ALT 250 FOR IP): Performed by: EMERGENCY MEDICINE

## 2024-06-15 PROCEDURE — 6360000002 HC RX W HCPCS: Performed by: STUDENT IN AN ORGANIZED HEALTH CARE EDUCATION/TRAINING PROGRAM

## 2024-06-15 PROCEDURE — 83880 ASSAY OF NATRIURETIC PEPTIDE: CPT

## 2024-06-15 RX ORDER — GUAIFENESIN 600 MG/1
600 TABLET, EXTENDED RELEASE ORAL ONCE
Status: COMPLETED | OUTPATIENT
Start: 2024-06-15 | End: 2024-06-15

## 2024-06-15 RX ORDER — ALBUMIN (HUMAN) 12.5 G/50ML
25 SOLUTION INTRAVENOUS EVERY 12 HOURS
Status: COMPLETED | OUTPATIENT
Start: 2024-06-15 | End: 2024-06-17

## 2024-06-15 RX ADMIN — APIXABAN 5 MG: 5 TABLET, FILM COATED ORAL at 21:43

## 2024-06-15 RX ADMIN — TAMSULOSIN HYDROCHLORIDE 0.4 MG: 0.4 CAPSULE ORAL at 07:56

## 2024-06-15 RX ADMIN — ACETAZOLAMIDE 125 MG: 250 TABLET ORAL at 07:56

## 2024-06-15 RX ADMIN — WATER 2000 MG: 1 INJECTION INTRAMUSCULAR; INTRAVENOUS; SUBCUTANEOUS at 05:45

## 2024-06-15 RX ADMIN — DOBUTAMINE HYDROCHLORIDE 4 MCG/KG/MIN: 200 INJECTION INTRAVENOUS at 03:12

## 2024-06-15 RX ADMIN — POTASSIUM BICARBONATE 40 MEQ: 782 TABLET, EFFERVESCENT ORAL at 10:20

## 2024-06-15 RX ADMIN — AMIODARONE HYDROCHLORIDE 100 MG: 200 TABLET ORAL at 07:56

## 2024-06-15 RX ADMIN — APIXABAN 5 MG: 5 TABLET, FILM COATED ORAL at 07:56

## 2024-06-15 RX ADMIN — POTASSIUM BICARBONATE 60 MEQ: 782 TABLET, EFFERVESCENT ORAL at 04:10

## 2024-06-15 RX ADMIN — ALBUMIN (HUMAN) 25 G: 0.25 INJECTION, SOLUTION INTRAVENOUS at 14:17

## 2024-06-15 RX ADMIN — LEVOTHYROXINE SODIUM 88 MCG: 0.09 TABLET ORAL at 07:56

## 2024-06-15 RX ADMIN — SODIUM CHLORIDE, PRESERVATIVE FREE 10 ML: 5 INJECTION INTRAVENOUS at 21:46

## 2024-06-15 RX ADMIN — WATER 2000 MG: 1 INJECTION INTRAMUSCULAR; INTRAVENOUS; SUBCUTANEOUS at 18:30

## 2024-06-15 RX ADMIN — POTASSIUM BICARBONATE 40 MEQ: 782 TABLET, EFFERVESCENT ORAL at 21:43

## 2024-06-15 RX ADMIN — MIDODRINE HYDROCHLORIDE 10 MG: 5 TABLET ORAL at 10:20

## 2024-06-15 RX ADMIN — GUAIFENESIN 600 MG: 600 TABLET, EXTENDED RELEASE ORAL at 21:45

## 2024-06-15 RX ADMIN — ACETAZOLAMIDE 125 MG: 250 TABLET ORAL at 21:43

## 2024-06-15 RX ADMIN — ALLOPURINOL 50 MG: 100 TABLET ORAL at 07:56

## 2024-06-15 RX ADMIN — SODIUM CHLORIDE, PRESERVATIVE FREE 10 ML: 5 INJECTION INTRAVENOUS at 07:57

## 2024-06-15 RX ADMIN — MIDODRINE HYDROCHLORIDE 10 MG: 5 TABLET ORAL at 07:56

## 2024-06-15 RX ADMIN — MIDODRINE HYDROCHLORIDE 10 MG: 5 TABLET ORAL at 15:25

## 2024-06-15 ASSESSMENT — PAIN SCALES - GENERAL
PAINLEVEL_OUTOF10: 0

## 2024-06-15 NOTE — H&P
(KLOR-CON M) 20 MEQ extended release tablet Take 1 tablet by mouth 2 times daily Start after you run out of the Effer K 6/4/24   Sunitha Metcalf APRN - NP   lidocaine 4 % external patch Place 1 patch onto the skin daily May cut in half to apply to both shoulders 6/4/24   Sunitha Metcalf APRN - NP   midodrine (PROAMATINE) 2.5 MG tablet Take 3 times a day. Hold if systolic blood pressure is >120 5/29/24   Iwona Whittington, APRN - NP   FARXIGA 10 MG tablet Take 1 tablet by mouth Daily with supper 5/28/24   Marly Ku APRN - NP   acetaZOLAMIDE (DIAMOX) 250 MG tablet Take 0.5 tablets by mouth 2 times daily 5/23/24   Marly Ku APRN - NP   magnesium oxide (MAG-OX) 400 MG tablet Take 1 tablet by mouth daily 5/23/24   Marly Ku APRN - NP   allopurinol (ZYLOPRIM) 100 MG tablet Take 0.5 tablets by mouth daily 5/23/24   Marly Ku APRN - NP   amiodarone (CORDARONE) 200 MG tablet Take 0.5 tablets by mouth daily 5/23/24   Marly Ku APRN - NP   bumetanide (BUMEX) 2 MG tablet Take 2 tablets by mouth 2 times daily 5/23/24   Marly Ku APRN - NP   DOBUTAMINE HCL IV Infuse 0.29 mg/min intravenously continuous. 500mg in dextrose 5% 250mL infusion. Flush using Sash method.    Justin Mahajan MD   levothyroxine (SYNTHROID) 88 MCG tablet Take 1 tablet by mouth daily 3/1/24   Elisa Loyd MD   Cholecalciferol (VITAMIN D3) 25 MCG TABS Take 1 tablet by mouth daily 2/5/24   Sunitha Metcalf, APRN - NP   sodium chloride 0.9 % SOLN Infuse 10 mLs intravenously every 24 hours. FLUSH LUMEN NOT IN USE FOR MILRINONE WITH 10ML EVERY 24 HOURS. FLUSH PRE/POST LAB DRAW WITH 10-20ML    Justin Mahajan MD   Heparin Sod, Pork, Lock Flush (HEPARIN, PF,) 10 UNIT/ML injection Infuse 3-5 mLs intravenously every 24 hours FLUSH LUMEN NOT IN USE FOR MILRINONE WITH 3-5ML AFTER LAST SALINE FLUSH OR EVERY 24 HOURS FOR MAINTENANCE IF NOT USED. FLUSH POST LAB 
Creatinine 2.04 (*)     BUN/Creatinine Ratio 27 (*)     Est, Glom Filt Rate 34 (*)     Calcium 8.4 (*)     All other components within normal limits   BASIC METABOLIC PANEL - Abnormal; Notable for the following components:    Potassium 2.9 (*)     CO2 33 (*)     Glucose 164 (*)     BUN 52 (*)     Creatinine 2.19 (*)     BUN/Creatinine Ratio 24 (*)     Est, Glom Filt Rate 31 (*)     Calcium 7.9 (*)     All other components within normal limits   CBC WITH AUTO DIFFERENTIAL - Abnormal; Notable for the following components:    WBC 12.5 (*)     RBC 3.74 (*)     Hemoglobin 10.2 (*)     Hematocrit 28.9 (*)     MCV 77.3 (*)     RDW 17.2 (*)     Neutrophils % 82 (*)     Lymphocytes % 9 (*)     Immature Granulocytes % 1 (*)     Neutrophils Absolute 10.2 (*)     Immature Granulocytes Absolute 0.1 (*)     All other components within normal limits   BRAIN NATRIURETIC PEPTIDE - Abnormal; Notable for the following components:    NT Pro-BNP 14,671 (*)     All other components within normal limits   HEPATIC FUNCTION PANEL - Abnormal; Notable for the following components:    Albumin 2.3 (*)     Globulin 4.2 (*)     Albumin/Globulin Ratio 0.5 (*)     Bilirubin, Direct 0.3 (*)     Alk Phosphatase 126 (*)     AST 12 (*)     All other components within normal limits   MAGNESIUM - Abnormal; Notable for the following components:    Magnesium 2.5 (*)     All other components within normal limits   POC BLOOD GAS & IONIZED CALCIUM - Abnormal; Notable for the following components:    Calcium, Ionized 1.06 (*)     POC pCO2 53.5 (*)     POC PO2 <27 (*)     POC HCO3 36.6 (*)     All other components within normal limits       Results       Procedure Component Value Units Date/Time    Culture, Blood 2 [[id number]] Collected: 06/14/24 1012    Order Status: Completed Specimen: Blood Updated: 06/15/24 1048     Special Requests NO SPECIAL REQUESTS        Culture NO GROWTH 1 DAY       Culture, Blood 1 [[id number]] Collected: 06/14/24 1012    Order

## 2024-06-15 NOTE — CONSULTS
Infectious Disease Consult    INFECTIOUS DISEASE Attending:     I agree with the above infectious disease daily progress note in its entirety as authored by and discussed in detail with the nurse practitioner.   I have reviewed pertinent laboratory studies, microbiology cultures, radiologic reports with review of the consultations and progress notes as appropriate.   I agree with today's subjective findings, physical examination, assessment and plan of care as described above and discussed extensively with the nurse practitioner.       Remove tunneled right chest wall CVC.  Original source seems to be urine but high suspicion of seeding tunneled CVC.    Continue Cefepime and adjust per sensitivities of Proteus isolate.    Follow repeat blood cultures x2 sets - they may remain positive prior to tunneled CVC removal though.      Today's Date: 6/13/2024   Admit Date: 6/12/2024    Date of Consultation:  June 13, 2024  Reason for consult: GNR sepsis  Referring Physician: MD Cathy    HPI:  Patient is a 73 y.o. male with medical hx of hypertension, hyperlipidemia, CHF, cardiomyopathy, PAF, s/p ICD, hx of bilateral knee replacements with post op infection (on cipro and rifampin), BPH, s/p TURP was sent to ER on 6/13 after the visit with heart failure team with SOB.     CT of abd/pel revealed constipation. KIDNEYS/URETERS, demonstrated are multiple low-density renal lesions. T-max 100.5, wbc 14.5, blood cx grew GNR, probable proteus species. U/A revealed wbc 10-20, moderate leukocyte esterase,cx-pending. Pt was started on IV cefepime.    During visit, pt is c/o constipation and generalized weakness and sob. No chills, nausea, vomiting, chest pain, abd pain, or dysuria.      Impression:   Bacteremia  - T-max 100.5,wbc 14.5->13.6    Blood cx (6/12) GNR, probable proteus, (6/13) pending    U/A (6/12) wbc 10-20, moderate leukocyte esterase,cx-pending    HEreF/pafib  - NT pro-BNP >35k    cardiology following    CKD III  Crea 
See consult note  
Normal speech  [] Normal sensation  []Deficits present:  Extremity: [x] Normal skin color/temp  [] Clubbing/cyanosis  [x] No edema  [] Other:    Wt Readings from Last 15 Encounters:   06/14/24 72.4 kg (159 lb 9.8 oz)   06/12/24 75.1 kg (165 lb 9.6 oz)   06/04/24 73.5 kg (162 lb)   06/03/24 73.9 kg (163 lb)   05/28/24 67.3 kg (148 lb 6.4 oz)   05/23/24 69.4 kg (153 lb)   05/21/24 68.2 kg (150 lb 6.4 oz)   05/17/24 66.7 kg (147 lb 2 oz)   05/16/24 66.5 kg (146 lb 9.7 oz)   05/06/24 72.7 kg (160 lb 3.2 oz)   04/30/24 73.3 kg (161 lb 9.6 oz)   04/29/24 71.9 kg (158 lb 9.6 oz)   04/22/24 70.2 kg (154 lb 12.8 oz)   04/16/24 74.8 kg (165 lb)   04/15/24 72.2 kg (159 lb 3.2 oz)        Current Diet: ADULT DIET; Regular       PSYCHOSOCIAL/SPIRITUAL SCREENING:   Palliative IDT has assessed this patient for cultural preferences / practices and a referral made as appropriate to needs (Cultural Services, Patient Advocacy, Ethics, etc.)    Spiritual Affiliation: Unknown    Any spiritual / Druze concerns:  [] Yes /  [x] No   If \"Yes\" to discuss with pastoral care during IDT     Does caregiver feel burdened by caring for their loved one:   [] Yes /  [] No /  [x] No Caregiver Present/Available [] No Caregiver [] Pt Lives at Facility  If \"Yes\" to discuss with social work during IDT    Anticipatory grief assessment:   [x] Normal  / [] Maladaptive     If \"Maladaptive\" to discuss with social work during IDT    ESAS Anxiety: Anxiety Score: Not anxious    ESAS Depression: Depression Score: Not depressed        LAB AND IMAGING FINDINGS:   Objective data reviewed:  labs, images, records, medication use, vitals, and chart     FINAL COMMENTS   Thank you for allowing Palliative Medicine to participate in the care of Ingrid Kennedy.    Only check if applicable and billing time based rather than MDM  [x] The total encounter time on this service date was __75__ minutes which was spent performing a face-to-face encounter and personally completing

## 2024-06-16 LAB
ALBUMIN/GLOB SERPL: 0.7 (ref 1.1–2.2)
ALP SERPL-CCNC: 132 U/L (ref 45–117)
ALT SERPL-CCNC: 14 U/L (ref 12–78)
ANION GAP SERPL CALC-SCNC: 7 MMOL/L (ref 5–15)
AST SERPL-CCNC: 16 U/L (ref 15–37)
BASOPHILS # BLD: 0 K/UL (ref 0–0.1)
BASOPHILS NFR BLD: 0 % (ref 0–1)
BILIRUB DIRECT SERPL-MCNC: 0.3 MG/DL (ref 0–0.2)
BILIRUB SERPL-MCNC: 0.7 MG/DL (ref 0.2–1)
BUN SERPL-MCNC: 66 MG/DL (ref 6–20)
BUN/CREAT SERPL: 28 (ref 12–20)
CALCIUM SERPL-MCNC: 8.5 MG/DL (ref 8.5–10.1)
CHLORIDE SERPL-SCNC: 99 MMOL/L (ref 97–108)
CREAT SERPL-MCNC: 2.37 MG/DL (ref 0.7–1.3)
DIFFERENTIAL METHOD BLD: ABNORMAL
EOSINOPHIL NFR BLD: 1 % (ref 0–7)
GLOBULIN SER CALC-MCNC: 3.7 G/DL (ref 2–4)
HGB BLD-MCNC: 9.6 G/DL (ref 12.1–17)
IMM GRANULOCYTES # BLD AUTO: 0.1 K/UL (ref 0–0.04)
IMM GRANULOCYTES NFR BLD AUTO: 1 % (ref 0–0.5)
LYMPHOCYTES # BLD: 1.5 K/UL (ref 0.8–3.5)
MCH RBC QN AUTO: 27 PG (ref 26–34)
MCHC RBC AUTO-ENTMCNC: 34.7 G/DL (ref 30–36.5)
MCV RBC AUTO: 77.8 FL (ref 80–99)
MONOCYTES # BLD: 0.7 K/UL (ref 0–1)
MONOCYTES NFR BLD: 10 % (ref 5–13)
NEUTS SEG # BLD: 5.1 K/UL (ref 1.8–8)
NEUTS SEG NFR BLD: 68 % (ref 32–75)
NRBC # BLD: 0 K/UL (ref 0–0.01)
NRBC BLD-RTO: 0 PER 100 WBC
NT PRO BNP: 8119 PG/ML
PMV BLD AUTO: 9.3 FL (ref 8.9–12.9)
PROT SERPL-MCNC: 6.3 G/DL (ref 6.4–8.2)
RBC # BLD AUTO: 3.56 M/UL (ref 4.1–5.7)
SODIUM SERPL-SCNC: 138 MMOL/L (ref 136–145)
WBC # BLD AUTO: 7.6 K/UL (ref 4.1–11.1)

## 2024-06-16 PROCEDURE — 83880 ASSAY OF NATRIURETIC PEPTIDE: CPT

## 2024-06-16 PROCEDURE — 6370000000 HC RX 637 (ALT 250 FOR IP): Performed by: EMERGENCY MEDICINE

## 2024-06-16 PROCEDURE — 99231 SBSQ HOSP IP/OBS SF/LOW 25: CPT | Performed by: NURSE PRACTITIONER

## 2024-06-16 PROCEDURE — 2580000003 HC RX 258: Performed by: STUDENT IN AN ORGANIZED HEALTH CARE EDUCATION/TRAINING PROGRAM

## 2024-06-16 PROCEDURE — 6360000002 HC RX W HCPCS: Performed by: NURSE PRACTITIONER

## 2024-06-16 PROCEDURE — P9047 ALBUMIN (HUMAN), 25%, 50ML: HCPCS | Performed by: NURSE PRACTITIONER

## 2024-06-16 PROCEDURE — 6370000000 HC RX 637 (ALT 250 FOR IP): Performed by: STUDENT IN AN ORGANIZED HEALTH CARE EDUCATION/TRAINING PROGRAM

## 2024-06-16 PROCEDURE — 2580000003 HC RX 258: Performed by: NURSE PRACTITIONER

## 2024-06-16 PROCEDURE — 2700000000 HC OXYGEN THERAPY PER DAY

## 2024-06-16 PROCEDURE — 6370000000 HC RX 637 (ALT 250 FOR IP): Performed by: NURSE PRACTITIONER

## 2024-06-16 PROCEDURE — 83735 ASSAY OF MAGNESIUM: CPT

## 2024-06-16 PROCEDURE — 36415 COLL VENOUS BLD VENIPUNCTURE: CPT

## 2024-06-16 PROCEDURE — 2060000000 HC ICU INTERMEDIATE R&B

## 2024-06-16 PROCEDURE — 6360000002 HC RX W HCPCS: Performed by: STUDENT IN AN ORGANIZED HEALTH CARE EDUCATION/TRAINING PROGRAM

## 2024-06-16 PROCEDURE — 85025 COMPLETE CBC W/AUTO DIFF WBC: CPT

## 2024-06-16 PROCEDURE — 80048 BASIC METABOLIC PNL TOTAL CA: CPT

## 2024-06-16 PROCEDURE — 80076 HEPATIC FUNCTION PANEL: CPT

## 2024-06-16 RX ADMIN — DOBUTAMINE HYDROCHLORIDE 4 MCG/KG/MIN: 200 INJECTION INTRAVENOUS at 06:49

## 2024-06-16 RX ADMIN — MIDODRINE HYDROCHLORIDE 10 MG: 5 TABLET ORAL at 12:47

## 2024-06-16 RX ADMIN — ACETAZOLAMIDE 125 MG: 250 TABLET ORAL at 08:23

## 2024-06-16 RX ADMIN — AMPICILLIN SODIUM 2000 MG: 2 INJECTION, POWDER, FOR SOLUTION INTRAVENOUS at 09:57

## 2024-06-16 RX ADMIN — POTASSIUM BICARBONATE 40 MEQ: 782 TABLET, EFFERVESCENT ORAL at 21:51

## 2024-06-16 RX ADMIN — LEVOTHYROXINE SODIUM 88 MCG: 0.09 TABLET ORAL at 08:24

## 2024-06-16 RX ADMIN — MIDODRINE HYDROCHLORIDE 10 MG: 5 TABLET ORAL at 08:23

## 2024-06-16 RX ADMIN — AMIODARONE HYDROCHLORIDE 100 MG: 200 TABLET ORAL at 08:23

## 2024-06-16 RX ADMIN — POTASSIUM BICARBONATE 40 MEQ: 782 TABLET, EFFERVESCENT ORAL at 08:23

## 2024-06-16 RX ADMIN — ALBUMIN (HUMAN) 25 G: 0.25 INJECTION, SOLUTION INTRAVENOUS at 12:46

## 2024-06-16 RX ADMIN — MIDODRINE HYDROCHLORIDE 10 MG: 5 TABLET ORAL at 16:46

## 2024-06-16 RX ADMIN — APIXABAN 5 MG: 5 TABLET, FILM COATED ORAL at 08:23

## 2024-06-16 RX ADMIN — ACETAZOLAMIDE 125 MG: 250 TABLET ORAL at 21:50

## 2024-06-16 RX ADMIN — ALLOPURINOL 50 MG: 100 TABLET ORAL at 08:23

## 2024-06-16 RX ADMIN — SODIUM CHLORIDE, PRESERVATIVE FREE 10 ML: 5 INJECTION INTRAVENOUS at 08:24

## 2024-06-16 RX ADMIN — SODIUM CHLORIDE, PRESERVATIVE FREE 10 ML: 5 INJECTION INTRAVENOUS at 21:51

## 2024-06-16 RX ADMIN — AMPICILLIN SODIUM 2000 MG: 2 INJECTION, POWDER, FOR SOLUTION INTRAVENOUS at 16:48

## 2024-06-16 RX ADMIN — WATER 2000 MG: 1 INJECTION INTRAMUSCULAR; INTRAVENOUS; SUBCUTANEOUS at 06:44

## 2024-06-16 RX ADMIN — TAMSULOSIN HYDROCHLORIDE 0.4 MG: 0.4 CAPSULE ORAL at 08:23

## 2024-06-16 RX ADMIN — ALBUMIN (HUMAN) 25 G: 0.25 INJECTION, SOLUTION INTRAVENOUS at 01:45

## 2024-06-16 RX ADMIN — APIXABAN 5 MG: 5 TABLET, FILM COATED ORAL at 21:50

## 2024-06-16 ASSESSMENT — PAIN SCALES - GENERAL
PAINLEVEL_OUTOF10: 0

## 2024-06-17 LAB
ALBUMIN SERPL-MCNC: 3.2 G/DL (ref 3.5–5)
ALBUMIN/GLOB SERPL: 0.9 (ref 1.1–2.2)
ALP SERPL-CCNC: 109 U/L (ref 45–117)
ALT SERPL-CCNC: 11 U/L (ref 12–78)
ANION GAP SERPL CALC-SCNC: 5 MMOL/L (ref 5–15)
AST SERPL-CCNC: 12 U/L (ref 15–37)
BILIRUB DIRECT SERPL-MCNC: 0.3 MG/DL (ref 0–0.2)
BUN SERPL-MCNC: 50 MG/DL (ref 6–20)
BUN/CREAT SERPL: 25 (ref 12–20)
CALCIUM SERPL-MCNC: 8.8 MG/DL (ref 8.5–10.1)
CHLORIDE SERPL-SCNC: 104 MMOL/L (ref 97–108)
CO2 SERPL-SCNC: 31 MMOL/L (ref 21–32)
GLOBULIN SER CALC-MCNC: 3.7 G/DL (ref 2–4)
GLUCOSE SERPL-MCNC: 106 MG/DL (ref 65–100)
MAGNESIUM SERPL-MCNC: 2.5 MG/DL (ref 1.6–2.4)
MAGNESIUM SERPL-MCNC: 2.6 MG/DL (ref 1.6–2.4)
NT PRO BNP: 7317 PG/ML
POTASSIUM SERPL-SCNC: 2.8 MMOL/L (ref 3.5–5.1)
PROT SERPL-MCNC: 6.9 G/DL (ref 6.4–8.2)
SODIUM SERPL-SCNC: 140 MMOL/L (ref 136–145)

## 2024-06-17 PROCEDURE — 6360000002 HC RX W HCPCS: Performed by: NURSE PRACTITIONER

## 2024-06-17 PROCEDURE — 6370000000 HC RX 637 (ALT 250 FOR IP): Performed by: NURSE PRACTITIONER

## 2024-06-17 PROCEDURE — 80076 HEPATIC FUNCTION PANEL: CPT

## 2024-06-17 PROCEDURE — 99233 SBSQ HOSP IP/OBS HIGH 50: CPT | Performed by: INTERNAL MEDICINE

## 2024-06-17 PROCEDURE — 97530 THERAPEUTIC ACTIVITIES: CPT

## 2024-06-17 PROCEDURE — 83735 ASSAY OF MAGNESIUM: CPT

## 2024-06-17 PROCEDURE — 80048 BASIC METABOLIC PNL TOTAL CA: CPT

## 2024-06-17 PROCEDURE — 2580000003 HC RX 258: Performed by: STUDENT IN AN ORGANIZED HEALTH CARE EDUCATION/TRAINING PROGRAM

## 2024-06-17 PROCEDURE — 83880 ASSAY OF NATRIURETIC PEPTIDE: CPT

## 2024-06-17 PROCEDURE — P9047 ALBUMIN (HUMAN), 25%, 50ML: HCPCS | Performed by: NURSE PRACTITIONER

## 2024-06-17 PROCEDURE — 99232 SBSQ HOSP IP/OBS MODERATE 35: CPT | Performed by: INTERNAL MEDICINE

## 2024-06-17 PROCEDURE — 2580000003 HC RX 258: Performed by: NURSE PRACTITIONER

## 2024-06-17 PROCEDURE — 97116 GAIT TRAINING THERAPY: CPT

## 2024-06-17 PROCEDURE — 6370000000 HC RX 637 (ALT 250 FOR IP): Performed by: STUDENT IN AN ORGANIZED HEALTH CARE EDUCATION/TRAINING PROGRAM

## 2024-06-17 PROCEDURE — 2060000000 HC ICU INTERMEDIATE R&B

## 2024-06-17 PROCEDURE — 6360000002 HC RX W HCPCS: Performed by: STUDENT IN AN ORGANIZED HEALTH CARE EDUCATION/TRAINING PROGRAM

## 2024-06-17 PROCEDURE — 6370000000 HC RX 637 (ALT 250 FOR IP): Performed by: EMERGENCY MEDICINE

## 2024-06-17 PROCEDURE — 36415 COLL VENOUS BLD VENIPUNCTURE: CPT

## 2024-06-17 RX ADMIN — SODIUM CHLORIDE 20 ML: 9 INJECTION, SOLUTION INTRAVENOUS at 10:11

## 2024-06-17 RX ADMIN — MIDODRINE HYDROCHLORIDE 10 MG: 5 TABLET ORAL at 17:42

## 2024-06-17 RX ADMIN — DOBUTAMINE HYDROCHLORIDE 4 MCG/KG/MIN: 200 INJECTION INTRAVENOUS at 17:50

## 2024-06-17 RX ADMIN — ALLOPURINOL 50 MG: 100 TABLET ORAL at 10:07

## 2024-06-17 RX ADMIN — MIDODRINE HYDROCHLORIDE 10 MG: 5 TABLET ORAL at 10:07

## 2024-06-17 RX ADMIN — SODIUM CHLORIDE, PRESERVATIVE FREE 10 ML: 5 INJECTION INTRAVENOUS at 10:08

## 2024-06-17 RX ADMIN — ALBUMIN (HUMAN) 25 G: 0.25 INJECTION, SOLUTION INTRAVENOUS at 02:02

## 2024-06-17 RX ADMIN — POTASSIUM BICARBONATE 40 MEQ: 782 TABLET, EFFERVESCENT ORAL at 21:23

## 2024-06-17 RX ADMIN — ACETAZOLAMIDE 125 MG: 250 TABLET ORAL at 21:24

## 2024-06-17 RX ADMIN — LEVOTHYROXINE SODIUM 88 MCG: 0.09 TABLET ORAL at 10:07

## 2024-06-17 RX ADMIN — APIXABAN 5 MG: 5 TABLET, FILM COATED ORAL at 21:23

## 2024-06-17 RX ADMIN — AMPICILLIN SODIUM 2000 MG: 2 INJECTION, POWDER, FOR SOLUTION INTRAVENOUS at 10:12

## 2024-06-17 RX ADMIN — TAMSULOSIN HYDROCHLORIDE 0.4 MG: 0.4 CAPSULE ORAL at 10:08

## 2024-06-17 RX ADMIN — ACETAZOLAMIDE 125 MG: 250 TABLET ORAL at 10:06

## 2024-06-17 RX ADMIN — AMIODARONE HYDROCHLORIDE 100 MG: 200 TABLET ORAL at 10:08

## 2024-06-17 RX ADMIN — APIXABAN 5 MG: 5 TABLET, FILM COATED ORAL at 10:07

## 2024-06-17 RX ADMIN — AMPICILLIN SODIUM 2000 MG: 2 INJECTION, POWDER, FOR SOLUTION INTRAVENOUS at 17:46

## 2024-06-17 RX ADMIN — AMPICILLIN SODIUM 2000 MG: 2 INJECTION, POWDER, FOR SOLUTION INTRAVENOUS at 01:30

## 2024-06-17 RX ADMIN — SODIUM CHLORIDE 100 ML: 9 INJECTION, SOLUTION INTRAVENOUS at 17:44

## 2024-06-17 RX ADMIN — POTASSIUM BICARBONATE 40 MEQ: 782 TABLET, EFFERVESCENT ORAL at 10:06

## 2024-06-17 RX ADMIN — SODIUM CHLORIDE, PRESERVATIVE FREE 10 ML: 5 INJECTION INTRAVENOUS at 21:22

## 2024-06-17 ASSESSMENT — ENCOUNTER SYMPTOMS
ABDOMINAL DISTENTION: 0
SHORTNESS OF BREATH: 0
VOMITING: 0
NAUSEA: 0
DIARRHEA: 0
COUGH: 0

## 2024-06-18 ENCOUNTER — APPOINTMENT (OUTPATIENT)
Facility: HOSPITAL | Age: 74
DRG: 871 | End: 2024-06-18
Attending: INTERNAL MEDICINE
Payer: MEDICARE

## 2024-06-18 LAB
ALBUMIN SERPL-MCNC: 2.9 G/DL (ref 3.5–5)
ALBUMIN/GLOB SERPL: 0.8 (ref 1.1–2.2)
ALP SERPL-CCNC: 113 U/L (ref 45–117)
ALT SERPL-CCNC: 12 U/L (ref 12–78)
ANION GAP SERPL CALC-SCNC: 5 MMOL/L (ref 5–15)
AST SERPL-CCNC: 16 U/L (ref 15–37)
BACTERIA SPEC CULT: NORMAL
BACTERIA SPEC CULT: NORMAL
BILIRUB DIRECT SERPL-MCNC: 0.3 MG/DL (ref 0–0.2)
BILIRUB SERPL-MCNC: 0.8 MG/DL (ref 0.2–1)
BUN SERPL-MCNC: 42 MG/DL (ref 6–20)
BUN/CREAT SERPL: 23 (ref 12–20)
CALCIUM SERPL-MCNC: 8.7 MG/DL (ref 8.5–10.1)
CHLORIDE SERPL-SCNC: 107 MMOL/L (ref 97–108)
CO2 SERPL-SCNC: 30 MMOL/L (ref 21–32)
CREAT SERPL-MCNC: 1.86 MG/DL (ref 0.7–1.3)
GLOBULIN SER CALC-MCNC: 3.8 G/DL (ref 2–4)
GLUCOSE SERPL-MCNC: 105 MG/DL (ref 65–100)
NT PRO BNP: ABNORMAL PG/ML
POTASSIUM SERPL-SCNC: 3.2 MMOL/L (ref 3.5–5.1)
PROT SERPL-MCNC: 6.7 G/DL (ref 6.4–8.2)
SERVICE CMNT-IMP: NORMAL
SERVICE CMNT-IMP: NORMAL
SODIUM SERPL-SCNC: 142 MMOL/L (ref 136–145)

## 2024-06-18 PROCEDURE — APPSS30 APP SPLIT SHARED TIME 16-30 MINUTES: Performed by: NURSE PRACTITIONER

## 2024-06-18 PROCEDURE — 2580000003 HC RX 258: Performed by: NURSE PRACTITIONER

## 2024-06-18 PROCEDURE — 6370000000 HC RX 637 (ALT 250 FOR IP): Performed by: NURSE PRACTITIONER

## 2024-06-18 PROCEDURE — 99232 SBSQ HOSP IP/OBS MODERATE 35: CPT | Performed by: INTERNAL MEDICINE

## 2024-06-18 PROCEDURE — 6370000000 HC RX 637 (ALT 250 FOR IP): Performed by: STUDENT IN AN ORGANIZED HEALTH CARE EDUCATION/TRAINING PROGRAM

## 2024-06-18 PROCEDURE — 6360000002 HC RX W HCPCS: Performed by: NURSE PRACTITIONER

## 2024-06-18 PROCEDURE — 6370000000 HC RX 637 (ALT 250 FOR IP): Performed by: EMERGENCY MEDICINE

## 2024-06-18 PROCEDURE — 2580000003 HC RX 258: Performed by: STUDENT IN AN ORGANIZED HEALTH CARE EDUCATION/TRAINING PROGRAM

## 2024-06-18 PROCEDURE — 36415 COLL VENOUS BLD VENIPUNCTURE: CPT

## 2024-06-18 PROCEDURE — 97535 SELF CARE MNGMENT TRAINING: CPT | Performed by: OCCUPATIONAL THERAPIST

## 2024-06-18 PROCEDURE — 83880 ASSAY OF NATRIURETIC PEPTIDE: CPT

## 2024-06-18 PROCEDURE — 6360000002 HC RX W HCPCS: Performed by: STUDENT IN AN ORGANIZED HEALTH CARE EDUCATION/TRAINING PROGRAM

## 2024-06-18 PROCEDURE — 97530 THERAPEUTIC ACTIVITIES: CPT | Performed by: OCCUPATIONAL THERAPIST

## 2024-06-18 PROCEDURE — 80048 BASIC METABOLIC PNL TOTAL CA: CPT

## 2024-06-18 PROCEDURE — 80076 HEPATIC FUNCTION PANEL: CPT

## 2024-06-18 PROCEDURE — 2060000000 HC ICU INTERMEDIATE R&B

## 2024-06-18 RX ORDER — BUMETANIDE 1 MG/1
2 TABLET ORAL 2 TIMES DAILY
Status: DISCONTINUED | OUTPATIENT
Start: 2024-06-18 | End: 2024-06-21 | Stop reason: HOSPADM

## 2024-06-18 RX ADMIN — DOBUTAMINE HYDROCHLORIDE 4 MCG/KG/MIN: 200 INJECTION INTRAVENOUS at 20:04

## 2024-06-18 RX ADMIN — SODIUM CHLORIDE, PRESERVATIVE FREE 10 ML: 5 INJECTION INTRAVENOUS at 10:08

## 2024-06-18 RX ADMIN — ACETAZOLAMIDE 125 MG: 250 TABLET ORAL at 22:29

## 2024-06-18 RX ADMIN — POTASSIUM BICARBONATE 40 MEQ: 782 TABLET, EFFERVESCENT ORAL at 10:07

## 2024-06-18 RX ADMIN — AMIODARONE HYDROCHLORIDE 100 MG: 200 TABLET ORAL at 10:07

## 2024-06-18 RX ADMIN — APIXABAN 5 MG: 5 TABLET, FILM COATED ORAL at 22:29

## 2024-06-18 RX ADMIN — AMPICILLIN SODIUM 2000 MG: 2 INJECTION, POWDER, FOR SOLUTION INTRAVENOUS at 18:30

## 2024-06-18 RX ADMIN — MIDODRINE HYDROCHLORIDE 10 MG: 5 TABLET ORAL at 10:07

## 2024-06-18 RX ADMIN — APIXABAN 5 MG: 5 TABLET, FILM COATED ORAL at 10:08

## 2024-06-18 RX ADMIN — MIDODRINE HYDROCHLORIDE 10 MG: 5 TABLET ORAL at 13:56

## 2024-06-18 RX ADMIN — SODIUM CHLORIDE, PRESERVATIVE FREE 10 ML: 5 INJECTION INTRAVENOUS at 22:30

## 2024-06-18 RX ADMIN — TAMSULOSIN HYDROCHLORIDE 0.4 MG: 0.4 CAPSULE ORAL at 10:07

## 2024-06-18 RX ADMIN — LEVOTHYROXINE SODIUM 88 MCG: 0.09 TABLET ORAL at 10:08

## 2024-06-18 RX ADMIN — AMPICILLIN SODIUM 2000 MG: 2 INJECTION, POWDER, FOR SOLUTION INTRAVENOUS at 00:53

## 2024-06-18 RX ADMIN — POTASSIUM BICARBONATE 40 MEQ: 782 TABLET, EFFERVESCENT ORAL at 22:29

## 2024-06-18 RX ADMIN — AMPICILLIN SODIUM 2000 MG: 2 INJECTION, POWDER, FOR SOLUTION INTRAVENOUS at 10:11

## 2024-06-18 RX ADMIN — ACETAZOLAMIDE 125 MG: 250 TABLET ORAL at 10:06

## 2024-06-18 RX ADMIN — MIDODRINE HYDROCHLORIDE 10 MG: 5 TABLET ORAL at 18:30

## 2024-06-18 RX ADMIN — ALLOPURINOL 50 MG: 100 TABLET ORAL at 10:08

## 2024-06-18 ASSESSMENT — ENCOUNTER SYMPTOMS
COUGH: 0
DIARRHEA: 0
VOMITING: 0
SHORTNESS OF BREATH: 0
ABDOMINAL DISTENTION: 0
NAUSEA: 0

## 2024-06-18 NOTE — NURSE NAVIGATOR
Heart Failure Nurse Navigator rounds completed.    HF NN provided introduction to self and nurse navigator role. Patient has already received HF printed education materials from his bedside nurse.  Patient is known to this HF NN from prior hospitalizations.    Patient states he had to come back to the hospital because he got a \"poison in his body\".  He states he is feeling so so.      Reminded patient about following low sodium diet and daily weights.         Advised patient that follow up appointment will be scheduled and placed on Discharge Instructions prior to discharge. Patient given [facility] flyer and is agreeable to services as needed.  Patient provided with contact information for HF NN and encouraged to call with questions. Will continue to follow until discharge.        Time spent with patient discussing HF education:  5 minutes    
blockers (doxazosin, diltiazem, verapamil, nifedipine)  Antiarrhythmics (flecanide, disopyrimide, sotalol, dronedarone)  Diabetes medications (thiasolidinediones, saxagliptin, alogliptin)  NSAIDs and LEVY 2 inhibitors    Consider vaccinations for respiratory illnesses (flu, pneumonia, covid) [Class 2b]    For eligible patients with LVEF < 35% consider discharge with wearable defibrillation [Class 2b] and/or referral for ICD implantation [Class 1] for prevention of sudden cardiac death.    Due to severely reduced LVEF < 25% and/or recurrent hospitalizations this patient may benefit from referral to Advanced Heart Failure Program to assess suitability for advanced therapies, such as left-ventricular assist device, heart transplantation, palliative inotropes or palliation [Class 1]. To obtain in-patient consultation or refer to [facility] Advanced Heart Failure Center, call [phone]    Patient Education:     Heart failure education to be provided, including information about medical therapy, lifestyle modifications, diet and fluid restrictions, physical activity.  Educational resources to be provided: “AHA Discharge packet for patients diagnosed with Heart Failure\" booklet; Signs/Symptoms magnet; Weight Calendar; [facility] flyer.  Information to be provided about HF support group.  Learning about Self-Care for Heart failure on discharge instructions.      Plan for Transitional Care:    Post discharge follow up phone call to be made within 48-72 hours of discharge.  Patient will follow-up with PCP.  Patient will follow-up with Primary Cardiologist.  Obstructive sleep apnea screening done and patient was referred to Sleep Medicine.  Referral/follow-up with Cardiac Rehabilitation.  Referral/follow-up with Advanced Heart Failure Specialist.  Referral/follow-up with Palliative Care Specialist.      Heart Failure Nurse Navigator  Phone: [phone]  /  [phone]    *Recommendations listed above are based on

## 2024-06-19 ENCOUNTER — HOSPITAL ENCOUNTER (INPATIENT)
Facility: HOSPITAL | Age: 74
Discharge: HOME | DRG: 871 | End: 2024-06-22
Attending: INTERNAL MEDICINE
Payer: MEDICARE

## 2024-06-19 ENCOUNTER — APPOINTMENT (OUTPATIENT)
Facility: HOSPITAL | Age: 74
DRG: 871 | End: 2024-06-19
Attending: INTERNAL MEDICINE
Payer: MEDICARE

## 2024-06-19 VITALS
HEART RATE: 78 BPM | RESPIRATION RATE: 18 BRPM | DIASTOLIC BLOOD PRESSURE: 87 MMHG | SYSTOLIC BLOOD PRESSURE: 117 MMHG | OXYGEN SATURATION: 97 % | TEMPERATURE: 98.4 F

## 2024-06-19 PROBLEM — Z71.89 ADVANCE CARE PLANNING: Status: ACTIVE | Noted: 2024-06-19

## 2024-06-19 LAB
ALBUMIN SERPL-MCNC: 2.8 G/DL (ref 3.5–5)
ALBUMIN/GLOB SERPL: 0.8 (ref 1.1–2.2)
ALP SERPL-CCNC: 97 U/L (ref 45–117)
ALT SERPL-CCNC: 10 U/L (ref 12–78)
ANION GAP SERPL CALC-SCNC: 7 MMOL/L (ref 5–15)
AST SERPL-CCNC: 12 U/L (ref 15–37)
BACTERIA SPEC CULT: NORMAL
BACTERIA SPEC CULT: NORMAL
BILIRUB DIRECT SERPL-MCNC: 0.3 MG/DL (ref 0–0.2)
BILIRUB SERPL-MCNC: 0.9 MG/DL (ref 0.2–1)
BUN SERPL-MCNC: 39 MG/DL (ref 6–20)
BUN/CREAT SERPL: 24 (ref 12–20)
CALCIUM SERPL-MCNC: 8.4 MG/DL (ref 8.5–10.1)
CHLORIDE SERPL-SCNC: 108 MMOL/L (ref 97–108)
CO2 SERPL-SCNC: 27 MMOL/L (ref 21–32)
CREAT SERPL-MCNC: 1.64 MG/DL (ref 0.7–1.3)
GLOBULIN SER CALC-MCNC: 3.6 G/DL (ref 2–4)
GLUCOSE SERPL-MCNC: 102 MG/DL (ref 65–100)
NT PRO BNP: ABNORMAL PG/ML
POTASSIUM SERPL-SCNC: 3 MMOL/L (ref 3.5–5.1)
PROT SERPL-MCNC: 6.4 G/DL (ref 6.4–8.2)
SERVICE CMNT-IMP: NORMAL
SERVICE CMNT-IMP: NORMAL
SODIUM SERPL-SCNC: 142 MMOL/L (ref 136–145)

## 2024-06-19 PROCEDURE — 2060000000 HC ICU INTERMEDIATE R&B

## 2024-06-19 PROCEDURE — 80076 HEPATIC FUNCTION PANEL: CPT

## 2024-06-19 PROCEDURE — 6370000000 HC RX 637 (ALT 250 FOR IP): Performed by: STUDENT IN AN ORGANIZED HEALTH CARE EDUCATION/TRAINING PROGRAM

## 2024-06-19 PROCEDURE — 36415 COLL VENOUS BLD VENIPUNCTURE: CPT

## 2024-06-19 PROCEDURE — 0JH63XZ INSERTION OF TUNNELED VASCULAR ACCESS DEVICE INTO CHEST SUBCUTANEOUS TISSUE AND FASCIA, PERCUTANEOUS APPROACH: ICD-10-PCS | Performed by: RADIOLOGY

## 2024-06-19 PROCEDURE — 6370000000 HC RX 637 (ALT 250 FOR IP): Performed by: NURSE PRACTITIONER

## 2024-06-19 PROCEDURE — 80048 BASIC METABOLIC PNL TOTAL CA: CPT

## 2024-06-19 PROCEDURE — 83880 ASSAY OF NATRIURETIC PEPTIDE: CPT

## 2024-06-19 PROCEDURE — 97116 GAIT TRAINING THERAPY: CPT | Performed by: PHYSICAL THERAPIST

## 2024-06-19 PROCEDURE — 2580000003 HC RX 258: Performed by: NURSE PRACTITIONER

## 2024-06-19 PROCEDURE — 76937 US GUIDE VASCULAR ACCESS: CPT

## 2024-06-19 PROCEDURE — 6360000002 HC RX W HCPCS: Performed by: NURSE PRACTITIONER

## 2024-06-19 PROCEDURE — 99232 SBSQ HOSP IP/OBS MODERATE 35: CPT | Performed by: INTERNAL MEDICINE

## 2024-06-19 PROCEDURE — 6360000002 HC RX W HCPCS: Performed by: RADIOLOGY

## 2024-06-19 PROCEDURE — 02HV33Z INSERTION OF INFUSION DEVICE INTO SUPERIOR VENA CAVA, PERCUTANEOUS APPROACH: ICD-10-PCS | Performed by: RADIOLOGY

## 2024-06-19 PROCEDURE — 36557 INSERT TUNNELED CV CATH: CPT

## 2024-06-19 PROCEDURE — 6360000002 HC RX W HCPCS: Performed by: STUDENT IN AN ORGANIZED HEALTH CARE EDUCATION/TRAINING PROGRAM

## 2024-06-19 PROCEDURE — 99497 ADVNCD CARE PLAN 30 MIN: CPT | Performed by: INTERNAL MEDICINE

## 2024-06-19 PROCEDURE — 2580000003 HC RX 258: Performed by: STUDENT IN AN ORGANIZED HEALTH CARE EDUCATION/TRAINING PROGRAM

## 2024-06-19 RX ORDER — FENTANYL CITRATE 50 UG/ML
INJECTION, SOLUTION INTRAMUSCULAR; INTRAVENOUS PRN
Status: COMPLETED | OUTPATIENT
Start: 2024-06-19 | End: 2024-06-19

## 2024-06-19 RX ORDER — MIDODRINE HYDROCHLORIDE 5 MG/1
5 TABLET ORAL
Status: DISCONTINUED | OUTPATIENT
Start: 2024-06-19 | End: 2024-06-21 | Stop reason: HOSPADM

## 2024-06-19 RX ADMIN — APIXABAN 5 MG: 5 TABLET, FILM COATED ORAL at 11:14

## 2024-06-19 RX ADMIN — APIXABAN 5 MG: 5 TABLET, FILM COATED ORAL at 22:44

## 2024-06-19 RX ADMIN — TAMSULOSIN HYDROCHLORIDE 0.4 MG: 0.4 CAPSULE ORAL at 11:14

## 2024-06-19 RX ADMIN — AMPICILLIN SODIUM 2000 MG: 2 INJECTION, POWDER, FOR SOLUTION INTRAVENOUS at 19:37

## 2024-06-19 RX ADMIN — MIDODRINE HYDROCHLORIDE 5 MG: 5 TABLET ORAL at 11:15

## 2024-06-19 RX ADMIN — ACETAZOLAMIDE 125 MG: 250 TABLET ORAL at 22:43

## 2024-06-19 RX ADMIN — SODIUM CHLORIDE 60 ML: 9 INJECTION, SOLUTION INTRAVENOUS at 19:37

## 2024-06-19 RX ADMIN — POTASSIUM BICARBONATE 40 MEQ: 782 TABLET, EFFERVESCENT ORAL at 22:43

## 2024-06-19 RX ADMIN — DOBUTAMINE HYDROCHLORIDE 4 MCG/KG/MIN: 200 INJECTION INTRAVENOUS at 22:46

## 2024-06-19 RX ADMIN — ACETAZOLAMIDE 125 MG: 250 TABLET ORAL at 11:14

## 2024-06-19 RX ADMIN — LEVOTHYROXINE SODIUM 88 MCG: 0.09 TABLET ORAL at 11:14

## 2024-06-19 RX ADMIN — ALLOPURINOL 50 MG: 100 TABLET ORAL at 11:14

## 2024-06-19 RX ADMIN — AMIODARONE HYDROCHLORIDE 100 MG: 200 TABLET ORAL at 11:14

## 2024-06-19 RX ADMIN — BUMETANIDE 2 MG: 1 TABLET ORAL at 11:14

## 2024-06-19 RX ADMIN — FENTANYL CITRATE 50 MCG: 50 INJECTION, SOLUTION INTRAMUSCULAR; INTRAVENOUS at 15:32

## 2024-06-19 RX ADMIN — MIDODRINE HYDROCHLORIDE 5 MG: 5 TABLET ORAL at 19:34

## 2024-06-19 RX ADMIN — AMPICILLIN SODIUM 2000 MG: 2 INJECTION, POWDER, FOR SOLUTION INTRAVENOUS at 11:18

## 2024-06-19 RX ADMIN — AMPICILLIN SODIUM 2000 MG: 2 INJECTION, POWDER, FOR SOLUTION INTRAVENOUS at 00:54

## 2024-06-19 RX ADMIN — SODIUM CHLORIDE, PRESERVATIVE FREE 10 ML: 5 INJECTION INTRAVENOUS at 22:43

## 2024-06-19 RX ADMIN — POTASSIUM BICARBONATE 40 MEQ: 782 TABLET, EFFERVESCENT ORAL at 11:14

## 2024-06-19 ASSESSMENT — PULMONARY FUNCTION TESTS
PIF_VALUE: 0

## 2024-06-19 ASSESSMENT — ENCOUNTER SYMPTOMS
DIARRHEA: 0
ABDOMINAL DISTENTION: 0
COUGH: 0
NAUSEA: 0
VOMITING: 0
SHORTNESS OF BREATH: 0

## 2024-06-19 NOTE — PROGRESS NOTES
TRANSFER - OUT REPORT:    Verbal report given to CHARLIE Montenegro on Ingrid Kennedy  being transferred to [facility] for routine progression of patient care       Report consisted of patient's Situation, Background, Assessment and   Recommendations(SBAR).     Information from the following report(s) Nurse Handoff Report was reviewed with the receiving nurse.           Lines:   CVC  06/19/24 Right (Active)   Central Line Being Utilized No 06/19/24 1547   Site Assessment Clean, dry & intact 06/19/24 1547   Phlebitis Assessment No symptoms 06/19/24 1547   Infiltration Assessment 0 06/19/24 1547   Alcohol Cap Used Yes 06/19/24 1547   Dressing Type Transparent w/CHG gel 06/19/24 1547   Dressing Status Clean, dry & intact 06/19/24 1547   Dressing Intervention New 06/19/24 1547       Peripheral IV 06/13/24 Left;Posterior Forearm (Active)   Site Assessment Clean, dry & intact 06/19/24 0401   Line Status Infusing 06/19/24 0401   Line Care Connections checked and tightened 06/19/24 0401   Phlebitis Assessment No symptoms 06/19/24 0401   Infiltration Assessment 0 06/19/24 0401   Alcohol Cap Used Yes 06/19/24 0401   Dressing Status Clean, dry & intact 06/19/24 0401   Dressing Type Transparent 06/19/24 0401   Dressing Intervention New 06/13/24 1400       Peripheral IV 06/14/24 Left;Posterior;Proximal Forearm (Active)   Site Assessment Clean, dry & intact 06/19/24 0401   Line Status Infusing 06/19/24 0401   Line Care Connections checked and tightened 06/19/24 0401   Phlebitis Assessment No symptoms 06/19/24 0401   Infiltration Assessment 0 06/19/24 0401   Alcohol Cap Used Yes 06/19/24 0401   Dressing Status Clean, dry & intact 06/19/24 0401   Dressing Type Transparent 06/19/24 0401   Dressing Intervention New 06/14/24 0400        Opportunity for questions and clarification was provided.      Patient transported with:  Tech

## 2024-06-19 NOTE — ACP (ADVANCE CARE PLANNING)
Advance Care Planning      Palliative Medicine Provider (MD/NP)  Advance Care Planning (ACP) Conversation      Date of Conversation: 06/19/24  The patient and/or authorized decision maker consented to a voluntary Advance Care Planning conversation.   Individuals present for the conversation:   Patient with decision making capacity    Legal Healthcare Agent(s):    Primary Decision Maker: Shannan Kennedy - Child - [phone]    Secondary Decision Maker: MINNIE PITTS - [place] - [phone]    ACP documents available in EMR prior to discussion:  -Power of  for Healthcare    Primary Palliative Diagnosis(es):  Heart failure  bacteremia    Conversation Summary:  Discussed GOC with patient and family. Plan for move to [place] to continue dobutamine    Discussed updating AMD to reflect Edwin as primary agent and ?Shannan? As secondary.    Discussed completing a POST form to reflect his wishes for no CPR.    Resuscitation Status:    Code Status: Limited    Outcomes / Completed Documentation:  An explanation of advance directives and their importance was provided and the following forms completed:    -No new documents completed.    If new document completed, original was provided to patient and/or family member.    Copy was placed for scanning into the [facility] EMR.      I spent 30 minutes providing separately identifiable ACP services with the patient and/or surrogate decision maker in a voluntary, in-person conversation discussing the patient's wishes and goals as detailed in the above note.       Zeynep Conte MD

## 2024-06-20 ENCOUNTER — APPOINTMENT (OUTPATIENT)
Facility: HOSPITAL | Age: 74
DRG: 871 | End: 2024-06-20
Attending: INTERNAL MEDICINE
Payer: MEDICARE

## 2024-06-20 VITALS
TEMPERATURE: 97.9 F | WEIGHT: 163.14 LBS | RESPIRATION RATE: 18 BRPM | HEART RATE: 77 BPM | OXYGEN SATURATION: 97 % | BODY MASS INDEX: 23.36 KG/M2 | DIASTOLIC BLOOD PRESSURE: 71 MMHG | SYSTOLIC BLOOD PRESSURE: 99 MMHG | HEIGHT: 70 IN

## 2024-06-20 LAB
ALBUMIN SERPL-MCNC: 2.7 G/DL (ref 3.5–5)
ALBUMIN/GLOB SERPL: 0.8 (ref 1.1–2.2)
ALP SERPL-CCNC: 97 U/L (ref 45–117)
ALT SERPL-CCNC: 11 U/L (ref 12–78)
ANION GAP SERPL CALC-SCNC: 5 MMOL/L (ref 5–15)
AST SERPL-CCNC: 13 U/L (ref 15–37)
BASOPHILS # BLD: 0 K/UL (ref 0–0.1)
BASOPHILS NFR BLD: 1 % (ref 0–1)
BILIRUB DIRECT SERPL-MCNC: 0.3 MG/DL (ref 0–0.2)
BILIRUB SERPL-MCNC: 1 MG/DL (ref 0.2–1)
BUN SERPL-MCNC: 38 MG/DL (ref 6–20)
BUN/CREAT SERPL: 23 (ref 12–20)
CALCIUM SERPL-MCNC: 8.3 MG/DL (ref 8.5–10.1)
CHLORIDE SERPL-SCNC: 108 MMOL/L (ref 97–108)
CO2 SERPL-SCNC: 27 MMOL/L (ref 21–32)
CREAT SERPL-MCNC: 1.67 MG/DL (ref 0.7–1.3)
DIFFERENTIAL METHOD BLD: ABNORMAL
EOSINOPHIL # BLD: 0.1 K/UL (ref 0–0.4)
EOSINOPHIL NFR BLD: 3 % (ref 0–7)
ERYTHROCYTE [DISTWIDTH] IN BLOOD BY AUTOMATED COUNT: 17.8 % (ref 11.5–14.5)
GLOBULIN SER CALC-MCNC: 3.6 G/DL (ref 2–4)
GLUCOSE SERPL-MCNC: 114 MG/DL (ref 65–100)
HCT VFR BLD AUTO: 28.1 % (ref 36.6–50.3)
HGB BLD-MCNC: 9.8 G/DL (ref 12.1–17)
IMM GRANULOCYTES # BLD AUTO: 0 K/UL (ref 0–0.04)
IMM GRANULOCYTES NFR BLD AUTO: 1 % (ref 0–0.5)
LYMPHOCYTES # BLD: 1.7 K/UL (ref 0.8–3.5)
LYMPHOCYTES NFR BLD: 33 % (ref 12–49)
MCH RBC QN AUTO: 27.2 PG (ref 26–34)
MCHC RBC AUTO-ENTMCNC: 34.9 G/DL (ref 30–36.5)
MCV RBC AUTO: 78.1 FL (ref 80–99)
MONOCYTES # BLD: 0.5 K/UL (ref 0–1)
MONOCYTES NFR BLD: 9 % (ref 5–13)
NEUTS SEG # BLD: 2.7 K/UL (ref 1.8–8)
NEUTS SEG NFR BLD: 53 % (ref 32–75)
NRBC # BLD: 0 K/UL (ref 0–0.01)
NRBC BLD-RTO: 0 PER 100 WBC
NT PRO BNP: ABNORMAL PG/ML
PLATELET # BLD AUTO: 356 K/UL (ref 150–400)
PMV BLD AUTO: 8.9 FL (ref 8.9–12.9)
POTASSIUM SERPL-SCNC: 3.3 MMOL/L (ref 3.5–5.1)
PROT SERPL-MCNC: 6.3 G/DL (ref 6.4–8.2)
RBC # BLD AUTO: 3.6 M/UL (ref 4.1–5.7)
SODIUM SERPL-SCNC: 140 MMOL/L (ref 136–145)
WBC # BLD AUTO: 5 K/UL (ref 4.1–11.1)

## 2024-06-20 PROCEDURE — 6370000000 HC RX 637 (ALT 250 FOR IP): Performed by: STUDENT IN AN ORGANIZED HEALTH CARE EDUCATION/TRAINING PROGRAM

## 2024-06-20 PROCEDURE — 6360000002 HC RX W HCPCS: Performed by: NURSE PRACTITIONER

## 2024-06-20 PROCEDURE — 6370000000 HC RX 637 (ALT 250 FOR IP): Performed by: NURSE PRACTITIONER

## 2024-06-20 PROCEDURE — 97535 SELF CARE MNGMENT TRAINING: CPT

## 2024-06-20 PROCEDURE — 97116 GAIT TRAINING THERAPY: CPT

## 2024-06-20 PROCEDURE — 80076 HEPATIC FUNCTION PANEL: CPT

## 2024-06-20 PROCEDURE — 80048 BASIC METABOLIC PNL TOTAL CA: CPT

## 2024-06-20 PROCEDURE — 83880 ASSAY OF NATRIURETIC PEPTIDE: CPT

## 2024-06-20 PROCEDURE — 85025 COMPLETE CBC W/AUTO DIFF WBC: CPT

## 2024-06-20 PROCEDURE — APPSS45 APP SPLIT SHARED TIME 31-45 MINUTES: Performed by: NURSE PRACTITIONER

## 2024-06-20 PROCEDURE — 97530 THERAPEUTIC ACTIVITIES: CPT

## 2024-06-20 PROCEDURE — 36415 COLL VENOUS BLD VENIPUNCTURE: CPT

## 2024-06-20 PROCEDURE — 2580000003 HC RX 258: Performed by: STUDENT IN AN ORGANIZED HEALTH CARE EDUCATION/TRAINING PROGRAM

## 2024-06-20 PROCEDURE — 2580000003 HC RX 258: Performed by: NURSE PRACTITIONER

## 2024-06-20 RX ORDER — BUMETANIDE 2 MG/1
2 TABLET ORAL 2 TIMES DAILY
Qty: 120 TABLET | Refills: 2 | Status: SHIPPED | OUTPATIENT
Start: 2024-06-20

## 2024-06-20 RX ORDER — MIDODRINE HYDROCHLORIDE 5 MG/1
5 TABLET ORAL
Qty: 90 TABLET | Refills: 3 | Status: SHIPPED | OUTPATIENT
Start: 2024-06-20

## 2024-06-20 RX ADMIN — AMPICILLIN SODIUM 2000 MG: 2 INJECTION, POWDER, FOR SOLUTION INTRAVENOUS at 02:14

## 2024-06-20 RX ADMIN — LEVOTHYROXINE SODIUM 88 MCG: 0.09 TABLET ORAL at 08:56

## 2024-06-20 RX ADMIN — APIXABAN 5 MG: 5 TABLET, FILM COATED ORAL at 08:56

## 2024-06-20 RX ADMIN — BUMETANIDE 2 MG: 1 TABLET ORAL at 08:56

## 2024-06-20 RX ADMIN — TAMSULOSIN HYDROCHLORIDE 0.4 MG: 0.4 CAPSULE ORAL at 08:55

## 2024-06-20 RX ADMIN — ACETAZOLAMIDE 125 MG: 250 TABLET ORAL at 08:57

## 2024-06-20 RX ADMIN — AMPICILLIN SODIUM 2000 MG: 2 INJECTION, POWDER, FOR SOLUTION INTRAVENOUS at 19:16

## 2024-06-20 RX ADMIN — SODIUM CHLORIDE, PRESERVATIVE FREE 10 ML: 5 INJECTION INTRAVENOUS at 09:06

## 2024-06-20 RX ADMIN — AMPICILLIN SODIUM 2000 MG: 2 INJECTION, POWDER, FOR SOLUTION INTRAVENOUS at 08:58

## 2024-06-20 RX ADMIN — AMIODARONE HYDROCHLORIDE 100 MG: 200 TABLET ORAL at 08:56

## 2024-06-20 RX ADMIN — ALLOPURINOL 50 MG: 100 TABLET ORAL at 08:57

## 2024-06-20 RX ADMIN — MIDODRINE HYDROCHLORIDE 5 MG: 5 TABLET ORAL at 16:30

## 2024-06-20 RX ADMIN — BUMETANIDE 2 MG: 1 TABLET ORAL at 18:57

## 2024-06-20 RX ADMIN — MIDODRINE HYDROCHLORIDE 5 MG: 5 TABLET ORAL at 08:56

## 2024-06-20 RX ADMIN — MIDODRINE HYDROCHLORIDE 5 MG: 5 TABLET ORAL at 12:50

## 2024-06-20 RX ADMIN — POTASSIUM BICARBONATE 40 MEQ: 782 TABLET, EFFERVESCENT ORAL at 08:55

## 2024-06-20 ASSESSMENT — PAIN SCALES - GENERAL
PAINLEVEL_OUTOF10: 0
PAINLEVEL_OUTOF10: 0

## 2024-06-20 ASSESSMENT — ENCOUNTER SYMPTOMS
NAUSEA: 0
ABDOMINAL DISTENTION: 0
VOMITING: 0
SHORTNESS OF BREATH: 0
DIARRHEA: 0
COUGH: 0

## 2024-06-20 NOTE — DISCHARGE SUMMARY
Discharge Summary       PATIENT ID: Ingrid Kennedy  MRN: [id number]   YOB: 1950    DATE OF ADMISSION: 6/12/2024  3:03 PM    DATE OF DISCHARGE: 6/20/2024  PRIMARY CARE PROVIDER: Elisa Loyd MD     ATTENDING PHYSICIAN: Aissatou Matthews MD  DISCHARGING PROVIDER: Aissatou Matthews MD    To contact this individual call [phone] and ask the  to page.  If unavailable ask to be transferred the Adult Hospitalist Department.    CONSULTATIONS:   IP CONSULT TO PHARMACY  IP CONSULT TO INFECTIOUS DISEASES  IP CONSULT TO PALLIATIVE CARE  IP CONSULT HOME HEALTH    PROCEDURES/SURGERIES: * No surgery found *    ADMITTING DIAGNOSES & HOSPITAL COURSE:     \"Ingrid Kennedy is a 73 y.o. male with a past medical history significant for chronic systolic congestive heart failure, NYHA class IV with ejection fraction of 15 to 20%, recently changed from milrinone to dobutamine by AHF team hypertension, hypothyroid who was recently discharged from our facility on 5/16 for heart failure exacerbation.  On 6/12 presented to the emergency department at our facility with worsening dyspnea as well as leg and abdominal swelling.  Further workup in the emergency department, patient with noted acute onset hypoxia, blood pressures in the 80s, was started on norepinephrine and admitted to intensive care.  In the course of his time in intensive care patient was maintained on dobutamine, continued on antibiotics, blood cultures came back with Proteus.  Patient was started on midodrine, was able to be weaned off of Levophed however continued on dobutamine.  Stable for transfer out of intensive care 6/15.\"      Bacteremia with septic shock - resolved   - off pressors  - Blood cx 6/12: Proteus and Enterobacter   - Urine cx: proteus   - Romie cath removed on 6/14    - repeated blood cx 6/13 and 6/14: no growth so far  - abx changed to Ampicillin on 6/16 per ID  - Echo showed Cannot rule out small vegetation on mitral valve.

## 2024-06-20 NOTE — PALLIATIVE CARE DISCHARGE
Goals of Care/Treatment Preferences    The Palliative Medicine team was consulted as part of your/your loved one's care in the hospital. Our team is a supportive service; we strive to relieve suffering and improve quality of life.    We reviewed advance care planning information, which includes the following:    Primary Decision Maker: Shannan Kennedy - Stefanie - [phone]    Secondary Decision Maker: MINNIE PITTS - Kameron - [phone]  Patient's Healthcare Decision Maker is:: Legal Next of Kin  Confirm Advance Directive: None  Patient Would Like to Complete Advance Directive: No/refused    We discussed completing a new Advance Medical Directive- you are considering completing a new document, you are giving this some thought. We encouraged you to think more on who you would like to appoint, and you can always create new documents with your physicians in [place]. You wanted to take more time to think about this before completing this admission.     We reviewed / discussed your code status as:   Code Status: Limited     “Full Code” means perform CPR in the event of cardiac arrest.      “DNR” means do NOT perform CPR in the event of cardiac arrest.      “Partial Code” means you have specific preferences, please discuss with your healthcare team.      “No Order” means this issue was not addressed / resolved during your stay        Because of the importance of this information, we are providing you with a printed copy to share with other healthcare providers after this hospitalization is complete. Please consider completing our survey so we can serve you to the best of our ability.

## 2024-06-20 NOTE — PROGRESS NOTES
[facility]                                                                                          Hospitalist Progress Note  Sushma Madala, MD  Office Phone: [phone]        Date of Service:  2024  NAME:  Ingrid Kennedy  :  1950  MRN:  [id number]       Admission Summary:   Ingrid Kennedy is a 73 y.o. male with a past medical history significant for chronic systolic congestive heart failure, NYHA class IV with ejection fraction of 15 to 20%, recently changed from milrinone to dobutamine by F team hypertension, hypothyroid who was recently discharged from our facility on  for heart failure exacerbation.  On  presented to the emergency department at our facility with worsening dyspnea as well as leg and abdominal swelling.  Further workup in the emergency department, patient with noted acute onset hypoxia, blood pressures in the 80s, was started on norepinephrine and admitted to intensive care.  In the course of his time in intensive care patient was maintained on dobutamine, continued on antibiotics, blood cultures came back with Proteus.  Patient was started on midodrine, was able to be weaned off of Levophed however continued on dobutamine.  Stable for transfer out of intensive care 6/15.        Interval history / Subjective:   Patient is seen and examined at bedside this AM in CCU. Sitting in chair. Feels ok. C/w hand tremor- which is not much noticeable today.   Discussed with [facility] NP      Assessment & Plan:      Bacteremia   Septic shock - resolved   - off pressors  - Blood cx : Proteus and Enterobacter   - Urine cx: proteus   - Romie cath removed     - rpt blood cx  and : NGTD   - Infectious disease following, appreciate recommendations  - abx changed to Ampicillin on  per ID  - Echo showed Cannot rule out small vegetation on mitral valve. Moderate mitral regurgitation.   - may need NISHA? - will wait for ID recs     Acute on 
     CRITICAL CARE NOTE    Name: Ingrid Kennedy   : 1950   MRN: [id number]   Date: 6/15/2024      REASON FOR ICU ADMISSION:    Septic shock     PRINCIPAL ICU DIAGNOSIS   Septic shock     BRIEF PATIENT SUMMARY   Pt is a 72 yo M w/ PMH as noted above who presents to [facility] for dyspnea and increasing edema. In ED, labs and clinical presentation consistent w/ early cardiogenic shock, however unable to r/o infection due to edema vs Pna on CXR and dirty appearing UA in AHF clinic. Pt maintained on home dobutamine and started on abx and levophed. [facility] consulted for admission.      Per discussion w/ pt he would be amenable for intubation if respiratory failure, however no CPR.     COMPREHENSIVE ASSESSMENT & PLAN:SYSTEM BASED     24 HOUR EVENTS:   : Blood cx + Proteus   : Removed tunneled line. Off norepi  06/15: Off vasoactives since 7 am yesterday.     NEUROLOGICAL:   KENDRICK  Delirum precautions     PULMONOLOGY:   IS   OB    CARDIOVASCULAR:   Advanced heart failure  - Continue Dobutamine 4 mcg/kg/min  - Beta-blocker: contraindicated with Dobutamine  - ACEi/ARB/ARNI: Unable to tolerate due to renal dysfunction  - Hydralazine/Nitrate: Unable to tolerate due to hypotension  - MRA: Unable to tolerate due to renal dysfunction  - SGLT2i: Hold in the setting of AMITA and UTI  - Not an LVAD candidate   - GOC meeting planned.    - Continue midodrine     Afib  - Continue apixiban   - Continue Amiodarone     Septic shock   - Resolved     GASTROINTESTINAL   Regular diet     RENAL/ELECTROLYTE/FLUIDS:   Volume overloaded > exam improving.   Target weight 150 lbs  Bumex 2mg BID IV for now, will increase to 4mg if UOP declines   Continue Diamox 125 mg BID  Keep K>4 and Mg>2  Fluid restriction to 2000 cc daily, strict I/Os, daily standing weight  Allopurinol 50 mg daily    ENDOCRINE:   KENDRICK    HEMATOLOGY/ONCOLOGY:   Leukocytosis in the setting of septic shock   - Monitor     INFECTIOUS DISEASE:   GNR Septic Shock (proteus)  - Tunneled 
  Palliative Medicine   [place] [phone] (COPE)        [facility] [phone] (COPE)      Palliative Medicine received call from Khloe- patient's close friend, and family requesting ZOOM meeting tomorrow at 1:00 p.m. 6/19 with multitude of family members- patient has 9 children, all want to be involved. F notified of family meeting time- family has many questions for F team.     Will attempt to set-up ZOOM link for meeting tomorrow at 1:00 p.m.       Thank you for including Palliative Medicine in the care of Ingridroland Fraire LCSW     
  Palliative Medicine   [place] [phone] (COPE)        [facility] [phone] (GIN)      Palliative Medicine SW attempted to meet with the patient at bedside for completion of new AMD if patient would like- patient working with PT, will follow-up later today as able/appropriate.     Addendum: SW met with the patient at bedside, sitting up in chair- alert and oriented/engaging.     Followed-up regarding conversations held yesterday about completing new AMD. The patient has been giving this some thought- he shares he is not sure yet what he is going to do regarding his decision maker. He verbalizes his current AMD (we do not have this on file) is Shannan, however, he also shares that he is going to be moving to live with Edwin and \"people think I should put him on there too.\" SW explored this further- he confirms he trusts both Edwin and Shannan, SW shared that it is important for him to choose agent that ultimately he trusts to make decisions that are in alignment with his wishes. He asked if they should be able to be reached- ZOHAIB confirmed that yes, it is important it is someone he trusts to follow his wishes for his medical care- and also someone that can be reached by phone in case of an emergency and a physician needs their assistance. He anticipates he will keep both Shannan and add Edwin as well, however, he wants to have more time to think about what order- Edwin first vs. Shannan first, including others, etc.- he would like to give this some more thought before completing document.     SW encouraged him to continue to think about who he would like, encouraged ongoing conversations between him and his family regarding his wishes for his medical care. ZOHAIB left blank [place] AMD at bedside-     Also encouraged patient that these documents can also be completed in [place], [place] have [place] own documents ([place]ed [place] with [place]). Patient verbalized understanding. It is clear he trusts both 
0100 TRANSFER - IN REPORT:    Verbal report received from Ewelina summers Ingrid Kennedy  being received from ED for routine progression of patient care      Report consisted of patient's Situation, Background, Assessment and   Recommendations(SBAR).     Information from the following report(s) ED Encounter Summary, ED SBAR, Intake/Output, MAR, and Recent Results was reviewed with the receiving nurse.    Opportunity for questions and clarification was provided.      Assessment completed upon patient's arrival to unit and care assumed.    0130 Arrived to CCU on stretcher, transferred to CCU 23. Oriented to room and call bell system. Labs drawn.  0300 Potassium 2.8, orders received for replacement.  0430 +BC results (gram negative rods in 4 of 4 bottles collected 6/12 at 1730), notified Dr. Hanson.  0450 Levophed weaned off for MAP >65.  0730 Bedside and Verbal shift change report given to Chantale (oncoming nurse) by Rylie (offgoing nurse). Report included the following information Nurse Handoff Report, Adult Overview, Intake/Output, MAR, and Recent Results.      
0130 4 Eyes Skin Assessment     NAME:  Ingrid Kennedy  YOB: 1950  MEDICAL RECORD NUMBER:  [id number]    The patient is being assessed for  Admission    I agree that at least one RN has performed a thorough Head to Toe Skin Assessment on the patient. ALL assessment sites listed below have been assessed.      Areas assessed by both nurses:    Head, Face, Ears, Shoulders, Back, Chest, Arms, Elbows, Hands, Sacrum. Buttock, Coccyx, Ischium, Legs. Feet and Heels, and Under Medical Devices         Does the Patient have a Wound? No noted wound(s)       Rikki Prevention initiated by RN: Yes  Wound Care Orders initiated by RN: No    Pressure Injury (Stage 3,4, Unstageable, DTI, NWPT, and Complex wounds) if present, place Wound referral order by RN under : No    New Ostomies, if present place, Ostomy referral order under : No     Nurse 1 eSignature: Electronically signed by DAR CUMMINGS RN on 6/13/24 at 3:47 AM EDT    **SHARE this note so that the co-signing nurse can place an eSignature**    Nurse 2 eSignature: Electronically signed by Rosemarie Barragan RN on 6/13/24 at 3:48 AM EDT   
0730 Bedside shift change report given to Chantale GUERRA (oncoming nurse) by Kanwal RN (offgoing nurse). Report included the following information Nurse Handoff Report, Index, ED Encounter Summary, ED SBAR, Adult Overview, Surgery Report, Intake/Output, MAR, Recent Results, Med Rec Status, and Cardiac Rhythm Afib .      1020 Bcx sent per Cathy COOK    1030 ID consulted per Cathy COOK    1040 HF @ bedside.    1100 Federico COOK @ bedside to assess for ID.    1130 K 3.0. 40mEq IV Potassium replacement ordered.    1400 BP 73/55, Cathy COOK notified. Orders received for lactate and iCa.  iCa 1.06, Lactate 1.8. Orders received for 2g IV calcium gluc    1405 Levo gtt restarted @ 4mcg/min     1430 Echo @ bedside.    1530 IR @ bedside to remove Romie catheter. No complications, VSS.    1930 Bedside shift change report given to Leonila GUERRA (oncoming nurse) by Chantale RN (offgoing nurse). Report included the following information Nurse Handoff Report, Index, ED Encounter Summary, ED SBAR, Adult Overview, Surgery Report, Intake/Output, MAR, Recent Results, Med Rec Status, and Cardiac Rhythm Afib .            
0730 Report received from CHARLIE Harmon. Krista @4. 2943 TRANSFER - OUT REPORT:    Verbal report given to CHARLIE Lynch on Ingrid Kennedy  being transferred to [facility] for routine progression of patient care     Report consisted of patient's Situation, Background, Assessment and   Recommendations(SBAR).   Information from the following report(s) Nurse Handoff Report, Adult Overview, Intake/Output, MAR, Recent Results, and Cardiac Rhythm NSR PVCs  was reviewed with the receiving nurse.  Lines:   Peripheral IV 06/13/24 Left;Posterior Forearm (Active)   Site Assessment Clean, dry & intact 06/16/24 1600   Line Status Infusing 06/16/24 1600   Line Care Connections checked and tightened 06/16/24 1600   Phlebitis Assessment No symptoms 06/16/24 1600   Infiltration Assessment 0 06/16/24 1600   Alcohol Cap Used Yes 06/16/24 1600   Dressing Status Clean, dry & intact 06/16/24 1600   Dressing Type Transparent 06/16/24 1600   Dressing Intervention New 06/13/24 1400       Peripheral IV 06/14/24 Left;Posterior;Proximal Forearm (Active)   Site Assessment Clean, dry & intact 06/16/24 1600   Line Status Infusing 06/16/24 1600   Line Care Connections checked and tightened 06/16/24 1600   Phlebitis Assessment No symptoms 06/16/24 1600   Infiltration Assessment 0 06/16/24 1600   Alcohol Cap Used Yes 06/16/24 1600   Dressing Status Clean, dry & intact 06/16/24 1600   Dressing Type Transparent 06/16/24 1600   Dressing Intervention New 06/14/24 0400   Opportunity for questions and clarification was provided.    Patient transported with:  Monitor, Registered Nurse, and Tech                   
0730: Bedside report received from CHARLIE Hill.    0820: Pt with transfer orders.    1000: Blood to lab.    1310: Cardiology made aware pts Bps have been approx 85/58 nfor the past few hrs with MAP 65+.  Albumin ordered.    1525: MD made aware BP remains soft.  Received instructions to return pt to bed and try passive leg raises to increase BP.    1530: Returned to bed, BP 99/67.    1955: Report given to CHARLIE Harmon.  
1930 SBAR from Lela GUERRA.    2000 K+ 2.9. Scheduled 40meq effer-k given with bedtime meds.    2130 1x dose mucinex given for \"stuck in throat\" feeling of congestion.    0730 Bedside shift change report given to Brianna GUERRA (oncoming nurse) by Faustino GUERRA (offgoing nurse). Report included the following information Nurse Handoff Report.     
1930:  Bedside shift change report given to Leonila RN and Lynn hansen RN (oncoming nurse) by Chantale  RN (offgoing nurse). Report included the following information Nurse Handoff Report and Recent Results.     2320: K 3.2- orders for potassium via PIV and PO.    0200: Labs drawn and sent.         
ADVANCED HEART FAILURE CENTER  [facility] in [place], [place]  Inpatient Progress Note      Patient name: Ingrid Kennedy  Patient : 1950  Patient MRN: [id number]  Consulting MD: Siddharth Wilson MD  Date of service: 24    REASON FOR REFERRAL:  ADHF, Inotrope management    ASSESSMENT:  Ingrid Kennedy is a 73 y.o. male admitted with acute on chronic systolic heart failure, reported 9 lb weight gain over the last week.   History of non ischemic cardiomyopathy, stage D heart failure, on palliative inotrope therapy with Dobutamine 4 mcg/kg/min.   Not a candidate for LVAD. Formally evaluated by Advanced Heart Failure team and declined for LVAD implant due to renal dysfunction, Creatinine >1.8, RV dysfunction, and neurocognitive dysfunction.   Patient has been considering weaning off inotrope therapy. Will consult Palliative care to assist with goals of care. Tentatively planning for family meeting on Friday    RECOMMENDATIONS:  Medical Therapy for Heart Failure  Continue Dobutamine 4 mcg/kg/min  Beta-blocker: contraindicated with Dobutamine  ACEi/ARB/ARNI: Unable to tolerate due to renal dysfunction  Hydralazine/Nitrate: Unable to tolerate due to hypotension  MRA: Unable to tolerate due to renal dysfunction  SGLT2i: Hold in the setting of AMITA and UTI, restart once renal function improving  Midodrine 2.5 mg TID for hypotension    Volume Management  Volume overloaded by exam, proBNP >35,000  Target weight 150 lbs  Need to balance management of possible sepsis with decompensated heart failure  Bumex 4 mg IV once given. Monitor response. If good response, continue Bumex 4 mg IV BID. If poor response, start Bumex infusion at 1 mg/h  Continue Diamox 125 mg BID  Keep K>4 and Mg>2  Fluid restriction to 2000 cc daily, strict I/Os, daily standing weight  Allopurinol 50 mg daily    UTI  Symptoms of dysuria, +UA  Cultures sent  Started Cefepime  Further management per admitting team    Anticoagulation and 
ADVANCED HEART FAILURE CENTER  [facility] in [place], [place]  Inpatient Progress Note      Patient name: Ingrid Kennedy  Patient : 1950  Patient MRN: [id number]  Consulting MD: Artem Morgan MD  Date of service: 24    REASON FOR REFERRAL:  ADHF, Inotrope management    ASSESSMENT:  Ingrid Kennedy is a 73 y.o. male admitted with acute on chronic systolic heart failure, reported 9 lb weight gain over the last week.   History of non ischemic cardiomyopathy, stage D heart failure, on palliative inotrope therapy with Dobutamine 4 mcg/kg/min.   Not a candidate for LVAD. Formally evaluated by Advanced Heart Failure team and declined for LVAD implant due to renal dysfunction, Creatinine >1.8, RV dysfunction, and neurocognitive dysfunction.   Patient has been considering weaning off inotrope therapy. Will consult Palliative care to assist with goals of care. Tentatively planning for family meeting on Friday    Interval Events:  Feeling much better today  Off Norepi since 0700 today  Has ambulated with nursing this morning  Ate all of his breakfast  Edema resolved. Shortness of breath improved    RECOMMENDATIONS:  Medical Therapy for Heart Failure  Continue Dobutamine 4 mcg/kg/min  Beta-blocker: contraindicated with Dobutamine  ACEi/ARB/ARNI: Unable to tolerate due to renal dysfunction  Hydralazine/Nitrate: Unable to tolerate due to hypotension  MRA: Unable to tolerate due to renal dysfunction  SGLT2i: Discontinued due to UTI with resulting bacteremia and septic shock. High risk to restart  Midodrine increased to 10 mg TID    Volume Management  Target weight 150 lbs  Diuresing well. ProBNP trending down, weight trending down  Continue Bumex 2 mg IV BID  Continue Diamox 125 mg BID  Keep K>4 and Mg>2  Fluid restriction to 2000 cc daily, strict I/Os, daily standing weight  Allopurinol 50 mg daily    Septic Shock, resolved  Bacteremia and UTI  Blood cultures + Proteus and Enterobacter  Urine culture + 
ADVANCED HEART FAILURE CENTER  [facility] in [place], [place]  Inpatient Progress Note      Patient name: Ingrid Kennedy  Patient : 1950  Patient MRN: [id number]  Consulting MD: Madala, Sushma, MD  Date of service: 24    REASON FOR REFERRAL:  ADHF, Inotrope management    ASSESSMENT:  Ingrid Kennedy is a 73 y.o. male admitted with acute on chronic systolic heart failure, reported 9 lb weight gain over the last week.   History of non ischemic cardiomyopathy, stage D heart failure, on palliative inotrope therapy with Dobutamine 4 mcg/kg/min.   Not a candidate for LVAD. Formally evaluated by Advanced Heart Failure team and declined for LVAD implant due to renal dysfunction, Creatinine >1.8, RV dysfunction, and neurocognitive dysfunction.   Patient has been considering weaning off inotrope therapy. Will consult Palliative care to assist with goals of care. Tentatively planning for family meeting today    INTERVAL HISTORY:  -VSS  -labs creat down to 1.64; pBNP down slightly; K 3.0  -weight down 3lb; I/O inc  -Ingrid Kennedy is feeling well.  He denies SOB, chest pain, dizziness.  He walked in the hallway and felt quite well.  We discussed trying to coordinate family meeting       RECOMMENDATIONS:  Medical Therapy for Heart Failure  Continue Dobutamine 4 mcg/kg/min  Beta-blocker: contraindicated with Dobutamine  ACEi/ARB/ARNI: Unable to tolerate due to renal dysfunction and hypotension  Hydralazine/Nitrate: Unable to tolerate due to hypotension  MRA: Unable to tolerate due to renal dysfunction  SGLT2i: Discontinued due to UTI with resulting bacteremia and septic shock. High risk to restart  Decrease Midodrine to 5 mg TID for hypotension    Volume Management  Target weight 150 lbs  Continue home PO bumex 2mg BID  Continue Diamox 125 mg BID  Keep K>4 and Mg>2  Fluid restriction to 2000 cc daily, strict I/Os, daily standing weight  Allopurinol 50 mg daily    Septic Shock, resolved  Bacteremia and 
ADVANCED HEART FAILURE CENTER  [facility] in [place], [place]  Inpatient Progress Note      Patient name: Ingrid Kennedy  Patient : 1950  Patient MRN: [id number]  Consulting MD: Martha att. providers found  Date of service: 24    REASON FOR REFERRAL:  ADHF, Inotrope management    ASSESSMENT:  Ingrid Kennedy is a 73 y.o. male admitted with acute on chronic systolic heart failure, reported 9 lb weight gain over the last week.   History of non ischemic cardiomyopathy, stage D heart failure, on palliative inotrope therapy with Dobutamine 4 mcg/kg/min.   Not a candidate for LVAD. Formally evaluated by Advanced Heart Failure team and declined for LVAD implant due to renal dysfunction, Creatinine >1.8, RV dysfunction, and neurocognitive dysfunction.   Patient has been considering weaning off inotrope therapy. Will consult Palliative care to assist with goals of care. Tentatively planning for family meeting on Friday    Interval Events:  Hypotension requiring transient levophed  Blood cx positive with proteus and enterobacter  Urine cx pending   Labs reviewed- creatinine improved, K 2.8, LA down to 1.4, PBNP down today, T Bili down to 2.0  Remains on home Dobutamine     RECOMMENDATIONS:  Medical Therapy for Heart Failure  Continue Dobutamine 4 mcg/kg/min  Beta-blocker: contraindicated with Dobutamine  ACEi/ARB/ARNI: Unable to tolerate due to renal dysfunction  Hydralazine/Nitrate: Unable to tolerate due to hypotension  MRA: Unable to tolerate due to renal dysfunction  SGLT2i: Hold in the setting of AMITA and UTI  Midodrine 2.5 mg TID for hypotension- will uptitrate as needed     Volume Management  Volume overloaded by exam, proBNP >35,000  Target weight 150 lbs  Need to balance management of possible sepsis with decompensated heart failure  Bumex 2mg BID IV for now, will increase to 4mg if UOP declines   Continue Diamox 125 mg BID  Keep K>4 and Mg>2  Fluid restriction to 2000 cc daily, strict I/Os, daily 
[place] [phone] (COPE)  [facility] [phone] (COPE)      GOALS OF CARE: Limited, continue current care- goal to prolong life w/ dobutamine gtt and restorative measures        TREATMENT PREFERENCES:   Code Status: Limited    Advance Care Planning:  [x] [facility] Interdisciplinary Team has updated the ACP Navigator with Health Care Agent and Patient Capacity    Primary Decision Maker (Health Care Agent):   Relationship to patient:  [] Named in a scanned document   [] Legal Next of Kin  [] Guardian    Patient has AMD, however, not on file- we discussed completing new document, possibly to reflect Edwin as primary agent.  Patient shared he is considering placing Edwin as primary agent and Shannan as secondary- will plan to follow-up on 6/20 regarding whether or not patient would like to complete new AMD.     Family Meeting Documentation    Participants: Dr. Conte, Dr. Major, Sunitha Metcalf, NP, patient, Khloe, Edwin, Shannan, Brenda, Gilbert, Shahnaz Shah, Edie, Eduardo- at least 9 participants from patient's family     Discussion: Palliative Medicine Met with the patient at bedside, participated in ZOOM meeting w/ family members along with F team-     Patient has been on inotropes and currently feeling better- overall the inotrope has provided better quality of life, less SOB, fatigue, able to ambulate in hallway. The patient has had infections, however, thus far treatable. The patient and family are clear currently that they want to continue life prolonging measures, dobutamine gtt, and plan is for patient to move to [place] to live with his son Edwin.     We did discuss that at some point the inotropes will no longer be benefiting the patient, and at that point hospice would be recommended. We do discuss that if patient is declining and if he has a strong wish to return to [place]- we encourage family to keep this in mind as they navigate care in [place], at some point likely too 
Day #1 of Ampicillin  Indication:  bacteremia  Current regimen:  1 gram q4h  Recent Labs     24  0157 24  1720 06/15/24  0259 06/15/24  1000 06/15/24  2033 06/16/24  0332   WBC 11.2*  --  12.5*  --   --  7.6   CREATININE 2.43*   < > 2.04* 2.14* 2.69* 2.37*   BUN 52*   < > 56* 58* 62* 66*    < > = values in this interval not displayed.     Est CrCl: 29 ml/min  Temp (24hrs), Av.1 °F (36.7 °C), Min:97.8 °F (36.6 °C), Max:98.4 °F (36.9 °C)     Blood cx () probable mirabilis, (, ) no growth so far     Plan: Change to 2 grams q8h for CrCl 10-30 ml/min       
Hospital follow-up PCP transitional care appointment has been scheduled with Dr. Dena Ramirez for Monday, July 8th, 2024  at 2:00p.m.  Pending patient discharge.  Elisa Schaefer, Care Management Assistant   
ID Progress Note    INFECTIOUS DISEASE Attending:     I agree with the above infectious disease daily progress note in its entirety as authored by and discussed in detail with the nurse practitioner.   I have reviewed pertinent laboratory studies, microbiology cultures, radiologic reports with review of the consultations and progress notes as appropriate.   I agree with today's subjective findings, physical examination, assessment and plan of care as described above and discussed extensively with the nurse practitioner.       Stop Cefepime.    Start Ampicillin 2g IV q8hrs.    Follow repeat blood cultures post-Romie catheter removal.       Admission Summary:   73 y.o. male with medical hx of hypertension, hyperlipidemia, CHF, cardiomyopathy, PAF, s/p ICD, hx of bilateral knee replacements with post op infection (on cipro and rifampin), BPH, s/p TURP was sent to ER on 6/13 after the visit with heart failure team with SOB.                 CT of abd/pel revealed constipation. KIDNEYS/URETERS, demonstrated are multiple low-density renal lesions. T-max 100.5, wbc 14.5, blood cx grew GNR, probable proteus species. U/A revealed wbc 10-20, moderate leukocyte esterase,cx-pending. Pt was started on IV cefepime.     During visit, pt is c/o constipation and generalized weakness and sob. No chills, nausea, vomiting, chest pain, abd pain, or dysuria.     ID team was consulted for GNR sepsis evaluation and its treatment recommendation.  Interval history    Consult date;6/13    Antimicrobial therapy history      IV cefepime  IV ancef 6/14-  Assessment     Bacteremia  - afebrile,wbc 11.2    Blood cx (6/12) probable proteus, (6/13, 6/14) pending    U/A (6/12) wbc 10-20, moderate leukocyte esterase,cx-proteus mirabilis     HEreF/pafib  - NT pro-BNP >35k    cardiology following     CKD III  Crea 2.4     Hx bilateral knee replacement  - pt is on suppressive rifampin, cipro per his ortho team      Plan:      - continue with IV ancef    
Occupational Therapy  06/17/24    Chart reviewed up to this date, cleared to see by RN. Pt received seated in chair and politely declined OT intervention at this time citing increased fatigue. Pt continued to decline despite max verbal education on importance of working with therapy. Will continue to follow up as able/medically appropriate.     Cielo Redding, MICHELLE, OTR/L    
Palliative Medicine  Patient Name: Ingrid Kennedy  YOB: 1950  MRN: [id number]  Age: 73 y.o.  Gender: male    Date of Initial Consult: 6/14/2024  Date of Service: 6/18/2024  Time: 2:58 PM  Provider: Zeynep Conte MD  Hospital Day: 7  Admit Date: 6/12/2024  Referring Provider: Iwona Whittington MP       Reasons for Consultation:  Goals of Care and End Stage Disease    HISTORY OF PRESENT ILLNESS (HPI):   Ingrid Kennedy is a 73 y.o. male with a past medical history of  bph S/P turp, EF 15-20% with severe global hypokinesis, mild-moderate AR, mild-severe MR, severe TR, severe pulmonary hypertension, CKD 3 on chronic: Dobutamine drip (follows with F Dr. Major, not a candidate for LVAD ) who was [facility] on [facility]  with a diagnosis of cardiogenic shock, acute and chronic CHF, acute hypoxic respiratory failure, AMITA and UTI    Course of hospitalization: Complicated by E. coli bacteremia and E. coli UTI. And hypotension requiring IV pressors support.. Hypoxia improving, O2 down to 2L.     Psychosocial: Originally from [place], he has house there that he finished building 2 years ago, but the has [place] cardiac care with home inotrope therapy, which apparently is not available in [facility].      PALLIATIVE DIAGNOSES:    Palliative care encounter  Hypoxia  Hypoalbuminemia  Goals of care discussion    ASSESSMENT AND PLAN:   Chart reviewed including labs, notes, imaging. Met with pt at bedside, he currently has capacity to participate in MDM  Along with the patient, Called daughter Shannan (no answer) Called Layne to discuss setting up a meeting  Pt would like all 4 of his children Shannan, Edwin, Eduardo and Brianna as well as Layne to participate in the meeting  Cardiology is available to particpate in the afternoon  Meeting set for 1pm on Wednesday     Referrals to:   [] Outpatient Palliative Care  [] Home Based Palliative Care  [] Home Based Primary 
Palliative Medicine  Patient Name: Ingrid Kennedy  YOB: 1950  MRN: [id number]  Age: 73 y.o.  Gender: male    Date of Initial Consult: 6/14/2024  Date of Service: 6/19/2024  Time: 2:15 PM  Provider: Zeynep Conte MD  Hospital Day: 8  Admit Date: 6/12/2024  Referring Provider: Iwona Whittington MP       Reasons for Consultation:  Goals of Care and End Stage Disease    HISTORY OF PRESENT ILLNESS (HPI):   Ingrid Kennedy is a 73 y.o. male with a past medical history of  bph S/P turp, EF 15-20% with severe global hypokinesis, mild-moderate AR, mild-severe MR, severe TR, severe pulmonary hypertension, CKD 3 on chronic: Dobutamine drip (follows with F Dr. Major, not a candidate for LVAD ) who was [facility] on 6[facility]12/2024 [facility]  with a diagnosis of cardiogenic shock, acute and chronic CHF, acute hypoxic respiratory failure, AMITA and UTI    Course of hospitalization: Complicated by E. coli bacteremia and E. coli UTI. And hypotension requiring IV pressors support.. Hypoxia improving, O2 down to 2L.     Psychosocial: Originally from [place], his wife remains in [facility], he has house there that he finished building 2 years ago. He has 5 daughters and 2 sons. He worked as a       PALLIATIVE DIAGNOSES:    Palliative care encounter  Hypoxia  Hypoalbuminemia  Goals of care discussion    ASSESSMENT AND PLAN:   Chart reviewed including labs, notes, imaging. Met with pt at bedside, he currently has capacity to participate in MDM  Along with Dr. Major, Sunitha Metcalf NP, Jelly Fraire LCSW met with pt and family (via zoom)  At least 9 family members participated including Edwin, Shannan, Khloe, Brenda, Gilbert, Pablo, Edie Christie Earl Dr. Smith provided a medical update -- pt has been on inotropes since December; initially on milrinone now transitioned to dobutamine  He has had improvement in his symptoms -- fatigue and shortness of breath his QOL is better.   This has been 
Pharmacist Note - Vancomycin Dosing    Consult provided for this 73 y.o. male for indication of Shock, possibly infectious.  Antibiotic regimen(s): Vanc+Cefepime  Patient on vancomycin PTA? NO     Recent Labs     24  1110 24  1512   WBC 14.5* 13.1*   CREATININE 2.67* 2.67*   BUN 46* 48*     Frequency of BMP: Daily   Height: 177.8 cm  Weight: 78 kg  Est CrCl: 26 ml/min; UO: -- ml/kg/hr  Temp (24hrs), Av.7 °F (37.6 °C), Min:98.5 °F (36.9 °C), Max:100.5 °F (38.1 °C)    Cultures:  Blood-NGTD    MRSA Swab ordered (if applicable)? YES    The plan below is expected to result in a target range of Trough 10-15 mcg/mL    Therapy will be initiated with a loading dose of 2000 mg IV x 1 then hold.  Further doses will be determined by random levels.  Pharmacy to follow patient daily and order levels / make dose adjustments as appropriate.    Evert Coburn, RlD    
Pt has Romie catheter (placed 6/19) to be used for IV dobutamine and antibiotics at discharge.     1600: spoke with rep from [employer] asking about pts antibiotic dosing. Pts 1700 dose to be given at hospital prior to discharge. Bioscript to program pump for midnight dose. Per case management note, IV meds will be delivered around 1730, and pt friend Khloe will arrive to take patient home.     1630: pt only with one peripheral line. IV dobutamine running through peripheral- unable to be stopped. MD Matthews messaged about accessing Romie catheter for dobutamine, and peripheral for IV antibiotic. Per MD Matthews, Romie catheter will be accessed right before discharge by either home health nurse or liaison delivering the IV medication- this RN unable to touch Romie cath due to infection risk while patient is still admitted in hospital.   Plan to wait until 1730 when family member and meds arrive to have dobutamine hooked to Romie cath, so IV antibiotics can run in peripheral over 30 minutes while discharge is taking place.     1730: spoke with on call care manager about who will be setting up pts IV medications to Romie catheter. RODRIGO to call [employer] rep.   On call CM called this RN back, said that pt family member will hook him up to IV meds as she has been doing it for a long time. Provided this RN with number for [employer] rep Bev.     1745: this RN called [employer] rep to confirm that family member will be accessing Romie cath prior to discharge. Rep confirmed.     1815: meds still not arrived. This RN called [employer] rep back about ETA of medications.     1830: meds to be delivered around [id number].    1915: peripheral access obtained to run IV antibiotic dose due to late arrival of medications/inability to access Romie.    1920: home IV pumps/meds delivered to bedside.     2002: pt VSS, alert and oriented x 4, and family member at bedside to transport pt home and set up infusions.     2005: Pt family member connected IV 
Received consult for NISHA request.  Dr. Barroso spoke with Dr. Caballero regarding NSIHA. Pt is on ionotrope and NISHA would be invasive and challenging. Dr. Caballero says he does not think NISHA is necessary. Additional weeks of antibiotics is recommended and he says he will address this. Appreciate input of ID and agree with plan.  
Referral source:   Ingrid Kennedy at [facility] in [facility] 4 IM.  attended rounds in the IMCU as part of the Interdisciplinary team where the patient's ongoing care was discussed. I reviewed the medical record as part of this encounter.     Outcome: Interdisciplinary team are aware of  availability and were encouraged to request support as needed.      Advised nurse to contact Spiritual Care for any further referrals.The  on-call can be reached at [phone]).     Rev. Michelle Bautista MDiv, [facility]  Staff     
Referral source:   Ingrid Kennedy at [facility] in [facility] 4 CORONARY CARE.  attended rounds in the CCU as part of the Interdisciplinary team where the patient's ongoing care was discussed. I reviewed the medical record as part of this encounter.     Outcome: Interdisciplinary team are aware of  availability and were encouraged to request Spiritual Health support as needed.      The  on-call can be reached at ([phone]).     Rev. Michelle Bautista MDiv, [facility]  Staff     
Referral source:   Ingrid Kennedy at [facility] in [facility] 4 CORONARY CARE.  attended rounds in the CCU as part of the Interdisciplinary team where the patient's ongoing care was discussed. I reviewed the medical record as part of this encounter.     Outcome: Interdisciplinary team are aware of  availability and were encouraged to request Spiritual Health support as needed.      The  on-call can be reached at [phone]).     Rev. Michelle Bautista MDiv, [facility]  Staff     
Renal Dosing/Monitoring  Medication: cefazolin   Current regimen:  2 grams every 8 hr  Recent Labs     06/13/24  1028 06/13/24  1757 06/14/24  0157   CREATININE 2.30* 2.34* 2.43*   BUN 44* 52* 52*     Estimated CrCl:  28 ml/min  Plan: Change to 2 grams IV Q12H for CrCl 11-29 mL/min.  
Spiritual Care Assessment/Progress Note  [facility]    Name: Ingrid Kennedy MRN: [id number]    Age: 73 y.o.     Sex: male   Language: English     Date: 6/14/2024            Total Time Calculated: 24 min              Spiritual Assessment begun in [facility] CORONARY CARE  Service Provided For: Patient  Referral/Consult From: Rounding  Encounter Overview/Reason: Initial Encounter, Spiritual/Emotional Needs    Spiritual beliefs:      [x] Involved in a laila tradition/spiritual practice: Orthodoxy     [x] Supported by a laila community:      [] Claims no spiritual orientation:      [] Seeking spiritual identity:           [] Adheres to an individual form of spirituality:      [] Not able to assess:                Identified resources for coping and support system:   Support System: Family members, Children       [x] Prayer                  [] Devotional reading               [] Music                  [] Guided Imagery     [] Pet visits                                        [] Other: (COMMENT)     Specific area/focus of visit   Encounter:    Crisis:    Spiritual/Emotional needs: Type: Spiritual Support  Ritual, Rites and Sacraments:    Grief, Loss, and Adjustments: Type: Adjustment to illness  Ethics/Mediation:    Behavioral Health:    Palliative Care:    Advance Care Planning:           Narrative:   Referral source:  initiated visit to Ingrid Kennedy at [facility] in [facility] CORONARY CARE. I reviewed the medical record prior to this encounter.     Spiritual Assessment:     We discussed Ingrid Kennedy current feelings/concerns and spiritual perspectives. A quiet spoken man Ingrid Kennedy shared that he was doing ok. He is from [place] [facility] and he longs to return home to be with family until he dies. Ingrid shared that he is  from hi spouse, who lives in [facility]. However, he has 7 children, 2 grands and 2 great grands. He is a Catholic and remains a member of his home [employer] in [facility]. He shared that 
  05/21/24 68.2 kg (150 lb 6.4 oz)   05/17/24 66.7 kg (147 lb 2 oz)   05/16/24 66.5 kg (146 lb 9.7 oz)   05/06/24 72.7 kg (160 lb 3.2 oz)         Estimated Nutrition Needs:   Energy Requirements Based On: Kcal/kg  Weight Used for Energy Requirements: Usual (66.2 kg)  Energy (kcal/day): 2000 (30 kcal/kg)  Weight Used for Protein Requirements: Usual  Protein (g/day): 80 (1.2 gm/kg)     Fluid (ml/day): 1 mL/kcal        Recent Labs     06/18/24  0256 06/19/24  0345 06/20/24  0218   GLUCOSE 105* 102* 114*   BUN 42* 39* 38*   CREATININE 1.86* 1.64* 1.67*    142 140   K 3.2* 3.0* 3.3*    108 108   CO2 30 27 27   CALCIUM 8.7 8.4* 8.3*       No results for input(s): \"POCGLU\" in the last 72 hours.    Lab Results   Component Value Date/Time    LABA1C 6.4 05/21/2024 10:41 AM    LABA1C 6.1 12/12/2023 04:29 PM     05/21/2024 10:41 AM           Marisa Hernandez RD  Available via [employer]    
Transportation (Medical): No     Lack of Transportation (Non-Medical): No   Physical Activity: Insufficiently Active (1/24/2024)    Exercise Vital Sign     Days of Exercise per Week: 5 days     Minutes of Exercise per Session: 20 min   Stress: Not on file   Social Connections: Moderately Integrated (6/17/2024)    Social Connections ([facility] HRSN)     If for any reason you need help with day-to-day activities such as bathing, preparing meals, shopping, managing finances, etc., do you get the help you need?: Not on file   Intimate Partner Violence: Not on file   Depression: Not at risk (6/4/2024)    PHQ-2     PHQ-2 Score: 0   Housing Stability: Low Risk  (6/13/2024)    Housing Stability Vital Sign     Unable to Pay for Housing in the Last Year: No     Number of Places Lived in the Last Year: 1     Unstable Housing in the Last Year: No   Interpersonal Safety: Not At Risk (6/13/2024)    Interpersonal Safety Domain Source: IP Abuse Screening     Physical abuse: Denies     Verbal abuse: Denies     Emotional abuse: Denies     Financial abuse: Denies     Sexual abuse: Denies   Utilities: Not At Risk (6/13/2024)    [facility] Utilities     Threatened with loss of utilities: No       Review of Systems:   A comprehensive review of systems was negative.       Vital Signs:    Last 24hrs VS reviewed since prior progress note. Most recent are:  Vitals:    06/17/24 1056   BP: 101/64   Pulse: 72   Resp: 16   Temp: 98.1 °F (36.7 °C)   SpO2: 95%         Intake/Output Summary (Last 24 hours) at 6/17/2024 1229  Last data filed at 6/17/2024 0700  Gross per 24 hour   Intake 937.56 ml   Output 1800 ml   Net -862.44 ml          Physical Examination:     I had a face to face encounter with this patient and independently examined them on 6/17/2024 as outlined below:          General : alert x 3, awake, no acute distress, ill looking   HEENT: PEERL, EOMI, moist mucus membrane  Neck: supple, no JVD, no meningeal signs  Chest: Clear to auscultation 
established cardiology care with [facility] in [place], [place]. He was managed with GDMT. He had a cardiac cath 12/27/2022 showing normal coronary arteries, normal filling pressures and CI of 2.6.      In the summer of 2023 he had bilateral knee replacements in June and July. His right knee replacement became infected and he reports since this time having decline in heart function with repeated hospitalizations. He reports monthly heart failure hospitalizations since August 2023. He denies history of bacteremia with hardware infection. He has been managed with Cipro and Rifampin. He is followed by orthopedics in [place] and reports good infection control on antibiotic suppression.      He was seen in September 2023 at the [facility] in [place], [place], referred from hospitalization in [place]. He had a Medtronic Dual Chamber ICD implanted 9/29/2023. He continued to be hospitalized with heart failure. His most recent hospitalization was in [place] on 11/4/2023. He required inotrope therapy with Milrinone and IV diuresis. He had AMITA on CKD with creatinine up to 2.91 which improved to 1.68 with Milrinone. LVAD was discussed during hospitalization and he was scheduled for visit in December 2023 back at [facility] in [place] for CardioMEMS implant and LVAD evaluation.      He has since relocated to [place], [place] to live with close family friends which he refers to has his adopted daughter and her family, in total 5 adults. He has no family in [place] and was interested in LVAD evaluation near a support system. He has been in [place] since 11/29/2023. We met the patient during a hospitalization for decompensated heart failure in December 2023. He was initiated on Milrinone and diuresed. We weaned Milrinone and RHC off inotrope showed elevated biventricular filling pressures and CI of 1.37. We performed LVAD evaluation during admission. He was not immediately an LVAD candidate due to renal 
  CVS: S1 S2 heard, Capillary refill less than 2 seconds  Abd: soft/ non tender, non distended, BS physiological,   Ext: no clubbing, no cyanosis, no edema, brisk 2+ DP pulses  Neuro/Psych: pleasant mood and affect,grossly intact  Skin: warm     Data Review:    I personally reviewed  Image      I have personally and independently reviewed all pertinent labs, diagnostic studies, imaging, and have provided independent interpretation of the same.     Labs:     No results for input(s): \"WBC\", \"HGB\", \"HCT\", \"PLT\" in the last 72 hours.    Recent Labs     06/17/24  0306 06/18/24  0256 06/19/24  0345    142 142   K 2.8* 3.2* 3.0*    107 108   CO2 31 30 27   BUN 50* 42* 39*   MG 2.5*  --   --        Recent Labs     06/17/24 0306 06/18/24  0256 06/19/24  0345   ALT 11* 12 10*   GLOB 3.7 3.8 3.6       No results for input(s): \"INR\", \"APTT\" in the last 72 hours.    Invalid input(s): \"PTP\"   No results for input(s): \"TIBC\" in the last 72 hours.    Invalid input(s): \"FE\", \"PSAT\", \"FERR\"   No results found for: \"RBCF\"   No results for input(s): \"PH\", \"PCO2\", \"PO2\" in the last 72 hours.  No results for input(s): \"CPK\" in the last 72 hours.    Invalid input(s): \"CPKMB\", \"CKNDX\", \"TROIQ\"  Lab Results   Component Value Date/Time    CHOL 124 04/15/2024 11:46 AM    HDL 60 04/15/2024 11:46 AM    LDL 54 04/15/2024 11:46 AM     No results found for: \"GLUCPOC\"  [unfilled]    Notes reviewed from all clinical/nonclinical/nursing services involved in patient's clinical care. Care coordination discussions were held with appropriate clinical/nonclinical/ nursing providers based on care coordination needs.         Patients current active Medications were reviewed, considered, added and adjusted based on the clinical condition today.      Home Medications were reconciled to the best of my ability given all available resources at the time of admission. Route is PO if not otherwise noted.      Admission 
01/17/24 Right Subclavian (Active)   $Central Line Insertion $ Yes 05/12/24 0820   Central Line Being Utilized Yes 06/13/24 0800   Criteria for Appropriate Use Long term IV/antibiotic administration 06/13/24 0800   Site Assessment Clean 06/13/24 0800   Phlebitis Assessment No symptoms 06/13/24 0800   Infiltration Assessment 0 06/13/24 0800   Proximal Lumen Color/Status Purple;Infusing;No blood return 06/13/24 0800   Distal Lumen Color/Status Red;Infusing;No blood return 06/13/24 0800   Line Care Chlorhexidine wipes;Connections checked and tightened 06/13/24 0800   Alcohol Cap Used Yes 06/13/24 0800   Date of Last Dressing Change 06/12/24 06/13/24 0800   Dressing Type Transparent w/CHG gel 06/13/24 0800   Dressing Status Clean, dry & intact 06/13/24 0800   Dressing Intervention New 06/13/24 0400     Venous Access Device:     Peripheral Intravenous Line:    Peripheral IV 06/12/24 Posterior;Right Hand (Active)   Site Assessment Clean 06/13/24 0800   Line Status Flushed;Capped 06/13/24 0800   Line Care Connections checked and tightened 06/13/24 0800   Phlebitis Assessment No symptoms 06/13/24 0800   Infiltration Assessment 0 06/13/24 0800   Alcohol Cap Used Yes 06/13/24 0800   Dressing Status Clean, dry & intact 06/13/24 0800   Dressing Type Transparent 06/13/24 0800     Arterial Line:     Hemodialysis Catheter:     Drain(s):    External Urinary Catheter (Active)   Site Assessment Clean,dry & intact 06/13/24 0800   Placement Replaced 06/13/24 0800   Securement Method Securing device (Describe) 06/13/24 0800   Catheter Care Suction Canister/Tubing changed 06/13/24 0800   Perineal Care Yes 06/13/24 0800   Suction 40 mmgHg continuous 06/13/24 0800   Urine Color Velma 06/13/24 0800   Urine Appearance Clear 06/13/24 0800   Output (mL) 200 mL 06/13/24 1100     Airway:     Intraosseous Line:     Epidural:     Atrial Line:     Cervical Ripening Balloon:        HOSPITAL COURSE/DAILY EVENT LOG       SUBJECTIVE   Review of Systems 
Status:[id number]:::1}      Medications Reviewed:     Current Facility-Administered Medications   Medication Dose Route Frequency    ampicillin (OMNIPEN) 2,000 mg in sodium chloride 0.9 % 100 mL IVPB (mini-bag)  2,000 mg IntraVENous Q8H    potassium bicarb-citric acid (EFFER-K) effervescent tablet 40 mEq  40 mEq Oral BID    midodrine (PROAMATINE) tablet 10 mg  10 mg Oral TID WC    acetaZOLAMIDE (DIAMOX) tablet 125 mg  125 mg Oral BID    allopurinol (ZYLOPRIM) tablet 50 mg  50 mg Oral Daily    amiodarone (CORDARONE) tablet 100 mg  100 mg Oral Daily    apixaban (ELIQUIS) tablet 5 mg  5 mg Oral BID    levothyroxine (SYNTHROID) tablet 88 mcg  88 mcg Oral Daily    [Held by provider] bumetanide (BUMEX) injection 2 mg  2 mg IntraVENous BID    tamsulosin (FLOMAX) capsule 0.4 mg  0.4 mg Oral Daily    docusate sodium (COLACE) capsule 100 mg  100 mg Oral BID PRN    DOBUTamine (DOBUTREX) 500 mg in dextrose 5 % 250 mL infusion  4 mcg/kg/min (Order-Specific) IntraVENous Continuous    sodium chloride flush 0.9 % injection 5-40 mL  5-40 mL IntraVENous 2 times per day    sodium chloride flush 0.9 % injection 5-40 mL  5-40 mL IntraVENous PRN    0.9 % sodium chloride infusion   IntraVENous PRN    ondansetron (ZOFRAN-ODT) disintegrating tablet 4 mg  4 mg Oral Q8H PRN    Or    ondansetron (ZOFRAN) injection 4 mg  4 mg IntraVENous Q6H PRN    polyethylene glycol (GLYCOLAX) packet 17 g  17 g Oral Daily PRN    acetaminophen (TYLENOL) tablet 650 mg  650 mg Oral Q6H PRN    Or    acetaminophen (TYLENOL) suppository 650 mg  650 mg Rectal Q6H PRN     ______________________________________________________________________  EXPECTED LENGTH OF STAY: 7  ACTUAL LENGTH OF STAY:          6                 Sushma Madala, MD   
complex by PCR Not detected        Bacteroides fragilis by PCR Not detected        Enterobacteriaceae by PCR Detected        Enterobacter cloacae complex by PCR Not detected        Escherichia Coli Not detected        Klebsiella aerogenes by PCR Not detected        Klebsiella oxytoca by PCR Not detected        Klebsiella pneumoniae group by PCR Not detected        Proteus by PCR Detected        Salmonella species by PCR Not detected        Serratia marcescens by PCR Not detected        Haemophilus Influenzae by PCR Not detected        Neisseria meningitidis by PCR Not detected        Pseudomonas aeruginosa Not detected        Stenotrophomonas maltophilia by PCR Not detected        Candida albicans by PCR Not detected        Candida auris by PCR Not detected        Candida glabrata Not detected        Candida krusei by PCR Not detected        Candida parapsilosis by PCR Not detected        Candida tropicalis by PCR Not detected        Cryptococcus neoformans/gattii by PCR Not detected        Resistant gene targets          Resistant gene ctx-m by PCR Not detected        Resistant gene imp by PCR Not detected        KPC (Carbapenem resistance gene) Not detected        Resistant gene ndm by PCR Not detected        Resistant gene oxa-48-like by pcr Not detected        Resistant gene vim by PCR Not detected        [employer] test comment       False positive results may rarely occur. Correlate with clinical,epidemiologic, and other laboratory findings           Comment: Please see BCID Interpretation Guide in EPIC Links       Blood Culture 1 [[id number]]  (Abnormal) Collected: 06/12/24 1730    Order Status: Completed Specimen: Blood Updated: 06/15/24 0820     Special Requests --        NO SPECIAL REQUESTS  RIGHT  HAND       Culture       Proteus mirabilis GROWING IN BOTH BOTTLES DRAWN SITE = RIGHT HAND REFER TO [id number] FOR SENSITIVITIES          Culture, Urine [[id number]]  (Abnormal)  (Susceptibility) Collected: 06/12/24 
continuous 24 0800   Urine Color Velma 24 0800   Urine Appearance Clear 24 0800   Output (mL) 200 mL 24 1100     Airway:     Intraosseous Line:     Epidural:     Atrial Line:     Cervical Ripening Balloon:        HOSPITAL COURSE/DAILY EVENT LOG       SUBJECTIVE   Review of Systems   Constitutional:  Positive for chills.          OBJECTIVE   Physical Exam  Vitals:    24 1200   BP: 100/60   Pulse: 87   Resp: 27   Temp: 98.2 °F (36.8 °C)   SpO2:      General appearance: NAD, conversant  Neuro: No FND.   Lungs: Clear to auscultation  CV: RRR    Labs and Data: Reviewed 24  Medications: Reviewed 24  Imaging: Reviewed 24    BP (!) 94/57   Pulse (!) 101   Temp 100.3 °F (37.9 °C) (Oral)   Resp 22   Ht 1.778 m (5' 10\")   Wt 72.7 kg (160 lb 4.4 oz)   SpO2 95%   BMI 23.00 kg/m²      Temp (24hrs), Av.2 °F (37.3 °C), Min:98.3 °F (36.8 °C), Max:100.5 °F (38.1 °C)           Intake/Output:     Intake/Output Summary (Last 24 hours) at 2024 1144  Last data filed at 2024 1100  Gross per 24 hour   Intake 1116.42 ml   Output 2450 ml   Net -1333.58 ml       Imaging    No valid procedures specified.     CRITICAL CARE DOCUMENTATION  I had a face to face encounter with the patient, reviewed and interpreted patient data including clinical events, labs, images, vital signs, I/O's, and examined patient.  I have discussed the case and the plan and management of the patient's care with the consulting services, the bedside nurses and the respiratory therapist.        Artem Morgan MD   Critical Care Medicine  [facility]           
  Substance and Sexual Activity    Alcohol use: Never    Drug use: Never    Sexual activity: Not Currently     Partners: Female     Social Determinants of Health     Financial Resource Strain: Low Risk  (12/6/2023)    Overall Financial Resource Strain (CARDIA)     Difficulty of Paying Living Expenses: Not hard at all   Food Insecurity: No Food Insecurity (6/13/2024)    Hunger Vital Sign     Worried About Running Out of Food in the Last Year: Never true     Ran Out of Food in the Last Year: Never true   Transportation Needs: No Transportation Needs (6/13/2024)    PRAPARE - Transportation     Lack of Transportation (Medical): No     Lack of Transportation (Non-Medical): No   Physical Activity: Insufficiently Active (1/24/2024)    Exercise Vital Sign     Days of Exercise per Week: 5 days     Minutes of Exercise per Session: 20 min    Social Connections ([facility] HRSN)   Housing Stability: Low Risk  (6/13/2024)    Housing Stability Vital Sign     Unable to Pay for Housing in the Last Year: No     Number of Places Lived in the Last Year: 1     Unstable Housing in the Last Year: No       LABORATORY RESULTS:   Failed to redirect to the Timeline version of the conXt SmartLink.  No results found for: \"TSH\", \"TSH2\", \"TSH3\", \"TSHELE\"    ALLERGY:  No Known Allergies     CURRENT MEDICATIONS:    Current Facility-Administered Medications:     ampicillin (OMNIPEN) 2,000 mg in sodium chloride 0.9 % 100 mL IVPB (mini-bag), 2,000 mg, IntraVENous, Q8H, Syeda Butcher, APRN - NP, Stopped at 06/17/24 1043    potassium bicarb-citric acid (EFFER-K) effervescent tablet 40 mEq, 40 mEq, Oral, BID, Sunitha Metcalf, APRN - NP, 40 mEq at 06/17/24 1006    midodrine (PROAMATINE) tablet 10 mg, 10 mg, Oral, TID DARRYL, Artem Morgan MD, 10 mg at 06/17/24 1007    acetaZOLAMIDE (DIAMOX) tablet 125 mg, 125 mg, Oral, BID, Tino Patrick MD, 125 mg at 06/17/24 1006    allopurinol (ZYLOPRIM) tablet 50 mg, 50 mg, Oral, Daily, Tino Patrick MD, 50 
IntraVENous, Q12H, Sunitha Metcalf APRN - NP, Stopped at 06/16/24 0215    midodrine (PROAMATINE) tablet 10 mg, 10 mg, Oral, TID WC, Artem Morgan MD, 10 mg at 06/16/24 0823    acetaZOLAMIDE (DIAMOX) tablet 125 mg, 125 mg, Oral, BID, Tino Patrick MD, 125 mg at 06/16/24 0823    allopurinol (ZYLOPRIM) tablet 50 mg, 50 mg, Oral, Daily, Tino Patrick MD, 50 mg at 06/16/24 0823    amiodarone (CORDARONE) tablet 100 mg, 100 mg, Oral, Daily, Tino Patrick MD, 100 mg at 06/16/24 0823    apixaban (ELIQUIS) tablet 5 mg, 5 mg, Oral, BID, Tino Patrick MD, 5 mg at 06/16/24 0823    levothyroxine (SYNTHROID) tablet 88 mcg, 88 mcg, Oral, Daily, Tino Patrick MD, 88 mcg at 06/16/24 0824    [Held by provider] bumetanide (BUMEX) injection 2 mg, 2 mg, IntraVENous, BID, Iwona Whittington APRN - NP, 2 mg at 06/14/24 1712    tamsulosin (FLOMAX) capsule 0.4 mg, 0.4 mg, Oral, Daily, Iwona Whittington APRN - NP, 0.4 mg at 06/16/24 0823    docusate sodium (COLACE) capsule 100 mg, 100 mg, Oral, BID PRN, Artem Morgan MD, 100 mg at 06/13/24 1137    DOBUTamine (DOBUTREX) 500 mg in dextrose 5 % 250 mL infusion, 4 mcg/kg/min (Order-Specific), IntraVENous, Continuous, Tino Patrick MD, Last Rate: 8.7 mL/hr at 06/16/24 0649, 4 mcg/kg/min at 06/16/24 0649    sodium chloride flush 0.9 % injection 5-40 mL, 5-40 mL, IntraVENous, 2 times per day, Tino Patrick MD, 10 mL at 06/16/24 0824    sodium chloride flush 0.9 % injection 5-40 mL, 5-40 mL, IntraVENous, PRN, Tino Patrick MD    0.9 % sodium chloride infusion, , IntraVENous, PRN, Tino Patrick MD, Last Rate: 5 mL/hr at 06/13/24 1600, Rate Verify at 06/13/24 1600    ondansetron (ZOFRAN-ODT) disintegrating tablet 4 mg, 4 mg, Oral, Q8H PRN **OR** ondansetron (ZOFRAN) injection 4 mg, 4 mg, IntraVENous, Q6H PRN, Tino Patrick MD    polyethylene glycol (GLYCOLAX) packet 17 g, 17 g, Oral, Daily PRN, Tino Patrick MD    
acetaZOLAMIDE (DIAMOX) tablet 125 mg, 125 mg, Oral, BID, Tino Patrick MD, 125 mg at 06/15/24 0756    allopurinol (ZYLOPRIM) tablet 50 mg, 50 mg, Oral, Daily, Tino Patrick MD, 50 mg at 06/15/24 0756    amiodarone (CORDARONE) tablet 100 mg, 100 mg, Oral, Daily, Tino Patrick MD, 100 mg at 06/15/24 0756    apixaban (ELIQUIS) tablet 5 mg, 5 mg, Oral, BID, Tino Patrick MD, 5 mg at 06/15/24 0756    levothyroxine (SYNTHROID) tablet 88 mcg, 88 mcg, Oral, Daily, Tino Patrick MD, 88 mcg at 06/15/24 0756    [Held by provider] bumetanide (BUMEX) injection 2 mg, 2 mg, IntraVENous, BID, PkIwona wang APRN - NP, 2 mg at 06/14/24 1712    tamsulosin (FLOMAX) capsule 0.4 mg, 0.4 mg, Oral, Daily, Pk, Iwona JENNIFER APRN - NP, 0.4 mg at 06/15/24 0756    docusate sodium (COLACE) capsule 100 mg, 100 mg, Oral, BID PRN, Artem Morgan MD, 100 mg at 06/13/24 1137    DOBUTamine (DOBUTREX) 500 mg in dextrose 5 % 250 mL infusion, 4 mcg/kg/min (Order-Specific), IntraVENous, Continuous, Tino Patrick MD, Last Rate: 8.7 mL/hr at 06/15/24 0312, 4 mcg/kg/min at 06/15/24 0312    sodium chloride flush 0.9 % injection 5-40 mL, 5-40 mL, IntraVENous, 2 times per day, Tino Patrick MD, 10 mL at 06/15/24 0757    sodium chloride flush 0.9 % injection 5-40 mL, 5-40 mL, IntraVENous, PRN, Tino Patrick MD    0.9 % sodium chloride infusion, , IntraVENous, PRN, Tino Patrick MD, Last Rate: 5 mL/hr at 06/13/24 1600, Rate Verify at 06/13/24 1600    ondansetron (ZOFRAN-ODT) disintegrating tablet 4 mg, 4 mg, Oral, Q8H PRN **OR** ondansetron (ZOFRAN) injection 4 mg, 4 mg, IntraVENous, Q6H PRN, Tino Patrick MD    polyethylene glycol (GLYCOLAX) packet 17 g, 17 g, Oral, Daily PRN, Tino Patrick MD    acetaminophen (TYLENOL) tablet 650 mg, 650 mg, Oral, Q6H PRN, 650 mg at 06/12/24 6083 **OR** acetaminophen (TYLENOL) suppository 650 mg, 650 mg, Rectal, Q6H PRN, Tino Patrick, 
suppository 650 mg, 650 mg, Rectal, Q6H PRN, Tino Patrick MD    PATIENT CARE TEAM:  Patient Care Team:  Elisa Loyd MD as PCP - General (Internal Medicine)  Elisa Loyd MD as PCP - Empaneled Provider     Thank you for allowing me to participate in this patient's care.    MARYCRUZ Jeffries NP   [facility]  [facility]  [address] [place], [address]  Phone: [phone]    Electronically signed by MARYCRUZ Jeffries NP on 6/18/24 at 4:14 PM EDT

## 2024-06-20 NOTE — CARE COORDINATION
CM acknowledges ID final orders. CM sent referral for NOBLE to BS pending orders. CM sent final ID orders to [employer] to review benefits. CM will continue to follow.   
CM spoke to MD in regards to D/C dispo. Pt iv abx final orders and dobutamine orders submitted to [employer]. Romie CVC note uploaded to [facility] for [facility] and [employer] to view. Daughter will be at the bedside at 1730 to  the pumps and the pt. MD aware.     PT/OT spoke to pt in regards to ordering rollator. Pt inquired if rollator would be covered. CM contacted [employer] and provided insurance info; company stated they would cover rollator. CM spoke with pt and pt caregiver Khloe in regards to coverage for rollator. CM confirmed address and phone number for delivery. CM will continue to follow.   
Long Term Discharge Plan:    Transfer home dobutamine and home health services to [place], [place]. Patient will discharge from this hospitalization with resumption of care ([facility] and [facility]) to his [place] address. Plan once IV abx are complete, patient will move to [place] to live with his son.     [place] Address:  Edwin Kennedy (son)  [address] [place] [address]    CM will prioritize determining if [facility] is available for home infusion services in [place], [place]. [place] of [employer]. Will then attempt to secure an Advanced Heart Failure specialist in the [employer] area who will accept new patients and coordinate new patient appointment. Patient will need to fly to [employer] within 48hrs of new appointment to become established patient with new  agency in [place].    CM updated patient who is in agreement. Spoke with [facility] MD - inotrope transition will ultimately occur in outpatient setting unless patient is readmitted. CM available to assist as needed.    [facility] is Cardiology group that specializes in Advanced Heart Failure among other Cardiology specialities. The closest office near the patient's address in [place]:    [place] Office:  Address: [address] [place] [address]   [address]  [place] [address]  Phone: [phone]    Will need to try and establish a new patient appointment after 7/11 (last date of IV abx).     CM will continue to follow.    Renny Jesus, MPH  Care Manager l [facility]  Available via [employer]  
Transition of Care Plan:    RUR: 23% - high readmission  Prior Level of Functioning: independent; chronic dobutamine at home  Disposition: home health and home infusion  Follow up appointments: [facility]  DME needed: none  Transportation at discharge: friend/family  IM/IMM Medicare/ letter given: needs 2nd IMM  Caregiver Contact: daughter Rose Kennedy - p: [phone]  Discharge Caregiver contacted prior to discharge? no  Care Conference needed? Yes - held today  Barriers to discharge: erick port placement; medical stability    CM updated by [facility] MD that family meeting held this afternoon with Palliative Medicine. Patient will discharge with resumption of care for home health. He will later transition to care in [place] after his IV abx are completed.    Updated by attending MD that erick port ordered for placement. Patient will be stable for discharge tomorrow after port placed by IR.     Clinicals and infusion orders sent to [facility] for home dobutamine and home IV antibiotics. Patient will discharge to friend (Khloe Douglas) house and resume home services.    [facility] will need line report for Erick catheter sent via [employer] once available.    CM will continue to follow.    Disposition Plan:  Home Health: [facility]  Home Infusion: [facility] (dobutamine + IV abx)  Transportation: home with friend    Renny Jesus, MPH  Care Manager l [facility]  Available via [employer]       
Transition of Care Plan:    RUR: 25% - moderate  Prior Level of Functioning: independent; chronic inotrope  Disposition: home health and home infusion - IV abx and inotrope  Follow up appointments: [facility]  DME needed: none  Transportation at discharge: family  IM/IMM Medicare/ letter given: needs 2nd IMM  Caregiver Contact: levy Douglas - [phone]  Discharge Caregiver contacted prior to discharge? no  Care Conference needed? yes  Barriers to discharge: medical; goals of care    CM updated by attending MD that IV abx orders are placed. Spoke with [facility] and Palliative Medicine - hopeful for meeting this afternoon with patient and family to discuss goals of care. Limited options as patient cannot discharge to [place] on [place] and [place] including debility, shortness of breath, missing connection and getting stuck in [place]. Patient also has a family member (son?) in [place] - could be an option if we coordinate with Cardiology in [place] and patient can discharge on inotrope.    Tentative plan for family meeting and care conference this afternoon.    Care conference recommended due to chronic infection, dependent on inotrope, and frequent readmissions.    CM will continue to follow.    Renny Jesus, MPH  Care Manager l [facility]  Available via [employer]  
Transition of Care Plan:    RUR: 26% - high  Prior Level of Functioning: chronic inotrope support; resides w daughter  Disposition: home health  Follow up appointments: [facility]  DME needed: none  Transportation at discharge: family  IM/IMM Medicare/ letter given: 6/14/24  Caregiver Contact: Khloe Douglas - daughter - p: [phone]  Discharge Caregiver contacted prior to discharge? planned  Care Conference needed? Yes - planned this week  Barriers to discharge: medical; goals of care    CM updated by attending MD that plans for family meeting this week for goals of care. Patient admitted with septic shock and chronic infection (bacteremia). Romie port removed at time of admission and remains out. Patient to have NISHA - timing is TBD. Patient at baseline is on home dobutamine with [facility] and [facility]. He resides at address on file with his daughter. From [place] - patient would like to return.    CM will continue to follow.    Renny Jesus, MPH  Care Manager l [facility]  Available via [employer]  
Follow Up Transport Self   Condition of Participation: Discharge Planning   The Patient and/or Patient Representative was provided with a Choice of Provider? Patient   The Patient and/Or Patient Representative agree with the Discharge Plan? Yes   Freedom of Choice list was provided with basic dialogue that supports the patient's individualized plan of care/goals, treatment preferences, and shares the quality data associated with the providers?  Yes     Admitted from home with shortness of breath and increasing edema. History of heart failure on home IV Dobutamine. Independent at baseline. Resides with daughter Khloe Amin [phone].  Patient and family are considering Hospice vs returning to home to [place]. Barriers to that disposition would be the Dobutamiine. It would need to be flown from [place]. Per [facility] note a familt meeting is scheduled for 6/17/24. Palliative Care consult noted. CM following for SUSANNAH needs.

## 2024-06-21 ENCOUNTER — TELEPHONE (OUTPATIENT)
Age: 74
End: 2024-06-21

## 2024-06-21 NOTE — PLAN OF CARE
Problem: Occupational Therapy - Adult  Goal: By Discharge: Performs self-care activities at highest level of function for planned discharge setting.  See evaluation for individualized goals.  Description: FUNCTIONAL STATUS PRIOR TO ADMISSION:  Patient was ambulatory using a cane occasionally outside of the home and was independent for basic ADLs, requires assist for IADLs.     ADL Assistance: Independent, Homemaking Assistance: Needs assistance, Ambulation Assistance: Independent, Transfer Assistance: Independent, Active : No     HOME SUPPORT: Patient lived with his daughter who assists with IADLs.    Occupational Therapy Goals:  Initiated 6/14/2024  1.  Patient will perform grooming standing at sink with [place] within 7 day(s).  2.  Patient will perform lower body dressing with [place] within 7 day(s).  3.  Patient will perform bathing with [place] within 7 day(s).  4.  Patient will perform toilet transfers with [place]  within 7 day(s).  5.  Patient will perform all aspects of toileting with [place] within 7 day(s).  6.  Patient will participate in upper extremity therapeutic exercise/activities with [place] for 10 minutes within 7 day(s).    7.  Patient will utilize energy conservation techniques during functional activities with no cues within 7 day(s).  Outcome: Progressing    OCCUPATIONAL THERAPY TREATMENT  Patient: Ingrid Kennedy (73 y.o. male)  Date: 6/18/2024  Primary Diagnosis: Shock circulatory (HCC) [R57.9]  Hypervolemia, unspecified hypervolemia type [E87.70]  Acute on chronic congestive heart failure, unspecified heart failure type (HCC) [I50.9]       Precautions: Fall Risk (Home dobutamine)                Chart, occupational therapy assessment, plan of care, and goals were reviewed.    ASSESSMENT  Patient continues to benefit from skilled OT services and is progressing towards goals. Patient received in bed and agreeable to tx. Flat affect throughout. Demonstrated 
  Problem: Occupational Therapy - Adult  Goal: By Discharge: Performs self-care activities at highest level of function for planned discharge setting.  See evaluation for individualized goals.  Description: FUNCTIONAL STATUS PRIOR TO ADMISSION:  Patient was ambulatory using a cane occasionally outside of the home and was independent for basic ADLs, requires assist for IADLs.     ADL Assistance: Independent, Homemaking Assistance: Needs assistance, Ambulation Assistance: Independent, Transfer Assistance: Independent, Active : No     HOME SUPPORT: Patient lived with his daughter who assists with IADLs.    Occupational Therapy Goals:  Initiated 6/14/2024  1.  Patient will perform grooming standing at sink with [place] within 7 day(s).  2.  Patient will perform lower body dressing with [place] within 7 day(s).  3.  Patient will perform bathing with [place] within 7 day(s).  4.  Patient will perform toilet transfers with [place]  within 7 day(s).  5.  Patient will perform all aspects of toileting with [place] within 7 day(s).  6.  Patient will participate in upper extremity therapeutic exercise/activities with [place] for 10 minutes within 7 day(s).    7.  Patient will utilize energy conservation techniques during functional activities with no cues within 7 day(s).  Outcome: Progressing   OCCUPATIONAL THERAPY EVALUATION    Patient: Ingrid Kennedy (73 y.o. male)  Date: 6/14/2024  Primary Diagnosis: Shock circulatory (HCC) [R57.9]  Hypervolemia, unspecified hypervolemia type [E87.70]  Acute on chronic congestive heart failure, unspecified heart failure type (HCC) [I50.9]         Precautions: Fall Risk (Home dobutamine)    ASSESSMENT :  The patient is limited by decreased functional mobility, independence in ADLs, high-level IADLs, strength, activity tolerance, endurance, balance, and new O2 use . Patient presented to ED for dyspnea and increasing edema and found with cardiogenic shock. 
  Problem: Physical Therapy - Adult  Goal: By Discharge: Performs mobility at highest level of function for planned discharge setting.  See evaluation for individualized goals.  Description: FUNCTIONAL STATUS PRIOR TO ADMISSION: Patient was modified independent using a single point cane for functional mobility.    HOME SUPPORT PRIOR TO ADMISSION: The patient lived with daughter but did not require assistance.    Physical Therapy Goals  Initiated 6/14/2024  1.  Patient will move from supine to sit and sit to supine and roll side to side in bed with modified independence within 7 day(s).    2.  Patient will perform sit to stand with modified independence within 7 day(s).  3.  Patient will transfer from bed to chair and chair to bed with modified independence using the least restrictive device within 7 day(s).  4.  Patient will ambulate with modified independence for 200 feet with the least restrictive device within 7 day(s).   5.  Patient will ascend/descend 12 stairs with single handrail(s) with supervision/set-up within 7 day(s).    6/20/2024 1318 by Ashely Cruz, PT  Outcome: Progressing     Problem: Occupational Therapy - Adult  Goal: By Discharge: Performs self-care activities at highest level of function for planned discharge setting.  See evaluation for individualized goals.  Description: FUNCTIONAL STATUS PRIOR TO ADMISSION:  Patient was ambulatory using a cane occasionally outside of the home and was independent for basic ADLs, requires assist for IADLs.     ADL Assistance: Independent, Homemaking Assistance: Needs assistance, Ambulation Assistance: Independent, Transfer Assistance: Independent, Active : No     HOME SUPPORT: Patient lived with his daughter who assists with IADLs.    Occupational Therapy Goals:  Initiated 6/14/2024  1.  Patient will perform grooming standing at sink with [place] within 7 day(s).  2.  Patient will perform lower body dressing with [place] within 7 day(s).  3.  
  Problem: Physical Therapy - Adult  Goal: By Discharge: Performs mobility at highest level of function for planned discharge setting.  See evaluation for individualized goals.  Description: FUNCTIONAL STATUS PRIOR TO ADMISSION: Patient was modified independent using a single point cane for functional mobility.    HOME SUPPORT PRIOR TO ADMISSION: The patient lived with daughter but did not require assistance.    Physical Therapy Goals  Initiated 6/14/2024  1.  Patient will move from supine to sit and sit to supine and roll side to side in bed with modified independence within 7 day(s).    2.  Patient will perform sit to stand with modified independence within 7 day(s).  3.  Patient will transfer from bed to chair and chair to bed with modified independence using the least restrictive device within 7 day(s).  4.  Patient will ambulate with modified independence for 200 feet with the least restrictive device within 7 day(s).   5.  Patient will ascend/descend 12 stairs with single handrail(s) with supervision/set-up within 7 day(s).    Outcome: Progressing     PHYSICAL THERAPY TREATMENT    Patient: Ingrid Kennedy (73 y.o. male)  Date: 6/19/2024  Diagnosis: Shock circulatory (HCC) [R57.9]  Hypervolemia, unspecified hypervolemia type [E87.70]  Acute on chronic congestive heart failure, unspecified heart failure type (HCC) [I50.9] Cardiogenic shock (HCC)      Precautions: Fall Risk (Home dobutamine)                      ASSESSMENT:  Patient continues to benefit from skilled PT services and is progressing towards goals. The patient is alert and cooperative.  He is able to come to sitting independently and ambulated 200 feet slowly with a RW.  He has no balance loss and is steady.  He returned to supine after ambulating.           PLAN:  Patient continues to benefit from skilled intervention to address the above impairments.  Continue treatment per established plan of care.    Recommend with staff: therapy recommendations for 
  Problem: Physical Therapy - Adult  Goal: By Discharge: Performs mobility at highest level of function for planned discharge setting.  See evaluation for individualized goals.  Description: FUNCTIONAL STATUS PRIOR TO ADMISSION: Patient was modified independent using a single point cane for functional mobility.    HOME SUPPORT PRIOR TO ADMISSION: The patient lived with daughter but did not require assistance.    Physical Therapy Goals  Initiated 6/14/2024  1.  Patient will move from supine to sit and sit to supine and roll side to side in bed with modified independence within 7 day(s).    2.  Patient will perform sit to stand with modified independence within 7 day(s).  3.  Patient will transfer from bed to chair and chair to bed with modified independence using the least restrictive device within 7 day(s).  4.  Patient will ambulate with modified independence for 200 feet with the least restrictive device within 7 day(s).   5.  Patient will ascend/descend 12 stairs with single handrail(s) with supervision/set-up within 7 day(s).    Outcome: Progressing   PHYSICAL THERAPY EVALUATION    Patient: Ingrid Kennedy (73 y.o. male)  Date: 6/14/2024  Primary Diagnosis: Shock circulatory (HCC) [R57.9]  Hypervolemia, unspecified hypervolemia type [E87.70]  Acute on chronic congestive heart failure, unspecified heart failure type (HCC) [I50.9]       Precautions: Restrictions/Precautions: Fall Risk (Home dobutamine)                      ASSESSMENT :   DEFICITS/IMPAIRMENTS:   The patient is limited by decreased activity tolerance, balance, gait s/p admission on SOB and abdominal distention due to acute on chronic CHF. Pt received on 2L of oxygen during session.      Based on the impairments listed above pt mobilized with SBA towards EOB, transferred without AD with SBA and tolerated gait training x 80 ft without AD with CGA due to minor path deviations. Pt with 1 episode of minor instability that he was able to self correct. Pt 
  Problem: Physical Therapy - Adult  Goal: By Discharge: Performs mobility at highest level of function for planned discharge setting.  See evaluation for individualized goals.  Description: FUNCTIONAL STATUS PRIOR TO ADMISSION: Patient was modified independent using a single point cane for functional mobility.    HOME SUPPORT PRIOR TO ADMISSION: The patient lived with daughter but did not require assistance.    Physical Therapy Goals  Initiated 6/14/2024  1.  Patient will move from supine to sit and sit to supine and roll side to side in bed with modified independence within 7 day(s).    2.  Patient will perform sit to stand with modified independence within 7 day(s).  3.  Patient will transfer from bed to chair and chair to bed with modified independence using the least restrictive device within 7 day(s).  4.  Patient will ambulate with modified independence for 200 feet with the least restrictive device within 7 day(s).   5.  Patient will ascend/descend 12 stairs with single handrail(s) with supervision/set-up within 7 day(s).    Outcome: Progressing   PHYSICAL THERAPY TREATMENT    Patient: Ingrid Kennedy (73 y.o. male)  Date: 6/17/2024  Diagnosis: Shock circulatory (HCC) [R57.9]  Hypervolemia, unspecified hypervolemia type [E87.70]  Acute on chronic congestive heart failure, unspecified heart failure type (HCC) [I50.9] Cardiogenic shock (HCC)      Precautions: Fall Risk (Home dobutamine)                      ASSESSMENT:  Patient continues to benefit from skilled PT services and is progressing towards goals. Pt received semi-fowlers in bed on RA agreeable to PT treatment. Pt completed all mobility at CGA -SBA levels including ambulating 120 feet and stair training (3) with step-to pattern. Gait characterized by minor path deviations and decreased step length. Education provided on importance of activity pacing and standing rest breaks with prolonged activity. Pt returned to bedside chair, all needs in reach, NAD. 
  Problem: Physical Therapy - Adult  Goal: By Discharge: Performs mobility at highest level of function for planned discharge setting.  See evaluation for individualized goals.  Description: FUNCTIONAL STATUS PRIOR TO ADMISSION: Patient was modified independent using a single point cane for functional mobility.    HOME SUPPORT PRIOR TO ADMISSION: The patient lived with daughter but did not require assistance.    Physical Therapy Goals  Initiated 6/14/2024  1.  Patient will move from supine to sit and sit to supine and roll side to side in bed with modified independence within 7 day(s).    2.  Patient will perform sit to stand with modified independence within 7 day(s).  3.  Patient will transfer from bed to chair and chair to bed with modified independence using the least restrictive device within 7 day(s).  4.  Patient will ambulate with modified independence for 200 feet with the least restrictive device within 7 day(s).   5.  Patient will ascend/descend 12 stairs with single handrail(s) with supervision/set-up within 7 day(s).    Outcome: Progressing   PHYSICAL THERAPY TREATMENT    Patient: Ingrid Kennedy (73 y.o. male)  Date: 6/20/2024  Diagnosis: Shock circulatory (HCC) [R57.9]  Hypervolemia, unspecified hypervolemia type [E87.70]  Acute on chronic congestive heart failure, unspecified heart failure type (HCC) [I50.9] Cardiogenic shock (HCC)      Precautions: Fall Risk (Home dobutamine)                      ASSESSMENT:  Patient continues to benefit from skilled PT services and is progressing towards goals. Pt remains on track for discharge to home. He did a trial use of a rollator and practiced using it to take a seated rest beak and he demonstrated quick learning of the use of the brakes to safely complete this task. He amb a short distance without support (received him up in the bathroom and amb out of the bathroom without an assistive device) and note slight balance impairments without an assistive device. 
  Problem: Safety - Adult  Goal: Free from fall injury  Outcome: HH/HSPC Progressing     Problem: Discharge Planning  Goal: Discharge to home or other facility with appropriate resources  Outcome: HH/HSPC Progressing     Problem: Pain  Goal: Verbalizes/displays adequate comfort level or baseline comfort level  Outcome: HH/HSPC Progressing     Problem: Chronic Conditions and Co-morbidities  Goal: Patient's chronic conditions and co-morbidity symptoms are monitored and maintained or improved  Outcome: HH/HSPC Progressing     Problem: Skin/Tissue Integrity  Goal: Absence of new skin breakdown  Description: 1.  Monitor for areas of redness and/or skin breakdown  2.  Assess vascular access sites hourly  3.  Every 4-6 hours minimum:  Change oxygen saturation probe site  4.  Every 4-6 hours:  If on nasal continuous positive airway pressure, respiratory therapy assess nares and determine need for appliance change or resting period.  Outcome: HH/HSPC Progressing     Problem: ABCDS Injury Assessment  Goal: Absence of physical injury  Outcome: HH/HSPC Progressing     
  Problem: Safety - Adult  Goal: Free from fall injury  Outcome: Progressing     Problem: Discharge Planning  Goal: Discharge to home or other facility with appropriate resources  Outcome: Progressing  Flowsheets (Taken 6/16/2024 1816 by Cris Arrington, RN)  Discharge to home or other facility with appropriate resources: Identify barriers to discharge with patient and caregiver     Problem: Pain  Goal: Verbalizes/displays adequate comfort level or baseline comfort level  Outcome: Progressing     Problem: Chronic Conditions and Co-morbidities  Goal: Patient's chronic conditions and co-morbidity symptoms are monitored and maintained or improved  Outcome: Progressing  Flowsheets (Taken 6/16/2024 1816 by Cris Arrington, RN)  Care Plan - Patient's Chronic Conditions and Co-Morbidity Symptoms are Monitored and Maintained or Improved: Monitor and assess patient's chronic conditions and comorbid symptoms for stability, deterioration, or improvement     Problem: Skin/Tissue Integrity  Goal: Absence of new skin breakdown  Description: 1.  Monitor for areas of redness and/or skin breakdown  2.  Assess vascular access sites hourly  3.  Every 4-6 hours minimum:  Change oxygen saturation probe site  4.  Every 4-6 hours:  If on nasal continuous positive airway pressure, respiratory therapy assess nares and determine need for appliance change or resting period.  Outcome: Progressing     
Discharge PICC and Antibiotic Orders  [address] [address]  [phone]  [phone]    1.  Diagnosis:  Bacteremia  2.  Antibiotic:  IV ampicillin 2000 mg every 8 hours       End Date 7/11/2024  3.  Routine Romie line Care including PRN catheter flow management  4.  Weekly labs:   __x_CBC/diff/platelets   __x_BUN/Creatinine   ___CPK   __x_AST/TotalBilirubin/AlkalinePhosphatase   __x_CRP     5.  Fax lab to [phone] Call Critical Lab Values to [phone]  6.  May send to IR for line evaluation or replacement -[phone]  7.  Do NOT remove Romie catheter  8.  Allergies:  No Known Allergies  9.  Recommend patient to follow up with heart failure team      MARYCRUZ Mccartney - NP      
Intact  Standing: Intact  Standing - Static: Constant support;Good      ADL Intervention:              Upper Extremity Dressing: .  - Hospital Gown : Set-up Assistance to gather clothing and manage telemetry box while Seated in chair     Lower Extremity Dressing: using sock aid  - Socks : Minimal Assistance and Seated in chair                    Pain Ratin/10   Pain Intervention(s):         Activity Tolerance:   Good  Please refer to the flowsheet for vital signs taken during this treatment.    After treatment:   Patient left in no apparent distress sitting up in chair and RN present to address IV.    COMMUNICATION/EDUCATION:   The patient's plan of care was discussed with: physical therapist and registered nurse    Patient Education  Education Given To: Patient  Education Provided: Role of Therapy;Transfer Training;Equipment;Plan of Care;Precautions;ADL Adaptive Strategies;Fall Prevention Strategies  Education Provided Comments: AE training  Education Method: Demonstration;Verbal;Teach Back  Barriers to Learning: None  Education Outcome: Verbalized understanding;Demonstrated understanding    Thank you for this referral.  Marley Salazar OT  Minutes: 11

## 2024-06-21 NOTE — TELEPHONE ENCOUNTER
Care Transitions Initial Follow Up Call    Outreach made within 2 business days of discharge: Yes    Patient: Ingrid Kennedy Patient : 1950   MRN: [id number]  Reason for Admission: There are no discharge diagnoses documented for the most recent discharge.  Discharge Date: 24       Spoke with: Left Voicemail    Discharge department/facility: [facility]      Scheduled appointment with PCP within 7-14 days    Follow Up  Future Appointments   Date Time Provider Department Center   2024 12:00 PM Shanta Perez RN [facility] HOME HEAL   2024  1:00 PM Iwona Whittington APRN - NP [facility] BS AMB   7/10/2024 10:15 AM Dena Ramirez MD [facility] BS AMB       Pepper Mcknight MA

## 2024-06-21 NOTE — TELEPHONE ENCOUNTER
Care Transitions Initial Follow Up Call    Outreach made within 2 business days of discharge: Yes    Patient: Ingrid Kennedy Patient : 1950   MRN: [id number]  Reason for Admission: There are no discharge diagnoses documented for the most recent discharge.  Discharge Date: 24       Spoke with: Left voicemail      Scheduled appointment with PCP within 7-14 days    Follow Up  Future Appointments   Date Time Provider Department Center   2024 12:00 PM Shanta Perez RN [facility] HOME HEAL   2024  1:00 PM Iwona Whittington, APRN - NP [facility] BS AMB   7/10/2024 10:15 AM Dena Ramirez MD [facility] BS AMB       Pepper Mcknight MA

## 2024-06-22 ENCOUNTER — HOME CARE VISIT (OUTPATIENT)
Facility: HOME HEALTH | Age: 74
End: 2024-06-22
Payer: MEDICARE

## 2024-06-22 VITALS
WEIGHT: 161.6 LBS | HEART RATE: 35 BPM | TEMPERATURE: 98.2 F | OXYGEN SATURATION: 95 % | BODY MASS INDEX: 23.19 KG/M2 | DIASTOLIC BLOOD PRESSURE: 60 MMHG | SYSTOLIC BLOOD PRESSURE: 108 MMHG | RESPIRATION RATE: 18 BRPM

## 2024-06-22 PROCEDURE — G0299 HHS/HOSPICE OF RN EA 15 MIN: HCPCS

## 2024-06-22 NOTE — HOME HEALTH
Reason for referral, [facility] SUMMARY of clinical condition: Ingrid Kennedy admitted to home care for Acute on chronic systolic congestive heart failure. Nursing needed for Inotrope Infusion.     Clinical Assessment/Skilled reason for admission to home health: Pt was admitted to the hospital d/t UTI and sent home with IV antibiotics. Pt had a new PICC line placed d/t the possibility of an infection in the line as well. Pt has a Hx of CHF,HTN,CKD stage 3,Bacteremia,hypothyroidism and hypokalemia. Pt is currently infusing antibiotics and inotropes into PICC line and caregiver needs no further education on how to properly infuse medications. Pt lives in a 2 story home with family and no visible pets. Pt would benefit from further HH visits for PICC line dressing changes, disease process education and medication education to prevent rehospitalization. SN performed assessment, education and VS.  Next scheduled visit is Monday 6/24/2024.    Functional Mobility:  Bed Mobility (rolling/scooting): Supervision or Touching Assistance  Transfers (supine to chair and return to supine): Supervision or Touching Assistance.  Patient uses device: yes  Gait:  Ambulates at the modified independent level using cane  Safety concerns with mobility include: impaired balance  and cluttered living area, environmental modifications needed for safety   DME: Cane, grab bars, IV suplies    Diagnosis: Acute on chronic systolic congestive heart failure     Subjective: \"I'm not having any pain.\"    Caregiver: relative.  Caregiver assists with: Medications, Meals, Transportation and Housekeeping Caregiver unable to assist with: Bathing and ADL. Caregiver is available 24 hours/day Caregiver is  present at this visit and did participate with clinician.    Medications reconciled and all medications are available in the home this visit. The following education was provided regarding medications: medication interactions and look alike medications: NA.

## 2024-06-24 ENCOUNTER — TELEPHONE (OUTPATIENT)
Age: 74
End: 2024-06-24

## 2024-06-24 ENCOUNTER — PATIENT MESSAGE (OUTPATIENT)
Age: 74
End: 2024-06-24

## 2024-06-24 ENCOUNTER — HOSPITAL ENCOUNTER (OUTPATIENT)
Facility: HOSPITAL | Age: 74
Setting detail: SPECIMEN
Discharge: HOME | End: 2024-06-27

## 2024-06-24 ENCOUNTER — HOME CARE VISIT (OUTPATIENT)
Facility: HOME HEALTH | Age: 74
End: 2024-06-24
Payer: MEDICARE

## 2024-06-24 LAB
BASOPHILS # BLD: 0 K/UL (ref 0–0.1)
BASOPHILS NFR BLD: 1 % (ref 0–1)
DIFFERENTIAL METHOD BLD: ABNORMAL
EOSINOPHIL # BLD: 0.1 K/UL (ref 0–0.4)
EOSINOPHIL NFR BLD: 1 % (ref 0–7)
ERYTHROCYTE [DISTWIDTH] IN BLOOD BY AUTOMATED COUNT: 18.4 % (ref 11.5–14.5)
HCT VFR BLD AUTO: 28.6 % (ref 36.6–50.3)
HGB BLD-MCNC: 9.7 G/DL (ref 12.1–17)
IMM GRANULOCYTES # BLD AUTO: 0 K/UL (ref 0–0.04)
IMM GRANULOCYTES NFR BLD AUTO: 1 % (ref 0–0.5)
LYMPHOCYTES # BLD: 1.6 K/UL (ref 0.8–3.5)
LYMPHOCYTES NFR BLD: 33 % (ref 12–49)
MCH RBC QN AUTO: 26.6 PG (ref 26–34)
MCHC RBC AUTO-ENTMCNC: 33.9 G/DL (ref 30–36.5)
MCV RBC AUTO: 78.6 FL (ref 80–99)
MONOCYTES # BLD: 0.5 K/UL (ref 0–1)
MONOCYTES NFR BLD: 10 % (ref 5–13)
NEUTS SEG # BLD: 2.6 K/UL (ref 1.8–8)
NEUTS SEG NFR BLD: 54 % (ref 32–75)
NRBC # BLD: 0 K/UL (ref 0–0.01)
NRBC BLD-RTO: 0 PER 100 WBC
PLATELET # BLD AUTO: 329 K/UL (ref 150–400)
PMV BLD AUTO: 10.7 FL (ref 8.9–12.9)
RBC # BLD AUTO: 3.64 M/UL (ref 4.1–5.7)
WBC # BLD AUTO: 4.8 K/UL (ref 4.1–11.1)

## 2024-06-24 PROCEDURE — G0299 HHS/HOSPICE OF RN EA 15 MIN: HCPCS

## 2024-06-24 NOTE — TELEPHONE ENCOUNTER
----- Message from Nuria Major MD sent at 6/24/2024  7:47 AM EDT -----  Can you check in on Mr. Kennedy later today. They called in 3x this weekend. Yesterday he was swelling and more short of breath. He had been discharged on Bumex 2 mg BID which was less than his prior 4 mg BID. I asked him to increase back to 4 mg BID. This morning called again had he was still swelling. He is going to take a Metolazone with Bumex 4 mg this morning. I told them if that does not help we can get him in to clinic tomorrow for IV Bumex.       6/24-Called [facility] , message left for Xenia GUERRA seeing patient today-- to call in update at/after visit regarding fluid status.    6/23-169lb  6/24-175lb, 110/70-59 was intructed by Dr Major to take metolazone this morning prior to bumex (which he did)  Weighed with nurse-- 176.8lb     nurse saw patient at noon  2+ edema legs, also noted in abdomen/ left arm/ face  SOB  only a little urine output today     Labs drawn by     Called to schedule IV diuretic visit for 6/25  My chart message sent as well.    No response to my chart or voicemail, patient already scheduled for [facility] appt javier 6/26 will assess for IV diuretics at that time

## 2024-06-25 VITALS
SYSTOLIC BLOOD PRESSURE: 110 MMHG | BODY MASS INDEX: 25.37 KG/M2 | OXYGEN SATURATION: 95 % | WEIGHT: 176.8 LBS | TEMPERATURE: 98.7 F | DIASTOLIC BLOOD PRESSURE: 70 MMHG | HEART RATE: 59 BPM | RESPIRATION RATE: 20 BRPM

## 2024-06-25 LAB
ALBUMIN SERPL-MCNC: 3.5 G/DL (ref 3.5–5)
ALBUMIN/GLOB SERPL: 1 (ref 1.1–2.2)
ALP SERPL-CCNC: 140 U/L (ref 45–117)
ALT SERPL-CCNC: 23 U/L (ref 12–78)
ANION GAP SERPL CALC-SCNC: 11 MMOL/L (ref 5–15)
AST SERPL-CCNC: 23 U/L (ref 15–37)
BILIRUB SERPL-MCNC: 0.9 MG/DL (ref 0.2–1)
BUN SERPL-MCNC: 42 MG/DL (ref 6–20)
BUN/CREAT SERPL: 18 (ref 12–20)
CALCIUM SERPL-MCNC: 8.5 MG/DL (ref 8.5–10.1)
CHLORIDE SERPL-SCNC: 101 MMOL/L (ref 97–108)
CO2 SERPL-SCNC: 26 MMOL/L (ref 21–32)
CREAT SERPL-MCNC: 2.35 MG/DL (ref 0.7–1.3)
GLOBULIN SER CALC-MCNC: 3.5 G/DL (ref 2–4)
GLUCOSE SERPL-MCNC: 115 MG/DL (ref 65–100)
MAGNESIUM SERPL-MCNC: 2.4 MG/DL (ref 1.6–2.4)
NT PRO BNP: ABNORMAL PG/ML
POTASSIUM SERPL-SCNC: 4.1 MMOL/L (ref 3.5–5.1)
PROT SERPL-MCNC: 7 G/DL (ref 6.4–8.2)
SODIUM SERPL-SCNC: 138 MMOL/L (ref 136–145)

## 2024-06-25 ASSESSMENT — ENCOUNTER SYMPTOMS
DIARRHEA: 0
COUGH: 0
NAUSEA: 0
VOMITING: 0
ABDOMINAL DISTENTION: 0
DYSPNEA ACTIVITY LEVEL: AT REST

## 2024-06-25 NOTE — HOME HEALTH
follows: Is no longer homebound, Per physician order and When goals are met    Specific plan for next visit: assessment and education

## 2024-06-26 ENCOUNTER — HOME CARE VISIT (OUTPATIENT)
Facility: HOME HEALTH | Age: 74
End: 2024-06-26
Payer: MEDICARE

## 2024-06-26 ENCOUNTER — OFFICE VISIT (OUTPATIENT)
Age: 74
End: 2024-06-26
Payer: COMMERCIAL

## 2024-06-26 VITALS
DIASTOLIC BLOOD PRESSURE: 82 MMHG | HEART RATE: 71 BPM | RESPIRATION RATE: 16 BRPM | SYSTOLIC BLOOD PRESSURE: 134 MMHG | HEIGHT: 70 IN | TEMPERATURE: 98.6 F | OXYGEN SATURATION: 99 % | WEIGHT: 173.8 LBS | BODY MASS INDEX: 24.88 KG/M2

## 2024-06-26 DIAGNOSIS — I50.22 CHRONIC SYSTOLIC (CONGESTIVE) HEART FAILURE (HCC): ICD-10-CM

## 2024-06-26 DIAGNOSIS — I50.22 CHRONIC SYSTOLIC HEART FAILURE (HCC): Primary | ICD-10-CM

## 2024-06-26 LAB
ALBUMIN SERPL-MCNC: 3.4 G/DL (ref 3.5–5)
ALBUMIN/GLOB SERPL: 1 (ref 1.1–2.2)
ALP SERPL-CCNC: 133 U/L (ref 45–117)
ALT SERPL-CCNC: 18 U/L (ref 12–78)
ANION GAP SERPL CALC-SCNC: 6 MMOL/L (ref 5–15)
AST SERPL-CCNC: 21 U/L (ref 15–37)
BILIRUB SERPL-MCNC: 1.1 MG/DL (ref 0.2–1)
BUN SERPL-MCNC: 47 MG/DL (ref 6–20)
BUN/CREAT SERPL: 18 (ref 12–20)
CALCIUM SERPL-MCNC: 8.8 MG/DL (ref 8.5–10.1)
CHLORIDE SERPL-SCNC: 101 MMOL/L (ref 97–108)
CO2 SERPL-SCNC: 32 MMOL/L (ref 21–32)
CREAT SERPL-MCNC: 2.57 MG/DL (ref 0.7–1.3)
GLOBULIN SER CALC-MCNC: 3.5 G/DL (ref 2–4)
GLUCOSE SERPL-MCNC: 117 MG/DL (ref 65–100)
MAGNESIUM SERPL-MCNC: 2.2 MG/DL (ref 1.6–2.4)
NT PRO BNP: ABNORMAL PG/ML
POTASSIUM SERPL-SCNC: 3.5 MMOL/L (ref 3.5–5.1)
PROT SERPL-MCNC: 6.9 G/DL (ref 6.4–8.2)
SODIUM SERPL-SCNC: 139 MMOL/L (ref 136–145)

## 2024-06-26 PROCEDURE — 96374 THER/PROPH/DIAG INJ IV PUSH: CPT | Performed by: NURSE PRACTITIONER

## 2024-06-26 PROCEDURE — G8427 DOCREV CUR MEDS BY ELIG CLIN: HCPCS | Performed by: NURSE PRACTITIONER

## 2024-06-26 PROCEDURE — 1111F DSCHRG MED/CURRENT MED MERGE: CPT | Performed by: NURSE PRACTITIONER

## 2024-06-26 PROCEDURE — 3017F COLORECTAL CA SCREEN DOC REV: CPT | Performed by: NURSE PRACTITIONER

## 2024-06-26 PROCEDURE — 3074F SYST BP LT 130 MM HG: CPT | Performed by: NURSE PRACTITIONER

## 2024-06-26 PROCEDURE — 36592 COLLECT BLOOD FROM PICC: CPT | Performed by: NURSE PRACTITIONER

## 2024-06-26 PROCEDURE — 1123F ACP DISCUSS/DSCN MKR DOCD: CPT | Performed by: NURSE PRACTITIONER

## 2024-06-26 PROCEDURE — 3078F DIAST BP <80 MM HG: CPT | Performed by: NURSE PRACTITIONER

## 2024-06-26 PROCEDURE — G8420 CALC BMI NORM PARAMETERS: HCPCS | Performed by: NURSE PRACTITIONER

## 2024-06-26 PROCEDURE — 1036F TOBACCO NON-USER: CPT | Performed by: NURSE PRACTITIONER

## 2024-06-26 PROCEDURE — 99215 OFFICE O/P EST HI 40 MIN: CPT | Performed by: NURSE PRACTITIONER

## 2024-06-26 RX ORDER — POTASSIUM CHLORIDE 1500 MG/1
20 TABLET, EXTENDED RELEASE ORAL ONCE
Status: COMPLETED | OUTPATIENT
Start: 2024-06-26 | End: 2024-06-26

## 2024-06-26 RX ORDER — BUMETANIDE 0.25 MG/ML
2 INJECTION INTRAMUSCULAR; INTRAVENOUS ONCE
Status: COMPLETED | OUTPATIENT
Start: 2024-06-26 | End: 2024-06-26

## 2024-06-26 RX ADMIN — POTASSIUM CHLORIDE 20 MEQ: 1500 TABLET, EXTENDED RELEASE ORAL at 14:29

## 2024-06-26 RX ADMIN — BUMETANIDE 2 MG: 0.25 INJECTION INTRAMUSCULAR; INTRAVENOUS at 14:20

## 2024-06-26 ASSESSMENT — PATIENT HEALTH QUESTIONNAIRE - PHQ9
SUM OF ALL RESPONSES TO PHQ QUESTIONS 1-9: 0
1. LITTLE INTEREST OR PLEASURE IN DOING THINGS: NOT AT ALL
SUM OF ALL RESPONSES TO PHQ QUESTIONS 1-9: 0
2. FEELING DOWN, DEPRESSED OR HOPELESS: NOT AT ALL
SUM OF ALL RESPONSES TO PHQ9 QUESTIONS 1 & 2: 0
SUM OF ALL RESPONSES TO PHQ QUESTIONS 1-9: 0
SUM OF ALL RESPONSES TO PHQ QUESTIONS 1-9: 0

## 2024-06-26 ASSESSMENT — ENCOUNTER SYMPTOMS: SHORTNESS OF BREATH: 1

## 2024-06-26 NOTE — PATIENT INSTRUCTIONS
Medication changes:    IV bumex 2mg and Potassium 20meq today in clinic  Hold evening bumex for today    We will check in tomorrow regarding below, and adjust medication accordingly    Home weight:  Morning BP:  Symptom update:  Urine output since clinic visit      Testing Ordered:    Labs drawn today in clinic      Other Recommendations:      - record blood pressure and heart rate daily before medication and two hours after medication  - record weight daily upon waking/after using the bathroom.   - keep a written records of your weights and blood pressure and bring to your next appointment.   - Call the clinic at if you have a weight gain of 3 or more pounds overnight OR 5 or more pounds in one week, new/worsened shortness of breath or swelling, or if your blood pressure begins to consistently run below 90/60 and/or you begin to experience dizziness or lightheadedness. Our office phone number [phone] option 2.  - Ensure you are drinking an adequate amount of water with a goal of 6-8 eight ounce glasses (1.5-2 liters) of fluid daily. Your urine should be clear and light yellow straw colored.       Follow up in 1 week (titration clinic)--we will call to schedule with [facility]    Thank you for allowing us the privilege of being a part of your healthcare team! Please do not hesitate to contact our office at [phone] option 2 with any questions or concerns.     We are restarting a monthly heart failure support group, this will be the last Wednesday of every month from 5-6pm at [facility]. If you would like to attend you will need to RSVP to [email or web address]

## 2024-06-26 NOTE — PROGRESS NOTES
failure in dyspneic patients. A NT-proBNP result <300 pg/mL has a negative predictive value of 98% for acute heart failure.  Marked elevations in NT-proBNP levels may be observed in patients with left ventricular congestive failure, atrial fibrillation without clear heart failure, acute coronary syndromes, right heart strain/failure (including pulmonary embolism and cor pulmonale), critical illness, renal failure or advanced age. Falsely low NT-proBNP may be observed in obese CHF patients.     Recent research suggests the following cutoff values are appropriate based on patient age and clinical setting, reduce false positive (FP) results, and improve positive predictive values for acute heart failure:  Acutely dyspneic patient: age <50, use cutoff of 450 pg/mL  Acutely dyspneic patient: age 50-75, use cutoff of 900 pg/mL  Acutely dyspneic patient: age >75,                            use cutoff of 1800 pg/mL     FDA approved cutpoints for ruling-out heart failure in the office:  Ambulatory patient: age <76, use cutoff of 125 pg/mL  Ambulatory patient: age >75, use cutoff of 450 pg/mL          Medications given per providers orders:      Administrations This Visit       bumetanide (BUMEX) injection 2 mg       Admin Date  06/26/2024  14:20 Action  Given Dose  2 mg Route  IntraVENous Site   Administered By  Surendra Hale RN    Ordering Provider: Iwona Whittington APRN - NP    NDC: 0641-6007-10    Lot#: [id number]    : Excep Apps [facility] [[email or web address]]    Patient Supplied?: No              potassium chloride (K-TAB) extended release tablet 20 mEq       Admin Date  06/26/2024  14:29 Action  Given Dose  20 mEq Route  Oral Site   Administered By  Surendra Hale RN    Ordering Provider: Iwona Whittington APRN - NP    NDC: 0121-9743-32    Lot#: [id number]    : [employer]    Patient Supplied?: No    Comments: 20meq total given  2- 10meq tabs                     Lines: Central line right chest. 
Disp: 90 tablet, Rfl: 3    metOLazone (ZAROXOLYN) 2.5 MG tablet, Take 1 tablet by mouth as needed (as needed for weight gain, sweeling, SOB, or edema when instructed by heart failure team), Disp: 10 tablet, Rfl: 0    potassium chloride (KLOR-CON M) 20 MEQ extended release tablet, Take 1 tablet by mouth 2 times daily Start after you run out of the Effer K, Disp: 30 tablet, Rfl: 5    lidocaine 4 % external patch, Place 1 patch onto the skin daily May cut in half to apply to both shoulders, Disp: 30 patch, Rfl: 1    acetaZOLAMIDE (DIAMOX) 250 MG tablet, Take 0.5 tablets by mouth 2 times daily, Disp: 30 tablet, Rfl: 2    magnesium oxide (MAG-OX) 400 MG tablet, Take 1 tablet by mouth daily, Disp: 30 tablet, Rfl: 2    allopurinol (ZYLOPRIM) 100 MG tablet, Take 0.5 tablets by mouth daily, Disp: 15 tablet, Rfl: 2    amiodarone (CORDARONE) 200 MG tablet, Take 0.5 tablets by mouth daily, Disp: 15 tablet, Rfl: 2    DOBUTAMINE HCL IV, Infuse 0.29 mg/min intravenously continuous. 500mg in dextrose 5% 250mL infusion. Flush using Sash method., Disp: , Rfl:     levothyroxine (SYNTHROID) 88 MCG tablet, Take 1 tablet by mouth daily, Disp: 30 tablet, Rfl: 5    Cholecalciferol (VITAMIN D3) 25 MCG TABS, Take 1 tablet by mouth daily, Disp: 90 tablet, Rfl: 1    sodium chloride 0.9 % SOLN, Infuse 10 mLs intravenously every 24 hours. FLUSH LUMEN NOT IN USE FOR MILRINONE WITH 10ML EVERY 24 HOURS. FLUSH PRE/POST LAB DRAW WITH 10-20ML, Disp: , Rfl:     Heparin Sod, Pork, Lock Flush (HEPARIN, PF,) 10 UNIT/ML injection, Infuse 3-5 mLs intravenously every 24 hours FLUSH LUMEN NOT IN USE FOR MILRINONE WITH 3-5ML AFTER LAST SALINE FLUSH OR EVERY 24 HOURS FOR MAINTENANCE IF NOT USED. FLUSH POST LAB DRAW., Disp: , Rfl:     acetaminophen (TYLENOL) 500 MG tablet, Take 1 tablet by mouth every 6 hours as needed for Pain, Disp: , Rfl:     apixaban (ELIQUIS) 5 MG TABS tablet, Take 1 tablet by mouth 2 times daily, Disp: , Rfl:     tamsulosin (FLOMAX) 0.4 MG

## 2024-06-27 ENCOUNTER — HOSPITAL ENCOUNTER (INPATIENT)
Facility: HOSPITAL | Age: 74
LOS: 5 days | Discharge: HOME | DRG: 291 | End: 2024-07-02
Attending: EMERGENCY MEDICINE | Admitting: STUDENT IN AN ORGANIZED HEALTH CARE EDUCATION/TRAINING PROGRAM
Payer: MEDICARE

## 2024-06-27 ENCOUNTER — APPOINTMENT (OUTPATIENT)
Facility: HOSPITAL | Age: 74
DRG: 291 | End: 2024-06-27
Payer: MEDICARE

## 2024-06-27 DIAGNOSIS — I50.9 CONGESTIVE HEART FAILURE, UNSPECIFIED HF CHRONICITY, UNSPECIFIED HEART FAILURE TYPE (HCC): Primary | ICD-10-CM

## 2024-06-27 PROBLEM — I50.84 CHF (NYHA CLASS IV, ACC/AHA STAGE D) (HCC): Status: ACTIVE | Noted: 2024-06-27

## 2024-06-27 LAB
ALBUMIN SERPL-MCNC: 3.4 G/DL (ref 3.5–5)
ALBUMIN/GLOB SERPL: 0.8 (ref 1.1–2.2)
ALP SERPL-CCNC: 132 U/L (ref 45–117)
ALT SERPL-CCNC: 22 U/L (ref 12–78)
ANION GAP SERPL CALC-SCNC: 5 MMOL/L (ref 5–15)
AST SERPL-CCNC: 22 U/L (ref 15–37)
BASOPHILS # BLD: 0.1 K/UL (ref 0–0.1)
BASOPHILS NFR BLD: 1 % (ref 0–1)
BILIRUB SERPL-MCNC: 1.3 MG/DL (ref 0.2–1)
BUN SERPL-MCNC: 46 MG/DL (ref 6–20)
BUN/CREAT SERPL: 17 (ref 12–20)
CALCIUM SERPL-MCNC: 8.8 MG/DL (ref 8.5–10.1)
CHLORIDE SERPL-SCNC: 97 MMOL/L (ref 97–108)
CO2 SERPL-SCNC: 30 MMOL/L (ref 21–32)
COMMENT:: NORMAL
CREAT SERPL-MCNC: 2.67 MG/DL (ref 0.7–1.3)
DIFFERENTIAL METHOD BLD: ABNORMAL
EOSINOPHIL # BLD: 0.1 K/UL (ref 0–0.4)
EOSINOPHIL NFR BLD: 2 % (ref 0–7)
ERYTHROCYTE [DISTWIDTH] IN BLOOD BY AUTOMATED COUNT: 19.1 % (ref 11.5–14.5)
GLOBULIN SER CALC-MCNC: 4.2 G/DL (ref 2–4)
GLUCOSE SERPL-MCNC: 96 MG/DL (ref 65–100)
HCT VFR BLD AUTO: 29.1 % (ref 36.6–50.3)
HGB BLD-MCNC: 10 G/DL (ref 12.1–17)
IMM GRANULOCYTES # BLD AUTO: 0 K/UL (ref 0–0.04)
IMM GRANULOCYTES NFR BLD AUTO: 0 % (ref 0–0.5)
LYMPHOCYTES # BLD: 1.9 K/UL (ref 0.8–3.5)
LYMPHOCYTES NFR BLD: 39 % (ref 12–49)
MCH RBC QN AUTO: 27.3 PG (ref 26–34)
MCHC RBC AUTO-ENTMCNC: 34.4 G/DL (ref 30–36.5)
MCV RBC AUTO: 79.5 FL (ref 80–99)
MONOCYTES # BLD: 0.5 K/UL (ref 0–1)
MONOCYTES NFR BLD: 10 % (ref 5–13)
NEUTS SEG # BLD: 2.4 K/UL (ref 1.8–8)
NEUTS SEG NFR BLD: 48 % (ref 32–75)
NRBC # BLD: 0 K/UL (ref 0–0.01)
NRBC BLD-RTO: 0 PER 100 WBC
NT PRO BNP: ABNORMAL PG/ML
PLATELET # BLD AUTO: 267 K/UL (ref 150–400)
PMV BLD AUTO: 9.7 FL (ref 8.9–12.9)
POTASSIUM SERPL-SCNC: 3.4 MMOL/L (ref 3.5–5.1)
PROT SERPL-MCNC: 7.6 G/DL (ref 6.4–8.2)
RBC # BLD AUTO: 3.66 M/UL (ref 4.1–5.7)
SODIUM SERPL-SCNC: 132 MMOL/L (ref 136–145)
SPECIMEN HOLD: NORMAL
TROPONIN I SERPL HS-MCNC: 95 NG/L (ref 0–76)
WBC # BLD AUTO: 5 K/UL (ref 4.1–11.1)

## 2024-06-27 PROCEDURE — 2060000000 HC ICU INTERMEDIATE R&B

## 2024-06-27 PROCEDURE — 93005 ELECTROCARDIOGRAM TRACING: CPT | Performed by: PHYSICIAN ASSISTANT

## 2024-06-27 PROCEDURE — 80053 COMPREHEN METABOLIC PANEL: CPT

## 2024-06-27 PROCEDURE — 6360000002 HC RX W HCPCS: Performed by: EMERGENCY MEDICINE

## 2024-06-27 PROCEDURE — 99285 EMERGENCY DEPT VISIT HI MDM: CPT

## 2024-06-27 PROCEDURE — 6360000002 HC RX W HCPCS: Performed by: STUDENT IN AN ORGANIZED HEALTH CARE EDUCATION/TRAINING PROGRAM

## 2024-06-27 PROCEDURE — 36415 COLL VENOUS BLD VENIPUNCTURE: CPT

## 2024-06-27 PROCEDURE — 71046 X-RAY EXAM CHEST 2 VIEWS: CPT

## 2024-06-27 PROCEDURE — 6370000000 HC RX 637 (ALT 250 FOR IP): Performed by: STUDENT IN AN ORGANIZED HEALTH CARE EDUCATION/TRAINING PROGRAM

## 2024-06-27 PROCEDURE — 96374 THER/PROPH/DIAG INJ IV PUSH: CPT

## 2024-06-27 PROCEDURE — 2580000003 HC RX 258: Performed by: STUDENT IN AN ORGANIZED HEALTH CARE EDUCATION/TRAINING PROGRAM

## 2024-06-27 PROCEDURE — 85025 COMPLETE CBC W/AUTO DIFF WBC: CPT

## 2024-06-27 PROCEDURE — 84484 ASSAY OF TROPONIN QUANT: CPT

## 2024-06-27 PROCEDURE — 83880 ASSAY OF NATRIURETIC PEPTIDE: CPT

## 2024-06-27 RX ORDER — SODIUM CHLORIDE 0.9 % (FLUSH) 0.9 %
5-40 SYRINGE (ML) INJECTION PRN
Status: DISCONTINUED | OUTPATIENT
Start: 2024-06-27 | End: 2024-07-02 | Stop reason: HOSPADM

## 2024-06-27 RX ORDER — SODIUM CHLORIDE 0.9 % (FLUSH) 0.9 %
5-40 SYRINGE (ML) INJECTION EVERY 12 HOURS SCHEDULED
Status: DISCONTINUED | OUTPATIENT
Start: 2024-06-27 | End: 2024-07-02 | Stop reason: HOSPADM

## 2024-06-27 RX ORDER — ONDANSETRON 4 MG/1
4 TABLET, ORALLY DISINTEGRATING ORAL EVERY 8 HOURS PRN
Status: DISCONTINUED | OUTPATIENT
Start: 2024-06-27 | End: 2024-07-02 | Stop reason: HOSPADM

## 2024-06-27 RX ORDER — BUMETANIDE 0.25 MG/ML
2 INJECTION INTRAMUSCULAR; INTRAVENOUS
Status: COMPLETED | OUTPATIENT
Start: 2024-06-27 | End: 2024-06-27

## 2024-06-27 RX ORDER — ACETAMINOPHEN 325 MG/1
650 TABLET ORAL EVERY 6 HOURS PRN
Status: DISCONTINUED | OUTPATIENT
Start: 2024-06-27 | End: 2024-07-02 | Stop reason: HOSPADM

## 2024-06-27 RX ORDER — TAMSULOSIN HYDROCHLORIDE 0.4 MG/1
0.8 CAPSULE ORAL DAILY
Status: DISCONTINUED | OUTPATIENT
Start: 2024-06-27 | End: 2024-07-02 | Stop reason: HOSPADM

## 2024-06-27 RX ORDER — METOLAZONE 2.5 MG/1
5 TABLET ORAL DAILY
Status: DISCONTINUED | OUTPATIENT
Start: 2024-06-27 | End: 2024-06-29

## 2024-06-27 RX ORDER — ALLOPURINOL 100 MG/1
50 TABLET ORAL DAILY
Status: DISCONTINUED | OUTPATIENT
Start: 2024-06-27 | End: 2024-07-02 | Stop reason: HOSPADM

## 2024-06-27 RX ORDER — DOBUTAMINE HYDROCHLORIDE 200 MG/100ML
4 INJECTION INTRAVENOUS CONTINUOUS
Status: DISCONTINUED | OUTPATIENT
Start: 2024-06-27 | End: 2024-07-02 | Stop reason: HOSPADM

## 2024-06-27 RX ORDER — ACETAZOLAMIDE 250 MG/1
125 TABLET ORAL 2 TIMES DAILY
Status: DISCONTINUED | OUTPATIENT
Start: 2024-06-27 | End: 2024-07-02 | Stop reason: HOSPADM

## 2024-06-27 RX ORDER — AMIODARONE HYDROCHLORIDE 200 MG/1
100 TABLET ORAL DAILY
Status: DISCONTINUED | OUTPATIENT
Start: 2024-06-27 | End: 2024-07-02 | Stop reason: HOSPADM

## 2024-06-27 RX ORDER — DOBUTAMINE HYDROCHLORIDE 200 MG/100ML
4 INJECTION INTRAVENOUS CONTINUOUS
Status: DISCONTINUED | OUTPATIENT
Start: 2024-06-28 | End: 2024-06-27

## 2024-06-27 RX ORDER — BUMETANIDE 0.25 MG/ML
2 INJECTION INTRAMUSCULAR; INTRAVENOUS 2 TIMES DAILY
Status: DISCONTINUED | OUTPATIENT
Start: 2024-06-28 | End: 2024-06-28

## 2024-06-27 RX ORDER — ACETAMINOPHEN 650 MG/1
650 SUPPOSITORY RECTAL EVERY 6 HOURS PRN
Status: DISCONTINUED | OUTPATIENT
Start: 2024-06-27 | End: 2024-07-02 | Stop reason: HOSPADM

## 2024-06-27 RX ORDER — LEVOTHYROXINE SODIUM 88 UG/1
88 TABLET ORAL DAILY
Status: DISCONTINUED | OUTPATIENT
Start: 2024-06-27 | End: 2024-07-02 | Stop reason: HOSPADM

## 2024-06-27 RX ORDER — ONDANSETRON 2 MG/ML
4 INJECTION INTRAMUSCULAR; INTRAVENOUS EVERY 6 HOURS PRN
Status: DISCONTINUED | OUTPATIENT
Start: 2024-06-27 | End: 2024-07-02 | Stop reason: HOSPADM

## 2024-06-27 RX ORDER — SODIUM CHLORIDE 9 MG/ML
INJECTION, SOLUTION INTRAVENOUS PRN
Status: DISCONTINUED | OUTPATIENT
Start: 2024-06-27 | End: 2024-07-02 | Stop reason: HOSPADM

## 2024-06-27 RX ORDER — POLYETHYLENE GLYCOL 3350 17 G/17G
17 POWDER, FOR SOLUTION ORAL DAILY PRN
Status: DISCONTINUED | OUTPATIENT
Start: 2024-06-27 | End: 2024-07-02 | Stop reason: HOSPADM

## 2024-06-27 RX ORDER — POTASSIUM CHLORIDE 20 MEQ/1
20 TABLET, EXTENDED RELEASE ORAL 2 TIMES DAILY
Qty: 180 TABLET | Refills: 0 | Status: ON HOLD | OUTPATIENT
Start: 2024-06-27

## 2024-06-27 RX ADMIN — Medication 10 ML: at 19:59

## 2024-06-27 RX ADMIN — LEVOTHYROXINE SODIUM 88 MCG: 88 TABLET ORAL at 21:07

## 2024-06-27 RX ADMIN — BUMETANIDE 2 MG: 0.25 INJECTION INTRAMUSCULAR; INTRAVENOUS at 18:55

## 2024-06-27 RX ADMIN — ACETAZOLAMIDE 125 MG: 250 TABLET ORAL at 21:01

## 2024-06-27 RX ADMIN — TAMSULOSIN HYDROCHLORIDE 0.8 MG: 0.4 CAPSULE ORAL at 21:00

## 2024-06-27 RX ADMIN — DOBUTAMINE HYDROCHLORIDE 4 MCG/KG/MIN: 200 INJECTION INTRAVENOUS at 21:12

## 2024-06-27 RX ADMIN — ALLOPURINOL 50 MG: 100 TABLET ORAL at 21:00

## 2024-06-27 RX ADMIN — AMIODARONE HYDROCHLORIDE 100 MG: 200 TABLET ORAL at 21:08

## 2024-06-27 RX ADMIN — APIXABAN 5 MG: 5 TABLET, FILM COATED ORAL at 21:01

## 2024-06-27 RX ADMIN — METOLAZONE 5 MG: 2.5 TABLET ORAL at 21:07

## 2024-06-27 ASSESSMENT — PAIN SCALES - GENERAL: PAINLEVEL_OUTOF10: 0

## 2024-06-27 NOTE — TELEPHONE ENCOUNTER
Potassium RX sent to [facility] per patient/family request    Requested Prescriptions     Signed Prescriptions Disp Refills    potassium chloride (KLOR-CON M) 20 MEQ extended release tablet 180 tablet 0     Sig: Take 1 tablet by mouth 2 times daily     Authorizing Provider: ARLET BOX     Ordering User: LINDA DELANEY

## 2024-06-27 NOTE — ED NOTES
1:22 PM  I have evaluated the patient as the Provider in Rapid Medical Evaluation (RME). I have reviewed his vital signs and the triage nurse assessment. I have talked with the patient and any available family and advised that I am the provider in triage and have ordered the appropriate study to initiate their work up based on the clinical presentation during my assessment. I have advised that the patient will be accommodated in the Main ED as soon as possible. I have also requested to contact the triage nurse or myself immediately if the patient experiences any changes in their condition during this brief waiting period.    History of heart failure, increasing fluid overload over the past few days, was seen in heart failure clinic yesterday and given bumex in clinic. Minimal urine output overnight and today. Worsening shortness of breath today, no chest pain.  AYSE Rivas Hannah J, PA  06/27/24 9995

## 2024-06-27 NOTE — H&P
History & Physical    Primary Care Provider: Elisa Loyd MD  Source of Information: Patient and chart review    History of Presenting Illness:   Ingrid Kennedy is a 73 y.o. male with history of chronic systolic congestive heart failure with EF of 15-20 on palliative dobutamine gtt., paroxysmal atrial fibrillation, hypothyroidism, VT status post ICD, hypertension, dyslipidemia, GERRI, CKD, BPH who presents to ED with complaints of shortness of breath.  He reports increasing fluid retention, weight gain dyspnea with lower orthopnea over the last week.  He is followed by advanced heart failure and has attempted outpatient diuresis with IV Bumex but states he has had very minimal urine output.  He was referred to the hospital today for admission and IV diuresis.  The patient denies any fever, chills, chest or abdominal pain, nausea, vomiting, cough, congestion, recent illness, palpitations, or dysuria.    Remarkable vitals on ER Presentation: Vital signs stable  Labs Remarkable for: Troponin 95,k 3.4, creatinine 7, BNP 15,846, hemoglobin 10  ER Images: Chest x-ray showed trace right pleural effusion  ER Rx: Bumex 2 mg IV     Review of Systems:  Pertinent items are noted in the History of Present Illness.     Past Medical History:   Diagnosis Date    AICD (automatic cardioverter/defibrillator) present     CHF (congestive heart failure) (HCC)     CKD (chronic kidney disease)     Dilated cardiomyopathy (HCC)     Hypertension     Low left ventricular ejection fraction     GERRI on CPAP     Thyroid disease       Past Surgical History:   Procedure Laterality Date    CARDIAC PROCEDURE N/A 12/18/2023    Right heart cath performed by Ananda Walton MD at [facility] CARDIAC CATH LAB    CARDIAC PROCEDURE N/A 12/27/2023    Right heart cath performed by Ananda Walton MD at [facility] CARDIAC CATH LAB    CARDIAC PROCEDURE N/A 02/15/2024    Right heart cath performed by Emil Covarrubias MD at [facility] CARDIAC CATH LAB    CARDIAC PROCEDURE N/A  required my frequent assessment to treat or prevent imminent deterioration.     I personally spent 55 minutes of critical care time.  This is time spent at this critically ill patient's bedside actively involved in patient care as well as the coordination of care and discussions with the patient's family.  This does not include any procedural time which has been billed separately.         Signed By: Lesa Castro MD     June 27, 2024

## 2024-06-27 NOTE — ED TRIAGE NOTES
Pt arrived to the ER with CC fluid retention. HF gave him a diuretic but pt has not had much output since then. +SOB x3 days

## 2024-06-27 NOTE — ED PROVIDER NOTES
[facility] EMERGENCY DEP  EMERGENCY DEPARTMENT ENCOUNTER      Pt Name: Ingrid Kennedy  MRN: [id number]  Birthdate 1950  Date of evaluation: 6/27/2024  Provider: Xenia Diallo MD    CHIEF COMPLAINT       Chief Complaint   Patient presents with    Leg Swelling    Shortness of Breath         HISTORY OF PRESENT ILLNESS    Ingrid Kennedy is a 74 yo M with h/o CHF followed by advanced heart failure clinic.  HE was admitted earlier this month for sepsis and discharged 1 week ago with IV ampicillin and dobutamine.  Was seen in clinic yesterday and received Bumex without improvement.  They attempted direct admit today but no beds available and so was sent to ED.  Patient denies chest pain but has had worsened shortness of Breath.            Additional history from independent historians:     Review of External Medical Records:     Nursing Notes were reviewed.    REVIEW OF SYSTEMS       Review of Systems   Respiratory:  Positive for shortness of breath.        Except as noted above the remainder of the review of systems was reviewed and negative.       PAST MEDICAL HISTORY     Past Medical History:   Diagnosis Date    AICD (automatic cardioverter/defibrillator) present     CHF (congestive heart failure) (HCC)     CKD (chronic kidney disease)     Dilated cardiomyopathy (HCC)     Hypertension     Low left ventricular ejection fraction     GERRI on CPAP     Thyroid disease          SURGICAL HISTORY       Past Surgical History:   Procedure Laterality Date    CARDIAC PROCEDURE N/A 12/18/2023    Right heart cath performed by Ananda Walton MD at [facility] CARDIAC CATH LAB    CARDIAC PROCEDURE N/A 12/27/2023    Right heart cath performed by Ananda Walton MD at [facility] CARDIAC CATH LAB    CARDIAC PROCEDURE N/A 02/15/2024    Right heart cath performed by Emil Covarrubias MD at [facility] CARDIAC CATH LAB    CARDIAC PROCEDURE N/A 05/14/2024    Right heart cath performed by Ananda Walton MD at [facility] CARDIAC CATH LAB    CARDIAC SURGERY  Year 2023

## 2024-06-28 ENCOUNTER — HOME CARE VISIT (OUTPATIENT)
Dept: HOME HEALTH SERVICES | Facility: HOME HEALTH | Age: 74
End: 2024-06-28
Payer: MEDICARE

## 2024-06-28 ENCOUNTER — TELEPHONE (OUTPATIENT)
Age: 74
End: 2024-06-28

## 2024-06-28 DIAGNOSIS — Z79.899 RECEIVING INOTROPIC MEDICATION: ICD-10-CM

## 2024-06-28 DIAGNOSIS — I50.22 CHRONIC SYSTOLIC HEART FAILURE (HCC): Primary | ICD-10-CM

## 2024-06-28 LAB
ANION GAP SERPL CALC-SCNC: 9 MMOL/L (ref 5–15)
BUN SERPL-MCNC: 48 MG/DL (ref 6–20)
BUN/CREAT SERPL: 20 (ref 12–20)
CALCIUM SERPL-MCNC: 8.6 MG/DL (ref 8.5–10.1)
CHLORIDE SERPL-SCNC: 98 MMOL/L (ref 97–108)
CO2 SERPL-SCNC: 28 MMOL/L (ref 21–32)
CREAT SERPL-MCNC: 2.44 MG/DL (ref 0.7–1.3)
EKG DIAGNOSIS: NORMAL
EKG Q-T INTERVAL: 440 MS
EKG QRS DURATION: 150 MS
EKG QTC CALCULATION (BAZETT): 538 MS
EKG R AXIS: -62 DEGREES
EKG T AXIS: 93 DEGREES
EKG VENTRICULAR RATE: 90 BPM
GLUCOSE SERPL-MCNC: 98 MG/DL (ref 65–100)
MAGNESIUM SERPL-MCNC: 2.2 MG/DL (ref 1.6–2.4)
POTASSIUM SERPL-SCNC: 3.1 MMOL/L (ref 3.5–5.1)
SODIUM SERPL-SCNC: 135 MMOL/L (ref 136–145)

## 2024-06-28 PROCEDURE — 2580000003 HC RX 258: Performed by: STUDENT IN AN ORGANIZED HEALTH CARE EDUCATION/TRAINING PROGRAM

## 2024-06-28 PROCEDURE — 93010 ELECTROCARDIOGRAM REPORT: CPT | Performed by: SPECIALIST

## 2024-06-28 PROCEDURE — 2700000000 HC OXYGEN THERAPY PER DAY

## 2024-06-28 PROCEDURE — 97165 OT EVAL LOW COMPLEX 30 MIN: CPT

## 2024-06-28 PROCEDURE — 94760 N-INVAS EAR/PLS OXIMETRY 1: CPT

## 2024-06-28 PROCEDURE — 83735 ASSAY OF MAGNESIUM: CPT

## 2024-06-28 PROCEDURE — 36415 COLL VENOUS BLD VENIPUNCTURE: CPT

## 2024-06-28 PROCEDURE — 2060000000 HC ICU INTERMEDIATE R&B

## 2024-06-28 PROCEDURE — 6370000000 HC RX 637 (ALT 250 FOR IP): Performed by: STUDENT IN AN ORGANIZED HEALTH CARE EDUCATION/TRAINING PROGRAM

## 2024-06-28 PROCEDURE — 80048 BASIC METABOLIC PNL TOTAL CA: CPT

## 2024-06-28 PROCEDURE — 6360000002 HC RX W HCPCS: Performed by: STUDENT IN AN ORGANIZED HEALTH CARE EDUCATION/TRAINING PROGRAM

## 2024-06-28 PROCEDURE — 97535 SELF CARE MNGMENT TRAINING: CPT

## 2024-06-28 PROCEDURE — 99223 1ST HOSP IP/OBS HIGH 75: CPT | Performed by: INTERNAL MEDICINE

## 2024-06-28 PROCEDURE — 87040 BLOOD CULTURE FOR BACTERIA: CPT

## 2024-06-28 PROCEDURE — 2580000003 HC RX 258: Performed by: INTERNAL MEDICINE

## 2024-06-28 PROCEDURE — 6360000002 HC RX W HCPCS: Performed by: INTERNAL MEDICINE

## 2024-06-28 RX ORDER — BUMETANIDE 0.25 MG/ML
3 INJECTION INTRAMUSCULAR; INTRAVENOUS 3 TIMES DAILY
Status: DISCONTINUED | OUTPATIENT
Start: 2024-06-28 | End: 2024-06-29

## 2024-06-28 RX ORDER — POTASSIUM CHLORIDE 750 MG/1
40 TABLET, FILM COATED, EXTENDED RELEASE ORAL 2 TIMES DAILY
Status: COMPLETED | OUTPATIENT
Start: 2024-06-28 | End: 2024-06-28

## 2024-06-28 RX ADMIN — AMPICILLIN SODIUM 2000 MG: 2 INJECTION, POWDER, FOR SOLUTION INTRAVENOUS at 21:37

## 2024-06-28 RX ADMIN — POTASSIUM CHLORIDE 40 MEQ: 750 TABLET, FILM COATED, EXTENDED RELEASE ORAL at 21:25

## 2024-06-28 RX ADMIN — LEVOTHYROXINE SODIUM 88 MCG: 88 TABLET ORAL at 09:57

## 2024-06-28 RX ADMIN — SODIUM CHLORIDE: 900 INJECTION INTRAVENOUS at 15:40

## 2024-06-28 RX ADMIN — POTASSIUM CHLORIDE 40 MEQ: 750 TABLET, FILM COATED, EXTENDED RELEASE ORAL at 12:32

## 2024-06-28 RX ADMIN — APIXABAN 5 MG: 5 TABLET, FILM COATED ORAL at 21:26

## 2024-06-28 RX ADMIN — BUMETANIDE 3 MG: 0.25 INJECTION INTRAMUSCULAR; INTRAVENOUS at 09:58

## 2024-06-28 RX ADMIN — AMPICILLIN SODIUM 2000 MG: 2 INJECTION, POWDER, FOR SOLUTION INTRAVENOUS at 15:42

## 2024-06-28 RX ADMIN — TAMSULOSIN HYDROCHLORIDE 0.8 MG: 0.4 CAPSULE ORAL at 09:57

## 2024-06-28 RX ADMIN — APIXABAN 5 MG: 5 TABLET, FILM COATED ORAL at 09:57

## 2024-06-28 RX ADMIN — AMIODARONE HYDROCHLORIDE 100 MG: 200 TABLET ORAL at 09:57

## 2024-06-28 RX ADMIN — BUMETANIDE 3 MG: 0.25 INJECTION INTRAMUSCULAR; INTRAVENOUS at 21:26

## 2024-06-28 RX ADMIN — ACETAZOLAMIDE 125 MG: 250 TABLET ORAL at 09:57

## 2024-06-28 RX ADMIN — Medication 10 ML: at 10:06

## 2024-06-28 RX ADMIN — Medication 10 ML: at 21:38

## 2024-06-28 RX ADMIN — METOLAZONE 5 MG: 2.5 TABLET ORAL at 09:57

## 2024-06-28 RX ADMIN — ACETAZOLAMIDE 125 MG: 250 TABLET ORAL at 21:25

## 2024-06-28 RX ADMIN — ALLOPURINOL 50 MG: 100 TABLET ORAL at 09:57

## 2024-06-28 RX ADMIN — BUMETANIDE 3 MG: 0.25 INJECTION INTRAMUSCULAR; INTRAVENOUS at 16:25

## 2024-06-28 RX ADMIN — DOBUTAMINE HYDROCHLORIDE 4 MCG/KG/MIN: 200 INJECTION INTRAVENOUS at 22:12

## 2024-06-28 NOTE — DISCHARGE INSTRUCTIONS
To access the American Heart Association's Interactive Workbook \"Healthier Living with Heart Failure - Managing Symptoms and Reducing Risk\"  Scan the QR code below.

## 2024-06-28 NOTE — PLAN OF CARE
Problem: Pain  Goal: Verbalizes/displays adequate comfort level or baseline comfort level  Outcome: Progressing  Flowsheets (Taken 6/27/2024 4736)  Verbalizes/displays adequate comfort level or baseline comfort level: Encourage patient to monitor pain and request assistance     Problem: Discharge Planning  Goal: Discharge to home or other facility with appropriate resources  Outcome: Progressing     Problem: Chronic Conditions and Co-morbidities  Goal: Patient's chronic conditions and co-morbidity symptoms are monitored and maintained or improved  Outcome: Progressing     Problem: Safety - Adult  Goal: Free from fall injury  Outcome: Progressing

## 2024-06-28 NOTE — CONSULTS
ADVANCED HEART FAILURE CENTER  [facility] in [place], [place]  Inpatient Progress Note      Patient name: Ingrid Kennedy  Patient : 1950  Patient MRN: [id number]  Consulting MD: Kaitlynn Burkett MD  Date of service: 24    REASON FOR REFERRAL:  Acute on chronic systolic heart failure    ASSESSMENT:  Ingrid Kennedy is a 73 y.o. male admitted with decompensated heart failure. History of non ischemic cardiomyopathy, stage D heart failure, on palliative inotrope therapy with Dobutamine 4 mcg/kg/min.   Not a candidate for LVAD. Formally evaluated by Advanced Heart Failure team and declined for LVAD implant due to renal dysfunction, Creatinine >1.8, RV dysfunction, and neurocognitive dysfunction.     RECOMMENDATIONS:  Medical Therapy for Heart Failure  Continue Dobutamine 4 mcg/kg/min  Check NICOM  Beta-blocker: contraindicated with Dobutamine  ACEi/ARB/ARNI: Unable to tolerate due to renal dysfunction and hypotension  Hydralazine/Nitrate: Unable to tolerate due to hypotension  MRA: Unable to tolerate due to renal dysfunction  SGLT2i: Discontinued due to UTI with resulting bacteremia and septic shock. High risk to restart  Continue Midodrine 10 mg TID for hypotension     Volume Management  Target weight 150-155 lbs  Continue Diamox 125 mg BID  Bumex increased to 3 mg IV TID  Keep K>4 and Mg>2  Fluid restriction to 2000 cc daily, strict I/Os, daily standing weight  Allopurinol 50 mg daily    Recent Bacteremia:  Patient is being managed with Ampicillin through  for Proteus and Enterobacter bacteremia    Anticoagulation and Antiplatelet Therapy  Anticoagulation with Apixaban for A-fib     Antiarrhythmic and Device Therapy  Continue amiodarone 100 mg daily.  Telemetry monitoring for rate/rhythm control   ICD interrogation per routine     Laboratory and Imaging Studies  Labs: BMP, NT pro-BNP daily     Physical activity and education  PT/OT consult  Nutritionist consult  Heart failure education by

## 2024-06-28 NOTE — ED NOTES
TRANSFER - OUT REPORT:    Verbal report given to Damaris RN on Ingrid Kennedy  being transferred to [facility] for routine progression of patient care       Report consisted of patient's Situation, Background, Assessment and   Recommendations(SBAR).     Information from the following report(s) Nurse Handoff Report, Index, and ED SBAR was reviewed with the receiving nurse.    [place] Fall Assessment:                           Lines:   [facility]  06/19/24 Right (Active)        Opportunity for questions and clarification was provided.      Patient transported with:  Monitor and Registered Nurse

## 2024-06-28 NOTE — PROGRESS NOTES
4208 - Shift handoff obtained from Clifford GUERRA (off-going) at this time. Patient resting quietly in bed, respirations even and unlabored. O2 in place @ 2L/NC for comfort. Call light within reach.    0954 - Patient up ambulating in room with OT. Tolerated well. Dobutamine infusion infusing without difficulty. Up in recliner after working with OT. Call light within reach.    1755 - NICOM completed by Cindy Means RN. Patient tolerated well. Denies any needs at this time. Dobutamine continues @ 4mcg/kg/min via RCW CVC.    Plan of Care:  Problem: Pain  Goal: Verbalizes/displays adequate comfort level or baseline comfort level  6/28/2024 1057 by Hermila Roberts RN  Outcome: Progressing  6/28/2024 0835 by Clifford Jaquez RN  Outcome: Progressing  Flowsheets (Taken 6/27/2024 2320)  Verbalizes/displays adequate comfort level or baseline comfort level: Encourage patient to monitor pain and request assistance     Problem: Discharge Planning  Goal: Discharge to home or other facility with appropriate resources  6/28/2024 1057 by Hermila Roberts RN  Outcome: Progressing  6/28/2024 0835 by Clifford Jaquez RN  Outcome: Progressing     Problem: Chronic Conditions and Co-morbidities  Goal: Patient's chronic conditions and co-morbidity symptoms are monitored and maintained or improved  6/28/2024 1057 by Hermila Roberts RN  Outcome: Progressing  6/28/2024 0835 by Clifford Jaquez RN  Outcome: Progressing     Problem: Safety - Adult  Goal: Free from fall injury  6/28/2024 1057 by Hermila Roberts RN  Outcome: Progressing  6/28/2024 0835 by Clifford Jaquez RN  Outcome: Progressing            0

## 2024-06-28 NOTE — CARE COORDINATION
Care Management Initial Assessment       RUR: 26% - high  Readmission? Yes   1st IM letter given? Yes - 6/28/24  1st  letter given: No    CM reviewed chart and spoke with patient. Patient readmitted for volume overload. He is currently open with [facility] and [facility] for home infusion services. Patient is on chronic dobutamine. He was additionally prescribed IV abx after his previous discharge (ampicillin) with an end date of 7/11. Patient went to the Advanced Heart Failure Clinic for outpatient IV diuretics in between hospitalizations and attempts to diurese in the outpatient setting were unsucessful. Patient admitted with decompensated heart failure.    Patient resides at address on file with friend Khloe. He has 9 children who reside across the country. His wife resides in [place], where patient is from. The long term discharge plan is for patient to move to [place] to reside with his son. [facility] is reviewing patient's records for new patient establishment. The long term plan for moving to [place] will occur after his IV abx are completed. This is contingent on patient's medical stability as he has now been readmitted with decompensated HF.    Short term discharge plan is to resume home health services with [facility] and home infusion services with [facility].    CM will continue to follow.       06/28/24 0958   Service Assessment   Patient Orientation Alert and Oriented   Cognition Alert   History Provided By Patient   Primary Caregiver Family   Support Systems Children;Family Members   Patient's Healthcare Decision Maker is: Legal Next of Kin   PCP Verified by CM Yes   Last Visit to PCP Within last 6 months   Prior Functional Level Assistance with the following:;Cooking;Shopping;Housework;Bathing   Current Functional Level Assistance with the following:;Cooking;Housework;Shopping   Can patient return to prior living arrangement Unknown at  present   Ability to make needs known: Good   Family able to assist with home care needs: Yes   Financial Resources Medicare   Community Resources None   Social/Functional History   Lives With Daughter   Type of Home House   Home Equipment Cane   Receives Help From Family;Home health   ADL Assistance Independent   Homemaking Assistance Independent   Homemaking Responsibilities No   Ambulation Assistance Independent   Transfer Assistance Independent   Active  No   Patient's  Info family provides transport   Mode of Transportation Car   Occupation Retired   Discharge Planning   Type of Residence House   Living Arrangements Children;Family Members   Current Services Prior To Admission Home Infusion   Potential Assistance Needed Home Care   DME Ordered? No   Potential Assistance Purchasing Medications No   Type of Home Care Services IV Therapy   Patient expects to be discharged to: House   Services At/After Discharge   Transition of Care Consult (CM Consult) Home Health;Discharge Planning   Internal Home Health Yes   Services At/After Discharge IV Therapy   [place] Resource Information Provided? No   Mode of Transport at Discharge Other (see comment)  (family)   Confirm Follow Up Transport Family   Condition of Participation: Discharge Planning   The Plan for Transition of Care is related to the following treatment goals: home health and home infusion   The Patient and/or Patient Representative was provided with a Choice of Provider? Patient   The Patient and/Or Patient Representative agree with the Discharge Plan? Yes   Freedom of Choice list was provided with basic dialogue that supports the patient's individualized plan of care/goals, treatment preferences, and shares the quality data associated with the providers?  Yes     Renny Jesus, MPH  Care Manager l [facility]  Available via [employer]

## 2024-06-28 NOTE — NURSE NAVIGATOR
HEART FAILURE NURSE NAVIGATOR NOTE  [facility]    Patient chart was reviewed by Heart Failure (HF) Nurse Navigators for compliance of prescribed treatment with guidelines directed medical therapy (GDMT) and HF database completed.     Please, review beneath recommendations for symptomatic patients with HF with Reduced Ejection Fraction ? 40% (HFrEF)* and patients whose LVEF improved > 40% (HFimpEF)* for your consideration when taking care of this patient.    Current Medical Therapy:    Name Ingrid Kennedy    1950   LVEF 10/15%   NYHA Functional Class IV   ARNi/ACEi/ARB Unable to tolerate d/t renal dysfunction/hypotension   Beta-blocker Contraindicated d/t dobutamine   Aldosterone Antagonist Unable to tolerate d/t renal dysfunction   SGLT2 inhibitor Discontinued due to UTI with resulting bacteremia and septic shock. High risk to restart    Hydralazine/Isosorbide Dinitrate Unable to tolerate d/t hypotension   Consulting Cardiologist: Dr. Nuria Major ([facility])     Recommendations:    Please, add the following GDMT for HFrEF ? 40% [Class 1] or document in discharge summary/progress note why patient cannot take the medication:  ARNi/ACEi or ARB  Beta-blockers (carvedilol, sustained-release metoprolol succinate or bisoprolol)  Aldosterone antagonists GFR > 30 and K< 5  SGLT2 inhibitor  Hydralazine/Isosorbide dinitrate for  Americans with Class III/IV symptoms  Adjust diuretic dose at discharge if hospitalized for volume overload    Consider adding the following GDMT for HFrEF ? 40%, if appropriate [Class 2b]:  Ivabradine for patients on maximally tolerated beta-blocker dose in order to achieve HR 70-80bpm  Digoxin, goal level 0.5-0.9  Polyunsaturated fatty acids  Vericuguat  For patient with hyperkalemia while on RAASi > 5.5, consider adding potassium binders (patiromer, sodium zirconium cycosilicate)    Note: the following medications may be potentially harmful in heart failure

## 2024-06-28 NOTE — PLAN OF CARE
[]  2 [x]  3 []  4   2.  Bathing (including washing, rinsing, drying)? []  1 []  2 [x]  3 []  4   3.  Toileting, which includes using toilet, bedpan or urinal? [] 1 []  2 [x]  3 []  4   4.  Putting on and taking off regular upper body clothing? []  1 []  2 [x]  3 []  4   5.  Taking care of personal grooming such as brushing teeth? []  1 []  2 [x]  3 []  4   6.  Eating meals? []  1 []  2 []  3 [x]  4   © 2007, Trustees of [facility], under license to [employer]. All rights reserved     Score: 19/24     Interpretation of Tool:  Represents clinically-significant functional categories (i.e. Activities of daily living).    Cutoff score 39.4 (19) correlates to a good likelihood of discharging home versus a facility  Elizabeth Estrada, Sonja Naranjo, Ricardo Kay, Reny Elam, Eric Arnold, Tony Estrada, AM-PAC “6-Clicks” Functional Assessment Scores Predict Acute Care Hospital Discharge Destination, Physical Therapy, Volume 94, Issue 9, 1 September 2014, Pages 3683-3641, [email or web address]    Pain Rating:  None reported       Activity Tolerance:   Good    After treatment:   Patient left in no apparent distress sitting up in chair, Call bell within reach, and Bed/ chair alarm activated    COMMUNICATION/EDUCATION:   The patient's plan of care was discussed with: physical therapist and registered nurse    Patient Education  Education Given To: Patient  Education Provided: Role of Therapy;Precautions;Plan of Care;ADL Adaptive Strategies;Transfer Training;Mobility Training;Energy Conservation  Education Method: Demonstration;Verbal;Teach Back  Barriers to Learning: None  Education Outcome: Verbalized understanding;Demonstrated understanding;Continued education needed    Thank you for this referral.  ELIE Drake  Minutes: 31    Occupational Therapy Evaluation Charge Determination   History Examination Decision-Making   LOW Complexity : Brief history review  LOW Complexity:  1-3 Performance deficits relating to physical, cognitive, or psychosocial skills that result in activity limitations and/or participation restrictions LOW Complexity: No comorbidities that affect functional and  no verbal  or physical assist needed to complete eval tasks   Based on the above components, the patient evaluation is determined to be of the following complexity level: Low      Regarding student involvement in patient care:  A student participated in this treatment session. Per CMS Medicare statements and AOTA guidelines I certify that the following was true:  1. I was present and directly observed the entire session.  2. I made all skilled judgments and clinical decisions regarding care.  3. I am the practitioner responsible for assessment, treatment, and documentation.

## 2024-06-28 NOTE — PROGRESS NOTES
Hospitalist Progress Note  Kaitlynn Burkett MD  Answering service: [phone] OR [phone] from in house phone        Date of Service:  2024  NAME:  Ingrid Kennedy  :  1950  MRN:  [id number]      Admission Summary:   HPI: \"Ingrid Kennedy is a 73 y.o. male with history of chronic systolic congestive heart failure with EF of 15-20 on palliative dobutamine gtt., paroxysmal atrial fibrillation, hypothyroidism, VT status post ICD, hypertension, dyslipidemia, GERRI, CKD, BPH who presents to ED with complaints of shortness of breath.  He reports increasing fluid retention, weight gain dyspnea with lower orthopnea over the last week.  He is followed by advanced heart failure and has attempted outpatient diuresis with IV Bumex but states he has had very minimal urine output.  He was referred to the hospital today for admission and IV diuresis.  The patient denies any fever, chills, chest or abdominal pain, nausea, vomiting, cough, congestion, recent illness, palpitations, or dysuria.     Remarkable vitals on ER Presentation: Vital signs stable  Labs Remarkable for: Troponin 95,k 3.4, creatinine 7, BNP 15,846, hemoglobin 10  ER Images: Chest x-ray showed trace right pleural effusion  ER Rx: Bumex 2 mg IV\"       Interval history / Subjective:   Patient seen examined at bedside earlier still on 2 L nasal cannula feels better than yesterday     Assessment & Plan:      Acute on chronic systolic heart failure  Non ischemic cardiomyopathy  -Continue dobutamine 4 mcg/kg/min 2024  -Continue with Bumex, metolazone, diamox  -Strict Is&Os with daily weights  -AHF consulted increased diuretics to TID   -PTOT     PAF on Amiodarone  -cont amio and eliquis     Hypothyroidism  -Continue PTA Synthroid     VT S/p Medtronic dual chamber ICD 2023   -Monitor and replete lytes  -Monitor on tele     HTN  HLD  CKD stage 3  GERRI on CPAP  BPH s/p         Physical Examination:     I had a face to face encounter with this patient and independently examined them on 6/28/2024 as outlined below:          General : alert x 3, awake, no acute distress,   HEENT: PEERL, EOMI, moist mucus membrane  Neck: supple, no JVD, no meningeal signs  Chest: Clear to auscultation bilaterally   CVS: S1 S2 heard, Capillary refill less than 2 seconds  Abd: soft/ non tender, non distended, BS physiological,   Ext: no clubbing, no cyanosis, + edema, brisk 2+ DP pulses  Neuro/Psych: pleasant mood and affect, CN 2-12 grossly intact, sensory grossly within normal limit, Strength 5/5 in all extremities  Skin: warm            Data Review:    Review and/or order of clinical lab test  Review and/or order of tests in the radiology section of CPT  Review and/or order of tests in the medicine section of CPT    I have independently reviewed and interpreted patient's lab and all other diagnostic data    Notes reviewed from all clinical/nonclinical/nursing services involved in patient's clinical care. Care coordination discussions were held with appropriate clinical/nonclinical/ nursing providers based on care coordination needs.     Labs:     Recent Labs     06/27/24  1532   WBC 5.0   HGB 10.0*   HCT 29.1*        Recent Labs     06/26/24  1433 06/27/24  1532 06/28/24  0051    132* 135*   K 3.5 3.4* 3.1*    97 98   CO2 32 30 28   BUN 47* 46* 48*   MG 2.2  --  2.2     Recent Labs     06/26/24  1433 06/27/24  1532   ALT 18 22   GLOB 3.5 4.2*     No results for input(s): \"INR\", \"APTT\" in the last 72 hours.    Invalid input(s): \"PTP\"   No results for input(s): \"TIBC\" in the last 72 hours.    Invalid input(s): \"FE\", \"PSAT\", \"FERR\"   No results found for: \"RBCF\"   No results for input(s): \"PH\", \"PCO2\", \"PO2\" in the last 72 hours.  No results for input(s): \"CPK\" in the last 72 hours.    Invalid input(s): \"CPKMB\", \"CKNDX\", \"TROIQ\"  Lab Results   Component Value Date/Time    CHOL 124

## 2024-06-28 NOTE — PROGRESS NOTES
Physical therapy:    Attempted PT evaluation. Pt reported he just got back to bed recently and would prefer to rest. Will defer and continue to follow. Thank you.    14:47--Attempted PT evaluation again. Pt sleeping soundly and declined OOB at this time. Will defer and follow up tomorrow. Thank you.    Doris Morillo, PT, DPT

## 2024-06-28 NOTE — CARE COORDINATION
06/28/24 1006   Readmission Assessment   Number of Days since last admission? 8-30 days   Previous Disposition Home with Home Health   Who is being Interviewed Patient   What was the patient's/caregiver's perception as to why they think they needed to return back to the hospital? Other (Comment)  (shortness of breath and edema)   Did you visit your Primary Care Physician after you left the hospital, before you returned this time? No   Why weren't you able to visit your PCP? Other (Comment)  (went to HF specialist)   Did you see a specialist, such as Cardiac, Pulmonary, Orthopedic Physician, etc. after you left the hospital? Yes   Who advised the patient to return to the hospital? Physician   Does the patient report anything that got in the way of taking their medications? No   In our efforts to provide the best possible care to you and others like you, can you think of anything that we could have done to help you after you left the hospital the first time, so that you might not have needed to return so soon? Other (Comment)  (none)     Renny Jesus, MPH  Care Manager l [facility]  Available via [employer]

## 2024-06-28 NOTE — PROGRESS NOTES
NICOM Hemodynamic Monitoring     BP 99/65   Pulse 79   Temp 98 °F (36.7 °C) (Oral)   Resp 15   Ht 1.778 m (5' 10\")   Wt 81.7 kg (180 lb 1.9 oz)   SpO2 95%   BMI 25.84 kg/m²       Baseline assessment:        CO 5.2        CI 2.3        SVI 30        SVV 19        TPR 1034

## 2024-06-28 NOTE — TELEPHONE ENCOUNTER
Spoke with Malaika at [facility] heart failure  She asked I put my name an contact info on referral so that they can reach out to coordinate appt    RAMIN curry-heart failure  Pelma-[phone]  Fax--[phone]    Carlos Curry  Phone: [phone]  Fax: [phone]    7/5- He is registered, referral has been received and uploaded    Made appt with Madonna for Friday July 19th at 920am  Confirmed with Shannan (who will buy flgiht) and Edwin (who patient will be staying with in [place])  [address] [place] [address], 3rd floor, [place], [place]    Spoke with Parvez at [facility]--he will coordinate transfer of patient chart to [facility] [place] so that patient can continue to get dobutamine infusion in [place]     Updated note from 7/8 visit faxed to [facility]  Fax--[phone]

## 2024-06-29 LAB
ANION GAP SERPL CALC-SCNC: 6 MMOL/L (ref 5–15)
ANION GAP SERPL CALC-SCNC: 7 MMOL/L (ref 5–15)
BASOPHILS # BLD: 0 K/UL (ref 0–0.1)
BASOPHILS NFR BLD: 1 % (ref 0–1)
BUN SERPL-MCNC: 45 MG/DL (ref 6–20)
BUN SERPL-MCNC: 52 MG/DL (ref 6–20)
BUN/CREAT SERPL: 18 (ref 12–20)
BUN/CREAT SERPL: 20 (ref 12–20)
CALCIUM SERPL-MCNC: 8.3 MG/DL (ref 8.5–10.1)
CALCIUM SERPL-MCNC: 8.3 MG/DL (ref 8.5–10.1)
CHLORIDE SERPL-SCNC: 97 MMOL/L (ref 97–108)
CHLORIDE SERPL-SCNC: 97 MMOL/L (ref 97–108)
CO2 SERPL-SCNC: 32 MMOL/L (ref 21–32)
CO2 SERPL-SCNC: 34 MMOL/L (ref 21–32)
CREAT SERPL-MCNC: 2.52 MG/DL (ref 0.7–1.3)
CREAT SERPL-MCNC: 2.54 MG/DL (ref 0.7–1.3)
DIFFERENTIAL METHOD BLD: ABNORMAL
EOSINOPHIL # BLD: 0.1 K/UL (ref 0–0.4)
EOSINOPHIL NFR BLD: 2 % (ref 0–7)
ERYTHROCYTE [DISTWIDTH] IN BLOOD BY AUTOMATED COUNT: 18.9 % (ref 11.5–14.5)
GLUCOSE SERPL-MCNC: 106 MG/DL (ref 65–100)
GLUCOSE SERPL-MCNC: 111 MG/DL (ref 65–100)
HCT VFR BLD AUTO: 26.9 % (ref 36.6–50.3)
HGB BLD-MCNC: 9.2 G/DL (ref 12.1–17)
IMM GRANULOCYTES # BLD AUTO: 0 K/UL (ref 0–0.04)
IMM GRANULOCYTES NFR BLD AUTO: 0 % (ref 0–0.5)
LYMPHOCYTES # BLD: 1.5 K/UL (ref 0.8–3.5)
LYMPHOCYTES NFR BLD: 27 % (ref 12–49)
MAGNESIUM SERPL-MCNC: 2 MG/DL (ref 1.6–2.4)
MCH RBC QN AUTO: 27.1 PG (ref 26–34)
MCHC RBC AUTO-ENTMCNC: 34.2 G/DL (ref 30–36.5)
MCV RBC AUTO: 79.1 FL (ref 80–99)
MONOCYTES # BLD: 0.5 K/UL (ref 0–1)
MONOCYTES NFR BLD: 8 % (ref 5–13)
NEUTS SEG # BLD: 3.3 K/UL (ref 1.8–8)
NEUTS SEG NFR BLD: 62 % (ref 32–75)
NRBC # BLD: 0 K/UL (ref 0–0.01)
NRBC BLD-RTO: 0 PER 100 WBC
NT PRO BNP: ABNORMAL PG/ML
PLATELET # BLD AUTO: 247 K/UL (ref 150–400)
PMV BLD AUTO: 10.3 FL (ref 8.9–12.9)
POTASSIUM SERPL-SCNC: 2.6 MMOL/L (ref 3.5–5.1)
POTASSIUM SERPL-SCNC: 3 MMOL/L (ref 3.5–5.1)
RBC # BLD AUTO: 3.4 M/UL (ref 4.1–5.7)
SODIUM SERPL-SCNC: 136 MMOL/L (ref 136–145)
SODIUM SERPL-SCNC: 137 MMOL/L (ref 136–145)
WBC # BLD AUTO: 5.4 K/UL (ref 4.1–11.1)

## 2024-06-29 PROCEDURE — P9047 ALBUMIN (HUMAN), 25%, 50ML: HCPCS | Performed by: STUDENT IN AN ORGANIZED HEALTH CARE EDUCATION/TRAINING PROGRAM

## 2024-06-29 PROCEDURE — 97116 GAIT TRAINING THERAPY: CPT

## 2024-06-29 PROCEDURE — 2580000003 HC RX 258: Performed by: STUDENT IN AN ORGANIZED HEALTH CARE EDUCATION/TRAINING PROGRAM

## 2024-06-29 PROCEDURE — 6360000002 HC RX W HCPCS: Performed by: INTERNAL MEDICINE

## 2024-06-29 PROCEDURE — 6360000002 HC RX W HCPCS: Performed by: NURSE PRACTITIONER

## 2024-06-29 PROCEDURE — 85025 COMPLETE CBC W/AUTO DIFF WBC: CPT

## 2024-06-29 PROCEDURE — 36415 COLL VENOUS BLD VENIPUNCTURE: CPT

## 2024-06-29 PROCEDURE — 83735 ASSAY OF MAGNESIUM: CPT

## 2024-06-29 PROCEDURE — 6360000002 HC RX W HCPCS

## 2024-06-29 PROCEDURE — 2580000003 HC RX 258: Performed by: INTERNAL MEDICINE

## 2024-06-29 PROCEDURE — 97161 PT EVAL LOW COMPLEX 20 MIN: CPT

## 2024-06-29 PROCEDURE — 6370000000 HC RX 637 (ALT 250 FOR IP): Performed by: STUDENT IN AN ORGANIZED HEALTH CARE EDUCATION/TRAINING PROGRAM

## 2024-06-29 PROCEDURE — 6370000000 HC RX 637 (ALT 250 FOR IP)

## 2024-06-29 PROCEDURE — 80048 BASIC METABOLIC PNL TOTAL CA: CPT

## 2024-06-29 PROCEDURE — 6370000000 HC RX 637 (ALT 250 FOR IP): Performed by: NURSE PRACTITIONER

## 2024-06-29 PROCEDURE — 94760 N-INVAS EAR/PLS OXIMETRY 1: CPT

## 2024-06-29 PROCEDURE — 99232 SBSQ HOSP IP/OBS MODERATE 35: CPT | Performed by: NURSE PRACTITIONER

## 2024-06-29 PROCEDURE — 2700000000 HC OXYGEN THERAPY PER DAY

## 2024-06-29 PROCEDURE — 6360000002 HC RX W HCPCS: Performed by: STUDENT IN AN ORGANIZED HEALTH CARE EDUCATION/TRAINING PROGRAM

## 2024-06-29 PROCEDURE — 83880 ASSAY OF NATRIURETIC PEPTIDE: CPT

## 2024-06-29 PROCEDURE — P9047 ALBUMIN (HUMAN), 25%, 50ML: HCPCS

## 2024-06-29 PROCEDURE — 2060000000 HC ICU INTERMEDIATE R&B

## 2024-06-29 RX ORDER — POTASSIUM CHLORIDE 750 MG/1
40 TABLET, FILM COATED, EXTENDED RELEASE ORAL EVERY 12 HOURS
Status: DISCONTINUED | OUTPATIENT
Start: 2024-06-29 | End: 2024-06-30

## 2024-06-29 RX ORDER — POTASSIUM CHLORIDE 750 MG/1
40 TABLET, FILM COATED, EXTENDED RELEASE ORAL 2 TIMES DAILY
Status: DISCONTINUED | OUTPATIENT
Start: 2024-06-29 | End: 2024-06-29

## 2024-06-29 RX ORDER — ALBUMIN (HUMAN) 12.5 G/50ML
25 SOLUTION INTRAVENOUS ONCE
Status: COMPLETED | OUTPATIENT
Start: 2024-06-29 | End: 2024-06-29

## 2024-06-29 RX ORDER — POTASSIUM CHLORIDE 7.45 MG/ML
10 INJECTION INTRAVENOUS
Status: DISCONTINUED | OUTPATIENT
Start: 2024-06-29 | End: 2024-06-29

## 2024-06-29 RX ORDER — POTASSIUM CHLORIDE 29.8 MG/ML
20 INJECTION INTRAVENOUS ONCE
Status: COMPLETED | OUTPATIENT
Start: 2024-06-29 | End: 2024-06-29

## 2024-06-29 RX ORDER — POTASSIUM CHLORIDE 29.8 MG/ML
20 INJECTION INTRAVENOUS
Status: COMPLETED | OUTPATIENT
Start: 2024-06-29 | End: 2024-06-29

## 2024-06-29 RX ORDER — BUMETANIDE 0.25 MG/ML
3 INJECTION INTRAMUSCULAR; INTRAVENOUS 2 TIMES DAILY
Status: DISCONTINUED | OUTPATIENT
Start: 2024-06-29 | End: 2024-06-30

## 2024-06-29 RX ADMIN — DOBUTAMINE HYDROCHLORIDE 4 MCG/KG/MIN: 200 INJECTION INTRAVENOUS at 22:06

## 2024-06-29 RX ADMIN — AMIODARONE HYDROCHLORIDE 100 MG: 200 TABLET ORAL at 08:42

## 2024-06-29 RX ADMIN — AMPICILLIN SODIUM 2000 MG: 2 INJECTION, POWDER, FOR SOLUTION INTRAVENOUS at 14:37

## 2024-06-29 RX ADMIN — ALBUMIN (HUMAN) 25 G: 0.25 INJECTION, SOLUTION INTRAVENOUS at 15:38

## 2024-06-29 RX ADMIN — POTASSIUM CHLORIDE 20 MEQ: 29.8 INJECTION, SOLUTION INTRAVENOUS at 16:49

## 2024-06-29 RX ADMIN — Medication 10 ML: at 21:55

## 2024-06-29 RX ADMIN — POTASSIUM CHLORIDE 40 MEQ: 750 TABLET, FILM COATED, EXTENDED RELEASE ORAL at 18:01

## 2024-06-29 RX ADMIN — AMPICILLIN SODIUM 2000 MG: 2 INJECTION, POWDER, FOR SOLUTION INTRAVENOUS at 23:04

## 2024-06-29 RX ADMIN — POTASSIUM CHLORIDE 20 MEQ: 29.8 INJECTION, SOLUTION INTRAVENOUS at 12:11

## 2024-06-29 RX ADMIN — LEVOTHYROXINE SODIUM 88 MCG: 88 TABLET ORAL at 08:42

## 2024-06-29 RX ADMIN — POTASSIUM CHLORIDE 40 MEQ: 750 TABLET, FILM COATED, EXTENDED RELEASE ORAL at 05:40

## 2024-06-29 RX ADMIN — APIXABAN 5 MG: 5 TABLET, FILM COATED ORAL at 21:54

## 2024-06-29 RX ADMIN — BUMETANIDE 3 MG: 0.25 INJECTION INTRAMUSCULAR; INTRAVENOUS at 08:42

## 2024-06-29 RX ADMIN — ALLOPURINOL 50 MG: 100 TABLET ORAL at 08:41

## 2024-06-29 RX ADMIN — ALBUMIN (HUMAN) 25 G: 0.25 INJECTION, SOLUTION INTRAVENOUS at 21:59

## 2024-06-29 RX ADMIN — METOLAZONE 5 MG: 2.5 TABLET ORAL at 08:40

## 2024-06-29 RX ADMIN — APIXABAN 5 MG: 5 TABLET, FILM COATED ORAL at 08:41

## 2024-06-29 RX ADMIN — Medication 10 ML: at 08:39

## 2024-06-29 RX ADMIN — AMPICILLIN SODIUM 2000 MG: 2 INJECTION, POWDER, FOR SOLUTION INTRAVENOUS at 05:41

## 2024-06-29 RX ADMIN — ACETAZOLAMIDE 125 MG: 250 TABLET ORAL at 08:40

## 2024-06-29 RX ADMIN — TAMSULOSIN HYDROCHLORIDE 0.8 MG: 0.4 CAPSULE ORAL at 08:41

## 2024-06-29 RX ADMIN — POTASSIUM CHLORIDE 20 MEQ: 29.8 INJECTION, SOLUTION INTRAVENOUS at 11:11

## 2024-06-29 RX ADMIN — BUMETANIDE 3 MG: 0.25 INJECTION INTRAMUSCULAR; INTRAVENOUS at 18:04

## 2024-06-29 NOTE — PROGRESS NOTES
1205: Provider notified of BP 92/58. No new orders received, just continue to monitor at this time.    1430: Provider notified of repeat K 3    1545: Provider notified of BP 93/43. New orders received.

## 2024-06-29 NOTE — PROGRESS NOTES
ADVANCED HEART FAILURE CENTER  [facility] in [place], [place]  Inpatient Progress Note      Patient name: Ingrid Kennedy  Patient : 1950  Patient MRN: [id number]  Consulting MD: Kaitlynn Burkett MD  Date of service: 24    REASON FOR REFERRAL:  Acute on chronic systolic heart failure    INTERVAL EVENTS  No acute events overnight  Net neg 4.2L , weight 180-->163  Potassium low at 2.6, BNP down trending, cr stable  No complaints, feels ok    ASSESSMENT:  Ingrid Kennedy is a 73 y.o. male admitted with decompensated heart failure. History of non ischemic cardiomyopathy, stage D heart failure, on palliative inotrope therapy with Dobutamine 4 mcg/kg/min.   Not a candidate for LVAD. Formally evaluated by Advanced Heart Failure team and declined for LVAD implant due to renal dysfunction (GFR<30), RV dysfunction, and neurocognitive dysfunction.     RECOMMENDATIONS:  Medical Therapy for Heart Failure  Continue Dobutamine 4 mcg/kg/min  Check NICOM  Beta-blocker: contraindicated with Dobutamine  ACEi/ARB/ARNI: Unable to tolerate due to renal dysfunction and hypotension  Hydralazine/Nitrate: Unable to tolerate due to hypotension  MRA: Unable to tolerate due to renal dysfunction  SGLT2i: Discontinued due to UTI with resulting bacteremia and septic shock. High risk to restart  Continue Midodrine 10 mg TID for hypotension     Volume Management  Target weight 150-155 lbs  Continue Diamox 125 mg BID  Decrease Bumex to 3 mg IV BID  Discontinue metolazone, will use PRN if needed  Keep K>4 and Mg>2  Given K repletions, repeat BMP mid day  Fluid restriction to 2000 cc daily, strict I/Os, daily standing weight  Allopurinol 50 mg daily    Recent Bacteremia:  Patient is being managed with Ampicillin through  for Proteus and Enterobacter bacteremia    Anticoagulation and Antiplatelet Therapy  Anticoagulation with Apixaban for A-fib     Antiarrhythmic and Device Therapy  Continue amiodarone 100 mg  decompensated heart failure. He was diuresed to a discharge weight of 160 lbs.      Outpatient he continued to struggle with renal dysfunction, hypotension, and ongoing NYHA class III-IV symptoms. Family raised concerns about patient's ability for self care. If family is not around to direct him he would forget medications. They did not trust him to be alone for any prolonged periods of time. We admitted the patient 5/10/2024 and transitioned Milrinone to Dobutamine in an attempt to improve renal function and hypotension. C 5/14/24 on Dobutamine at 3 mcg/kg/min showed ongoing decompensated heart failure with elevated filling pressures and TDCI of 1.74, 2.26 by Shelly. Dobutamine was increased to 4. Weight was down to 146 lbs on discharge.      In the interim patient and family were interested in transitioning patient back home to [place]. Upon discussion with local cardiologist, this did not appear to be a feasible plan as inotrope would have to be flown in from [place]. Patient would need to wean from inotrope and transition to hospice if he were to move back home. They then asked about weaning inotrope off vs other options for living arrangements with children in [place] or [place]. Family meeting was scheduled for 6/17. Patient presented 6/12/24 with 1 week of weight gain, 9 lbs, worsening dyspnea, peripheral edema, orthopnea and dysuria. He was found to have a Proteus UTI and Enterobacter and Proteus bacteremia. He was managed with IV antibiotics, diuresed with improvement. Family meeting during admission. Patient and family decided after completion of IV antibiotics he would move to [place] with his son. They wised to continue with Inotrope therapy.    Patient now readmitted 7 days after discharge with decompensated heart failure. He reports after discharge home his urine output decreased despite diuretics and he began gaining weight and swelling. He has dyspnea on exertion. Weight on admission was  bilateral  Gastrointestinal:  Normoactive bowel sounds  Soft, Non-tender, Non distended  No splenomegaly, No hepatomegaly  Musculoskeletal:  no deformity  Extremities:  No clubbing or cyanosis  Skin:  Inspection: No rash, no ulcers  Neurologic/Psychiatric:  Orientation: Oriented to time, place and person  Mood and Affect: Appropriate      PAST MEDICAL HISTORY:  Past Medical History:   Diagnosis Date    AICD (automatic cardioverter/defibrillator) present     CHF (congestive heart failure) (HCC)     CKD (chronic kidney disease)     Dilated cardiomyopathy (HCC)     Hypertension     Low left ventricular ejection fraction     GERRI on CPAP     Thyroid disease        PAST SURGICAL HISTORY:  Past Surgical History:   Procedure Laterality Date    CARDIAC PROCEDURE N/A 12/18/2023    Right heart cath performed by Ananda Walton MD at [facility] CARDIAC CATH LAB    CARDIAC PROCEDURE N/A 12/27/2023    Right heart cath performed by Ananda Walton MD at [facility] CARDIAC CATH LAB    CARDIAC PROCEDURE N/A 02/15/2024    Right heart cath performed by Emil Covarrubias MD at [facility] CARDIAC CATH LAB    CARDIAC PROCEDURE N/A 05/14/2024    Right heart cath performed by Ananda Walton MD at [facility] CARDIAC CATH LAB    CARDIAC SURGERY  Year 2023    IR INSERT TUNNELED CV CATH WO PORT < 5YRS  12/26/2023    IR INSERT TUNNELED CV CATH WO PORT < 5YRS 12/26/2023 [facility] RAD ANGIO IR    IR INSERT TUNNELED CV CATH WO PORT < 5YRS  6/19/2024    IR INSERT TUNNELED CV CATH WO PORT < 5YRS 6/19/2024 [facility] RAD ANGIO IR    IR TUNNELED CATHETER PLACEMENT GREATER THAN 5 YEARS  01/17/2024    IR TUNNELED CATHETER PLACEMENT GREATER THAN 5 YEARS 1/17/2024 [facility] RAD ANGIO IR    KNEE ARTHROPLASTY  Year 2023    TOTAL KNEE ARTHROPLASTY         FAMILY HISTORY:  Family History   Problem Relation Age of Onset    High Blood Pressure Mother     Breast Cancer Sister     Cancer Sister        SOCIAL HISTORY:  Social History     Socioeconomic History    Marital status:    Tobacco Use

## 2024-06-29 NOTE — PLAN OF CARE
0453:  Critical K 2.6 received.  GEORGETTE Kauffman notified.  Orders received, see MAR.    Problem: Pain  Goal: Verbalizes/displays adequate comfort level or baseline comfort level  6/28/2024 2111 by Clifford Jaquez RN  Outcome: Progressing  Flowsheets (Taken 6/28/2024 2030)  Verbalizes/displays adequate comfort level or baseline comfort level: Encourage patient to monitor pain and request assistance  6/28/2024 1057 by Hermila Roberts RN  Outcome: Progressing  6/28/2024 0835 by Clifford Jaquez RN  Outcome: Progressing  Flowsheets (Taken 6/27/2024 2320)  Verbalizes/displays adequate comfort level or baseline comfort level: Encourage patient to monitor pain and request assistance     Problem: Discharge Planning  Goal: Discharge to home or other facility with appropriate resources  6/28/2024 2111 by Clifford Jaquez RN  Outcome: Progressing  6/28/2024 1057 by Hermila Roberts RN  Outcome: Progressing  6/28/2024 0835 by Clifford Jaquez RN  Outcome: Progressing     Problem: Chronic Conditions and Co-morbidities  Goal: Patient's chronic conditions and co-morbidity symptoms are monitored and maintained or improved  6/28/2024 2111 by Clifford Jaquez RN  Outcome: Progressing  6/28/2024 1057 by Hermila Roberts RN  Outcome: Progressing  6/28/2024 0835 by Clifford Jaquez RN  Outcome: Progressing     Problem: Safety - Adult  Goal: Free from fall injury  6/28/2024 2111 by Clifford Jaquez RN  Outcome: Progressing  6/28/2024 1057 by Hermila Roberts RN  Outcome: Progressing  6/28/2024 0835 by Clifford Jaquez RN  Outcome: Progressing     Problem: Occupational Therapy - Adult  Goal: By Discharge: Performs self-care activities at highest level of function for planned discharge setting.  See evaluation for individualized goals.  Description: FUNCTIONAL STATUS PRIOR TO ADMISSION:  PTA patient was Mod I-IND with ADLs and functional mobility, uses SPC occasionally. He lives with his daughter who assists with IADLs.    Occupational Therapy

## 2024-06-29 NOTE — PROGRESS NOTES
Hospitalist Progress Note  Kaitlynn Burkett MD  Answering service: [phone] OR [phone] from in house phone        Date of Service:  2024  NAME:  Ingrid Kennedy  :  1950  MRN:  [id number]      Admission Summary:   HPI: \"Ingrid Kennedy is a 73 y.o. male with history of chronic systolic congestive heart failure with EF of 15-20 on palliative dobutamine gtt., paroxysmal atrial fibrillation, hypothyroidism, VT status post ICD, hypertension, dyslipidemia, GERRI, CKD, BPH who presents to ED with complaints of shortness of breath.  He reports increasing fluid retention, weight gain dyspnea with lower orthopnea over the last week.  He is followed by advanced heart failure and has attempted outpatient diuresis with IV Bumex but states he has had very minimal urine output.  He was referred to the hospital today for admission and IV diuresis.  The patient denies any fever, chills, chest or abdominal pain, nausea, vomiting, cough, congestion, recent illness, palpitations, or dysuria.     Remarkable vitals on ER Presentation: Vital signs stable  Labs Remarkable for: Troponin 95,k 3.4, creatinine 7, BNP 15,846, hemoglobin 10  ER Images: Chest x-ray showed trace right pleural effusion  ER Rx: Bumex 2 mg IV\"       Interval history / Subjective:   Patient seen examined at bedside earlier still on 2 L nasal cannula feels better than yesterday     Assessment & Plan:      Acute on chronic systolic heart failure  Non ischemic cardiomyopathy  -Continue dobutamine 4 mcg/kg/min 2024  -Continue with Bumex, metolazone stopped, diamox  -Strict Is&Os with daily weights  -AHF consulted appreciate recs spoke to np today, weight improved, reduce diuretic   -PTOT  -No SGLT2 inhibitor due to UTI and history of septic shock, no MRA due to renal dysfunction and no hydralazine/nitrate/ACE/ARB/ARNI due to renal dysfunction    Hypokalemia  - Replete  06/27/24  1532   ALT 18 22   GLOB 3.5 4.2*       No results for input(s): \"INR\", \"APTT\" in the last 72 hours.    Invalid input(s): \"PTP\"   No results for input(s): \"TIBC\" in the last 72 hours.    Invalid input(s): \"FE\", \"PSAT\", \"FERR\"   No results found for: \"RBCF\"   No results for input(s): \"PH\", \"PCO2\", \"PO2\" in the last 72 hours.  No results for input(s): \"CPK\" in the last 72 hours.    Invalid input(s): \"CPKMB\", \"CKNDX\", \"TROIQ\"  Lab Results   Component Value Date/Time    CHOL 124 04/15/2024 11:46 AM    HDL 60 04/15/2024 11:46 AM    LDL 54 04/15/2024 11:46 AM     No results found for: \"GLUCPOC\"        Medications Reviewed:     Current Facility-Administered Medications   Medication Dose Route Frequency    bumetanide (BUMEX) injection 3 mg  3 mg IntraVENous BID    potassium chloride (KLOR-CON) extended release tablet 40 mEq  40 mEq Oral Q12H    ampicillin (OMNIPEN) 2,000 mg in sodium chloride 0.9 % 100 mL IVPB (mini-bag)  2,000 mg IntraVENous 3 times per day    acetaZOLAMIDE (DIAMOX) tablet 125 mg  125 mg Oral BID    allopurinol (ZYLOPRIM) tablet 50 mg  50 mg Oral Daily    amiodarone (CORDARONE) tablet 100 mg  100 mg Oral Daily    apixaban (ELIQUIS) tablet 5 mg  5 mg Oral BID    levothyroxine (SYNTHROID) tablet 88 mcg  88 mcg Oral Daily    tamsulosin (FLOMAX) capsule 0.8 mg  0.8 mg Oral Daily    sodium chloride flush 0.9 % injection 5-40 mL  5-40 mL IntraVENous 2 times per day    sodium chloride flush 0.9 % injection 5-40 mL  5-40 mL IntraVENous PRN    0.9 % sodium chloride infusion   IntraVENous PRN    ondansetron (ZOFRAN-ODT) disintegrating tablet 4 mg  4 mg Oral Q8H PRN    Or    ondansetron (ZOFRAN) injection 4 mg  4 mg IntraVENous Q6H PRN    polyethylene glycol (GLYCOLAX) packet 17 g  17 g Oral Daily PRN    acetaminophen (TYLENOL) tablet 650 mg  650 mg Oral Q6H PRN    Or    acetaminophen (TYLENOL) suppository 650 mg  650 mg Rectal Q6H PRN    DOBUTamine (DOBUTREX) 500 mg in dextrose 5 % 250 mL infusion  4  mcg/kg/min IntraVENous Continuous     ______________________________________________________________________  EXPECTED LENGTH OF STAY: Unable to retrieve estimated LOS  ACTUAL LENGTH OF STAY:          2                 Kaitlynn Burkett MD

## 2024-06-29 NOTE — PROGRESS NOTES
PHYSICAL THERAPY EVALUATION/DISCHARGE    Patient: Ingrid Kennedy (73 y.o. male)  Date: 6/29/2024  Primary Diagnosis: CHF (NYHA class IV, ACC/AHA stage D) ([facility]) [I50.84]  Congestive heart failure, unspecified HF chronicity, unspecified heart failure type ([facility]) [I50.9]       Precautions: Fall Risk                      ASSESSMENT AND RECOMMENDATIONS:  Pt seen following RN clearance following admission for fluid retention, dyspnea, and swelling associated with above diagnosis. Pt pleasant and agreeable to therapy session: bed mobility, transfers, gait training, and education. He demonstrates good-fair balance, strength/ROM WFL, and denies MATHIS on RA today. Of note, pt with initially low Bps which declined with supine>sit>stand; however, this improved after seated therex and standing. Pt's SpO2 remained >90% with ambulation on RA, however, when pt returned to sitting post walk, SpO2 dropped to 88% and 2L NC was replaced. RN aware. Pt expresses no concerns with returning home and performing his ADLs/mobility needs with his SPC- as demonstrated today. He reports he is fine \"when the fluid stays off...\" but always has difficulty when overloaded. Pt does not present with any acute skilled PT needs at this time. Recommend OOB to chair for all meals and walking in hallway TID with supervision for safety/line management.    Functional Outcome Measure:  The patient scored 22 on the [facility] outcome measure which is indicative of Cutoff score ?171,2,3 had higher odds of discharging home with home health or need of SNF/IPR..          Further skilled acute physical therapy is not indicated at this time.       PLAN :  Recommendation for discharge: (in order for the patient to meet his/her long term goals): No skilled physical therapy    Other factors to consider for discharge:  pt reports having support from his family at home    IF patient discharges home will need the following DME:  pt has a SPC for home use       SUBJECTIVE:   Patient

## 2024-06-30 LAB
ANION GAP SERPL CALC-SCNC: 5 MMOL/L (ref 5–15)
ANION GAP SERPL CALC-SCNC: 8 MMOL/L (ref 5–15)
BASOPHILS # BLD: 0 K/UL (ref 0–0.1)
BASOPHILS NFR BLD: 1 % (ref 0–1)
BUN SERPL-MCNC: 47 MG/DL (ref 6–20)
BUN SERPL-MCNC: 49 MG/DL (ref 6–20)
BUN/CREAT SERPL: 18 (ref 12–20)
BUN/CREAT SERPL: 20 (ref 12–20)
CALCIUM SERPL-MCNC: 8.4 MG/DL (ref 8.5–10.1)
CALCIUM SERPL-MCNC: 8.6 MG/DL (ref 8.5–10.1)
CHLORIDE SERPL-SCNC: 96 MMOL/L (ref 97–108)
CHLORIDE SERPL-SCNC: 97 MMOL/L (ref 97–108)
CO2 SERPL-SCNC: 34 MMOL/L (ref 21–32)
CO2 SERPL-SCNC: 35 MMOL/L (ref 21–32)
CREAT SERPL-MCNC: 2.44 MG/DL (ref 0.7–1.3)
CREAT SERPL-MCNC: 2.56 MG/DL (ref 0.7–1.3)
DIFFERENTIAL METHOD BLD: ABNORMAL
EOSINOPHIL # BLD: 0.2 K/UL (ref 0–0.4)
EOSINOPHIL NFR BLD: 3 % (ref 0–7)
ERYTHROCYTE [DISTWIDTH] IN BLOOD BY AUTOMATED COUNT: 19 % (ref 11.5–14.5)
GLUCOSE SERPL-MCNC: 109 MG/DL (ref 65–100)
GLUCOSE SERPL-MCNC: 133 MG/DL (ref 65–100)
HCT VFR BLD AUTO: 27 % (ref 36.6–50.3)
HGB BLD-MCNC: 9.1 G/DL (ref 12.1–17)
IMM GRANULOCYTES # BLD AUTO: 0 K/UL (ref 0–0.04)
IMM GRANULOCYTES NFR BLD AUTO: 0 % (ref 0–0.5)
LYMPHOCYTES # BLD: 1.6 K/UL (ref 0.8–3.5)
LYMPHOCYTES NFR BLD: 30 % (ref 12–49)
MAGNESIUM SERPL-MCNC: 2.1 MG/DL (ref 1.6–2.4)
MCH RBC QN AUTO: 27.1 PG (ref 26–34)
MCHC RBC AUTO-ENTMCNC: 33.7 G/DL (ref 30–36.5)
MCV RBC AUTO: 80.4 FL (ref 80–99)
MONOCYTES # BLD: 0.4 K/UL (ref 0–1)
MONOCYTES NFR BLD: 8 % (ref 5–13)
NEUTS SEG # BLD: 3 K/UL (ref 1.8–8)
NEUTS SEG NFR BLD: 58 % (ref 32–75)
NRBC # BLD: 0 K/UL (ref 0–0.01)
NRBC BLD-RTO: 0 PER 100 WBC
NT PRO BNP: 6337 PG/ML
PLATELET # BLD AUTO: 214 K/UL (ref 150–400)
PMV BLD AUTO: 9.9 FL (ref 8.9–12.9)
POTASSIUM SERPL-SCNC: 2.6 MMOL/L (ref 3.5–5.1)
POTASSIUM SERPL-SCNC: 3 MMOL/L (ref 3.5–5.1)
RBC # BLD AUTO: 3.36 M/UL (ref 4.1–5.7)
SODIUM SERPL-SCNC: 137 MMOL/L (ref 136–145)
SODIUM SERPL-SCNC: 138 MMOL/L (ref 136–145)
WBC # BLD AUTO: 5.2 K/UL (ref 4.1–11.1)

## 2024-06-30 PROCEDURE — 6370000000 HC RX 637 (ALT 250 FOR IP): Performed by: STUDENT IN AN ORGANIZED HEALTH CARE EDUCATION/TRAINING PROGRAM

## 2024-06-30 PROCEDURE — 99232 SBSQ HOSP IP/OBS MODERATE 35: CPT | Performed by: NURSE PRACTITIONER

## 2024-06-30 PROCEDURE — 6360000002 HC RX W HCPCS: Performed by: STUDENT IN AN ORGANIZED HEALTH CARE EDUCATION/TRAINING PROGRAM

## 2024-06-30 PROCEDURE — 80048 BASIC METABOLIC PNL TOTAL CA: CPT

## 2024-06-30 PROCEDURE — 6360000002 HC RX W HCPCS: Performed by: NURSE PRACTITIONER

## 2024-06-30 PROCEDURE — 85025 COMPLETE CBC W/AUTO DIFF WBC: CPT

## 2024-06-30 PROCEDURE — 2580000003 HC RX 258: Performed by: INTERNAL MEDICINE

## 2024-06-30 PROCEDURE — 6360000002 HC RX W HCPCS: Performed by: INTERNAL MEDICINE

## 2024-06-30 PROCEDURE — 83880 ASSAY OF NATRIURETIC PEPTIDE: CPT

## 2024-06-30 PROCEDURE — 2060000000 HC ICU INTERMEDIATE R&B

## 2024-06-30 PROCEDURE — 83735 ASSAY OF MAGNESIUM: CPT

## 2024-06-30 PROCEDURE — 94760 N-INVAS EAR/PLS OXIMETRY 1: CPT

## 2024-06-30 PROCEDURE — 6370000000 HC RX 637 (ALT 250 FOR IP): Performed by: NURSE PRACTITIONER

## 2024-06-30 PROCEDURE — 2580000003 HC RX 258: Performed by: STUDENT IN AN ORGANIZED HEALTH CARE EDUCATION/TRAINING PROGRAM

## 2024-06-30 PROCEDURE — 36415 COLL VENOUS BLD VENIPUNCTURE: CPT

## 2024-06-30 RX ORDER — POTASSIUM CHLORIDE 7.45 MG/ML
10 INJECTION INTRAVENOUS
Status: COMPLETED | OUTPATIENT
Start: 2024-06-30 | End: 2024-06-30

## 2024-06-30 RX ORDER — POTASSIUM CHLORIDE 750 MG/1
40 TABLET, FILM COATED, EXTENDED RELEASE ORAL 3 TIMES DAILY
Status: DISCONTINUED | OUTPATIENT
Start: 2024-06-30 | End: 2024-07-01

## 2024-06-30 RX ORDER — BUMETANIDE 1 MG/1
3 TABLET ORAL 2 TIMES DAILY
Status: DISCONTINUED | OUTPATIENT
Start: 2024-06-30 | End: 2024-07-02 | Stop reason: HOSPADM

## 2024-06-30 RX ORDER — POTASSIUM CHLORIDE 29.8 MG/ML
20 INJECTION INTRAVENOUS ONCE
Status: COMPLETED | OUTPATIENT
Start: 2024-06-30 | End: 2024-06-30

## 2024-06-30 RX ADMIN — BUMETANIDE 3 MG: 1 TABLET ORAL at 17:45

## 2024-06-30 RX ADMIN — TAMSULOSIN HYDROCHLORIDE 0.8 MG: 0.4 CAPSULE ORAL at 08:46

## 2024-06-30 RX ADMIN — AMIODARONE HYDROCHLORIDE 100 MG: 200 TABLET ORAL at 08:45

## 2024-06-30 RX ADMIN — DOBUTAMINE HYDROCHLORIDE 4 MCG/KG/MIN: 200 INJECTION INTRAVENOUS at 21:58

## 2024-06-30 RX ADMIN — POTASSIUM CHLORIDE 40 MEQ: 750 TABLET, FILM COATED, EXTENDED RELEASE ORAL at 21:33

## 2024-06-30 RX ADMIN — POTASSIUM CHLORIDE 10 MEQ: 7.46 INJECTION, SOLUTION INTRAVENOUS at 11:53

## 2024-06-30 RX ADMIN — POTASSIUM CHLORIDE 40 MEQ: 750 TABLET, FILM COATED, EXTENDED RELEASE ORAL at 09:50

## 2024-06-30 RX ADMIN — ALLOPURINOL 50 MG: 100 TABLET ORAL at 08:47

## 2024-06-30 RX ADMIN — ACETAZOLAMIDE 125 MG: 250 TABLET ORAL at 08:46

## 2024-06-30 RX ADMIN — LEVOTHYROXINE SODIUM 88 MCG: 88 TABLET ORAL at 08:46

## 2024-06-30 RX ADMIN — AMPICILLIN SODIUM 2000 MG: 2 INJECTION, POWDER, FOR SOLUTION INTRAVENOUS at 21:32

## 2024-06-30 RX ADMIN — POTASSIUM CHLORIDE 10 MEQ: 7.46 INJECTION, SOLUTION INTRAVENOUS at 12:56

## 2024-06-30 RX ADMIN — POTASSIUM CHLORIDE 10 MEQ: 7.46 INJECTION, SOLUTION INTRAVENOUS at 09:50

## 2024-06-30 RX ADMIN — BUMETANIDE 3 MG: 0.25 INJECTION INTRAMUSCULAR; INTRAVENOUS at 08:48

## 2024-06-30 RX ADMIN — Medication 10 ML: at 08:45

## 2024-06-30 RX ADMIN — AMPICILLIN SODIUM 2000 MG: 2 INJECTION, POWDER, FOR SOLUTION INTRAVENOUS at 05:34

## 2024-06-30 RX ADMIN — Medication 10 ML: at 21:33

## 2024-06-30 RX ADMIN — APIXABAN 5 MG: 5 TABLET, FILM COATED ORAL at 21:32

## 2024-06-30 RX ADMIN — POTASSIUM CHLORIDE 40 MEQ: 750 TABLET, FILM COATED, EXTENDED RELEASE ORAL at 14:30

## 2024-06-30 RX ADMIN — POTASSIUM CHLORIDE 10 MEQ: 7.46 INJECTION, SOLUTION INTRAVENOUS at 10:50

## 2024-06-30 RX ADMIN — ACETAZOLAMIDE 125 MG: 250 TABLET ORAL at 21:33

## 2024-06-30 RX ADMIN — AMPICILLIN SODIUM 2000 MG: 2 INJECTION, POWDER, FOR SOLUTION INTRAVENOUS at 14:30

## 2024-06-30 RX ADMIN — SODIUM CHLORIDE: 900 INJECTION INTRAVENOUS at 21:31

## 2024-06-30 RX ADMIN — POTASSIUM CHLORIDE 40 MEQ: 750 TABLET, FILM COATED, EXTENDED RELEASE ORAL at 05:33

## 2024-06-30 RX ADMIN — APIXABAN 5 MG: 5 TABLET, FILM COATED ORAL at 08:46

## 2024-06-30 RX ADMIN — POTASSIUM CHLORIDE 20 MEQ: 400 INJECTION, SOLUTION INTRAVENOUS at 19:21

## 2024-06-30 ASSESSMENT — PAIN SCALES - GENERAL
PAINLEVEL_OUTOF10: 0
PAINLEVEL_OUTOF10: 0

## 2024-06-30 NOTE — PROGRESS NOTES
ADVANCED HEART FAILURE CENTER  [facility] in [place], [place]  Inpatient Progress Note      Patient name: Ingrid Kennedy  Patient : 1950  Patient MRN: [id number]  Consulting MD: Kaitlynn Burkett MD  Date of service: 24    REASON FOR REFERRAL:  Acute on chronic systolic heart failure    INTERVAL EVENTS  No acute events overnight  Net neg 3.7L , weight 163-->160  Potassium low at 2.6, BNP down trending, cr stable  No complaints, feels ok    ASSESSMENT:  Ingrid Kennedy is a 73 y.o. male admitted with decompensated heart failure. History of non ischemic cardiomyopathy, stage D heart failure, on palliative inotrope therapy with Dobutamine 4 mcg/kg/min.   Not a candidate for LVAD. Formally evaluated by Advanced Heart Failure team and declined for LVAD implant due to renal dysfunction (GFR<30), RV dysfunction, and neurocognitive dysfunction.     RECOMMENDATIONS:  Medical Therapy for Heart Failure  Continue Dobutamine 4 mcg/kg/min  Check NICOM  Beta-blocker: contraindicated with Dobutamine  ACEi/ARB/ARNI: Unable to tolerate due to renal dysfunction and hypotension  Hydralazine/Nitrate: Unable to tolerate due to hypotension  MRA: Unable to tolerate due to renal dysfunction  SGLT2i: Discontinued due to UTI with resulting bacteremia and septic shock. High risk to restart  Continue Midodrine 10 mg TID for hypotension     Volume Management  Target weight 155 lbs  Continue Diamox 125 mg BID  Transition Bumex to 3 mg IV BID to 3 mg PO BID  Discontinued metolazone, will use PRN if needed  Keep K>4 and Mg>2  Given K repletions, repeat BMP mid day  Fluid restriction to 2000 cc daily, strict I/Os, daily standing weight  Allopurinol 50 mg daily    Recent Bacteremia:  Patient is being managed with Ampicillin through  for Proteus and Enterobacter bacteremia    Anticoagulation and Antiplatelet Therapy  Anticoagulation with Apixaban for A-fib     Antiarrhythmic and Device Therapy  Continue amiodarone 100 mg  Smoking status: Never    Smokeless tobacco: Never   Vaping Use    Vaping Use: Never used   Substance and Sexual Activity    Alcohol use: Never    Drug use: Never    Sexual activity: Not Currently     Partners: Female     Social Determinants of Health     Financial Resource Strain: Low Risk  (12/6/2023)    Overall Financial Resource Strain (CARDIA)     Difficulty of Paying Living Expenses: Not hard at all   Food Insecurity: No Food Insecurity (6/27/2024)    Hunger Vital Sign     Worried About Running Out of Food in the Last Year: Never true     Ran Out of Food in the Last Year: Never true   Transportation Needs: No Transportation Needs (6/27/2024)    PRAPARE - Transportation     Lack of Transportation (Medical): No     Lack of Transportation (Non-Medical): No   Physical Activity: Insufficiently Active (1/24/2024)    Exercise Vital Sign     Days of Exercise per Week: 5 days     Minutes of Exercise per Session: 20 min    Social Connections ([facility] HRSN)   Housing Stability: Low Risk  (6/27/2024)    Housing Stability Vital Sign     Unable to Pay for Housing in the Last Year: No     Number of Places Lived in the Last Year: 1     Unstable Housing in the Last Year: No       LABORATORY RESULTS:   Failed to redirect to the Timeline version of the In[employer] SmartLink.  No results found for: \"TSH\", \"TSH2\", \"TSH3\", \"TSHELE\"    ALLERGY:  No Known Allergies     CURRENT MEDICATIONS:    Current Facility-Administered Medications:     potassium chloride (KLOR-CON) extended release tablet 40 mEq, 40 mEq, Oral, TID, Kaitlynn Burkett MD, 40 mEq at 06/30/24 0950    bumetanide (BUMEX) tablet 3 mg, 3 mg, Oral, BID, Marly Ku, APRN - NP    ampicillin (OMNIPEN) 2,000 mg in sodium chloride 0.9 % 100 mL IVPB (mini-bag), 2,000 mg, IntraVENous, 3 times per day, Nuria Major MD, Stopped at 06/30/24 0718    acetaZOLAMIDE (DIAMOX) tablet 125 mg, 125 mg, Oral, BID, Lesa Castro MD, 125 mg at 06/30/24 0846    allopurinol (ZYLOPRIM)  [address], [address]  Phone: [phone]    I have spent a total time of 35 minutes personally reviewing new vitals, test results, notes, telemetry/EKG, face to face encounter/physical exam of patient, writing orders, performing medical decision making, and patient education.

## 2024-06-30 NOTE — PROGRESS NOTES
Pertinent items are noted in HPI.         Vital Signs:    Last 24hrs VS reviewed since prior progress note. Most recent are:  BP (!) 104/54   Pulse 78   Temp 98.2 °F (36.8 °C) (Oral)   Resp 18   Ht 1.778 m (5' 10\")   Wt 72.8 kg (160 lb 7.9 oz)   SpO2 96%   BMI 23.03 kg/m²        Intake/Output Summary (Last 24 hours) at 6/30/2024 1245  Last data filed at 6/30/2024 1153  Gross per 24 hour   Intake 2548.65 ml   Output 5850 ml   Net -3301.35 ml          Physical Examination:     I had a face to face encounter with this patient and independently examined them on 6/30/2024 as outlined below:          General : alert x 3, awake, no acute distress,   HEENT: PEERL, EOMI, moist mucus membrane  Neck: supple, no JVD, no meningeal signs  Chest: Clear to auscultation bilaterally   CVS: S1 S2 heard, Capillary refill less than 2 seconds  Abd: soft/ non tender, non distended, BS physiological,   Ext: no clubbing, no cyanosis, + edema, brisk 2+ DP pulses  Neuro/Psych: pleasant mood and affect, CN 2-12 grossly intact, sensory grossly within normal limit, Strength 5/5 in all extremities  Skin: warm            Data Review:    Review and/or order of clinical lab test  Review and/or order of tests in the radiology section of CPT  Review and/or order of tests in the medicine section of CPT    I have independently reviewed and interpreted patient's lab and all other diagnostic data    Notes reviewed from all clinical/nonclinical/nursing services involved in patient's clinical care. Care coordination discussions were held with appropriate clinical/nonclinical/ nursing providers based on care coordination needs.     Labs:     Recent Labs     06/29/24  0402 06/30/24  0533   WBC 5.4 5.2   HGB 9.2* 9.1*   HCT 26.9* 27.0*    214       Recent Labs     06/28/24  0051 06/29/24  0402 06/29/24  1331 06/30/24  0533   * 136 137 138   K 3.1* 2.6* 3.0* 2.6*   CL 98 97 97 96*   CO2 28 32 34* 34*   BUN 48* 52* 45* 49*   MG 2.2 2.0  --   2.1       Recent Labs     06/27/24  1532   ALT 22   GLOB 4.2*       No results for input(s): \"INR\", \"APTT\" in the last 72 hours.    Invalid input(s): \"PTP\"   No results for input(s): \"TIBC\" in the last 72 hours.    Invalid input(s): \"FE\", \"PSAT\", \"FERR\"   No results found for: \"RBCF\"   No results for input(s): \"PH\", \"PCO2\", \"PO2\" in the last 72 hours.  No results for input(s): \"CPK\" in the last 72 hours.    Invalid input(s): \"CPKMB\", \"CKNDX\", \"TROIQ\"  Lab Results   Component Value Date/Time    CHOL 124 04/15/2024 11:46 AM    HDL 60 04/15/2024 11:46 AM    LDL 54 04/15/2024 11:46 AM     No results found for: \"GLUCPOC\"        Medications Reviewed:     Current Facility-Administered Medications   Medication Dose Route Frequency    potassium chloride (KLOR-CON) extended release tablet 40 mEq  40 mEq Oral TID    potassium chloride 10 mEq/100 mL IVPB (Peripheral Line)  10 mEq IntraVENous Q1H    bumetanide (BUMEX) injection 3 mg  3 mg IntraVENous BID    ampicillin (OMNIPEN) 2,000 mg in sodium chloride 0.9 % 100 mL IVPB (mini-bag)  2,000 mg IntraVENous 3 times per day    acetaZOLAMIDE (DIAMOX) tablet 125 mg  125 mg Oral BID    allopurinol (ZYLOPRIM) tablet 50 mg  50 mg Oral Daily    amiodarone (CORDARONE) tablet 100 mg  100 mg Oral Daily    apixaban (ELIQUIS) tablet 5 mg  5 mg Oral BID    levothyroxine (SYNTHROID) tablet 88 mcg  88 mcg Oral Daily    tamsulosin (FLOMAX) capsule 0.8 mg  0.8 mg Oral Daily    sodium chloride flush 0.9 % injection 5-40 mL  5-40 mL IntraVENous 2 times per day    sodium chloride flush 0.9 % injection 5-40 mL  5-40 mL IntraVENous PRN    0.9 % sodium chloride infusion   IntraVENous PRN    ondansetron (ZOFRAN-ODT) disintegrating tablet 4 mg  4 mg Oral Q8H PRN    Or    ondansetron (ZOFRAN) injection 4 mg  4 mg IntraVENous Q6H PRN    polyethylene glycol (GLYCOLAX) packet 17 g  17 g Oral Daily PRN    acetaminophen (TYLENOL) tablet 650 mg  650 mg Oral Q6H PRN    Or    acetaminophen (TYLENOL) suppository  650 mg  650 mg Rectal Q6H PRN    DOBUTamine (DOBUTREX) 500 mg in dextrose 5 % 250 mL infusion  4 mcg/kg/min IntraVENous Continuous     ______________________________________________________________________  EXPECTED LENGTH OF STAY: Unable to retrieve estimated LOS  ACTUAL LENGTH OF STAY:          3                 Kaitlynn Burkett MD

## 2024-06-30 NOTE — PROGRESS NOTES
2141: Blood Pressure 97/42. Daly Grover, notified. Orders received for albumin and to hold evening dose of Diamox.

## 2024-06-30 NOTE — PROGRESS NOTES
0800: Dr Burkett notified for K 2.6    1615: Repeat K 3.0, Dr Burkett notified.   [FreeTextEntry1] : Labs 3/8/24 chol 137 HDL 58 LDL 58 tri 126 glucose 104 BUN/cr 17/.072 Hba1c 7% H/H 11.9/36.4 iron 46 vitamin B12 346 folate 9 platelet 548 magnesium 1.7 TSH 0.58 amylase 38 lipase 18 LFTs normal celiac screen negative

## 2024-07-01 LAB
ANION GAP SERPL CALC-SCNC: 7 MMOL/L (ref 5–15)
BASOPHILS # BLD: 0 K/UL (ref 0–0.1)
BASOPHILS NFR BLD: 1 % (ref 0–1)
BUN SERPL-MCNC: 54 MG/DL (ref 6–20)
BUN/CREAT SERPL: 23 (ref 12–20)
CALCIUM SERPL-MCNC: 8.8 MG/DL (ref 8.5–10.1)
CHLORIDE SERPL-SCNC: 99 MMOL/L (ref 97–108)
CO2 SERPL-SCNC: 33 MMOL/L (ref 21–32)
CREAT SERPL-MCNC: 2.37 MG/DL (ref 0.7–1.3)
DIFFERENTIAL METHOD BLD: ABNORMAL
EOSINOPHIL # BLD: 0.2 K/UL (ref 0–0.4)
EOSINOPHIL NFR BLD: 4 % (ref 0–7)
ERYTHROCYTE [DISTWIDTH] IN BLOOD BY AUTOMATED COUNT: 19.2 % (ref 11.5–14.5)
GLUCOSE SERPL-MCNC: 91 MG/DL (ref 65–100)
HCT VFR BLD AUTO: 28.5 % (ref 36.6–50.3)
HGB BLD-MCNC: 9.4 G/DL (ref 12.1–17)
IMM GRANULOCYTES # BLD AUTO: 0 K/UL (ref 0–0.04)
IMM GRANULOCYTES NFR BLD AUTO: 0 % (ref 0–0.5)
LYMPHOCYTES # BLD: 1.6 K/UL (ref 0.8–3.5)
LYMPHOCYTES NFR BLD: 31 % (ref 12–49)
MAGNESIUM SERPL-MCNC: 2.1 MG/DL (ref 1.6–2.4)
MCH RBC QN AUTO: 27.1 PG (ref 26–34)
MCHC RBC AUTO-ENTMCNC: 33 G/DL (ref 30–36.5)
MCV RBC AUTO: 82.1 FL (ref 80–99)
MONOCYTES # BLD: 0.4 K/UL (ref 0–1)
MONOCYTES NFR BLD: 8 % (ref 5–13)
NEUTS SEG # BLD: 3 K/UL (ref 1.8–8)
NEUTS SEG NFR BLD: 56 % (ref 32–75)
NRBC # BLD: 0 K/UL (ref 0–0.01)
NRBC BLD-RTO: 0 PER 100 WBC
NT PRO BNP: 6008 PG/ML
PLATELET # BLD AUTO: 234 K/UL (ref 150–400)
PMV BLD AUTO: 10.3 FL (ref 8.9–12.9)
POTASSIUM SERPL-SCNC: 2.9 MMOL/L (ref 3.5–5.1)
RBC # BLD AUTO: 3.47 M/UL (ref 4.1–5.7)
SODIUM SERPL-SCNC: 139 MMOL/L (ref 136–145)
WBC # BLD AUTO: 5.3 K/UL (ref 4.1–11.1)

## 2024-07-01 PROCEDURE — 2700000000 HC OXYGEN THERAPY PER DAY

## 2024-07-01 PROCEDURE — 36415 COLL VENOUS BLD VENIPUNCTURE: CPT

## 2024-07-01 PROCEDURE — APPNB45 APP NON BILLABLE 31-45 MINUTES: Performed by: NURSE PRACTITIONER

## 2024-07-01 PROCEDURE — P9045 ALBUMIN (HUMAN), 5%, 250 ML: HCPCS | Performed by: NURSE PRACTITIONER

## 2024-07-01 PROCEDURE — 85025 COMPLETE CBC W/AUTO DIFF WBC: CPT

## 2024-07-01 PROCEDURE — 6370000000 HC RX 637 (ALT 250 FOR IP): Performed by: NURSE PRACTITIONER

## 2024-07-01 PROCEDURE — 6360000002 HC RX W HCPCS: Performed by: NURSE PRACTITIONER

## 2024-07-01 PROCEDURE — 99233 SBSQ HOSP IP/OBS HIGH 50: CPT | Performed by: INTERNAL MEDICINE

## 2024-07-01 PROCEDURE — 2580000003 HC RX 258: Performed by: INTERNAL MEDICINE

## 2024-07-01 PROCEDURE — 2580000003 HC RX 258: Performed by: STUDENT IN AN ORGANIZED HEALTH CARE EDUCATION/TRAINING PROGRAM

## 2024-07-01 PROCEDURE — 6370000000 HC RX 637 (ALT 250 FOR IP): Performed by: STUDENT IN AN ORGANIZED HEALTH CARE EDUCATION/TRAINING PROGRAM

## 2024-07-01 PROCEDURE — 6360000002 HC RX W HCPCS: Performed by: STUDENT IN AN ORGANIZED HEALTH CARE EDUCATION/TRAINING PROGRAM

## 2024-07-01 PROCEDURE — 2060000000 HC ICU INTERMEDIATE R&B

## 2024-07-01 PROCEDURE — 80048 BASIC METABOLIC PNL TOTAL CA: CPT

## 2024-07-01 PROCEDURE — 83735 ASSAY OF MAGNESIUM: CPT

## 2024-07-01 PROCEDURE — 6360000002 HC RX W HCPCS: Performed by: INTERNAL MEDICINE

## 2024-07-01 PROCEDURE — 83880 ASSAY OF NATRIURETIC PEPTIDE: CPT

## 2024-07-01 RX ORDER — POTASSIUM CHLORIDE 29.8 MG/ML
20 INJECTION INTRAVENOUS
Status: COMPLETED | OUTPATIENT
Start: 2024-07-01 | End: 2024-07-01

## 2024-07-01 RX ORDER — ALBUMIN, HUMAN INJ 5% 5 %
12.5 SOLUTION INTRAVENOUS ONCE
Status: COMPLETED | OUTPATIENT
Start: 2024-07-01 | End: 2024-07-01

## 2024-07-01 RX ORDER — POTASSIUM CHLORIDE 750 MG/1
60 TABLET, FILM COATED, EXTENDED RELEASE ORAL 3 TIMES DAILY
Status: DISCONTINUED | OUTPATIENT
Start: 2024-07-01 | End: 2024-07-02 | Stop reason: HOSPADM

## 2024-07-01 RX ADMIN — DOBUTAMINE HYDROCHLORIDE 4 MCG/KG/MIN: 200 INJECTION INTRAVENOUS at 22:48

## 2024-07-01 RX ADMIN — POTASSIUM CHLORIDE 20 MEQ: 400 INJECTION, SOLUTION INTRAVENOUS at 10:20

## 2024-07-01 RX ADMIN — TAMSULOSIN HYDROCHLORIDE 0.8 MG: 0.4 CAPSULE ORAL at 08:39

## 2024-07-01 RX ADMIN — Medication 10 ML: at 08:42

## 2024-07-01 RX ADMIN — AMPICILLIN SODIUM 2000 MG: 2 INJECTION, POWDER, FOR SOLUTION INTRAVENOUS at 05:32

## 2024-07-01 RX ADMIN — SODIUM CHLORIDE: 900 INJECTION INTRAVENOUS at 05:31

## 2024-07-01 RX ADMIN — POTASSIUM CHLORIDE 20 MEQ: 400 INJECTION, SOLUTION INTRAVENOUS at 08:44

## 2024-07-01 RX ADMIN — APIXABAN 5 MG: 5 TABLET, FILM COATED ORAL at 08:42

## 2024-07-01 RX ADMIN — Medication 10 ML: at 20:24

## 2024-07-01 RX ADMIN — POTASSIUM CHLORIDE 60 MEQ: 750 TABLET, FILM COATED, EXTENDED RELEASE ORAL at 20:24

## 2024-07-01 RX ADMIN — ALBUMIN (HUMAN) 12.5 G: 12.5 INJECTION, SOLUTION INTRAVENOUS at 12:06

## 2024-07-01 RX ADMIN — AMPICILLIN SODIUM 2000 MG: 2 INJECTION, POWDER, FOR SOLUTION INTRAVENOUS at 14:48

## 2024-07-01 RX ADMIN — ACETAZOLAMIDE 125 MG: 250 TABLET ORAL at 20:23

## 2024-07-01 RX ADMIN — ACETAZOLAMIDE 125 MG: 250 TABLET ORAL at 08:39

## 2024-07-01 RX ADMIN — BUMETANIDE 3 MG: 1 TABLET ORAL at 08:39

## 2024-07-01 RX ADMIN — AMPICILLIN SODIUM 2000 MG: 2 INJECTION, POWDER, FOR SOLUTION INTRAVENOUS at 21:56

## 2024-07-01 RX ADMIN — LEVOTHYROXINE SODIUM 88 MCG: 88 TABLET ORAL at 08:39

## 2024-07-01 RX ADMIN — POTASSIUM CHLORIDE 40 MEQ: 750 TABLET, FILM COATED, EXTENDED RELEASE ORAL at 08:38

## 2024-07-01 RX ADMIN — APIXABAN 5 MG: 5 TABLET, FILM COATED ORAL at 20:23

## 2024-07-01 RX ADMIN — ALLOPURINOL 50 MG: 100 TABLET ORAL at 08:39

## 2024-07-01 RX ADMIN — POTASSIUM CHLORIDE 60 MEQ: 750 TABLET, FILM COATED, EXTENDED RELEASE ORAL at 14:45

## 2024-07-01 RX ADMIN — AMIODARONE HYDROCHLORIDE 100 MG: 200 TABLET ORAL at 08:42

## 2024-07-01 RX ADMIN — SODIUM CHLORIDE: 900 INJECTION INTRAVENOUS at 21:55

## 2024-07-01 ASSESSMENT — PAIN SCALES - GENERAL
PAINLEVEL_OUTOF10: 0

## 2024-07-01 ASSESSMENT — ENCOUNTER SYMPTOMS
ABDOMINAL DISTENTION: 0
SHORTNESS OF BREATH: 0
COUGH: 0
NAUSEA: 0
VOMITING: 0

## 2024-07-01 NOTE — PROGRESS NOTES
0755: Pt potassium 2.9, scheduled PO potassium ordered; however, bumex scheduled aswell. Notified MD Burkett, orders received for IV potassium 20 mEq x2 doses, RN to enter orders.      1149: Pt hypotensive, SBP 80s & MAPS 40s-50s. Pt denies any dizziness, dyspnea or fatigue. -903 output already this AM. Notified Iwona NP with [facility].    1153: Received verbal orders from NP Iwona for 1 big bottle albumin & To hold next bumex dose. RN to enter orders, see MAR for medication details.    1206: Albumin administered per order. /48        Pt reports taking bumex as prescribed; however, reports that he does not follow fluid restriction \"occasionally\" and drinks a lot more then he should.    Care plan:    Problem: Pain  Goal: Verbalizes/displays adequate comfort level or baseline comfort level  7/1/2024 1208 by Cindy Means, RN  Outcome: Progressing  Flowsheets (Taken 7/1/2024 1208)  Verbalizes/displays adequate comfort level or baseline comfort level:   Encourage patient to monitor pain and request assistance   Assess pain using appropriate pain scale   Administer analgesics based on type and severity of pain and evaluate response  6/30/2024 2209 by Mahanes, Ryleigh  Flowsheets (Taken 6/28/2024 2030 by Clifford Jaquez, RN)  Verbalizes/displays adequate comfort level or baseline comfort level: Encourage patient to monitor pain and request assistance     Problem: Discharge Planning  Goal: Discharge to home or other facility with appropriate resources  7/1/2024 1208 by Cindy Means RN  Outcome: Progressing  Flowsheets (Taken 7/1/2024 1208)  Discharge to home or other facility with appropriate resources:   Identify barriers to discharge with patient and caregiver   Arrange for needed discharge resources and transportation as appropriate   Identify discharge learning needs (meds, wound care, etc)  6/30/2024 2209 by Mahanes, Ryleigh  Flowsheets (Taken 6/30/2024 0805 by Elma Major, RN)  Discharge to home or other

## 2024-07-01 NOTE — PROGRESS NOTES
Spiritual Care Assessment/Progress Note  [facility]    Name: Ingrid Kennedy MRN: [id number]    Age: 73 y.o.     Sex: male   Language: English     Date: 7/1/2024            Total Time Calculated: 25 min              Spiritual Assessment begun in [facility] CV SERVICES UNIT  Service Provided For: Patient  Referral/Consult From: Rounding  Encounter Overview/Reason: Attempted Encounter    Spiritual beliefs:      [] Involved in a laila tradition/spiritual practice:      [] Supported by a laila community:      [] Claims no spiritual orientation:      [] Seeking spiritual identity:           [] Adheres to an individual form of spirituality:      [x] Not able to assess:                Identified resources for coping and support system:   Support System: Unknown       [] Prayer                  [] Devotional reading               [] Music                  [] Guided Imagery     [] Pet visits                                        [] Other: (COMMENT)     Specific area/focus of visit   Encounter:    Crisis:    Spiritual/Emotional needs:    Ritual, Rites and Sacraments:    Grief, Loss, and Adjustments:    Ethics/Mediation:    Behavioral Health:    Palliative Care:    Advance Care Planning:           Narrative:   Referral source:   Ingrid Kennedy at [facility] in [facility] CV SERVICES UNIT. I reviewed the medical record prior to this encounter.    Spiritual Assessment:     Patient appeared to be sleeping. No family present. Provided ministry of presence and silent prayer.    Plan of Care: Please contact Spiritual Health Care Services for future services.     Visited by: Chaplain Michelle Bautista Mdiv, [facility]   paging Service [phone])

## 2024-07-01 NOTE — PROGRESS NOTES
hospitalizations since August 2023. He denies history of bacteremia with hardware infection. He has been managed with Cipro and Rifampin. He is followed by orthopedics in [place] and reports good infection control on antibiotic suppression.      He was seen in September 2023 at the [facility] in [place], [place], referred from hospitalization in [place]. He had a Medtronic Dual Chamber ICD implanted 9/29/2023. He continued to be hospitalized with heart failure. His most recent hospitalization was in [place] on 11/4/2023. He required inotrope therapy with Milrinone and IV diuresis. He had AMITA on CKD with creatinine up to 2.91 which improved to 1.68 with Milrinone. LVAD was discussed during hospitalization and he was scheduled for visit in December 2023 back at [facility] in [place] for CardioMEMS implant and LVAD evaluation.      He has since relocated to [place] to live with close family friends which he refers to has his adopted daughter and her family, in total 5 adults. He has no family in [place] and was interested in LVAD evaluation near a support system. He has been in [place] since 11/29/2023. We met the patient during a hospitalization for decompensated heart failure in December 2023. He was initiated on Milrinone and diuresed. We weaned Milrinone and RHC off inotrope showed elevated biventricular filling pressures and CI of 1.37. We performed LVAD evaluation during admission. He was not immediately an LVAD candidate due to renal dysfunction with creatinine >2. Plans were made for inotrope therapy as bridge to LVAD candidacy.      He was readmitted in January 2024 with Klebsiella and Enterobacter blood stream infection. Romie catheter was replaced. NISHA showed no vegetations. He completed 2 weeks of IV antibiotics.      Patient was seen in the [facility] for follow up 2/9/2024 and reported resting dyspnea, weight gain and edema. He was admitted from clinic with decompensated heart  MD Kaitlynn, Last Rate: 50 mL/hr at 07/01/24 0844, 20 mEq at 07/01/24 0844    potassium chloride (KLOR-CON) extended release tablet 40 mEq, 40 mEq, Oral, TID, Kaitlynn Burkett MD, 40 mEq at 07/01/24 0838    bumetanide (BUMEX) tablet 3 mg, 3 mg, Oral, BID, Marly Ku, APRN - NP, 3 mg at 07/01/24 0839    ampicillin (OMNIPEN) 2,000 mg in sodium chloride 0.9 % 100 mL IVPB (mini-bag), 2,000 mg, IntraVENous, 3 times per day, Nuria Major MD, Stopped at 07/01/24 0751    acetaZOLAMIDE (DIAMOX) tablet 125 mg, 125 mg, Oral, BID, Lesa Castro MD, 125 mg at 07/01/24 0839    allopurinol (ZYLOPRIM) tablet 50 mg, 50 mg, Oral, Daily, Lesa Castro MD, 50 mg at 07/01/24 0839    amiodarone (CORDARONE) tablet 100 mg, 100 mg, Oral, Daily, Lesa Castro MD, 100 mg at 07/01/24 0842    apixaban (ELIQUIS) tablet 5 mg, 5 mg, Oral, BID, eLsa Castor MD, 5 mg at 07/01/24 0842    levothyroxine (SYNTHROID) tablet 88 mcg, 88 mcg, Oral, Daily, Lesa Castro MD, 88 mcg at 07/01/24 0839    tamsulosin (FLOMAX) capsule 0.8 mg, 0.8 mg, Oral, Daily, Lesa Castro MD, 0.8 mg at 07/01/24 0839    sodium chloride flush 0.9 % injection 5-40 mL, 5-40 mL, IntraVENous, 2 times per day, Lesa Castro MD, 10 mL at 07/01/24 0842    sodium chloride flush 0.9 % injection 5-40 mL, 5-40 mL, IntraVENous, PRN, Lesa Casrto MD    0.9 % sodium chloride infusion, , IntraVENous, PRN, Lesa Castro MD, Stopped at 07/01/24 0532    ondansetron (ZOFRAN-ODT) disintegrating tablet 4 mg, 4 mg, Oral, Q8H PRN **OR** ondansetron (ZOFRAN) injection 4 mg, 4 mg, IntraVENous, Q6H PRN, Lesa Castro MD    polyethylene glycol (GLYCOLAX) packet 17 g, 17 g, Oral, Daily PRN, Lesa Castro MD    acetaminophen (TYLENOL) tablet 650 mg, 650 mg, Oral, Q6H PRN **OR** acetaminophen (TYLENOL) suppository 650 mg, 650 mg, Rectal, Q6H PRN, Lesa Castro MD    DOBUTamine (DOBUTREX) 500 mg in dextrose 5 % 250 mL infusion, 4  mcg/kg/min, IntraVENous, Continuous, Lesa Castro MD, Last Rate: 10.1 mL/hr at 07/01/24 0544, 4 mcg/kg/min at 07/01/24 0544    PATIENT CARE TEAM:  Patient Care Team:  Elisa Loyd MD as PCP - General (Internal Medicine)  Elisa Loyd MD as PCP - Empaneled Provider  Elmo Caballero MD as Consulting Physician (Infectious Diseases)  Shahnaz Lugo, RN as Care Transitions Nurse     Thank you for allowing me to participate in this patient's care.    MARYCRUZ Molina - NP   Advanced Heart Failure Center  [facility]  [address], [address]  Phone: [phone]

## 2024-07-01 NOTE — PLAN OF CARE
1930: Bedside and Verbal shift change report given to Ryleigh & Lexi RN  (oncoming nurse) by Elma RN (offgoing nurse). Report included the following information Nurse Handoff Report.     Problem: Pain  Goal: Verbalizes/displays adequate comfort level or baseline comfort level  6/30/2024 2209 by Mahanes, Ryleigh  Flowsheets (Taken 6/28/2024 2030 by Clifford Jaquez, RN)  Verbalizes/displays adequate comfort level or baseline comfort level: Encourage patient to monitor pain and request assistance  6/30/2024 1139 by Elma Major RN  Outcome: Progressing     Problem: Discharge Planning  Goal: Discharge to home or other facility with appropriate resources  6/30/2024 2209 by Mahanes, Ryleigh  Flowsheets (Taken 6/30/2024 0805 by Elma Major RN)  Discharge to home or other facility with appropriate resources: Identify barriers to discharge with patient and caregiver  6/30/2024 1139 by Elma Major RN  Outcome: Progressing  Flowsheets (Taken 6/30/2024 0805)  Discharge to home or other facility with appropriate resources: Identify barriers to discharge with patient and caregiver     Problem: Chronic Conditions and Co-morbidities  Goal: Patient's chronic conditions and co-morbidity symptoms are monitored and maintained or improved  6/30/2024 2209 by Mahanes, Ryleigh  Flowsheets (Taken 6/30/2024 0805 by Elma Major RN)  Care Plan - Patient's Chronic Conditions and Co-Morbidity Symptoms are Monitored and Maintained or Improved: Monitor and assess patient's chronic conditions and comorbid symptoms for stability, deterioration, or improvement  6/30/2024 1139 by Elma Major RN  Outcome: Progressing  Flowsheets (Taken 6/30/2024 0805)  Care Plan - Patient's Chronic Conditions and Co-Morbidity Symptoms are Monitored and Maintained or Improved: Monitor and assess patient's chronic conditions and comorbid symptoms for stability, deterioration, or improvement     Problem: Safety - Adult  Goal: Free from fall

## 2024-07-01 NOTE — PROGRESS NOTES
Referral source:   Ingrid Kennedy at [facility] in [facility] CV SERVICES UNIT.  attended rounds on the CV Services Unit as part of the Interdisciplinary team where the patient's ongoing care was discussed. I reviewed the medical record as part of this encounter.     Outcome: Interdisciplinary team are aware of  availability and were encouraged to request Spiritual Health support as needed.      The  on-call can be reached at ([phone]).     Rev. Michelle Bautista MDiv, [facility]  Staff

## 2024-07-01 NOTE — CARE COORDINATION
Transition of Care Plan:    RUR: 27%  Prior Level of Functioning: Open to [facility] and [facility] for dobutamin and IV ABX. Lives w a friend. Ind w cane. Family provides transport.  Disposition: [facility] and [facility]  Follow up appointments: [facility]  DME needed: PT-None, OT-assessing  Transportation at discharge: Family  IM/IMM Medicare/ letter given:   Caregiver Contact: FriendKhloe [phone]  Discharge Caregiver contacted prior to discharge?   Care Conference needed?   Barriers to discharge:  Medical    Anticipated d/c is 7/2.  CM sent NBOLE referral to [facility] for dobutamin and also sent HH order to [facility].  No change w IV ABX and [facility] will use previous ID plan.     Alyx Judge MSW CCM  Care Management

## 2024-07-02 VITALS
HEIGHT: 70 IN | RESPIRATION RATE: 19 BRPM | DIASTOLIC BLOOD PRESSURE: 63 MMHG | WEIGHT: 150.35 LBS | OXYGEN SATURATION: 94 % | TEMPERATURE: 97.9 F | SYSTOLIC BLOOD PRESSURE: 111 MMHG | BODY MASS INDEX: 21.53 KG/M2 | HEART RATE: 78 BPM

## 2024-07-02 LAB
ANION GAP SERPL CALC-SCNC: 6 MMOL/L (ref 5–15)
BUN SERPL-MCNC: 55 MG/DL (ref 6–20)
BUN/CREAT SERPL: 22 (ref 12–20)
CALCIUM SERPL-MCNC: 8.9 MG/DL (ref 8.5–10.1)
CHLORIDE SERPL-SCNC: 104 MMOL/L (ref 97–108)
CO2 SERPL-SCNC: 32 MMOL/L (ref 21–32)
CREAT SERPL-MCNC: 2.52 MG/DL (ref 0.7–1.3)
GLUCOSE SERPL-MCNC: 116 MG/DL (ref 65–100)
MAGNESIUM SERPL-MCNC: 2.2 MG/DL (ref 1.6–2.4)
NT PRO BNP: 6249 PG/ML
POTASSIUM SERPL-SCNC: 3.4 MMOL/L (ref 3.5–5.1)
SODIUM SERPL-SCNC: 142 MMOL/L (ref 136–145)

## 2024-07-02 PROCEDURE — APPNB45 APP NON BILLABLE 31-45 MINUTES: Performed by: NURSE PRACTITIONER

## 2024-07-02 PROCEDURE — 83880 ASSAY OF NATRIURETIC PEPTIDE: CPT

## 2024-07-02 PROCEDURE — 99232 SBSQ HOSP IP/OBS MODERATE 35: CPT | Performed by: INTERNAL MEDICINE

## 2024-07-02 PROCEDURE — 2580000003 HC RX 258: Performed by: STUDENT IN AN ORGANIZED HEALTH CARE EDUCATION/TRAINING PROGRAM

## 2024-07-02 PROCEDURE — 83735 ASSAY OF MAGNESIUM: CPT

## 2024-07-02 PROCEDURE — 36415 COLL VENOUS BLD VENIPUNCTURE: CPT

## 2024-07-02 PROCEDURE — 2580000003 HC RX 258: Performed by: INTERNAL MEDICINE

## 2024-07-02 PROCEDURE — 6370000000 HC RX 637 (ALT 250 FOR IP): Performed by: STUDENT IN AN ORGANIZED HEALTH CARE EDUCATION/TRAINING PROGRAM

## 2024-07-02 PROCEDURE — 6370000000 HC RX 637 (ALT 250 FOR IP): Performed by: NURSE PRACTITIONER

## 2024-07-02 PROCEDURE — 6360000002 HC RX W HCPCS: Performed by: INTERNAL MEDICINE

## 2024-07-02 PROCEDURE — 6360000002 HC RX W HCPCS: Performed by: NURSE PRACTITIONER

## 2024-07-02 PROCEDURE — 80048 BASIC METABOLIC PNL TOTAL CA: CPT

## 2024-07-02 RX ORDER — BUMETANIDE 1 MG/1
3 TABLET ORAL 2 TIMES DAILY
Qty: 180 TABLET | Refills: 0 | Status: SHIPPED | OUTPATIENT
Start: 2024-07-02 | End: 2024-07-05 | Stop reason: SDUPTHER

## 2024-07-02 RX ORDER — POTASSIUM CHLORIDE 29.8 MG/ML
20 INJECTION INTRAVENOUS
Status: COMPLETED | OUTPATIENT
Start: 2024-07-02 | End: 2024-07-02

## 2024-07-02 RX ORDER — POTASSIUM CHLORIDE 20 MEQ/1
40 TABLET, EXTENDED RELEASE ORAL 2 TIMES DAILY
Qty: 120 TABLET | Refills: 0 | Status: SHIPPED | OUTPATIENT
Start: 2024-07-02 | End: 2024-08-01

## 2024-07-02 RX ADMIN — POTASSIUM CHLORIDE 60 MEQ: 750 TABLET, FILM COATED, EXTENDED RELEASE ORAL at 09:03

## 2024-07-02 RX ADMIN — TAMSULOSIN HYDROCHLORIDE 0.8 MG: 0.4 CAPSULE ORAL at 09:03

## 2024-07-02 RX ADMIN — POTASSIUM CHLORIDE 60 MEQ: 750 TABLET, FILM COATED, EXTENDED RELEASE ORAL at 15:14

## 2024-07-02 RX ADMIN — APIXABAN 5 MG: 5 TABLET, FILM COATED ORAL at 09:03

## 2024-07-02 RX ADMIN — BUMETANIDE 3 MG: 1 TABLET ORAL at 09:02

## 2024-07-02 RX ADMIN — ACETAZOLAMIDE 125 MG: 250 TABLET ORAL at 09:03

## 2024-07-02 RX ADMIN — BUMETANIDE 3 MG: 1 TABLET ORAL at 18:02

## 2024-07-02 RX ADMIN — POTASSIUM CHLORIDE 20 MEQ: 29.8 INJECTION, SOLUTION INTRAVENOUS at 09:05

## 2024-07-02 RX ADMIN — ALLOPURINOL 50 MG: 100 TABLET ORAL at 09:03

## 2024-07-02 RX ADMIN — AMPICILLIN SODIUM 2000 MG: 2 INJECTION, POWDER, FOR SOLUTION INTRAVENOUS at 06:25

## 2024-07-02 RX ADMIN — AMIODARONE HYDROCHLORIDE 100 MG: 200 TABLET ORAL at 09:03

## 2024-07-02 RX ADMIN — SODIUM CHLORIDE: 900 INJECTION INTRAVENOUS at 06:24

## 2024-07-02 RX ADMIN — LEVOTHYROXINE SODIUM 88 MCG: 88 TABLET ORAL at 09:03

## 2024-07-02 RX ADMIN — AMPICILLIN SODIUM 2000 MG: 2 INJECTION, POWDER, FOR SOLUTION INTRAVENOUS at 15:15

## 2024-07-02 ASSESSMENT — PAIN SCALES - GENERAL
PAINLEVEL_OUTOF10: 0
PAINLEVEL_OUTOF10: 1

## 2024-07-02 ASSESSMENT — ENCOUNTER SYMPTOMS
SHORTNESS OF BREATH: 0
COUGH: 0
VOMITING: 0
NAUSEA: 0
ABDOMINAL DISTENTION: 0

## 2024-07-02 NOTE — PLAN OF CARE
1930: Bedside and Verbal shift change report given to Selina GUERRA & Ryleigh  (oncoming nurse) by Cindy GUERRA (offgoing nurse). Report included the following information Nurse Handoff Report.   Problem: Pain  Goal: Verbalizes/displays adequate comfort level or baseline comfort level  7/1/2024 2125 by Mahanes, Ryleigh  Flowsheets (Taken 7/1/2024 1208 by Cindy Means, RN)  Verbalizes/displays adequate comfort level or baseline comfort level:   Encourage patient to monitor pain and request assistance   Assess pain using appropriate pain scale   Administer analgesics based on type and severity of pain and evaluate response  7/1/2024 1208 by Cindy Means RN  Outcome: Progressing  Flowsheets (Taken 7/1/2024 1208)  Verbalizes/displays adequate comfort level or baseline comfort level:   Encourage patient to monitor pain and request assistance   Assess pain using appropriate pain scale   Administer analgesics based on type and severity of pain and evaluate response     Problem: Discharge Planning  Goal: Discharge to home or other facility with appropriate resources  7/1/2024 2125 by Mahanes, Ryleigh  Flowsheets (Taken 7/1/2024 1208 by Cindy Means, RN)  Discharge to home or other facility with appropriate resources:   Identify barriers to discharge with patient and caregiver   Arrange for needed discharge resources and transportation as appropriate   Identify discharge learning needs (meds, wound care, etc)  7/1/2024 1208 by Cindy Means RN  Outcome: Progressing  Flowsheets  Taken 7/1/2024 1208  Discharge to home or other facility with appropriate resources:   Identify barriers to discharge with patient and caregiver   Arrange for needed discharge resources and transportation as appropriate   Identify discharge learning needs (meds, wound care, etc)  Taken 7/1/2024 0750  Discharge to home or other facility with appropriate resources:   Identify barriers to discharge with patient and caregiver   Arrange for

## 2024-07-02 NOTE — PROGRESS NOTES
ADVANCED HEART FAILURE CENTER  [facility] in [place], [place]  Inpatient Progress Note      Patient name: Ingrid Kennedy  Patient : 1950  Patient MRN: [id number]  Consulting MD: Kaitlynn Burkett MD  Date of service: 24    REASON FOR REFERRAL:  Acute on chronic systolic heart failure    INTERVAL EVENTS  No acute events overnight  Net neg 500mL  VSS  Labs reviewed- K 3.4, creatinine stable,  PBNP stable, Hgb stable   Patient states he feels about the same today     ASSESSMENT:  Ingrid Kennedy is a 73 y.o. male admitted with decompensated heart failure. History of non ischemic cardiomyopathy, stage D heart failure, on palliative inotrope therapy with Dobutamine 4 mcg/kg/min.   Not a candidate for LVAD. Formally evaluated by Advanced Heart Failure team and declined for LVAD implant due to renal dysfunction (GFR<30), RV dysfunction, and neurocognitive dysfunction.     RECOMMENDATIONS:  Medical Therapy for Heart Failure  Continue Dobutamine 4 mcg/kg/min  Beta-blocker: contraindicated with Dobutamine  ACEi/ARB/ARNI: Unable to tolerate due to renal dysfunction and hypotension  Hydralazine/Nitrate: Unable to tolerate due to hypotension  MRA: Unable to tolerate due to renal dysfunction  SGLT2i: Discontinued due to UTI with resulting bacteremia and septic shock. High risk to restart  Continue Midodrine 10 mg TID for hypotension     Volume Management  Target weight 155 lbs- today 150lb  Continue Diamox 125 mg BID  Cont Bumex 3 mg PO BID  Discontinued metolazone, will use PRN if needed  Keep K>4 and Mg>2  Fluid restriction to 2000 cc daily, strict I/Os, daily standing weight  Allopurinol 50 mg daily    Recent Bacteremia:  Patient is being managed with Ampicillin through  for Proteus and Enterobacter bacteremia    Anticoagulation and Antiplatelet Therapy  Anticoagulation with Apixaban for A-fib     Antiarrhythmic and Device Therapy  Continue amiodarone 100 mg daily.  Telemetry monitoring for  07/01/24 2319, 4 mcg/kg/min at 07/01/24 2319    PATIENT CARE TEAM:  Patient Care Team:  Elisa Loyd MD as PCP - General (Internal Medicine)  Elisa Loyd MD as PCP - Empaneled Provider  Elmo Caballero MD as Consulting Physician (Infectious Diseases)  Shahnaz Lugo, RN as Care Transitions Nurse     Thank you for allowing me to participate in this patient's care.    MARYCRUZ Molina - NP   Advanced Heart Failure Center  [facility]  [address], [address]  Phone: [phone]

## 2024-07-02 NOTE — CARE COORDINATION
SUSANNAH    CM met w patient at bedside to discuss the d/c plan w [facility] and [facility] for IV ABX and dobutamine. CM also reviewed an important message from Medicare and patient verbalized understanding and gave permission for possible discharge within 4 hours of receiving IMM.  Patient reports his roommate, Khloe will hook him up to the pump and will transport him home after 5pm when she gets off work.  [facility] will use the ID plan from last admission since nothing has changed per peer, Renny Jesus conversation w them on yesterday.  All questions have been answered and CM wished patient well.    Updated [facility] and [facility] w d/c clinicals.    Alyx Judge, MSW CCM  Care Management

## 2024-07-02 NOTE — DISCHARGE SUMMARY
Discharge Summary       PATIENT ID: Ingrid Kennedy  MRN: [id number]   YOB: 1950    DATE OF ADMISSION: 6/27/2024  5:37 PM    DATE OF DISCHARGE: 07/02/24    PRIMARY CARE PROVIDER: Elisa Loyd MD     ATTENDING PHYSICIAN: Kaitlynn Burkett MD   DISCHARGING PROVIDER: Kaitlynn Burkett MD    To contact this individual call [phone] and ask the  to page.  If unavailable ask to be transferred the Adult Hospitalist Department.    CONSULTATIONS: IP CONSULT TO HOSPITALIST  IP CONSULT TO ADVANCED HEART FAILURE  IP CONSULT TO ADVANCED HEART FAILURE  IP CONSULT HOME HEALTH    PROCEDURES/SURGERIES: * No surgery found *     ADMITTING DIAGNOSES & HOSPITAL COURSE:   HPI: \"Ingrid Kennedy is a 73 y.o. male with history of chronic systolic congestive heart failure with EF of 15-20 on palliative dobutamine gtt., paroxysmal atrial fibrillation, hypothyroidism, VT status post ICD, hypertension, dyslipidemia, GERRI, CKD, BPH who presents to ED with complaints of shortness of breath.  He reports increasing fluid retention, weight gain dyspnea with lower orthopnea over the last week.  He is followed by advanced heart failure and has attempted outpatient diuresis with IV Bumex but states he has had very minimal urine output.  He was referred to the hospital today for admission and IV diuresis.  The patient denies any fever, chills, chest or abdominal pain, nausea, vomiting, cough, congestion, recent illness, palpitations, or dysuria.     Remarkable vitals on ER Presentation: Vital signs stable  Labs Remarkable for: Troponin 95,k 3.4, creatinine 7, BNP 15,846, hemoglobin 10  ER Images: Chest x-ray showed trace right pleural effusion  ER Rx: Bumex 2 mg IV\"        DISCHARGE DIAGNOSES / PLAN:      Acute on chronic systolic heart failure  Non ischemic cardiomyopathy  -Continue dobutamine gtt   -Continue with Bumex dose adjusted for dc 3 mg p.o. twice daily, metolazone stopped can use prn, diamox  -Strict Is&Os with daily  evening.  What changed:   medication strength  how much to take  when to take this     potassium chloride 20 MEQ extended release tablet  Commonly known as: KLOR-CON M  Take 2 tablets by mouth 2 times daily  What changed: how much to take            CONTINUE taking these medications      acetaminophen 500 MG tablet  Commonly known as: TYLENOL     acetaZOLAMIDE 250 MG tablet  Commonly known as: DIAMOX  Take 0.5 tablets by mouth 2 times daily     allopurinol 100 MG tablet  Commonly known as: ZYLOPRIM  Take 0.5 tablets by mouth daily     amiodarone 200 MG tablet  Commonly known as: CORDARONE  Take 0.5 tablets by mouth daily     DOBUTAMINE HCL IV     Eliquis 5 MG Tabs tablet  Generic drug: apixaban     heparin (PF) 10 UNIT/ML injection     levothyroxine 88 MCG tablet  Commonly known as: SYNTHROID  Take 1 tablet by mouth daily     lidocaine 4 % external patch  Place 1 patch onto the skin daily May cut in half to apply to both shoulders     magnesium oxide 400 MG tablet  Commonly known as: MAG-OX  Take 1 tablet by mouth daily     metOLazone 2.5 MG tablet  Commonly known as: ZAROXOLYN  Take 1 tablet by mouth as needed (as needed for weight gain, sweeling, SOB, or edema when instructed by heart failure team)     midodrine 5 MG tablet  Commonly known as: PROAMATINE  Take 1 tablet by mouth 3 times daily (with meals)     sodium chloride 0.9 % Soln     tamsulosin 0.4 MG capsule  Commonly known as: FLOMAX     Vitamin D3 25 MCG Tabs  Take 1 tablet by mouth daily            ASK your doctor about these medications      ampicillin 1 g injection  Commonly known as: OMNIPEN               Where to Get Your Medications        These medications were sent to [id number] - [place], [place] - [address] - P [phone] - F [phone] [address] [facility] [address]      Phone: [phone]   bumetanide 1 MG tablet  potassium chloride 20 MEQ extended release tablet           NOTIFY YOUR PHYSICIAN FOR ANY

## 2024-07-02 NOTE — PROGRESS NOTES
Hospital follow-up PCP transitional care appointment has been scheduled with Dr. Elisa Loyd for Wednesday July 10th, 2024 at 10:15 am.  This previously is a scheduled appointment. Pending patient discharge. Rosalva Ulloa Care Management Assistant

## 2024-07-02 NOTE — PROGRESS NOTES
1800: Discussed with the patient and all questioned fully answered. He will call me if any problems arise.      Medication List        CHANGE how you take these medications      bumetanide 1 MG tablet  Commonly known as: BUMEX  Take 3 tablets by mouth in the morning and 3 tablets in the evening.  What changed:   medication strength  how much to take  when to take this     potassium chloride 20 MEQ extended release tablet  Commonly known as: KLOR-CON M  Take 2 tablets by mouth 2 times daily  What changed: how much to take            CONTINUE taking these medications      acetaminophen 500 MG tablet  Commonly known as: TYLENOL     acetaZOLAMIDE 250 MG tablet  Commonly known as: DIAMOX  Take 0.5 tablets by mouth 2 times daily     allopurinol 100 MG tablet  Commonly known as: ZYLOPRIM  Take 0.5 tablets by mouth daily     amiodarone 200 MG tablet  Commonly known as: CORDARONE  Take 0.5 tablets by mouth daily     DOBUTAMINE HCL IV     Eliquis 5 MG Tabs tablet  Generic drug: apixaban     heparin (PF) 10 UNIT/ML injection     levothyroxine 88 MCG tablet  Commonly known as: SYNTHROID  Take 1 tablet by mouth daily     lidocaine 4 % external patch  Place 1 patch onto the skin daily May cut in half to apply to both shoulders     magnesium oxide 400 MG tablet  Commonly known as: MAG-OX  Take 1 tablet by mouth daily     metOLazone 2.5 MG tablet  Commonly known as: ZAROXOLYN  Take 1 tablet by mouth as needed (as needed for weight gain, sweeling, SOB, or edema when instructed by heart failure team)     midodrine 5 MG tablet  Commonly known as: PROAMATINE  Take 1 tablet by mouth 3 times daily (with meals)     sodium chloride 0.9 % Soln     tamsulosin 0.4 MG capsule  Commonly known as: FLOMAX     Vitamin D3 25 MCG Tabs  Take 1 tablet by mouth daily            ASK your doctor about these medications      ampicillin 1 g injection  Commonly known as: OMNIPEN               Where to Get Your Medications        These medications were sent

## 2024-07-03 LAB
BACTERIA SPEC CULT: NORMAL
BACTERIA SPEC CULT: NORMAL
SERVICE CMNT-IMP: NORMAL
SERVICE CMNT-IMP: NORMAL

## 2024-07-04 ENCOUNTER — HOME CARE VISIT (OUTPATIENT)
Facility: HOME HEALTH | Age: 74
End: 2024-07-04
Payer: COMMERCIAL

## 2024-07-04 ENCOUNTER — PATIENT MESSAGE (OUTPATIENT)
Age: 74
End: 2024-07-04

## 2024-07-04 PROCEDURE — G0299 HHS/HOSPICE OF RN EA 15 MIN: HCPCS

## 2024-07-04 NOTE — HOME HEALTH
Reason for referral, [facility] SUMMARY of clinical condition: HPI: \"Ingrid Kennedy is a 73 y.o. male with history of chronic systolic congestive heart failure with EF of 15-20 on palliative dobutamine gtt., paroxysmal atrial fibrillation, hypothyroidism, VT status post ICD, hypertension, dyslipidemia, GERRI, CKD, BPH who presents to ED with complaints of shortness of breath.    He reports increasing fluid retention, weight gain dyspnea with lower orthopnea over the last week.  He is followed by advanced heart failure and has attempted outpatient diuresis with IV Bumex but states he has had very minimal urine output.  He was referred to the hospital today for admission and IV diuresis. Patient  admitted to home care for continued IVAB and inotrope therapy.     Clinical Assessment/Skilled reason for admission to home health    Functional Mobility:  Bed Mobility (rolling/scooting): Independent  Transfers (supine to chair and return to supine): Supervision or Touching Assistance.  Patient uses device: yes  Gait:  Ambulates with supervision using no equipment  Safety concerns with mobility include: SOB with minimal exertion  DME: no equipment    Diagnosis: CHF    Subjective I am feeling better    Caregiver: Khloe Bhat- friend is primary caregiver and assist with IVAB and change Inotrope bag  Medications reconciled and all medications are available in the home this visit. The following education was provided regarding medications: medication interactions and look alike medications:  Patient/CG able to demonstrate knowledge through teach back with 98 percent accuracy.      notified of any discrepancies/medication interactions n/a.       A list of reconciled medications has been uploaded to media.    High risk med teaching was performed on High risk medication education as follows; ANTICOAGULANTS - Eliquis  Instructed pt/CG can select appropriate dose ( Medication/dose/frequency).  No ASA or NSAIDS unless ordered by MD.  S & S

## 2024-07-05 RX ORDER — MIDODRINE HYDROCHLORIDE 5 MG/1
5 TABLET ORAL
Qty: 270 TABLET | Refills: 0 | Status: SHIPPED | OUTPATIENT
Start: 2024-07-05

## 2024-07-05 RX ORDER — ACETAZOLAMIDE 250 MG/1
125 TABLET ORAL 2 TIMES DAILY
Qty: 90 TABLET | Refills: 0 | Status: SHIPPED | OUTPATIENT
Start: 2024-07-05

## 2024-07-05 RX ORDER — BUMETANIDE 2 MG/1
4 TABLET ORAL 2 TIMES DAILY
Qty: 360 TABLET | Refills: 0 | Status: SHIPPED | OUTPATIENT
Start: 2024-07-05

## 2024-07-05 RX ORDER — CHOLECALCIFEROL (VITAMIN D3) 25 MCG
1000 TABLET ORAL DAILY
Qty: 90 TABLET | Refills: 0 | Status: SHIPPED | OUTPATIENT
Start: 2024-07-05

## 2024-07-05 NOTE — TELEPHONE ENCOUNTER
From: Ingrid Kennedy  To: Marly Ku  Sent: 7/4/2024 12:30 PM EDT  Subject: Medication Refills    Good Day    I'm trying to refill Ingrid Kennedy prescriptions but I am unable too. He is in need of Bumentide, Tamsulosin, Vitamin D, Midodrine, Acetzo. I am trying to refill it through his usual pharmacy provider which is [facility] because they provide him a 90 day supply instead of [facility]. Can you please send in the refills for those medications for him through [facility]? I am trying to have him equipped with all his medications before traveling. Thank you    Requested Prescriptions     Signed Prescriptions Disp Refills    bumetanide (BUMEX) 2 MG tablet 360 tablet 0     Sig: Take 2 tablets by mouth in the morning and 2 tablets in the evening.     Authorizing Provider: ARLET BOX     Ordering User: LINDA DELANEY    acetaZOLAMIDE (DIAMOX) 250 MG tablet 90 tablet 0     Sig: Take 0.5 tablets by mouth 2 times daily     Authorizing Provider: ARLET BOX     Ordering User: LINDA DELANEY    Cholecalciferol (VITAMIN D3) 25 MCG TABS 90 tablet 0     Sig: Take 1 tablet by mouth daily     Authorizing Provider: ARLET BOX     Ordering User: LINDA DELANEY    midodrine (PROAMATINE) 5 MG tablet 270 tablet 0     Sig: Take 1 tablet by mouth 3 times daily (with meals)     Authorizing Provider: ARLET BOX     Ordering User: LINDA DELANEY

## 2024-07-05 NOTE — PROGRESS NOTES
PF,) 10 UNIT/ML injection, Infuse 3-5 mLs intravenously every 24 hours FLUSH LUMEN NOT IN USE FOR MILRINONE WITH 3-5ML AFTER LAST SALINE FLUSH OR EVERY 24 HOURS FOR MAINTENANCE IF NOT USED. FLUSH POST LAB DRAW., Disp: , Rfl:     acetaminophen (TYLENOL) 500 MG tablet, Take 1 tablet by mouth every 6 hours as needed for Pain, Disp: , Rfl:     apixaban (ELIQUIS) 5 MG TABS tablet, Take 1 tablet by mouth 2 times daily, Disp: , Rfl:     tamsulosin (FLOMAX) 0.4 MG capsule, Take 2 capsules by mouth daily, Disp: , Rfl:     PATIENT CARE TEAM:  Patient Care Team:  Elisa Loyd MD as PCP - General (Internal Medicine)  Elisa Loyd MD as PCP - Empaneled Provider  Elmo Caballero MD as Consulting Physician (Infectious Diseases)  Shahnaz Lugo, RN as Care Transitions Nurse     Thank you for allowing me to participate in this patient's care.    MARYCRUZ Molina - NP   Advanced Heart Failure Center  [facility]  [address] [place] [address], [address]  Phone: [phone]    On this date 7/8/24 , I have spent a total time of  35 minutes personally reviewing new vitals, test results, notes from recent visits, face to face encounter/physical exam of patient with counseling, writing orders, performing medical decision making, and documenting.

## 2024-07-06 VITALS
TEMPERATURE: 98.1 F | RESPIRATION RATE: 16 BRPM | OXYGEN SATURATION: 97 % | SYSTOLIC BLOOD PRESSURE: 100 MMHG | DIASTOLIC BLOOD PRESSURE: 54 MMHG | HEART RATE: 58 BPM

## 2024-07-06 ASSESSMENT — ENCOUNTER SYMPTOMS: DYSPNEA ACTIVITY LEVEL: AFTER AMBULATING LESS THAN 10 FT

## 2024-07-08 ENCOUNTER — OFFICE VISIT (OUTPATIENT)
Age: 74
End: 2024-07-08
Payer: MEDICARE

## 2024-07-08 ENCOUNTER — TELEPHONE (OUTPATIENT)
Age: 74
End: 2024-07-08

## 2024-07-08 VITALS
TEMPERATURE: 97.3 F | SYSTOLIC BLOOD PRESSURE: 92 MMHG | HEIGHT: 70 IN | WEIGHT: 157 LBS | OXYGEN SATURATION: 96 % | RESPIRATION RATE: 18 BRPM | DIASTOLIC BLOOD PRESSURE: 60 MMHG | HEART RATE: 70 BPM | BODY MASS INDEX: 22.48 KG/M2

## 2024-07-08 DIAGNOSIS — I50.22 CHRONIC HFREF (HEART FAILURE WITH REDUCED EJECTION FRACTION) (HCC): Primary | ICD-10-CM

## 2024-07-08 DIAGNOSIS — Z79.899 RECEIVING INOTROPIC MEDICATION: ICD-10-CM

## 2024-07-08 PROCEDURE — 3017F COLORECTAL CA SCREEN DOC REV: CPT | Performed by: NURSE PRACTITIONER

## 2024-07-08 PROCEDURE — 3074F SYST BP LT 130 MM HG: CPT | Performed by: NURSE PRACTITIONER

## 2024-07-08 PROCEDURE — 1111F DSCHRG MED/CURRENT MED MERGE: CPT | Performed by: NURSE PRACTITIONER

## 2024-07-08 PROCEDURE — 3078F DIAST BP <80 MM HG: CPT | Performed by: NURSE PRACTITIONER

## 2024-07-08 PROCEDURE — G8427 DOCREV CUR MEDS BY ELIG CLIN: HCPCS | Performed by: NURSE PRACTITIONER

## 2024-07-08 PROCEDURE — 1036F TOBACCO NON-USER: CPT | Performed by: NURSE PRACTITIONER

## 2024-07-08 PROCEDURE — G8420 CALC BMI NORM PARAMETERS: HCPCS | Performed by: NURSE PRACTITIONER

## 2024-07-08 PROCEDURE — 99214 OFFICE O/P EST MOD 30 MIN: CPT | Performed by: NURSE PRACTITIONER

## 2024-07-08 PROCEDURE — 1123F ACP DISCUSS/DSCN MKR DOCD: CPT | Performed by: NURSE PRACTITIONER

## 2024-07-08 RX ORDER — ALLOPURINOL 100 MG/1
50 TABLET ORAL DAILY
Qty: 45 TABLET | Refills: 0 | Status: SHIPPED | OUTPATIENT
Start: 2024-07-08

## 2024-07-08 RX ORDER — MIDODRINE HYDROCHLORIDE 2.5 MG/1
5 TABLET ORAL 3 TIMES DAILY
Qty: 60 TABLET | Refills: 0 | OUTPATIENT
Start: 2024-07-08

## 2024-07-08 RX ORDER — DAPAGLIFLOZIN 10 MG/1
10 TABLET, FILM COATED ORAL
COMMUNITY
Start: 2024-07-04 | End: 2024-07-08 | Stop reason: SINTOL

## 2024-07-08 RX ORDER — TAMSULOSIN HYDROCHLORIDE 0.4 MG/1
0.8 CAPSULE ORAL DAILY
Qty: 180 CAPSULE | Refills: 0 | Status: SHIPPED | OUTPATIENT
Start: 2024-07-08

## 2024-07-08 RX ORDER — MIDODRINE HYDROCHLORIDE 5 MG/1
5 TABLET ORAL
Qty: 270 TABLET | Refills: 0 | Status: SHIPPED | OUTPATIENT
Start: 2024-07-08 | End: 2024-07-11 | Stop reason: SDUPTHER

## 2024-07-08 ASSESSMENT — PATIENT HEALTH QUESTIONNAIRE - PHQ9
SUM OF ALL RESPONSES TO PHQ QUESTIONS 1-9: 0
2. FEELING DOWN, DEPRESSED OR HOPELESS: NOT AT ALL
SUM OF ALL RESPONSES TO PHQ9 QUESTIONS 1 & 2: 0
1. LITTLE INTEREST OR PLEASURE IN DOING THINGS: NOT AT ALL

## 2024-07-08 NOTE — TELEPHONE ENCOUNTER
Loganm with Khloe Douglas to tell her Dr. Loyd would like pt to come in the office on 7/10/24 at 9 am. Ok to double book per Dr. Loyd

## 2024-07-08 NOTE — TELEPHONE ENCOUNTER
----- Message from Ingrid Kennedy sent at 7/8/2024  4:21 PM EDT -----  Regarding: Tamsulosin Med  Contact: [phone]  Good Day     Can you please send a refill of the Tamsulosin to Ingrid [facility] pharmacy? I am trying to refill it on his [facility] and the other doctor said it has to be refilled through his PCP.

## 2024-07-08 NOTE — TELEPHONE ENCOUNTER
Pt friend called and said that this pt is transferring to [place] on 07/16 so she wanted to make an appt with  before then so he could have a last visit with her so that they can discuss things. There was no available appointment until the end of the month so she asked if I could send a message back and ask her nurse if she could just reach out and give her a call.

## 2024-07-08 NOTE — PATIENT INSTRUCTIONS
Medication changes:    NONE    Please remove all Farxiga tablets from medication packets    Contact [facility] in 1 week (by [employer] -preferable- or telephone)  with your daily weights, blood pressures and any symptoms. If you leave a voicemail, please report this information in detail so we can follow up appropriately and efficiently. Be reassured, we will call you back. Phone number is [phone] option 2    Please take this to your pharmacy to notify them of the change in medications.     Testing Ordered:    NONE    Referrals:      Other Recommendations:      - record blood pressure and heart rate daily before medication and two hours after medication  - record weight daily upon waking/after using the bathroom.   - keep a written records of your weights and blood pressure and bring to your next appointment.   - Call the clinic at if you have a weight gain of 3 or more pounds overnight OR 5 or more pounds in one week, new/worsened shortness of breath or swelling, or if your blood pressure begins to consistently run below 90/60 and/or you begin to experience dizziness or lightheadedness. Our office phone number [phone] option 2.  - Ensure you are drinking an adequate amount of water with a goal of 6-8 eight ounce glasses (1.5-2 liters) of fluid daily. Your urine should be clear and light yellow straw colored.           Thank you for allowing us the privilege of being a part of your healthcare team! Please do not hesitate to contact our office at [phone] option 2 with any questions or concerns.     We are restarting a monthly heart failure support group, this will be the last Wednesday of every month from 5-6pm at [facility]. If you would like to attend you will need to RSVP to [email or web address]

## 2024-07-08 NOTE — TELEPHONE ENCOUNTER
Requested Prescriptions     Signed Prescriptions Disp Refills    allopurinol (ZYLOPRIM) 100 MG tablet 45 tablet 0     Sig: Take 0.5 tablets by mouth daily     Authorizing Provider: ARLET BOX     Ordering User: ROD JAIN    midodrine (PROAMATINE) 5 MG tablet 270 tablet 0     Sig: Take 1 tablet by mouth 3 times daily (with meals)     Authorizing Provider: ARELT BOX     Ordering User: ROD JAIN     Refused Prescriptions Disp Refills    midodrine (PROAMATINE) 2.5 MG tablet [Pharmacy Med Name: MIDODRINE HCL TABS 2.5MG] 60 tablet 0     Sig: Take 2 tablets by mouth 3 times daily     Refused By: ROD JAIN     Reason for Refusal: Medication dose changed

## 2024-07-10 ENCOUNTER — LAB (OUTPATIENT)
Age: 74
End: 2024-07-10
Payer: MEDICARE

## 2024-07-10 ENCOUNTER — HOME CARE VISIT (OUTPATIENT)
Facility: HOME HEALTH | Age: 74
End: 2024-07-10
Payer: COMMERCIAL

## 2024-07-10 ENCOUNTER — OFFICE VISIT (OUTPATIENT)
Age: 74
End: 2024-07-10
Payer: MEDICARE

## 2024-07-10 VITALS
BODY MASS INDEX: 22.16 KG/M2 | TEMPERATURE: 98.3 F | WEIGHT: 154.8 LBS | DIASTOLIC BLOOD PRESSURE: 62 MMHG | HEART RATE: 76 BPM | SYSTOLIC BLOOD PRESSURE: 102 MMHG | OXYGEN SATURATION: 97 % | HEIGHT: 70 IN | RESPIRATION RATE: 15 BRPM

## 2024-07-10 DIAGNOSIS — I50.9 DECOMPENSATED HEART FAILURE (HCC): ICD-10-CM

## 2024-07-10 DIAGNOSIS — Z95.810 ICD (IMPLANTABLE CARDIOVERTER-DEFIBRILLATOR) IN PLACE: ICD-10-CM

## 2024-07-10 DIAGNOSIS — N18.32 STAGE 3B CHRONIC KIDNEY DISEASE (HCC): ICD-10-CM

## 2024-07-10 DIAGNOSIS — I50.42 CHRONIC COMBINED SYSTOLIC AND DIASTOLIC CONGESTIVE HEART FAILURE (HCC): ICD-10-CM

## 2024-07-10 DIAGNOSIS — I49.9 IRREGULAR HEART BEAT: ICD-10-CM

## 2024-07-10 DIAGNOSIS — R06.02 SOB (SHORTNESS OF BREATH): ICD-10-CM

## 2024-07-10 DIAGNOSIS — I10 ESSENTIAL HYPERTENSION: ICD-10-CM

## 2024-07-10 DIAGNOSIS — G47.33 OSA ON CPAP: ICD-10-CM

## 2024-07-10 DIAGNOSIS — I50.42 CHRONIC COMBINED SYSTOLIC AND DIASTOLIC CONGESTIVE HEART FAILURE (HCC): Primary | ICD-10-CM

## 2024-07-10 DIAGNOSIS — E55.9 VITAMIN D DEFICIENCY: ICD-10-CM

## 2024-07-10 DIAGNOSIS — E03.8 SUBCLINICAL HYPOTHYROIDISM: ICD-10-CM

## 2024-07-10 PROCEDURE — 99214 OFFICE O/P EST MOD 30 MIN: CPT | Performed by: INTERNAL MEDICINE

## 2024-07-10 PROCEDURE — 3074F SYST BP LT 130 MM HG: CPT | Performed by: INTERNAL MEDICINE

## 2024-07-10 PROCEDURE — 3078F DIAST BP <80 MM HG: CPT | Performed by: INTERNAL MEDICINE

## 2024-07-10 PROCEDURE — G8420 CALC BMI NORM PARAMETERS: HCPCS | Performed by: INTERNAL MEDICINE

## 2024-07-10 PROCEDURE — 1111F DSCHRG MED/CURRENT MED MERGE: CPT | Performed by: INTERNAL MEDICINE

## 2024-07-10 PROCEDURE — G8427 DOCREV CUR MEDS BY ELIG CLIN: HCPCS | Performed by: INTERNAL MEDICINE

## 2024-07-10 PROCEDURE — 3017F COLORECTAL CA SCREEN DOC REV: CPT | Performed by: INTERNAL MEDICINE

## 2024-07-10 PROCEDURE — 1036F TOBACCO NON-USER: CPT | Performed by: INTERNAL MEDICINE

## 2024-07-10 PROCEDURE — 1123F ACP DISCUSS/DSCN MKR DOCD: CPT | Performed by: INTERNAL MEDICINE

## 2024-07-10 RX ORDER — AMIODARONE HYDROCHLORIDE 200 MG/1
100 TABLET ORAL DAILY
Qty: 45 TABLET | Refills: 0 | Status: SHIPPED | OUTPATIENT
Start: 2024-07-10

## 2024-07-10 RX ORDER — SACUBITRIL AND VALSARTAN 24; 26 MG/1; MG/1
TABLET, FILM COATED ORAL
COMMUNITY
Start: 2024-07-08

## 2024-07-10 ASSESSMENT — PATIENT HEALTH QUESTIONNAIRE - PHQ9
2. FEELING DOWN, DEPRESSED OR HOPELESS: NOT AT ALL
SUM OF ALL RESPONSES TO PHQ QUESTIONS 1-9: 0
1. LITTLE INTEREST OR PLEASURE IN DOING THINGS: NOT AT ALL
SUM OF ALL RESPONSES TO PHQ QUESTIONS 1-9: 0
SUM OF ALL RESPONSES TO PHQ9 QUESTIONS 1 & 2: 0
SUM OF ALL RESPONSES TO PHQ QUESTIONS 1-9: 0
SUM OF ALL RESPONSES TO PHQ QUESTIONS 1-9: 0

## 2024-07-10 ASSESSMENT — ENCOUNTER SYMPTOMS
ALLERGIC/IMMUNOLOGIC NEGATIVE: 1
EYES NEGATIVE: 1
RESPIRATORY NEGATIVE: 1
GASTROINTESTINAL NEGATIVE: 1

## 2024-07-10 NOTE — PROGRESS NOTES
Identified pt with two pt identifiers(name and ).    Chief Complaint   Patient presents with    Follow-Up from Hospital     Patient seen in ER for congestive heart failure on   No questions or concerns at this time           Health Maintenance Due   Topic    COVID-19 Vaccine (1)    Pneumococcal 65+ years Vaccine (1 of 2 - PCV)    Diabetic retinal exam     DTaP/Tdap/Td vaccine (1 - Tdap)    Shingles vaccine (1 of 2)    Respiratory Syncytial Virus (RSV) Pregnant or age 60 yrs+ (1 - 1-dose 60+ series)       Wt Readings from Last 3 Encounters:   07/10/24 70.2 kg (154 lb 12.8 oz)   24 71.2 kg (157 lb)   24 68.2 kg (150 lb 5.7 oz)     Temp Readings from Last 3 Encounters:   07/10/24 98.3 °F (36.8 °C) (Temporal)   24 97.3 °F (36.3 °C) (Temporal)   24 98.1 °F (36.7 °C) (Temporal)     BP Readings from Last 3 Encounters:   07/10/24 102/62   24 92/60   24 (!) 100/54     Pulse Readings from Last 3 Encounters:   07/10/24 76   24 70   24 58           Depression Screening:  :         7/10/2024     9:39 AM 2024     9:09 AM 2024     1:17 PM 2024    11:40 AM 2024    10:11 AM 2024     9:59 AM 2024    11:05 AM   PHQ-9 Questionaire   Little interest or pleasure in doing things 0 0 0 0 0 0 0   Feeling down, depressed, or hopeless 0 0 0 0 0 0 0   PHQ-9 Total Score 0 0 0 0 0 0 0        Fall Risk Assessment:  :         2024     9:30 AM 2024    10:18 AM 2024    10:07 AM 2023     2:56 PM 2023     1:25 PM   Fall Risk   2 or more falls in past year? no no no no no   Fall with injury in past year? no yes no yes yes        Abuse Screening:  :          No data to display                 Coordination of Care Questionnaire:  :     \"Have you been to the ER, urgent care clinic since your last visit?  Hospitalized since your last visit?\"    YES - When: approximately 2  weeks ago.  Where and Why: Congestive heart failure.    “Have you seen or

## 2024-07-10 NOTE — TELEPHONE ENCOUNTER
Requested Prescriptions     Signed Prescriptions Disp Refills    amiodarone (CORDARONE) 200 MG tablet 45 tablet 0     Sig: Take 0.5 tablets by mouth daily     Authorizing Provider: ARLET BOX     Ordering User: LINDA DELANEY NP to fill amiodarone, since patient is transferring care to [facility] next week

## 2024-07-10 NOTE — PROGRESS NOTES
Chief Complaint   Patient presents with    Follow-Up from Hospital     Patient seen in ER for congestive heart failure on 6/27  No questions or concerns at this time          Assessment/ Plan:   1. Chronic combined systolic and diastolic congestive heart failure (HCC)  -     CBC with Auto Differential; Future  -     Comprehensive Metabolic Panel; Future  -     Brain Natriuretic Peptide; Future  2. Essential hypertension  -     CBC with Auto Differential; Future  -     Comprehensive Metabolic Panel; Future  3. ICD (implantable cardioverter-defibrillator) in place  -     Comprehensive Metabolic Panel; Future  4. Decompensated heart failure (HCC)  -     CBC with Auto Differential; Future  -     Comprehensive Metabolic Panel; Future  5. GERRI on CPAP     6. Stage 3b chronic kidney disease (HCC)  -     Comprehensive Metabolic Panel; Future  7. Subclinical hypothyroidism  -     T4, Free; Future  -     TSH; Future  8. Vitamin D deficiency  -     Vitamin D 25 Hydroxy; Future  9. SOB (shortness of breath)  -     Brain Natriuretic Peptide; Future    He will be leaving to [place] on 7/17/2024 and he will now be living with his son Edwin Kennedy.   He has a [facility] clinic cardiologist in [place] and will have the first appointment on 7/19/2024.   We reviewed his HM (health maintenance).    He needs the following immunizations, once off antibiotics for 10 days (PCV-20, RSV, Tdap, Shingles and consider COVID-19 vaccinations as well).   He will need continued routine screening of his PSA, Colonoscopy (will be due in 2031), TSH and HBA1C. I have indicated the last available results below.   I have advised Mr. Kennedy to use a mask for the entire journey from [place] to [place]. COVID-19 is once again very elevated.     I have discussed the diagnosis with the patient and the intended treatment plan as seen in the above orders. The patient has received an after-visit summary and questions were answered concerning future plans. Asked to return

## 2024-07-11 ENCOUNTER — TELEPHONE (OUTPATIENT)
Age: 74
End: 2024-07-11

## 2024-07-11 LAB
25(OH)D3 SERPL-MCNC: 41.1 NG/ML (ref 30–100)
ALBUMIN SERPL-MCNC: 3.6 G/DL (ref 3.5–5)
ALBUMIN/GLOB SERPL: 0.9 (ref 1.1–2.2)
ALP SERPL-CCNC: 96 U/L (ref 45–117)
ALT SERPL-CCNC: 10 U/L (ref 12–78)
ANION GAP SERPL CALC-SCNC: 10 MMOL/L (ref 5–15)
AST SERPL-CCNC: 16 U/L (ref 15–37)
BASOPHILS # BLD: 0 K/UL (ref 0–0.1)
BASOPHILS NFR BLD: 1 % (ref 0–1)
BILIRUB SERPL-MCNC: 0.8 MG/DL (ref 0.2–1)
BUN SERPL-MCNC: 57 MG/DL (ref 6–20)
BUN/CREAT SERPL: 18 (ref 12–20)
CALCIUM SERPL-MCNC: 8.9 MG/DL (ref 8.5–10.1)
CHLORIDE SERPL-SCNC: 99 MMOL/L (ref 97–108)
CO2 SERPL-SCNC: 35 MMOL/L (ref 21–32)
CREAT SERPL-MCNC: 3.2 MG/DL (ref 0.7–1.3)
DIFFERENTIAL METHOD BLD: ABNORMAL
EOSINOPHIL # BLD: 0.2 K/UL (ref 0–0.4)
EOSINOPHIL NFR BLD: 5 % (ref 0–7)
ERYTHROCYTE [DISTWIDTH] IN BLOOD BY AUTOMATED COUNT: 19.9 % (ref 11.5–14.5)
GLOBULIN SER CALC-MCNC: 4.1 G/DL (ref 2–4)
GLUCOSE SERPL-MCNC: 88 MG/DL (ref 65–100)
HCT VFR BLD AUTO: 30.9 % (ref 36.6–50.3)
HGB BLD-MCNC: 10.1 G/DL (ref 12.1–17)
IMM GRANULOCYTES # BLD AUTO: 0 K/UL (ref 0–0.04)
IMM GRANULOCYTES NFR BLD AUTO: 0 % (ref 0–0.5)
LYMPHOCYTES # BLD: 1.5 K/UL (ref 0.8–3.5)
LYMPHOCYTES NFR BLD: 36 % (ref 12–49)
MCH RBC QN AUTO: 27.3 PG (ref 26–34)
MCHC RBC AUTO-ENTMCNC: 32.7 G/DL (ref 30–36.5)
MCV RBC AUTO: 83.5 FL (ref 80–99)
MONOCYTES # BLD: 0.5 K/UL (ref 0–1)
MONOCYTES NFR BLD: 12 % (ref 5–13)
NEUTS SEG # BLD: 1.9 K/UL (ref 1.8–8)
NEUTS SEG NFR BLD: 46 % (ref 32–75)
NRBC # BLD: 0 K/UL (ref 0–0.01)
NRBC BLD-RTO: 0 PER 100 WBC
NT PRO BNP: 3495 PG/ML
PLATELET # BLD AUTO: 204 K/UL (ref 150–400)
PMV BLD AUTO: 10.8 FL (ref 8.9–12.9)
POTASSIUM SERPL-SCNC: 2.9 MMOL/L (ref 3.5–5.1)
PROT SERPL-MCNC: 7.7 G/DL (ref 6.4–8.2)
RBC # BLD AUTO: 3.7 M/UL (ref 4.1–5.7)
SODIUM SERPL-SCNC: 144 MMOL/L (ref 136–145)
T4 FREE SERPL-MCNC: 1.4 NG/DL (ref 0.8–1.5)
TSH SERPL DL<=0.05 MIU/L-ACNC: 2.5 UIU/ML (ref 0.36–3.74)
WBC # BLD AUTO: 4.1 K/UL (ref 4.1–11.1)

## 2024-07-11 RX ORDER — MIDODRINE HYDROCHLORIDE 5 MG/1
5 TABLET ORAL
Qty: 21 TABLET | Refills: 0 | Status: SHIPPED | OUTPATIENT
Start: 2024-07-11

## 2024-07-11 NOTE — TELEPHONE ENCOUNTER
Requested Prescriptions     Signed Prescriptions Disp Refills    midodrine (PROAMATINE) 5 MG tablet 21 tablet 0     Sig: Take 1 tablet by mouth 3 times daily (with meals)     Authorizing Provider: ARLET BOX     Ordering User: LINDA DELANEY      Short supply sent to [facility], as mail delivery pharmacy wont be able to ship med out before patient runs out.

## 2024-07-11 NOTE — TELEPHONE ENCOUNTER
----- Message from Elisa Loyd MD sent at 7/11/2024  8:59 AM EDT -----  URGENT: please let patient know that his potassium is really quite low. Is he taking a supplement? If not, will have to give supplement and have him recheck before leaving for [place]. Other labs are stable. Thyroid is normal. Vitamin D is normal. Hemoglobin is stable and slightly improved.   However the Potassium is the concern. This needs to be repleted and repeated.

## 2024-07-11 NOTE — TELEPHONE ENCOUNTER
Mari states that patient is taking potassium 4x (2 in the am and 2 in the pm) She is asking should he increase?

## 2024-07-12 ENCOUNTER — HOME CARE VISIT (OUTPATIENT)
Facility: HOME HEALTH | Age: 74
End: 2024-07-12
Payer: COMMERCIAL

## 2024-07-12 ENCOUNTER — TELEPHONE (OUTPATIENT)
Age: 74
End: 2024-07-12

## 2024-07-12 PROCEDURE — G0299 HHS/HOSPICE OF RN EA 15 MIN: HCPCS

## 2024-07-12 NOTE — TELEPHONE ENCOUNTER
Fax/call from  in [place] who is assisting with patients wife to be able to travel to the [place] due to patients condition.    Letter signed and faxed on 7/17

## 2024-07-12 NOTE — PROGRESS NOTES
Physician Progress Note      PATIENT:               CORTEZ MENDIETA  CSN #:                  [id number]  :                       1950  ADMIT DATE:       2024 5:37 PM  DISCH DATE:        2024 6:41 PM  RESPONDING  PROVIDER #:        Kaitlynn Lopez MD          QUERY TEXT:    Pt admitted with CHF. Pt noted to also have cardiomyopathy ans HTN. If   possible, please document in progress notes and discharge summary the etiology   of CHF, if able to be determined.  The medical record reflects the following:  Risk Factors: cardiomyopathy  HTN  CHF    Clinical Indicators:  H&P   Non ischemic cardiomyopathy  HTN    ECHO   Left?Ventricle: Severely reduced left ventricular systolic function with a   visually estimated EF of 10 -15%. Left ventricle is moderately dilated. Mildly   increased wall thickness. Global hypokinesis present.    BNP [id number]  Trop 95      Treatment:  Continue dobutamine 4 mcg/kg/min 2024  -Continue with Bumex, metolazone, diamox  -Strict Is&Os with daily weights  -Cardiology consult in am      Thank you,  Sonja Lao RN CDI  CRCR  Clinical Documentation  [phone] or via [employer]  [email or web address]  Options provided:  -- CHF due to Hypertensive Heart Disease  -- CHF due to Hypertensive Heart Disease and NICMP  -- CHF not due to Hypertension but due to NICMP  -- Other - I will add my own diagnosis  -- Disagree - Not applicable / Not valid  -- Disagree - Clinically unable to determine / Unknown  -- Refer to Clinical Documentation Reviewer    PROVIDER RESPONSE TEXT:    This patient has CHF due to hypertensive heart disease and NICMP.    Query created by: Sonja Lao on 2024 1:05 PM      Electronically signed by:  Kaitlynn Lopez MD 2024 8:40 AM

## 2024-07-15 ENCOUNTER — HOSPITAL ENCOUNTER (OUTPATIENT)
Facility: HOSPITAL | Age: 74
Setting detail: SPECIMEN
Discharge: HOME | End: 2024-07-18

## 2024-07-15 ENCOUNTER — HOME CARE VISIT (OUTPATIENT)
Facility: HOME HEALTH | Age: 74
End: 2024-07-15
Payer: COMMERCIAL

## 2024-07-15 PROCEDURE — 85025 COMPLETE CBC W/AUTO DIFF WBC: CPT

## 2024-07-15 PROCEDURE — 83880 ASSAY OF NATRIURETIC PEPTIDE: CPT

## 2024-07-15 PROCEDURE — 83735 ASSAY OF MAGNESIUM: CPT

## 2024-07-15 PROCEDURE — 36415 COLL VENOUS BLD VENIPUNCTURE: CPT

## 2024-07-15 PROCEDURE — G0299 HHS/HOSPICE OF RN EA 15 MIN: HCPCS

## 2024-07-15 PROCEDURE — 80053 COMPREHEN METABOLIC PANEL: CPT

## 2024-07-16 VITALS
TEMPERATURE: 98.3 F | HEART RATE: 65 BPM | SYSTOLIC BLOOD PRESSURE: 100 MMHG | WEIGHT: 152.3 LBS | RESPIRATION RATE: 20 BRPM | OXYGEN SATURATION: 99 % | DIASTOLIC BLOOD PRESSURE: 60 MMHG | BODY MASS INDEX: 21.85 KG/M2

## 2024-07-16 LAB
ALBUMIN SERPL-MCNC: 3.4 G/DL (ref 3.5–5)
ALBUMIN/GLOB SERPL: 0.9 (ref 1.1–2.2)
ALP SERPL-CCNC: 94 U/L (ref 45–117)
ALT SERPL-CCNC: 14 U/L (ref 12–78)
ANION GAP SERPL CALC-SCNC: 9 MMOL/L (ref 5–15)
AST SERPL-CCNC: 19 U/L (ref 15–37)
BASOPHILS # BLD: 0 K/UL (ref 0–0.1)
BASOPHILS NFR BLD: 1 % (ref 0–1)
BILIRUB SERPL-MCNC: 0.8 MG/DL (ref 0.2–1)
BUN SERPL-MCNC: 52 MG/DL (ref 6–20)
BUN/CREAT SERPL: 21 (ref 12–20)
CALCIUM SERPL-MCNC: 8.7 MG/DL (ref 8.5–10.1)
CHLORIDE SERPL-SCNC: 104 MMOL/L (ref 97–108)
CO2 SERPL-SCNC: 30 MMOL/L (ref 21–32)
CREAT SERPL-MCNC: 2.51 MG/DL (ref 0.7–1.3)
DIFFERENTIAL METHOD BLD: ABNORMAL
EOSINOPHIL # BLD: 0.2 K/UL (ref 0–0.4)
EOSINOPHIL NFR BLD: 4 % (ref 0–7)
ERYTHROCYTE [DISTWIDTH] IN BLOOD BY AUTOMATED COUNT: 20 % (ref 11.5–14.5)
GLOBULIN SER CALC-MCNC: 3.7 G/DL (ref 2–4)
GLUCOSE SERPL-MCNC: 149 MG/DL (ref 65–100)
HCT VFR BLD AUTO: 27.1 % (ref 36.6–50.3)
HGB BLD-MCNC: 9.4 G/DL (ref 12.1–17)
IMM GRANULOCYTES # BLD AUTO: 0 K/UL (ref 0–0.04)
IMM GRANULOCYTES NFR BLD AUTO: 0 % (ref 0–0.5)
LYMPHOCYTES # BLD: 1.4 K/UL (ref 0.8–3.5)
LYMPHOCYTES NFR BLD: 36 % (ref 12–49)
MAGNESIUM SERPL-MCNC: 2.4 MG/DL (ref 1.6–2.4)
MCH RBC QN AUTO: 28.5 PG (ref 26–34)
MCHC RBC AUTO-ENTMCNC: 34.7 G/DL (ref 30–36.5)
MCV RBC AUTO: 82.1 FL (ref 80–99)
MONOCYTES # BLD: 0.3 K/UL (ref 0–1)
MONOCYTES NFR BLD: 8 % (ref 5–13)
NEUTS SEG # BLD: 2.1 K/UL (ref 1.8–8)
NEUTS SEG NFR BLD: 51 % (ref 32–75)
NRBC # BLD: 0 K/UL (ref 0–0.01)
NRBC BLD-RTO: 0 PER 100 WBC
NT PRO BNP: 5393 PG/ML
PLATELET # BLD AUTO: 178 K/UL (ref 150–400)
PMV BLD AUTO: 10.8 FL (ref 8.9–12.9)
POTASSIUM SERPL-SCNC: 3.9 MMOL/L (ref 3.5–5.1)
PROT SERPL-MCNC: 7.1 G/DL (ref 6.4–8.2)
RBC # BLD AUTO: 3.3 M/UL (ref 4.1–5.7)
SODIUM SERPL-SCNC: 143 MMOL/L (ref 136–145)
WBC # BLD AUTO: 4.1 K/UL (ref 4.1–11.1)

## 2024-07-19 VITALS
RESPIRATION RATE: 20 BRPM | HEART RATE: 60 BPM | DIASTOLIC BLOOD PRESSURE: 70 MMHG | WEIGHT: 150 LBS | BODY MASS INDEX: 21.52 KG/M2 | OXYGEN SATURATION: 98 % | SYSTOLIC BLOOD PRESSURE: 100 MMHG

## 2024-07-19 ASSESSMENT — ENCOUNTER SYMPTOMS: DYSPNEA ACTIVITY LEVEL: AFTER AMBULATING 10 - 20 FT

## 2024-07-19 NOTE — HOME HEALTH
Subjective: I guess this is the last time that I'll be seeing you  Falls since last visit No(if yes complete the Fall Tracking Form and include bsrifallreport):   Caregiver involvement changes: No    Clinician asked if patient has had any physician contact since last home care visit and patient states: NO  Clinician asked if patient has any new or changed medications and patient states:  NO   If Yes, were medications reconciled? NO   Was the certifying physician notified of changes in medications? NO     A list of reconciled medications has been given to the patient/caregiver .      Clinical assessment (what this visit means for the patient overall and need for ongoing skilled care) and progress or lack of progress towards SPECIFIC goals: Pt with CHF. Currently on continuous dobutamine drip. Pt is currently meeting goals as he has no complications of HF symptoms. Weight continues to remain WNL and pt denies any SOB. Pt with no swelling to bilateral lower extremities and endorses taking all medications ordered. CVC remains clean and fuctionig without any sigsn of overt infection. Labs collected and sent to [facility] with instructions to alert [facility] and PCP as pt's potassium was low last week. Pt was being seen by SN for asssessment, education, lab draws and weekly dressing changes. Pt is now moving to [place] to live with son. Care set up in [place]. Our services are no longer needed. Pt will be discharged due to moving out of service area.     Discharge Instructions:Pt instructed to continue to take all medications as prescribed by provider. Continue to monitor for signs of CHF exacerbation such as SOB, increased swelling, fatigue, activity intolerance, orthopnea, weight gain and inform provider. Continue to weigh yourself each morning and inform provider of weight increase of 2lbs in 1 day or 5lbs in 1 week. Continue with CVC maintence, using acohol pads, flushing the line with saline, followed by heparin and

## 2024-07-19 NOTE — HOME HEALTH
Subjective: I'll be flying out next wednesday morning  Falls since last visit No(if yes complete the Fall Tracking Form and include bsrifallreport):   Caregiver involvement changes: NO  Home health supplies by type and quantity ordered/delivered this visit include: N/A    Clinician asked if patient has had any physician contact since last home care visit and patient states: YES  Clinician asked if patient has any new or changed medications and patient states:  NO   If Yes, were medications reconciled? NO   Was the certifying physician notified of changes in medications? NO     Clinical assessment (what this visit means for the patient overall and need for ongoing skilled care) and progress or lack of progress towards SPECIFIC goals: PT with CHF. Meeting goals at thos time. pt with no signs of CHF complications, weight WNL, compliant with medications, CVC remains free of overt signs of infection. Pt will be moving next week to live with his son. He leaves for [place] on wednesday and has a doctors appt on that friday. SN will see pt once more on monday for assessment, labs  and [facility] d/[facility]     Written Teaching Material Utilized: N/A    Interdisciplinary communication with: N/A     Discharge planning as follows: Is no longer homebound, Per physician order and When goals are met    Specific plan for next visit:labs and [facility] d/c

## 2024-08-03 ASSESSMENT — ENCOUNTER SYMPTOMS: DYSPNEA ACTIVITY LEVEL: AFTER AMBULATING MORE THAN 20 FT

## 2024-11-19 RX ORDER — BUMETANIDE 2 MG/1
4 TABLET ORAL 2 TIMES DAILY
Qty: 360 TABLET | Refills: 3 | OUTPATIENT
Start: 2024-11-19

## 2024-12-02 RX ORDER — ACETAZOLAMIDE 250 MG/1
125 TABLET ORAL 2 TIMES DAILY
Qty: 90 TABLET | Refills: 3 | OUTPATIENT
Start: 2024-12-02

## 2025-04-16 NOTE — ASSESSMENT & PLAN NOTE
Nakul would like to get an appt as soon as possible with Dr Pickett. His pharmacist noticed a supplement (folic acid)  that his other dr was prescribing him that could be causing the brain fog and memory loss he is experiencing.     # [phone]     Thank you    Well-controlled, continue current plan pending work up below  Hemoglobin A1C   Date Value Ref Range Status   12/12/2023 6.1 (H) 4.0 - 5.6 % Final     Comment:     (NOTE)  HbA1C Interpretive Ranges  <5.7              Normal  5.7 - 6.4         Consider Prediabetes  >6.5              Consider Diabetes

## 2025-06-14 LAB — HBA1C MFR BLD HPLC: 6.4 %

## (undated) DEVICE — KIT MFLD ISOLATN NACL CNTRST PRT TBNG SPIK W/ PRSS TRNSDUC

## (undated) DEVICE — PRESSURE MONITORING SET: Brand: TRUWAVE

## (undated) DEVICE — ANGIOGRAPHY KIT

## (undated) DEVICE — CATHETER ART THERMODILUTION 6 FRX110 CM 4 LUMEN SWAN

## (undated) DEVICE — KIT MED IMAG CNTRST AGNT W/ IOPAMIDOL REUSE

## (undated) DEVICE — SPLINT WR VELC FOAM NEUT POS DISP FOR RAD ART ACC SFT STRP

## (undated) DEVICE — SPECIAL PROCEDURE DRAPE 32" X 34": Brand: SPECIAL PROCEDURE DRAPE

## (undated) DEVICE — GUIDEWIRE VASC L150CM DIA0.025IN TIP L7CM J RAD 3MM PTFE

## (undated) DEVICE — CO-SET DELIVERY SYSTEM FOR 123 ROOM TEMPATURE INJECTATE: Brand: CO-SET+

## (undated) DEVICE — KIT HND CTRL 3 W STPCOCK ROT END 54IN PREM HI PRSS TBNG AT

## (undated) DEVICE — PADPRO DEFIBRILLATION/PACING/CARDIOVERSION/MONITORING ELECTRODES, ADULT/CHILD GREATER THAN 10 KG RADIOTRANSPARENT ELECTRODE, PHYSIO-CONTROL QUIK-COMBO (M) 60" (152 CM): Brand: PADPRO

## (undated) DEVICE — GLIDESHEATH SLENDER STAINLESS STEEL KIT: Brand: GLIDESHEATH SLENDER

## (undated) DEVICE — PACK PROCEDURE SURG HRT CATH

## (undated) DEVICE — GLIDESHEATH SLENDER ACCESS KIT: Brand: GLIDESHEATH SLENDER

## (undated) DEVICE — PROVE COVER: Brand: UNBRANDED